# Patient Record
Sex: FEMALE
[De-identification: names, ages, dates, MRNs, and addresses within clinical notes are randomized per-mention and may not be internally consistent; named-entity substitution may affect disease eponyms.]

---
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
## 2021-05-17 ENCOUNTER — HOSPITAL ENCOUNTER (INPATIENT)
Facility: HOSPITAL | Age: 21
LOS: 13 days | Discharge: NURSING, REHAB OR LONG-TERM CARE | DRG: 881 | End: 2021-05-31
Attending: EMERGENCY MEDICINE | Admitting: PSYCHIATRY & NEUROLOGY
Payer: COMMERCIAL

## 2021-05-17 DIAGNOSIS — T50.902A INTENTIONAL DRUG OVERDOSE, INITIAL ENCOUNTER (CMS/HCC): Primary | ICD-10-CM

## 2021-05-17 DIAGNOSIS — R45.851 SUICIDAL IDEATION: ICD-10-CM

## 2021-05-17 LAB
AMPHET UR QL SCN: NOT DETECTED
ANION GAP SERPL CALC-SCNC: 11 MEQ/L (ref 3–15)
APAP SERPL-MCNC: <10 UG/ML (ref 10–30)
BARBITURATES UR QL SCN: NOT DETECTED
BASOPHILS # BLD: 0.04 K/UL (ref 0.01–0.1)
BASOPHILS NFR BLD: 0.5 %
BENZODIAZ UR QL SCN: NOT DETECTED
BUN SERPL-MCNC: 7 MG/DL (ref 8–20)
CALCIUM SERPL-MCNC: 9.2 MG/DL (ref 8.9–10.3)
CANNABINOIDS UR QL SCN: NOT DETECTED
CHLORIDE SERPL-SCNC: 106 MEQ/L (ref 98–109)
CO2 SERPL-SCNC: 21 MEQ/L (ref 22–32)
COCAINE UR QL SCN: NOT DETECTED
CREAT SERPL-MCNC: 0.9 MG/DL (ref 0.6–1.1)
DIFFERENTIAL METHOD BLD: ABNORMAL
EOSINOPHIL # BLD: 0.06 K/UL (ref 0.04–0.36)
EOSINOPHIL NFR BLD: 0.7 %
ERYTHROCYTE [DISTWIDTH] IN BLOOD BY AUTOMATED COUNT: 11.6 % (ref 11.7–14.4)
ETHANOL SERPL-MCNC: <5 MG/DL
GFR SERPL CREATININE-BSD FRML MDRD: >60 ML/MIN/1.73M*2
GLUCOSE SERPL-MCNC: 95 MG/DL (ref 70–99)
HCG UR QL: NEGATIVE
HCT VFR BLDCO AUTO: 38.5 % (ref 35–45)
HGB BLD-MCNC: 12.7 G/DL (ref 11.8–15.7)
IMM GRANULOCYTES # BLD AUTO: 0.02 K/UL (ref 0–0.08)
IMM GRANULOCYTES NFR BLD AUTO: 0.2 %
LYMPHOCYTES # BLD: 2.28 K/UL (ref 1.2–3.5)
LYMPHOCYTES NFR BLD: 26.7 %
MCH RBC QN AUTO: 29.5 PG (ref 28–33.2)
MCHC RBC AUTO-ENTMCNC: 33 G/DL (ref 32.2–35.5)
MCV RBC AUTO: 89.5 FL (ref 83–98)
MONOCYTES # BLD: 0.71 K/UL (ref 0.28–0.8)
MONOCYTES NFR BLD: 8.3 %
NEUTROPHILS # BLD: 5.44 K/UL (ref 1.7–7)
NEUTS SEG NFR BLD: 63.6 %
NRBC BLD-RTO: 0 %
OPIATES UR QL SCN: NOT DETECTED
PCP UR QL SCN: NOT DETECTED
PDW BLD AUTO: 9.9 FL (ref 9.4–12.3)
PLATELET # BLD AUTO: 265 K/UL (ref 150–369)
POTASSIUM SERPL-SCNC: 3.6 MEQ/L (ref 3.6–5.1)
RBC # BLD AUTO: 4.3 M/UL (ref 3.93–5.22)
SALICYLATES SERPL-MCNC: <4 MG/DL
SARS-COV-2 RNA RESP QL NAA+PROBE: NEGATIVE
SODIUM SERPL-SCNC: 138 MEQ/L (ref 136–144)
WBC # BLD AUTO: 8.55 K/UL (ref 3.8–10.5)

## 2021-05-17 PROCEDURE — 63700000 HC SELF-ADMINISTRABLE DRUG: Performed by: EMERGENCY MEDICINE

## 2021-05-17 PROCEDURE — 80307 DRUG TEST PRSMV CHEM ANLYZR: CPT | Performed by: PHYSICIAN ASSISTANT

## 2021-05-17 PROCEDURE — 63700000 HC SELF-ADMINISTRABLE DRUG: Performed by: PHYSICIAN ASSISTANT

## 2021-05-17 PROCEDURE — 84703 CHORIONIC GONADOTROPIN ASSAY: CPT | Performed by: PHYSICIAN ASSISTANT

## 2021-05-17 PROCEDURE — G0480 DRUG TEST DEF 1-7 CLASSES: HCPCS | Performed by: PHYSICIAN ASSISTANT

## 2021-05-17 PROCEDURE — 85025 COMPLETE CBC W/AUTO DIFF WBC: CPT | Performed by: PHYSICIAN ASSISTANT

## 2021-05-17 PROCEDURE — 36415 COLL VENOUS BLD VENIPUNCTURE: CPT | Performed by: PHYSICIAN ASSISTANT

## 2021-05-17 PROCEDURE — 93005 ELECTROCARDIOGRAM TRACING: CPT | Performed by: PHYSICIAN ASSISTANT

## 2021-05-17 PROCEDURE — 99285 EMERGENCY DEPT VISIT HI MDM: CPT | Mod: 25

## 2021-05-17 PROCEDURE — U0002 COVID-19 LAB TEST NON-CDC: HCPCS | Performed by: PHYSICIAN ASSISTANT

## 2021-05-17 PROCEDURE — 80048 BASIC METABOLIC PNL TOTAL CA: CPT | Performed by: PHYSICIAN ASSISTANT

## 2021-05-17 RX ORDER — ESCITALOPRAM OXALATE 20 MG/1
TABLET ORAL
COMMUNITY
Start: 2021-04-29 | End: 2021-05-31 | Stop reason: HOSPADM

## 2021-05-17 RX ORDER — TRAZODONE HYDROCHLORIDE 50 MG/1
50 TABLET ORAL
COMMUNITY
Start: 2020-11-30 | End: 2021-05-31 | Stop reason: HOSPADM

## 2021-05-17 RX ORDER — TALC
3 POWDER (GRAM) TOPICAL NIGHTLY
COMMUNITY
Start: 2020-11-30 | End: 2021-07-21 | Stop reason: HOSPADM

## 2021-05-17 RX ORDER — NORETHINDRONE ACETATE AND ETHINYL ESTRADIOL, AND FERROUS FUMARATE 1MG-20(24)
KIT ORAL
COMMUNITY
Start: 2021-04-21 | End: 2021-07-19

## 2021-05-17 RX ORDER — OLANZAPINE 5 MG/1
10 TABLET, ORALLY DISINTEGRATING ORAL ONCE
Status: COMPLETED | OUTPATIENT
Start: 2021-05-17 | End: 2021-05-17

## 2021-05-17 RX ORDER — ONDANSETRON 4 MG/1
4 TABLET, ORALLY DISINTEGRATING ORAL ONCE
Status: COMPLETED | OUTPATIENT
Start: 2021-05-17 | End: 2021-05-17

## 2021-05-17 RX ORDER — LAMOTRIGINE 100 MG/1
TABLET ORAL
COMMUNITY
Start: 2021-04-29 | End: 2021-05-31 | Stop reason: HOSPADM

## 2021-05-17 RX ADMIN — ONDANSETRON 4 MG: 4 TABLET, ORALLY DISINTEGRATING ORAL at 21:36

## 2021-05-17 RX ADMIN — OLANZAPINE 10 MG: 5 TABLET, ORALLY DISINTEGRATING ORAL at 23:00

## 2021-05-17 ASSESSMENT — ENCOUNTER SYMPTOMS
FEVER: 0
ABDOMINAL PAIN: 0
BACK PAIN: 0
DIZZINESS: 0
CHILLS: 0
HEADACHES: 0
FLANK PAIN: 0
COLOR CHANGE: 0
VOMITING: 0
NAUSEA: 1
DYSPHORIC MOOD: 1
SHORTNESS OF BREATH: 0

## 2021-05-17 NOTE — LETTER
Intake-                   Attached you will find clinical information for the patient, Gemma Randle.  Please feel free to contact me if there are any questions or concerns.  Thanks!            Wendy jennings, ARLETTE  Psychiatric Social Worker  [facility]   [address] [place] [address]  [phone]  [email or web address]                                          Patient Information    Patient Name Address Race   Gemma Randle [address] [id number] White   Patient Legal Name Legal Sex Date of Birth   Gemma Randle Female 2000   Room Ethnic Group Language   G015 Not , /a, or Barbadian origin    MRN Phone Numbers PCP   [id number] Hm: [phone] Arlette Tolentino FNP   Patient Demographics    Address   [address] [address] Phone   [phone] (Home)   Active Insurance as of 5/17/2021    Primary Coverage    Payor Plan Insurance Group Employer/Plan Group   IBC PERSONAL CHOICE [id number]    Payor Plan Address Payor Plan Phone Number Payor Plan Fax Number Effective Dates   [address] [phone]  4/1/2019 - None Entered   [place] [address]      Subscriber Name Subscriber Birth Date Member ID    ANASTACIO RANDLE 3/24/1968 [id number]    PCP and Center    Primary Care Provider Phone Center   TEJINDER Heaton [phone] [facility]   Emergency Contact(s)    Name Relation Home Work Mobile   Cindi Schaffer   [phone]   Documents on File     Status Date Received Description   Documents for the Patient   Identification  10/04/17    HIPAA Notice of Privacy Signed 05/17/21    Insurance Card Unable to Obtain 05/17/21    Photo ID Unable to Obtain 05/17/21    Advance Directives and Living Will Not Received     Power of  Not Received     Patient Photo Not Received     CE Auth Form (Scanned) Not Received     CE Prospective Auth Reg Signed 05/17/21    Documents for the Encounter   Hospital Consent and Financial TORITO Signed  05/17/21    CMS IM Copy of Signed Not Received     Lay Caregiver Designation Consent Not Received     Outside Order Not Received     Lab Requisition Scan Not Received     ECG Received (Deleted) 05/17/21    ECG Received (Deleted) 05/18/21    ECG Received 05/18/21    Admission Information    Current Information    Attending Provider Admitting Provider Admission Type Admission Status   Rosen, Howard B, MD Chevalier, Naomi E, MD Emergency Confirmed Admission          Admission Date/Time Discharge Date Hospital Service Auth/Cert Status   05/17/21  09:07 PM  Psychiatry Incomplete          Hospital Area Unit Room/Bed    [facility] PSYCH UNIT G015/G015            Discharge Disposition Discharge Destination   [facility]   Admission    Complaint      Hospital Account    Name Acct ID Class Status Primary Coverage   Gemma Darnell [id number] Inpatient Psych Open IBC - PERSONAL CHOICE          Guarantor Account (for Hospital Account #[id number])    Name Relation to Pt Service Area Active? Acct Type   Gemma Darnell Self [facility] Yes Personal/Family   Address Phone     [address] [place] [address] [phone](H)            Coverage Information (for Hospital Account #[id number])    F/O Payor/Plan Precert #   IBC/PERSONAL CHOICE    Subscriber Subscriber #   CarieGalo [id number]   Address Phone   [address] [address]   [place] [address] [phone]          Medical Record Numbers    [place] Id Number [id number]   [facility] Id Mrn [id number]   [facility] Mrn [id number]   Upi [id number]     Lara Hunt DO   Physician   Psychiatry   H&P       Addendum   Date of Service:  5/18/2021  1:42 PM               Addendum        Expand AllCollapse All      []Hide copied text    []Hover for details  Psychiatry H and P          Chief Complaint   Patient presents with   • Psychiatric Evaluation       pt reports that she was sent to ER after speaking with thep, pt reports that  "yesterday she took \"some extra meds\" reports that she has been having some SI thoughts, reported that to the who sent her to ER         Gemma Darnell is a 21 y.o. female with previous diagnoses of \"Bipolar disorder,\" anorexia, cocaine and ETOH abuse disorder, now presenting with worsening depression and suicide attempt by OD on Lamictal day before yesterday.     Today I encounter Gemma in her room. She is lying in bed. States that she was last hospitalized at the [facility] for substance and psych treatment Sept-Oct 2020 followed by Marlyn Cervantes for eating d/o Oct-Nov. Since then, \"I was doing ok for awhile\" though she admits she immediately began drinking again after discharge. Her parents  in January which was \"not a surprise, my dad is a heavy drinker\" and \"I think it is for the best\" but she also pinpoints a deterioration in her mental health around that time. Soon after that, she reports that her drinking worsened and recently, she has been drinking up to \"7 vodka shots per day\" but not every day. She denies s/s of withdrawal at this time. Endorses feeling mostly \"detached,\" empty, numb much of the time, with sadness at times. She has had difficulty sleeping prior to admission, and reports a weight loss of about 8 lbs in the last 2 months (though this was purposeful and she believes part of her anorexia rather than result of depression/anxiety), low energy, feeling like she is in a fog with lapses in memory, chronic thoughts of death/dying, self injury in the form of scratching herself (has not broken the skin), difficulty concentrating.      This all culminated the day before yesterday when she took \"half a bottle\" of Lamictal with the clear intent to die. She states, somewhat illogically, that she did not take the whole bottle \"because I knew it wouldn't work to kill me.\" Reports that it was impulsive and when she thinks about it now, she feels \"detached from it, I don't remember it too " "well.\" Following taking the pills, \"My mom set up a PCP appointment because I was giving her mixed signals\" (eg hinting that she had misused medication) but upon presenting to the PCP, she purposefully withheld that she had taken an OD. Following that appointment, she reached out to her therapist who recommended she present here. At this point, she states \"I don't want to be here\" but admits she is not safe to go home.     Other ROS: Reports previous diagnosis of bipolar d/o, was last \"manic\" in Sept 2020 when she stayed up for one week straight and was sexually impulsive, \"sleeping with friends,\" racing thoughts that \"I am a piece of shit\" (which she is experiencing right now as well). Denies feeling invincible/special pierre, denies spending large amounts of money (but has been recently in an effort to \"make myself feel better\"), denies increased goal-oriented activities. Unclear whether she was under the influence of substances at that time.      She reports hearing voices but denies VH. AH are \"in my head\" and say derogatory things such as \"you are a piece of shit.\" I ask if it is her voice and she states \"maybe.\" Does not hear them daily, they are distressing when she does hear them.     Has pushed away most friends recently but still has her mother and sister. Allows me to call her mother Cindi [phone] and outpatient psychiatrist Dr. Simpson though they have only met once.     Psychiatric History:  Suicide Attempts: yes - as above  Risk Factors: Alcohol and/or substance abuse, Easy access to lethal methods and Impulsive or aggressive tendencies   Protective Factors: Effective and accessible mental health care    Current Psychiatrist: yes - Dr. Simpson   Past psychiatric Hospitalization: yes - as above, no other hospitalizations prior to the Ag[facility]  Medication Trials:  yes - reports only Lamictal and Lexapro which she was put on in previous hospitalization  ECT trials: no        Substance Use History: ETOH as " above, cocaine prior to the Agnieszka but reports using only 2-3 times in recent months  Substance use:         Drug Details      Questions Responses     Cocaine frequency Past regular use     Comment: Past regular use on 5/18/2021       Cocaine method Snort     Comment: Snort on 5/18/2021            Consequences of use: Yes:  Blackouts  Past D&A Treatment: Yes: as above     Family History: father with ETOH use disorder, sister with good response to Prozac, cousin and sister have attempted suicide     Social History:   Social History   Social History            Socioeconomic History   • Marital status: Single       Spouse name: None   • Number of children: 0   • Years of education: 12   • Highest education level: High school graduate   Occupational History   • Occupation: [employer]   Tobacco Use   • Smoking status: Current Some Day Smoker       Types: Electronic Cigarette   • Smokeless tobacco: Never Used   Substance and Sexual Activity   • Alcohol use: Yes   • Drug use: Not Currently   • Sexual activity: None   Other Topics Concern   • None   Social History Narrative   • None      Social Determinants of Health          Financial Resource Strain:    • Difficulty of Paying Living Expenses:    Food Insecurity:    • Worried About Running Out of Food in the Last Year:    • Ran Out of Food in the Last Year:    Transportation Needs:    • Lack of Transportation (Medical):    • Lack of Transportation (Non-Medical):    Physical Activity:    • Days of Exercise per Week:    • Minutes of Exercise per Session:    Stress: Stress Concern Present   • Feeling of Stress : To some extent   Social Connections:    • Frequency of Communication with Friends and Family:    • Frequency of Social Gatherings with Friends and Family:    • Attends Zoroastrianism Services:    • Active Member of Clubs or Organizations:    • Attends Club or Organization Meetings:    • Marital Status:    Intimate Partner Violence:    • Fear of Current or Ex-Partner:    •  "Emotionally Abused:    • Physically Abused:    • Sexually Abused:             Medical History:   Medical History        Past Medical History:   Diagnosis Date   • Eating disorder     • History of ITP              Surgical History:   Surgical History   History reviewed. No pertinent surgical history.        Allergies: No Known Allergies     Current Medications:  SCHEDULED:  • [START ON 5/19/2021] escitalopram  10 mg oral Daily   • [START ON 5/19/2021] FLUoxetine  10 mg oral Daily   • gabapentin  200 mg oral TID   • nicotine  1 patch transdermal Daily   • norethindrone-e.estradioL-iron  1 tablet oral Daily      PRN  •  acetaminophen  •  alum-mag hydroxide-simeth  •  diphenhydrAMINE  •  diphenhydrAMINE  •  haloperidoL **OR** haloperidol lactate  •  ibuprofen  •  LORazepam **OR** LORazepam  •  melatonin  •  metoclopramide  •  nicotine polacrilex  •  senna        Review of Systems  Musculoskeletal:  Grossly intact        Objective         Vital Signs for the last 24 hours:  Temp:  [36.1 °C (97 °F)-36.6 °C (97.9 °F)] 36.1 °C (97 °F)  Heart Rate:  [] 112  Resp:  [18] 18  BP: (114-162)/(71-99) 119/76     Labs       Results from last 7 days   Lab Units 05/17/21  2134   HEMOGLOBIN g/dL 12.7   WBC K/uL 8.55   PLATELETS K/uL 265   POTASSIUM mEQ/L 3.6   SODIUM mEQ/L 138   CREATININE mg/dL 0.9                Ethanol   Date Value Ref Range Status   05/17/2021 <5 <=10 mg/dL Final      No results found for: LITHIUM, TSH, FREET4, T3OBWAO, ZNXNLIMQ38, FOLATE  No results found for: PHENYTOIN, PHENOBARB, VALPROATE, CBMZ     No results found.     No results found for: HDL, LDLCALC, TRIG, CHOL, HGBA1C           MENTAL STATUS EXAM  Appearance: well groomed  Gait and Motor: no abnormal movements  Speech: normal rate/rhythm/volume  Mood: \"detached\"  Affect: blunted  Associations: coherent  Thought Process: goal-directed and vague and contradictory at times  Thought Content: auditory hallucinations  Suicidality/Homicidality: thoughts of " "being dead/ no desire or plan to die  Judgement/Insight: minimizes severity level of illness  Cognition: she appears tired        Assessment/Plan  * Suicide attempt (CMS/[facility])  Assessment & Plan  Ms. Darnell is a 21 year old woman with previous diagnosis of \"bipolar disorder,\" anorexia, ETOH use and cocaine use disorder, now presenting with worsening feelings of detachment/depression culminating in overdose on Lamictal. Today she reports feeling detached, unsafe for discharge. Denies active SIIP at this time and contracts for safety. Initial impression is that substance is a significant factor in symptoms, but may have underlying mood disorder. Symptoms also highly suggestive of borderline personality disorder.      q15 min checks, contracts for safety, no plan or intent to harm herself at this time  Depression NOS, r/o substance induced mood d/o, r/o bipolar d/o, r/o major depression: will taper off Lexapro as she does not believe it has been helping (but admits to irregular compliance). Her sister has had a good experience on Prozac, she would like to try that. Will d/c Lamictal as she admits to taking it irregularly and in this context is at high risk for SJS. She expresses understanding.   For anxiety and off label for ETOH use disorder, will start gabapentin 200 mg po tid to start. Monitor for tolerability.   PRN Atarax 50 mg po tid anxiety  She has been educated to notify staff at any hint of rash following Lamictal OD  ETOH abuse: monitor for withdrawal. She is not exhibiting s/s at this time and VSS. CIWA protocol with symptom-triggered Ativan. She denies hx of withdrawal sz but has had \"cold sweats\" previously.  R/o borderline personality disorder: she would benefit from DBT on outpatient basis.   G/m/s therapy as tolerated, collaterals as permitted                              Revision History                            Lara Hunt DO   Physician   Psychiatry   Progress Notes       Signed   Date of " "Service:  5/19/2021  3:15 PM               Signed             []Hide copied text    []Dylon for details  Psychiatry Progress Note     Chief Complaint/Reason for follow-up: OD on Lamictal, \"I feel like I'm dying\"     Interval History:     Per staff, refused medication in the morning, high anxiety, SI without intent or plan currently, reports feeling numb.     Pt was observed laying in bed, wrapped in covers, with the lights turned off. She \"slept a lot\" yesterday. She continues to avoid food, stating \"I can't eat\" but cannot say why. She thinks her medicine regimen \"is not good for me,\" feels it is causing symptoms though she admits she has been experiencing many of these symptoms for months, even before being on medication at all. She feels \"numb\" but also admits to feeling anxious, sad, is tearful at times.      Pt endorsed some symptoms of alcohol withdrawal: anxiety, \"shaking\", nausea, headache, sweaty palms, itchiness, chills, night sweats but does not think this is due to ETOH withdrawal but rather to medication. She denied hallucinations, palpitations, insomnia. On review of systems and she endorsed nearly every one including: vision changes (couldn't clarify what her changes were), dizziness (\"feel like I'm going to pass out\"), soreness. She says her heart \"is slow\". States \"I feel like I am going to die\" but cannot give more detail/state why.     She denies any intent or plan to harm herself right now and contracts for safety. She does not feel she needs anyone to watch her. Tells me \"I can't even concentrate on what you are saying to me.\"     She socialized yesterday \"for an hour\" but felt like she couldn't concentrate and \"didn't know what to say\".     Later in day following PRN Ativan appeared more relaxed.     Vital Signs for the last 24 hours:  Temp:  [35.9 °C (96.7 °F)-36.9 °C (98.5 °F)] 35.9 °C (96.7 °F)  Heart Rate:  [] 124  Resp:  [16-18] 18  BP: (122-136)/(69-88) 128/85     Mental Status " "Exam:  Pt appeared her stated age (20 yo), was dressed appropriately in sweats, appropriate hygiene, well-groomed. Pt had no stereotypical tics or movements and ambulation was not observed. Pt's speech had normal rate, rhythm, and volume was soft. Pt's mood was \"I feel like I'm dying\". Pt's affect was dysphoric, anxious, tearful at times. Pt's associations were coherent. Thought process was linear and goal-directed but vague. Thought content included no current HI, VH, or AH. Anxiety was \"7/10\" with 10 being the worst and depression was \"9/10\" with 10 being the worst. Continues to endorse passive SI (\"would be ok with no waking up\"). Judgement and insight were appropriate. Cognition: alert, but appears distracted, not sedated.     Assessment/Plan  Gemma Darnell is a 20 yo female with a history of cocaine and alcohol use disorder, anorexia, \"bipolar disorder\", and \"depression\" who presented to the [place] ED after trying to overdose on Lamictal. Depression, ETOH use disorder, traits suggestive of borderline personality disorder.  1. Depression and anxiety: D/c Lexapro 10mg daily and increase Prozac to 20mg daily, PRN Atarax 50mg PO TID for anxiety  2. ETOH use disorder: increase gabapentin to 400mg PO TID for alcohol use disorder and anxiety, she is refusing at this time, continue to educate and offer but will d/c if continues to refuse. In the meantime, CIWA, monitor VS carefully, symptom triggered Ativan PRN  3. Medical: tachycardic this morning to 124, monitor, otherwise VSS.   4. R/o borderline personality disorder: she would benefit from DBT on outpatient basis.        I discussed the treatment plan and the patient's progress with nursing staff and Allied therapists in the treatment team meeting this morning. Patient is seen and evaluated for approximately 25 minutes in therapy with greater than 50% of time spent in direct face-to-face counseling, coordination of care and review of the medical record.          " "    Lara Hunt,    Physician   Psychiatry   Progress Notes       Signed   Date of Service:  5/20/2021  1:07 PM               Signed             []Hide copied text    []Dylon for details  PSYCHIATRIC PROGRESS NOTE     Chief Complaint/Reason for follow-up: depression, anorexia, ETOH abuse disorder, cluster B traits, suicide attempt     Interval History: Per staff, Ms. Darnell was visible on unit yesterday, coloring with peers, but generally withdrawn, disheveled, poor appetite, apathetic. CIWA of 10 yesterday, received Ativan 2 mg PRN, refusing gabapentin.      CT head today unremarkable.     Today I approach her in her room where she is lying in bed. I ask for her help in understanding what is going on with her. She tells me she does not know. I ask if she is upset about her parents' separation and divorce and she says yes. I ask if there are other stressors and she says she does not know. She feels \"numb.\" States she feels gabapentin is the reason for her symptoms though she was having same symptoms prior to taking gabapentin. She states she is not sure she needs medication. I ask her what she needs if not medication, ie therapy, etc. She states she does not know.      I spoke with Gemma's mother Cindi with her permission. States that Gemma has been staying with boyfriend recently so she does not know onset of current symptoms, but at least since Friday Gemma has been anxious, stating that she feels she is going to die, stating she cannot concentrate. Endorses that she believes eating disorder, substances, and recent psychosocial stressors to be contributing to current clinical picture. She has been encouraging Gemma to take medication and thinks Gemma would benefit from residential program targeting some combination of eating disorder, substance, and mental health following discharge. Gemma \"did really well in high school\" but her sister's mental health problems were difficult for the family for about 4 years " "and things have been chaotic in household with dissolution of marriage and father having difficulty with boundaries.      Vital Signs for the last 24 hours:  Temp:  [36.7 °C (98.1 °F)-37.5 °C (99.5 °F)] 36.9 °C (98.5 °F)  Heart Rate:  [105-120] 120  Resp:  [16-18] 18  BP: (124-141)/() 126/80     Scheduled Meds:  • FLUoxetine  20 mg oral Daily   • gabapentin  200 mg oral TID   • nicotine  1 patch transdermal Daily   • norethindrone-e.estradioL-iron  1 tablet oral Daily         Labs:       Results from last 7 days   Lab Units 05/17/21  2134   HEMOGLOBIN g/dL 12.7   WBC K/uL 8.55   PLATELETS K/uL 265   POTASSIUM mEQ/L 3.6   SODIUM mEQ/L 138   CREATININE mg/dL 0.9         MENTAL STATUS EXAM  Appearance: disheveled  Gait and Motor: no abnormal movements  Speech: soft  Mood: \"I don't know\"  Affect: blunted and dysphoric  Associations: coherent  Thought Process: vague  Thought Content: paucity of thought content  Suicidality/Homicidality: thoughts of being dead/ no desire or plan to die  Judgement/Insight: makes choices that perpetuate illness  Cognition: seems distracted, with poor concentration, but alert     Assessment/Plan  * Suicide attempt (CMS/[facility])  Assessment & Plan  Ms. Darnell is a 21 year old woman with previous diagnosis of anorexia, ETOH use and cocaine use disorder, now presenting with worsening feelings of detachment/depression culminating in overdose on Lamictal. Today she reports feeling detached, unsafe for discharge. Denies active SIIP at this time and contracts for safety. ETOH use disorder, substance-induced mood disorder, anorexia, symptoms also suggestive of borderline personality disorder.      q15 min checks, contracts for safety, no plan or intent to harm herself at this time  Depression NOS, r/o substance induced mood d/o, r/o major depression, bipolar disorder lower on ddx: C/w Prozac 20 mg po daily, c/w gabapentin for now though she is refusing it for unclear reason, will explore " "alternatives for acute anxiety. Monitor for tolerability. PRN Atarax 50 mg po tid anxiety  She has been educated to notify staff at any hint of rash following Lamictal OD  ETOH abuse: monitor for withdrawal. Has been about 5 days since last known drink. This morning she is not exhibiting s/s at this time and VSS, . CIWA protocol with symptom-triggered Ativan.  R/o borderline personality disorder: she would benefit from DBT on outpatient basis.   Medical:   -difficulty concentrating/AMS: baseline labs unremarkable, head CT unremarkable. Continue to monitor, repeat labs tomorrow  -Anorexia: she has been restricting intake. Repeat labs tomorrow. Nutrition consult. Provide Boost. Monitor VS - poor po intake likely contributing to tachycardia.  G/m/s therapy as tolerated, collaterals as permitted. She would benefit from residential program targeting some combination of substance, eating disorder, mental josey/DBT        I discussed the treatment plan and the patient's progress with nursing staff and Allied therapists in the treatment team meeting this morning. Patient is seen and evaluated for approximately 25 minutes in therapy with greater than 50% of time spent in direct face-to-face counseling, coordination of care and review of the medical record.              Lara Hunt DO   Physician   Psychiatry   Progress Notes       Addendum   Date of Service:  5/21/2021  1:19 PM               Addendum             []Hide copied text    []Dylon for details  Psychiatry Progress Note     Chief Complaint/Reason for follow-up: OD on Lamictal; \"I feel like I'm dying\"     Interval History:  Per staff, visible, minimally engaged, \"flat and sedentary.\" Complaining of memory difficulty. Not eating much but good fluid intake.     Today we speak in her room. She is tearful today, appears less blunted and more depressed. She slept \"ok - still tired\".  Tells me her memory is \"horrible.\" She can't remember our conversation from " "yesterday. She is \"having a hard time socializing\" because \"I'm not myself here.\" She cried and asked \"why am I here?\"  She denied active SI but described \"feeling like I'm dying, I feel like there is something wrong.\" I ask her what is wrong and she indicates her arms. With a lot of coaxing, she tells me her arms do not look normal to her, I ask what is abnormal and after long latency she says \"I don't know.\" I ask if it is the color, or a feeling, and she states it is both. States her arms look \"red.\" Upon exam there is nothing grossly remarkable about her arms.      Regarding medication, she is hesitant to take it but cannot articulate why. States \"it scares me.\" I point out that she was drinking significant ETOH outside the hospital which is more harmful to her body than the medication, and that it is also scary to continue feeling this way. She agrees with this.         Vital Signs for the last 24 hours:  Temp:  [36.9 °C (98.5 °F)-37.1 °C (98.8 °F)] 36.9 °C (98.5 °F)  Heart Rate:  [] 114  Resp:  [14-16] 14  BP: (111-140)/(72-90) 140/72     Mental Status Exam:  Pt appeared her stated age (20 yo), was dressed in sweats, unkempt. Pt had no stereotypical tics or movements and ambulation was appropriate. Pt's speech had normal rate, rhythm, and volume was soft. Pt's mood was \"I don't know\". Pt's affect was depressed, constricted, tearful, anxious. Pt's associations were coherent. Thought process was linear and goal-directed, vague. Thought content included no current SI, HI, VH, or AH. Judgement and insight were limited. Cognition was fair. Alert and oriented x 2.     Assessment/Plan  Gemma Darnell is a 20 yo female with a history of cocaine and alcohol use disorder, anorexia, now presenting to the [facility] ED after overdose on Lamictal. ETOH use disorder, anorexia, MDD. R/o borderline personality disorder.     1. MDD, anxiety: Continue Proxac 20mg daily for depression. Continue to offer gabapentin 200mg PO " "TID for alcohol use disorder and anxiety though that she has beenconsistently refusing, though she cannot articulate why she does not want it. Psychoeducation provided as she is in an acutely anxious state. Given her presentation with intense anxiety, disorganized state, will initiate Risperdal 0.5 mg po bid. PRN Atarax 50mg PO TID for anxiety  2. R/o borderline personality disorder: she would benefit from DBT on outpatient basis.   3. Anorexia: monitor PO intake. VS and labs today WNL. Would benefit from ongoing outpatient care.  4. Substance use disorder: motivational interviewing, would benefit from ongoing outpatient treatment  5. G/m/s therapy as tolerated, ongoing collaterals as permitted.        I discussed the treatment plan and the patient's progress with nursing staff and Allied therapists in the treatment team meeting this morning. Patient is seen and evaluated for approximately 25 minutes in therapy with greater than 50% of time spent in direct face-to-face counseling, coordination of care and review of the medical record.               Revision History                       Jermaine Pham MD   Physician   Psychiatry   Progress Notes       Signed   Date of Service:  5/22/2021 12:15 PM               Signed             []Hide copied text    []Errolver for details  Psychiatry Progress Note     Chief Complaint/Reason for follow-up:  MDD, anxiety, OD on lamictal     Interval History:  Mood is not as good today as yesterday and associates this with being \"nervous about discharge.\"  Sleep wsa broken, Appetite is low but she did eat breakfast.  She denies si/hi/plan/intent, no passive si.  She is looking forward to going back to a different school and looking forward to family.  No ah/vh/paranoia/no grandiosity.      Review of Systems:    Appetite is poor to fair, no gi complaints, sleep was poor to fair, broken     Vital Signs for the last 24 hours:  Temp:  [36.7 °C (98.1 °F)-36.9 °C (98.4 °F)] 36.9 °C (98.4 " "°F)  Heart Rate:  [100-117] 117  Resp:  [14-16] 16  BP: (106-132)/(77-83) 106/77        Mental Status Exam:   Appearance: appears stated age, appropriately dressed  Behavior: cooperative  Speech: normal r/r/t/v and no pressure/paucity  TP: linear  TC: no si/hi/plan/intent, no passive si, no grandiosity, no paranoia  Perception: no ah/vh, no ris  Mood: not as good  Affect: not labile, had a mask on  Insight: fair  Judgment: fair  A&Ox3        Assessment/Plan   Gemma Darnell is a 20 yo female with a history of cocaine and alcohol use disorder, anorexia, now presenting to the [place] ED after overdose on Lamictal. ETOH use disorder, anorexia, MDD. R/o borderline personality disorder.     1. MDD, anxiety: Continue Proxac 20mg daily for depression. Continue to offer gabapentin 200mg PO TID for alcohol use disorder and anxiety though that she has beenconsistently refusing, though she cannot articulate why she does not want it. Psychoeducation provided as she is in an acutely anxious state. Given her presentation with intense anxiety, disorganized state, will initiate Risperdal 0.5 mg po bid. PRN Atarax 50mg PO TID for anxiety  2. R/o borderline personality disorder: she would benefit from DBT on outpatient basis.   3. Anorexia: monitor PO intake. VS   WNL. Would benefit from ongoing outpatient care.  4. Substance use disorder: motivational interviewing, would benefit from ongoing outpatient treatment  5. G/m/s therapy as tolerated, ongoing collaterals as permitted.              Jermaine Pham MD   Physician   Psychiatry   Progress Notes       Signed   Date of Service:  5/23/2021 11:42 AM               Signed             []Hide copied text    []Hover for details  Psychiatry Progress Note     Chief Complaint/Reason for follow-up:    MDD, anxiety, OD on lamictal  Interval History:  She was in bed resting.   She reports that is \"tired.\"  She was not certain how her mood was,  She denies si/hi/plan/intent, no passive si. " She denies racing thoughts.  Appetite is ok.     She denies side effects from medication.     Review of Systems:   Appetite is poor to fair, no gi complaints, sleep is fair      Vital Signs for the last 24 hours:  Temp:  [36.7 °C (98 °F)-36.9 °C (98.4 °F)] 36.7 °C (98 °F)  Heart Rate:  [104-133] 119  Resp:  [16] 16  BP: (100-117)/(73-85) 109/76       Mental Status Exam:   Appearance: appears stated age, appropriately dressed  Behavior: cooperative  Speech: normal r/r/t/v and no pressure/paucity  TP: linear  TC: no si/hi/plan/intent, no passive si, no grandiosity, no paranoia  Perception: no ah/vh, no ris  Mood: tired  Affect: not labile, had a mask on  Insight: fair  Judgment: fair  A&Ox3     Assessment/Plan   Gemma Darnell is a 20 yo female with a history of cocaine and alcohol use disorder, anorexia, now presenting to the [facility] ED after overdose on Lamictal. ETOH use disorder, anorexia, MDD. R/o borderline personality disorder.     1. MDD, anxiety: Continue Proxac 20mg daily for depression. Continue to offer gabapentin 200mg PO TID for alcohol use disorder and anxiety though that she has beenconsistently refusing, though she cannot articulate why she does not want it. Psychoeducation provided as she is in an acutely anxious state. Given her presentation with intense anxiety, disorganized state, will initiate Risperdal 0.5 mg po bid. PRN Atarax 50mg PO TID for anxiety  2. R/o borderline personality disorder: she would benefit from DBT on outpatient basis.   3. Anorexia: monitor PO intake. VS   WNL. Would benefit from ongoing outpatient care.  4. Substance use disorder: motivational interviewing, would benefit from ongoing outpatient treatment  5. G/m/s therapy as tolerated, ongoing collaterals as permitted  6. Advised not to take naps as this could effect her night time sleep                    Lidia Brewer MD   Physician   Psychiatry   Progress Notes       Signed   Date of Service:  5/24/2021  8:30 AM     "           Signed             []Hide copied text    []Hover for details  Psychiatry Progress Note     Chief Complaint/Reason for follow-up:    MDD, anxiety, OD on lamictal  Hx of substance abuse and eating disorder     Interval History:    Discussed patient's treatment progress with nurses and allied therapists in morning meeting this morning. Gemma has been restricting but will drink Boost/Gatorade. CIWA has been continued. Patient stated she refuses to take gabapentin because it \"makes me feel weird.\" She stated she has been dissociating constantly. She noted feeling forgetful. She described her mood as \"pretty depressed\" and noted she feels worse since admission. Gemma's nurse pointed out that poor PO intake may be influencing her mental status and Gemma agreed.  She has passive wishes to die but no intent or plan. She feels guilt about needing medication.      Review of Systems:   Appetite is poor to fair, no gi complaints, sleep is fair      Vital Signs for the last 24 hours:  Temp:  [36.9 °C (98.4 °F)-37.1 °C (98.8 °F)] 37.1 °C (98.8 °F)  Heart Rate:  [106-125] 125  Resp:  [16] 16  BP: (121-142)/(80-91) 142/80       Mental Status Exam:   Appearance: appears stated age, appropriately dressed  Behavior: cooperative but guarded   Speech: normal r/r/t/v and no pressure/paucity  TP: linear, vague  TC: no si/hi/plan/intent, + passive si, no grandiosity, no paranoia  Perception: no ah/vh, no ris  Mood: tired  Affect: not labile, had a mask on  Insight: fair  Judgment: fair  A&Ox3     Assessment/Plan   Gemma Darnell is a 22 yo female with a history of cocaine and alcohol use disorder, anorexia, now presenting to the [place] ED after overdose on Lamictal. ETOH use disorder, anorexia, MDD. R/o borderline personality disorder.     1. MDD, anxiety: Continue Proxac 20mg daily for depression. D/C gabapentin. Psychoeducation provided as she is in an acutely anxious state. Given her presentation with intense anxiety, " "disorganized state, continue Risperdal 0.5 mg po bid. PRN Atarax 50mg PO TID for anxiety  2. R/o borderline personality disorder: she would benefit from DBT on outpatient basis.   3. Anorexia: monitor PO intake. VS   WNL. Would benefit from ongoing outpatient care.  4. Substance use disorder: motivational interviewing, would benefit from ongoing outpatient treatment  5. G/m/s therapy as tolerated, ongoing collaterals as permitted  6. Advised not to take naps as this could effect her night time sleep     I spent 28 minutes on this date of service performing the following activities: obtaining history, performing examination, documenting, preparing for visit, obtaining / reviewing records, providing counseling and education and coordinating care.                 Marciano Estrella MD   Physician   Psychiatry   Progress Notes       Signed   Date of Service:  5/25/2021 11:21 AM               Signed             []Hide copied text    []Dylon for details  This exam was done through synchronous audio-visual services.  The patient consented to the telehealth exam.  The patient provider were based in [place].  The patient was on the [facility] psychiatric unit.  The following other people were present during the interview: No one     Psychiatry Progress Note     Chief Complaint/Reason for follow-up:    MDD, anxiety, OD on lamictal  Hx of substance abuse and eating disorder     I met the patient today for the first time.  I reviewed her hospital psychiatric records.  She is on Prozac 20 mg daily.     Today the patient when queried says \"I do not feel right\".  She does not feel her meds are working.  Yet she asks to be discharged.  She admits that she is very unhappy here on the unit.  She is very negative.  She seems to me hopeless.  I explained to her that the Prozac which was started on 5/20 has not had time to work.  When I read her Dr. Brewer's note of yesterday saying she has passive SI.  The patient answers \"I do not " "remember\".  When I asked her to rate today's depression and anxiety on a scale.  She replies \"I do not know\" and won't try.     Mental status: Patient is fully oriented x3.  She recalls my name.  She is negative and hopeless.  Mood is of depression.  She is unsmiling.  Speech is coherent and goal-directed without evidence of psychosis but is nonspontaneous and terse.  She denies SI/HI.  She denies AH/VH.     Plan:  Continue psychiatric hospitalization  Continue Prozac 20 mg and Risperdal 0.5 mg twice daily                       Patient Information    Patient Name   Gemma Darnell ([id number]) Legal Sex   Female    2000   Results  ER toxicology screen, serum [[id number]] (Order [id number])  Contains abnormal data ER toxicology screen, serum  Order: [id number]  Status:  Final result   Visible to patient:  No (not released) Next appt with me:  None   0 Result Notes    Ref Range & Units 21 2134    Salicylate <=30.0 mg/dL <4.0     Acetaminophen 10.0 - 30.0 ug/mL <10.0Low      Ethanol <=10 mg/dL <5          Specimen Collected: 21 21:34 Last Resulted: 21 22:11        Lab Flowsheet     Order Details     View Encounter     Lab and Collection Details     Routing     Result History           Related Result Highlights     Basic metabolic panel  Final result 2021                  Order Providers    Authorizing Provider Encounter Provider   Colt Sawyer MD None   Order Report    ER toxicology screen, serum (Order #[id number]) on 21   Collection Information    Specimen ID: [id number]    Blood    Blood, Venous    Venipuncture     Collected: 2021  9:34 PM    YONATAN ROSALES II   Received: 2021  9:39 PM    Resulting Agency: [facility]    [address]   [place] [address]      Lab Information    Lab   [facility]   [address]   [place] [address]   : Leo Yarbrough MD              Additional Information    Specimen ID Bill Type " Client ID   [id number]            Specimen Date Taken Specimen Time Taken Specimen Received Date Specimen Received Time Result Date Result Time   May 17, 2021  9:34 PM May 17, 2021  9:39 PM May 17, 2021 10:11 PM   Result Read / Acknowledged    No acknowledgement history exists for this order.   Guide for Clinicians to build Lab Component [facility]    ER toxicology screen, serum (Order #[id number]) on 21   Contains abnormal data ER toxicology screen, serum  Order: [id number]  Status:  Final result   Visible to patient:  No (not released) Next appt:  None   0 Result Notes    Ref Range & Units 21 2134    Salicylate <=30.0 mg/dL <4.0     Acetaminophen 10.0 - 30.0 ug/mL <10.0Low      Ethanol <=10 mg/dL <5          Specimen Collected: 21 21:34 Last Resulted: 21 22:11        Lab Flowsheet     Order Details     View Encounter     Lab and Collection Details     Routing     Result History           Related Result Highlights     Basic metabolic panel  Final result 2021                    Patient Information    Patient Name   Gemma Darnell ([id number]) Legal Sex   Female    2000   Results  Urine drug screen (UDS) [ART050] (Order [id number])  Urine drug screen (UDS)  Order: [id number]  Status:  Final result   Visible to patient:  No (not released) Next appt with me:  None   0 Result Notes    Ref Range & Units 21 2307 Comments    PCP Scrn, Ur Not Detected Not Detected  Assay Detects: phencyclidine in urine. Lowest detectable concentration is 25 ng/mL of phencyclidine.    Benzodiazepine Ur Qual Not Detected Not Detected  Assay Detects: benzodiazepines and metabolites at varying concentrations. Lowest detectable concentration is 200 ng/mL of oxazepam.    Cocaine Screen, Urine Not Detected Not Detected  Assay Detects: benzoylecgonine and cocaine in urine. Lowest detectable concentration is 300 ng/mL of benzoylecgonine.    Amphetamine+Methamphetamine Screen, Ur Not Detected Not Detected   Assay Detects: d-methamphetamine, d-amphetamine, methlyenedioxyamphetamine (MDA), and methlyenendioxymethamphetamine (MDMA) in urine. Lowest detectable concentration is 1000 ng/mL of d-methamphetamine.   Assay is less sensitive to MDA and MDMA (lowest detectable concentration, 2500 ng/mL) and could produce a false negative result. If MDMA overdose is suspected and the result is negative, a more specific test should be requested.    Cannabinoid Screen, Urine Not Detected Not Detected  Assay Detects: cannabinoid metabolites in urine. Lowest detectable concentration is 50 ng/mL    Opiate Scrn, Ur Not Detected Not Detected  Assay Detects: codeine, dihydrocodeine, hydrocodone, hydromorphone, levorphanol, morphine, morphine-3-glucuronide, norcodeine, oxycodone in urine. Lowest detectable concentration is 300 ng/mL of morphine.    Barbiturate Screen, Ur Not Detected Not Detected  Assay Detects: alphenal, amobarbital, aprobarbital, barbital, butabarbital, butalbital, butethal, diallybarbital, pentobarbital, secobarbital,talbutal, and thiopental. Lowest detectable concentration is 200 ng/mL of secobarbital.         Specimen Collected: 05/17/21 23:07 Last Resulted: 05/17/21 23:38        Lab Flowsheet     Order Details     View Encounter     Lab and Collection Details     Routing     Result History              Order Providers    Authorizing Provider Encounter Provider   Colt Sawyer MD None   Order Report    Urine drug screen (UDS) (Order #[id number]) on 5/17/21   Collection Information    Specimen ID: [id number]    Urine    Urine, Clean Catch    Non-blood Collection     Collected: 5/17/2021 11:07 PM    PRABHU DAS   Received: 5/17/2021 11:13 PM    Resulting Agency: [facility]    [address]   [place] [address]      Lab Information    Lab   [facility]   [address]   [place] [address]   : Leo Yarbrough MD              Additional Information    Specimen ID Bill Type  Client ID   [id number]            Specimen Date Taken Specimen Time Taken Specimen Received Date Specimen Received Time Result Date Result Time   May 17, 2021 11:07 PM May 17, 2021 11:13 PM May 17, 2021 11:38 PM   Result Read / Acknowledged    No acknowledgement history exists for this order.   Guide for Clinicians to build Lab Component [facility]    Urine drug screen (UDS) (Order #[id number]) on 5/17/21   Urine drug screen (UDS)  Order: [id number]  Status:  Final result   Visible to patient:  No (not released) Next appt:  None   0 Result Notes    Ref Range & Units 5/17/21 2307 Comments    PCP Scrn, Ur Not Detected Not Detected  Assay Detects: phencyclidine in urine. Lowest detectable concentration is 25 ng/mL of phencyclidine.    Benzodiazepine Ur Qual Not Detected Not Detected  Assay Detects: benzodiazepines and metabolites at varying concentrations. Lowest detectable concentration is 200 ng/mL of oxazepam.    Cocaine Screen, Urine Not Detected Not Detected  Assay Detects: benzoylecgonine and cocaine in urine. Lowest detectable concentration is 300 ng/mL of benzoylecgonine.    Amphetamine+Methamphetamine Screen, Ur Not Detected Not Detected  Assay Detects: d-methamphetamine, d-amphetamine, methlyenedioxyamphetamine (MDA), and methlyenendioxymethamphetamine (MDMA) in urine. Lowest detectable concentration is 1000 ng/mL of d-methamphetamine.   Assay is less sensitive to MDA and MDMA (lowest detectable concentration, 2500 ng/mL) and could produce a false negative result. If MDMA overdose is suspected and the result is negative, a more specific test should be requested.    Cannabinoid Screen, Urine Not Detected Not Detected  Assay Detects: cannabinoid metabolites in urine. Lowest detectable concentration is 50 ng/mL    Opiate Scrn, Ur Not Detected Not Detected  Assay Detects: codeine, dihydrocodeine, hydrocodone, hydromorphone, levorphanol, morphine, morphine-3-glucuronide, norcodeine, oxycodone in urine. Lowest  detectable concentration is 300 ng/mL of morphine.    Barbiturate Screen, Ur Not Detected Not Detected  Assay Detects: alphenal, amobarbital, aprobarbital, barbital, butabarbital, butalbital, butethal, diallybarbital, pentobarbital, secobarbital,talbutal, and thiopental. Lowest detectable concentration is 200 ng/mL of secobarbital.         Specimen Collected: 21 23:07 Last Resulted: 21 23:38        Lab Flowsheet     Order Details     View Encounter     Lab and Collection Details     Routing     Result History                Patient Information    Patient Name   Gemma Darnell ([id number]) Legal Sex   Female    2000   Results  CBC and differential [[id number]] (Order [id number])  Contains abnormal data CBC and differential  Order: [id number]  Status:  Final result   Visible to patient:  No (not released) Next appt with me:  None   0 Result Notes    Ref Range & Units 21 0544 21 2134 10/5/17 0102    WBC 3.80 - 10.50 K/uL 9.80  8.55  18.70High  R     RBC 3.93 - 5.22 M/uL 4.43  4.30  4.48 R     Hemoglobin 11.8 - 15.7 g/dL 13.3  12.7  13.1 R     Hematocrit 35.0 - 45.0 % 39.7  38.5  38.2 R     MCV 83.0 - 98.0 fL 89.6  89.5  85.3 R     MCH 28.0 - 33.2 pg 30.0  29.5  29.2 R     MCHC 32.2 - 35.5 g/dL 33.5  33.0  34.3 R     RDW 11.7 - 14.4 % 11.6Low   11.6Low   12.7 R     Platelets 150 - 369 K/uL 237  265  303 R     MPV 9.4 - 12.3 fL 10.0  9.9  10.5 R     Differential Type  Auto  Auto  AUTOMATED     nRBC <=0.0 % 0.0  0.0  0 R     Immature Granulocytes % 0.3  0.2  0     Neutrophils % 61.0  63.6  80     Lymphocytes % 28.1  26.7  14     Monocytes % 6.4  8.3  5     Eosinophils % 3.6  0.7  1     Basophils % 0.6  0.5  0     Immature Granulocytes, Absolute 0.00 - 0.08 K/uL 0.03  0.02  0.08High  R     Neutrophils, Absolute 1.70 - 7.00 K/uL 5.98  5.44  14.72High  R     Lymphocytes, Absolute 1.20 - 3.50 K/uL 2.75  2.28  2.70 R     Monocytes, Absolute 0.28 - 0.80 K/uL 0.63  0.71  0.92High  R     Eosinophils,  Absolute 0.04 - 0.36 K/uL 0.35  0.06  0.21 R     Basophils, Absolute 0.01 - 0.10 K/uL 0.06  0.04  0.07High  R          Specimen Collected: 05/21/21 05:44 Last Resulted: 05/21/21 06:20        Lab Flowsheet     Order Details     View Encounter     Lab and Collection Details     Routing     Result History        R=Reference range differs from displayed range        Related Result Highlights     Hemoglobin A1c  Final result 5/21/2021                  Order Providers    Authorizing Provider Encounter Provider   Lara Hunt DO None   Order Report    CBC and differential (Order #[id number]) on 5/21/21   Collection Information    Specimen ID: [id number]    Blood    Blood, Venous    Venipuncture     Collected: 5/21/2021  5:44 AM    SHELLIE HAIDER   Received: 5/21/2021  6:03 AM    Resulting Agency: [facility]    [address]   [place] [address]      Lab Information    Lab   [facility]   [address]   [place] [address]   : Leo Yarbrough MD              Additional Information    Specimen ID Bill Type Client ID   [id number]            Specimen Date Taken Specimen Time Taken Specimen Received Date Specimen Received Time Result Date Result Time   May 21, 2021  5:44 AM May 21, 2021  6:03 AM May 21, 2021  6:20 AM   Result Read / Acknowledged    No acknowledgement history exists for this order.   Guide for Clinicians to build Lab Component [employer]    CBC and differential (Order #[id number]) on 5/21/21   Contains abnormal data CBC and differential  Order: [id number]  Status:  Final result   Visible to patient:  No (not released) Next appt:  None   0 Result Notes    Ref Range & Units 5/21/21 0544 5/17/21 2134 10/5/17 0102    WBC 3.80 - 10.50 K/uL 9.80  8.55  18.70High  R     RBC 3.93 - 5.22 M/uL 4.43  4.30  4.48 R     Hemoglobin 11.8 - 15.7 g/dL 13.3  12.7  13.1 R     Hematocrit 35.0 - 45.0 % 39.7  38.5  38.2 R     MCV 83.0 - 98.0 fL 89.6  89.5  85.3 R     MCH 28.0 -  33.2 pg 30.0  29.5  29.2 R     MCHC 32.2 - 35.5 g/dL 33.5  33.0  34.3 R     RDW 11.7 - 14.4 % 11.6Low   11.6Low   12.7 R     Platelets 150 - 369 K/uL 237  265  303 R     MPV 9.4 - 12.3 fL 10.0  9.9  10.5 R     Differential Type  Auto  Auto  AUTOMATED     nRBC <=0.0 % 0.0  0.0  0 R     Immature Granulocytes % 0.3  0.2  0     Neutrophils % 61.0  63.6  80     Lymphocytes % 28.1  26.7  14     Monocytes % 6.4  8.3  5     Eosinophils % 3.6  0.7  1     Basophils % 0.6  0.5  0     Immature Granulocytes, Absolute 0.00 - 0.08 K/uL 0.03  0.02  0.08High  R     Neutrophils, Absolute 1.70 - 7.00 K/uL 5.98  5.44  14.72High  R     Lymphocytes, Absolute 1.20 - 3.50 K/uL 2.75  2.28  2.70 R     Monocytes, Absolute 0.28 - 0.80 K/uL 0.63  0.71  0.92High  R     Eosinophils, Absolute 0.04 - 0.36 K/uL 0.35  0.06  0.21 R     Basophils, Absolute 0.01 - 0.10 K/uL 0.06  0.04  0.07High  R          Specimen Collected: 21 05:44 Last Resulted: 21 06:20        Lab Flowsheet     Order Details     View Encounter     Lab and Collection Details     Routing     Result History        R=Reference range differs from displayed range        Related Result Highlights     Hemoglobin A1c  Final result 2021                    Patient Information    Patient Name   Gemma Darnell ([id number]) Legal Sex   Female    2000   Results  Comprehensive metabolic panel [LAB17] (Order [id number])  Comprehensive metabolic panel  Order: [id number]  Status:  Final result   Visible to patient:  No (not released) Next appt with me:  None   0 Result Notes    Ref Range & Units 21 0544 21 2134 10/5/17 0102    Sodium 136 - 144 mEQ/L 138  138  142 R     Potassium 3.6 - 5.1 mEQ/L 4.1  3.6 CM  4.0 R, CM     Chloride 98 - 109 mEQ/L 104  106  110High  R     CO2 22 - 32 mEQ/L 26  21Low   22 R     BUN 8 - 20 mg/dL 8  7Low   6Low  R     Creatinine 0.6 - 1.1 mg/dL 0.7  0.9  0.8 R     Glucose 70 - 99 mg/dL 91  95  101High  R     Calcium 8.9 - 10.3 mg/dL 9.6   9.2  9.8 R     AST (SGOT) 15 - 41 IU/L 21       ALT (SGPT) 11 - 54 IU/L 13       Alkaline Phosphatase 35 - 126 IU/L 48       Total Protein 6.0 - 8.2 g/dL 6.6       Albumin 3.4 - 5.0 g/dL 4.0       Bilirubin, Total 0.3 - 1.2 mg/dL 0.4       eGFR >=60.0 mL/min/1.73m*2 >60.0  >60.0      Anion Gap 3 - 15 mEQ/L 8  11  10 R          Specimen Collected: 05/21/21 05:44 Last Resulted: 05/21/21 06:57        Lab Flowsheet     Order Details     View Encounter     Lab and Collection Details     Routing     Result History        CM=Additional comments  R=Reference range differs from displayed range        Related Result Highlights     Lipid panel  Final result 5/21/2021             TSH w reflex FT4  Final result 5/21/2021                  Order Providers    Authorizing Provider Encounter Provider   Lara Hunt DO None   Order Report    Comprehensive metabolic panel (Order #[id number]) on 5/21/21   Collection Information    Specimen ID: [id number]    Blood    Blood, Venous    Venipuncture     Collected: 5/21/2021  5:44 AM    SHELLIE HAIDER   Received: 5/21/2021  6:03 AM    Resulting Agency: [facility]    [address]   [place] [address]      Lab Information    Lab   [facility]   [address]   [place] [address]   : Leo Yarbrough MD              Additional Information    Specimen ID Bill Type Client ID   [id number]            Specimen Date Taken Specimen Time Taken Specimen Received Date Specimen Received Time Result Date Result Time   May 21, 2021  5:44 AM May 21, 2021  6:03 AM May 21, 2021  6:57 AM   Result Read / Acknowledged    No acknowledgement history exists for this order.   Guide for Clinicians to build Lab Component SmartPhrase    Comprehensive metabolic panel (Order #[id number]) on 5/21/21   Comprehensive metabolic panel  Order: [id number]  Status:  Final result   Visible to patient:  No (not released) Next appt:  None   0 Result Notes    Ref Range & Units  5/21/21 0544 5/17/21 2134 10/5/17 0102    Sodium 136 - 144 mEQ/L 138  138  142 R     Potassium 3.6 - 5.1 mEQ/L 4.1  3.6 CM  4.0 R, CM     Chloride 98 - 109 mEQ/L 104  106  110High  R     CO2 22 - 32 mEQ/L 26  21Low   22 R     BUN 8 - 20 mg/dL 8  7Low   6Low  R     Creatinine 0.6 - 1.1 mg/dL 0.7  0.9  0.8 R     Glucose 70 - 99 mg/dL 91  95  101High  R     Calcium 8.9 - 10.3 mg/dL 9.6  9.2  9.8 R     AST (SGOT) 15 - 41 IU/L 21       ALT (SGPT) 11 - 54 IU/L 13       Alkaline Phosphatase 35 - 126 IU/L 48       Total Protein 6.0 - 8.2 g/dL 6.6       Albumin 3.4 - 5.0 g/dL 4.0       Bilirubin, Total 0.3 - 1.2 mg/dL 0.4       eGFR >=60.0 mL/min/1.73m*2 >60.0  >60.0      Anion Gap 3 - 15 mEQ/L 8  11  10 R          Specimen Collected: 05/21/21 05:44 Last Resulted: 05/21/21 06:57        Lab Flowsheet     Order Details     View Encounter     Lab and Collection Details     Routing     Result History        CM=Additional comments  R=Reference range differs from displayed range        Related Result Highlights     Lipid panel  Final result 5/21/2021             TSH w reflex FT4  Final result 5/21/2021                    SARS-CoV-2 (COVID-19), PCR Nasopharynx  Order: [id number]  Collected:  5/23/2021 10:25 Status:  Final result   Visible to patient:  No (not released)  Specimen Information: Nasopharynx; Nasopharyngeal Swab         0 Result Notes        Specimen Collected: 05/23/21 10:25 Last Resulted: 05/23/21 14:33       Order Details     View Encounter     Lab and Collection Details     Routing     Result History              SARS-CoV-2 (COVID-19), PCR Nasopharynx  Order: [id number] - Part of Panel Order [id number]  Collected:  5/23/2021 10:25 Status:  Final result   Visible to patient:  No (not released)  Specimen Information: Nasopharynx; Nasopharyngeal Swab         0 Result Notes   Ref Range & Units    SARS-CoV-2 (COVID-19) Negative Negative    Resulting Agency  [facility]         Specimen Collected: 05/23/21 10:25 Last Resulted:  05/23/21 14:33       Order Details     View Encounter     Lab and Collection Details     Routing     Result History              Collection Information    Specimen ID: [id number]    Nasopharyngeal Swab    Nasopharynx     Collected: 5/23/2021 10:25 AM      Result Read / Acknowledged    SARS-CoV-2 (COVID-19), PCR Nasopharynx (Order [id number])    No acknowledgement history exists for this order.   Guide for Clinicians to build Lab Component SmartPhrase    SARS-CoV-2 (COVID-19), PCR Nasopharynx (Order #[id number]) on 5/23/21   SARS-CoV-2 (COVID-19), PCR Nasopharynx  Order: [id number]  Status:  Final result   Visible to patient:  No (not released) Next appt:  None  Specimen Information: Nasopharynx; Nasopharyngeal Swab         0 Result Notes        Specimen Collected: 05/23/21 10:25 Last Resulted: 05/23/21 14:33       Order Details     View Encounter     Lab and Collection Details     Routing     Result History              SARS-CoV-2 (COVID-19), PCR Nasopharynx  Order: [id number] - Part of Panel Order [id number]  Status:  Final result   Visible to patient:  No (not released) Next appt:  None  Specimen Information: Nasopharynx; Nasopharyngeal Swab         0 Result Notes    Ref Range & Units     SARS-CoV-2 (COVID-19) Negative Negative          Specimen Collected: 05/23/21 10:25 Last Resulted: 05/23/21 14:33       Order Details     View Encounter     Lab and Collection Details     Routing     Result History

## 2021-05-17 NOTE — LETTER
Intake-                   Attached you will find clinical information for the patient, Gemma Randle.  Please feel free to contact me if there are any questions or concerns.  Thanks!            Wendy jennings, ARLETTE  Psychiatric Social Worker  [facility]   [address] [place] [address]  [phone]  [email or web address]                                          Patient Information    Patient Name Address Race   Gemma Randle [address] [id number] White   Patient Legal Name Legal Sex Date of Birth   Gemma Randle Female 2000   Room Ethnic Group Language   G015 Not , /a, or Latvian origin    MRN Phone Numbers PCP   [id number] Hm: [phone] Arlette Tolentino FNP   Patient Demographics    Address   [address] [address] Phone   [phone] (Home)   Active Insurance as of 5/17/2021    Primary Coverage    Payor Plan Insurance Group Employer/Plan Group   IBC PERSONAL CHOICE [id number]    Payor Plan Address Payor Plan Phone Number Payor Plan Fax Number Effective Dates   [address] [phone]  4/1/2019 - None Entered   [place] [address]      Subscriber Name Subscriber Birth Date Member ID    ANASTACIO RANDLE 3/24/1968 [id number]    PCP and Center    Primary Care Provider Phone Center   TEJINDER Heaton [phone] [facility]   Emergency Contact(s)    Name Relation Home Work Mobile   Cindi Schaffer   [phone]   Documents on File     Status Date Received Description   Documents for the Patient   Identification  10/04/17    HIPAA Notice of Privacy Signed 05/17/21    Insurance Card Unable to Obtain 05/17/21    Photo ID Unable to Obtain 05/17/21    Advance Directives and Living Will Not Received     Power of  Not Received     Patient Photo Not Received     CE Auth Form (Scanned) Not Received     CE Prospective Auth Reg Signed 05/17/21    Documents for the Encounter   Hospital Consent and Financial TORITO Signed  05/17/21    CMS IM Copy of Signed Not Received     Lay Caregiver Designation Consent Not Received     Outside Order Not Received     Lab Requisition Scan Not Received     ECG Received (Deleted) 05/17/21    ECG Received (Deleted) 05/18/21    ECG Received 05/18/21    Admission Information    Current Information    Attending Provider Admitting Provider Admission Type Admission Status   Rosen, Howard B, MD Chevalier, Naomi E, MD Emergency Confirmed Admission          Admission Date/Time Discharge Date Hospital Service Auth/Cert Status   05/17/21  09:07 PM  Psychiatry Incomplete          Hospital Area Unit Room/Bed    [facility] PSY[facility] UNIT G015/G015            Discharge Disposition Discharge Destination   [facility]   Admission    Complaint      Hospital Account    Name Acct ID Class Status Primary Coverage   Gemma Darnell [id number] Inpatient Psych Open IBC - PERSONAL CHOICE          Guarantor Account (for Hospital Account #[id number])    Name Relation to Pt Service Area Active? Acct Type   Gemma Darnell Self [facility] Yes Personal/Family   Address Phone     [address] [place] [address] [phone](H)            Coverage Information (for Hospital Account #[id number])    F/O Payor/Plan Precert #   IBC/PERSONAL CHOICE    Subscriber Subscriber #   CarieGalo [id number]   Address Phone   [address] [address]   [place] [address] [phone]          Medical Record Numbers    [facility] Id Number [id number]   [facility] [place] Id Mrn [id number]   [facility] Mrn [id number]   Upi [id number]     Lara Hunt DO   Physician   Psychiatry   H&P       Addendum   Date of Service:  5/18/2021  1:42 PM               Addendum        Expand AllCollapse All      []Hide copied text    []Hover for details  Psychiatry H and P          Chief Complaint   Patient presents with   • Psychiatric Evaluation       pt reports that she was sent to ER after speaking with thep, pt reports that  "yesterday she took \"some extra meds\" reports that she has been having some SI thoughts, reported that to the who sent her to ER         Gemma Darnell is a 21 y.o. female with previous diagnoses of \"Bipolar disorder,\" anorexia, cocaine and ETOH abuse disorder, now presenting with worsening depression and suicide attempt by OD on Lamictal day before yesterday.     Today I encounter Gemma in her room. She is lying in bed. States that she was last hospitalized at the [facility] for substance and psych treatment Sept-Oct 2020 followed by Marlyn Cervantes for eating d/o Oct-Nov. Since then, \"I was doing ok for awhile\" though she admits she immediately began drinking again after discharge. Her parents  in January which was \"not a surprise, my dad is a heavy drinker\" and \"I think it is for the best\" but she also pinpoints a deterioration in her mental health around that time. Soon after that, she reports that her drinking worsened and recently, she has been drinking up to \"7 vodka shots per day\" but not every day. She denies s/s of withdrawal at this time. Endorses feeling mostly \"detached,\" empty, numb much of the time, with sadness at times. She has had difficulty sleeping prior to admission, and reports a weight loss of about 8 lbs in the last 2 months (though this was purposeful and she believes part of her anorexia rather than result of depression/anxiety), low energy, feeling like she is in a fog with lapses in memory, chronic thoughts of death/dying, self injury in the form of scratching herself (has not broken the skin), difficulty concentrating.      This all culminated the day before yesterday when she took \"half a bottle\" of Lamictal with the clear intent to die. She states, somewhat illogically, that she did not take the whole bottle \"because I knew it wouldn't work to kill me.\" Reports that it was impulsive and when she thinks about it now, she feels \"detached from it, I don't remember it too " "well.\" Following taking the pills, \"My mom set up a PCP appointment because I was giving her mixed signals\" (eg hinting that she had misused medication) but upon presenting to the PCP, she purposefully withheld that she had taken an OD. Following that appointment, she reached out to her therapist who recommended she present here. At this point, she states \"I don't want to be here\" but admits she is not safe to go home.     Other ROS: Reports previous diagnosis of bipolar d/o, was last \"manic\" in Sept 2020 when she stayed up for one week straight and was sexually impulsive, \"sleeping with friends,\" racing thoughts that \"I am a piece of shit\" (which she is experiencing right now as well). Denies feeling invincible/special pierre, denies spending large amounts of money (but has been recently in an effort to \"make myself feel better\"), denies increased goal-oriented activities. Unclear whether she was under the influence of substances at that time.      She reports hearing voices but denies VH. AH are \"in my head\" and say derogatory things such as \"you are a piece of shit.\" I ask if it is her voice and she states \"maybe.\" Does not hear them daily, they are distressing when she does hear them.     Has pushed away most friends recently but still has her mother and sister. Allows me to call her mother Cindi [phone] and outpatient psychiatrist Dr. Simpson though they have only met once.     Psychiatric History:  Suicide Attempts: yes - as above  Risk Factors: Alcohol and/or substance abuse, Easy access to lethal methods and Impulsive or aggressive tendencies   Protective Factors: Effective and accessible mental health care    Current Psychiatrist: yes - Dr. Simpson   Past psychiatric Hospitalization: yes - as above, no other hospitalizations prior to the [facility]  Medication Trials:  yes - reports only Lamictal and Lexapro which she was put on in previous hospitalization  ECT trials: no        Substance Use History: ETOH as " above, cocaine prior to the Agnieszka but reports using only 2-3 times in recent months  Substance use:         Drug Details      Questions Responses     Cocaine frequency Past regular use     Comment: Past regular use on 5/18/2021       Cocaine method Snort     Comment: Snort on 5/18/2021            Consequences of use: Yes:  Blackouts  Past D&A Treatment: Yes: as above     Family History: father with ETOH use disorder, sister with good response to Prozac, cousin and sister have attempted suicide     Social History:   Social History   Social History            Socioeconomic History   • Marital status: Single       Spouse name: None   • Number of children: 0   • Years of education: 12   • Highest education level: High school graduate   Occupational History   • Occupation: [employer]   Tobacco Use   • Smoking status: Current Some Day Smoker       Types: Electronic Cigarette   • Smokeless tobacco: Never Used   Substance and Sexual Activity   • Alcohol use: Yes   • Drug use: Not Currently   • Sexual activity: None   Other Topics Concern   • None   Social History Narrative   • None      Social Determinants of Health          Financial Resource Strain:    • Difficulty of Paying Living Expenses:    Food Insecurity:    • Worried About Running Out of Food in the Last Year:    • Ran Out of Food in the Last Year:    Transportation Needs:    • Lack of Transportation (Medical):    • Lack of Transportation (Non-Medical):    Physical Activity:    • Days of Exercise per Week:    • Minutes of Exercise per Session:    Stress: Stress Concern Present   • Feeling of Stress : To some extent   Social Connections:    • Frequency of Communication with Friends and Family:    • Frequency of Social Gatherings with Friends and Family:    • Attends [employer] Services:    • Active Member of Clubs or Organizations:    • Attends Club or Organization Meetings:    • Marital Status:    Intimate Partner Violence:    • Fear of Current or Ex-Partner:    •  "Emotionally Abused:    • Physically Abused:    • Sexually Abused:             Medical History:   Medical History        Past Medical History:   Diagnosis Date   • Eating disorder     • History of ITP              Surgical History:   Surgical History   History reviewed. No pertinent surgical history.        Allergies: No Known Allergies     Current Medications:  SCHEDULED:  • [START ON 5/19/2021] escitalopram  10 mg oral Daily   • [START ON 5/19/2021] FLUoxetine  10 mg oral Daily   • gabapentin  200 mg oral TID   • nicotine  1 patch transdermal Daily   • norethindrone-e.estradioL-iron  1 tablet oral Daily      PRN  •  acetaminophen  •  alum-mag hydroxide-simeth  •  diphenhydrAMINE  •  diphenhydrAMINE  •  haloperidoL **OR** haloperidol lactate  •  ibuprofen  •  LORazepam **OR** LORazepam  •  melatonin  •  metoclopramide  •  nicotine polacrilex  •  senna        Review of Systems  Musculoskeletal:  Grossly intact        Objective         Vital Signs for the last 24 hours:  Temp:  [36.1 °C (97 °F)-36.6 °C (97.9 °F)] 36.1 °C (97 °F)  Heart Rate:  [] 112  Resp:  [18] 18  BP: (114-162)/(71-99) 119/76     Labs       Results from last 7 days   Lab Units 05/17/21  2134   HEMOGLOBIN g/dL 12.7   WBC K/uL 8.55   PLATELETS K/uL 265   POTASSIUM mEQ/L 3.6   SODIUM mEQ/L 138   CREATININE mg/dL 0.9                Ethanol   Date Value Ref Range Status   05/17/2021 <5 <=10 mg/dL Final      No results found for: LITHIUM, TSH, FREET4, A9CYJAI, RSQFZQAV09, FOLATE  No results found for: PHENYTOIN, PHENOBARB, VALPROATE, CBMZ     No results found.     No results found for: HDL, LDLCALC, TRIG, CHOL, HGBA1C           MENTAL STATUS EXAM  Appearance: well groomed  Gait and Motor: no abnormal movements  Speech: normal rate/rhythm/volume  Mood: \"detached\"  Affect: blunted  Associations: coherent  Thought Process: goal-directed and vague and contradictory at times  Thought Content: auditory hallucinations  Suicidality/Homicidality: thoughts of " "being dead/ no desire or plan to die  Judgement/Insight: minimizes severity level of illness  Cognition: she appears tired        Assessment/Plan  * Suicide attempt (CMS/[facility])  Assessment & Plan  Ms. Darnell is a 21 year old woman with previous diagnosis of \"bipolar disorder,\" anorexia, ETOH use and cocaine use disorder, now presenting with worsening feelings of detachment/depression culminating in overdose on Lamictal. Today she reports feeling detached, unsafe for discharge. Denies active SIIP at this time and contracts for safety. Initial impression is that substance is a significant factor in symptoms, but may have underlying mood disorder. Symptoms also highly suggestive of borderline personality disorder.      q15 min checks, contracts for safety, no plan or intent to harm herself at this time  Depression NOS, r/o substance induced mood d/o, r/o bipolar d/o, r/o major depression: will taper off Lexapro as she does not believe it has been helping (but admits to irregular compliance). Her sister has had a good experience on Prozac, she would like to try that. Will d/c Lamictal as she admits to taking it irregularly and in this context is at high risk for SJS. She expresses understanding.   For anxiety and off label for ETOH use disorder, will start gabapentin 200 mg po tid to start. Monitor for tolerability.   PRN Atarax 50 mg po tid anxiety  She has been educated to notify staff at any hint of rash following Lamictal OD  ETOH abuse: monitor for withdrawal. She is not exhibiting s/s at this time and VSS. CIWA protocol with symptom-triggered Ativan. She denies hx of withdrawal sz but has had \"cold sweats\" previously.  R/o borderline personality disorder: she would benefit from DBT on outpatient basis.   G/m/s therapy as tolerated, collaterals as permitted                              Revision History                            Lara Hunt DO   Physician   Psychiatry   Progress Notes       Signed   Date of " "Service:  5/19/2021  3:15 PM               Signed             []Hide copied text    []Dylon for details  Psychiatry Progress Note     Chief Complaint/Reason for follow-up: OD on Lamictal, \"I feel like I'm dying\"     Interval History:     Per staff, refused medication in the morning, high anxiety, SI without intent or plan currently, reports feeling numb.     Pt was observed laying in bed, wrapped in covers, with the lights turned off. She \"slept a lot\" yesterday. She continues to avoid food, stating \"I can't eat\" but cannot say why. She thinks her medicine regimen \"is not good for me,\" feels it is causing symptoms though she admits she has been experiencing many of these symptoms for months, even before being on medication at all. She feels \"numb\" but also admits to feeling anxious, sad, is tearful at times.      Pt endorsed some symptoms of alcohol withdrawal: anxiety, \"shaking\", nausea, headache, sweaty palms, itchiness, chills, night sweats but does not think this is due to ETOH withdrawal but rather to medication. She denied hallucinations, palpitations, insomnia. On review of systems and she endorsed nearly every one including: vision changes (couldn't clarify what her changes were), dizziness (\"feel like I'm going to pass out\"), soreness. She says her heart \"is slow\". States \"I feel like I am going to die\" but cannot give more detail/state why.     She denies any intent or plan to harm herself right now and contracts for safety. She does not feel she needs anyone to watch her. Tells me \"I can't even concentrate on what you are saying to me.\"     She socialized yesterday \"for an hour\" but felt like she couldn't concentrate and \"didn't know what to say\".     Later in day following PRN Ativan appeared more relaxed.     Vital Signs for the last 24 hours:  Temp:  [35.9 °C (96.7 °F)-36.9 °C (98.5 °F)] 35.9 °C (96.7 °F)  Heart Rate:  [] 124  Resp:  [16-18] 18  BP: (122-136)/(69-88) 128/85     Mental Status " "Exam:  Pt appeared her stated age (20 yo), was dressed appropriately in sweats, appropriate hygiene, well-groomed. Pt had no stereotypical tics or movements and ambulation was not observed. Pt's speech had normal rate, rhythm, and volume was soft. Pt's mood was \"I feel like I'm dying\". Pt's affect was dysphoric, anxious, tearful at times. Pt's associations were coherent. Thought process was linear and goal-directed but vague. Thought content included no current HI, VH, or AH. Anxiety was \"7/10\" with 10 being the worst and depression was \"9/10\" with 10 being the worst. Continues to endorse passive SI (\"would be ok with no waking up\"). Judgement and insight were appropriate. Cognition: alert, but appears distracted, not sedated.     Assessment/Plan  Gemma Darnell is a 20 yo female with a history of cocaine and alcohol use disorder, anorexia, \"bipolar disorder\", and \"depression\" who presented to the [place] ED after trying to overdose on Lamictal. Depression, ETOH use disorder, traits suggestive of borderline personality disorder.  1. Depression and anxiety: D/c Lexapro 10mg daily and increase Prozac to 20mg daily, PRN Atarax 50mg PO TID for anxiety  2. ETOH use disorder: increase gabapentin to 400mg PO TID for alcohol use disorder and anxiety, she is refusing at this time, continue to educate and offer but will d/c if continues to refuse. In the meantime, CIWA, monitor VS carefully, symptom triggered Ativan PRN  3. Medical: tachycardic this morning to 124, monitor, otherwise VSS.   4. R/o borderline personality disorder: she would benefit from DBT on outpatient basis.        I discussed the treatment plan and the patient's progress with nursing staff and Allied therapists in the treatment team meeting this morning. Patient is seen and evaluated for approximately 25 minutes in therapy with greater than 50% of time spent in direct face-to-face counseling, coordination of care and review of the medical record.          " "    Lara Hunt,    Physician   Psychiatry   Progress Notes       Signed   Date of Service:  5/20/2021  1:07 PM               Signed             []Hide copied text    []Dylon for details  PSYCHIATRIC PROGRESS NOTE     Chief Complaint/Reason for follow-up: depression, anorexia, ETOH abuse disorder, cluster B traits, suicide attempt     Interval History: Per staff, Ms. Darnell was visible on unit yesterday, coloring with peers, but generally withdrawn, disheveled, poor appetite, apathetic. CIWA of 10 yesterday, received Ativan 2 mg PRN, refusing gabapentin.      CT head today unremarkable.     Today I approach her in her room where she is lying in bed. I ask for her help in understanding what is going on with her. She tells me she does not know. I ask if she is upset about her parents' separation and divorce and she says yes. I ask if there are other stressors and she says she does not know. She feels \"numb.\" States she feels gabapentin is the reason for her symptoms though she was having same symptoms prior to taking gabapentin. She states she is not sure she needs medication. I ask her what she needs if not medication, ie therapy, etc. She states she does not know.      I spoke with Gemma's mother Cindi with her permission. States that Gemma has been staying with boyfriend recently so she does not know onset of current symptoms, but at least since Friday Gemma has been anxious, stating that she feels she is going to die, stating she cannot concentrate. Endorses that she believes eating disorder, substances, and recent psychosocial stressors to be contributing to current clinical picture. She has been encouraging Gemma to take medication and thinks Gemma would benefit from residential program targeting some combination of eating disorder, substance, and mental health following discharge. Gemma \"did really well in high school\" but her sister's mental health problems were difficult for the family for about 4 years " "and things have been chaotic in household with dissolution of marriage and father having difficulty with boundaries.      Vital Signs for the last 24 hours:  Temp:  [36.7 °C (98.1 °F)-37.5 °C (99.5 °F)] 36.9 °C (98.5 °F)  Heart Rate:  [105-120] 120  Resp:  [16-18] 18  BP: (124-141)/() 126/80     Scheduled Meds:  • FLUoxetine  20 mg oral Daily   • gabapentin  200 mg oral TID   • nicotine  1 patch transdermal Daily   • norethindrone-e.estradioL-iron  1 tablet oral Daily         Labs:       Results from last 7 days   Lab Units 05/17/21  2134   HEMOGLOBIN g/dL 12.7   WBC K/uL 8.55   PLATELETS K/uL 265   POTASSIUM mEQ/L 3.6   SODIUM mEQ/L 138   CREATININE mg/dL 0.9         MENTAL STATUS EXAM  Appearance: disheveled  Gait and Motor: no abnormal movements  Speech: soft  Mood: \"I don't know\"  Affect: blunted and dysphoric  Associations: coherent  Thought Process: vague  Thought Content: paucity of thought content  Suicidality/Homicidality: thoughts of being dead/ no desire or plan to die  Judgement/Insight: makes choices that perpetuate illness  Cognition: seems distracted, with poor concentration, but alert     Assessment/Plan  * Suicide attempt (CMS/[facility])  Assessment & Plan  Ms. Darnell is a 21 year old woman with previous diagnosis of anorexia, ETOH use and cocaine use disorder, now presenting with worsening feelings of detachment/depression culminating in overdose on Lamictal. Today she reports feeling detached, unsafe for discharge. Denies active SIIP at this time and contracts for safety. ETOH use disorder, substance-induced mood disorder, anorexia, symptoms also suggestive of borderline personality disorder.      q15 min checks, contracts for safety, no plan or intent to harm herself at this time  Depression NOS, r/o substance induced mood d/o, r/o major depression, bipolar disorder lower on ddx: C/w Prozac 20 mg po daily, c/w gabapentin for now though she is refusing it for unclear reason, will explore " "alternatives for acute anxiety. Monitor for tolerability. PRN Atarax 50 mg po tid anxiety  She has been educated to notify staff at any hint of rash following Lamictal OD  ETOH abuse: monitor for withdrawal. Has been about 5 days since last known drink. This morning she is not exhibiting s/s at this time and VSS, . CIWA protocol with symptom-triggered Ativan.  R/o borderline personality disorder: she would benefit from DBT on outpatient basis.   Medical:   -difficulty concentrating/AMS: baseline labs unremarkable, head CT unremarkable. Continue to monitor, repeat labs tomorrow  -Anorexia: she has been restricting intake. Repeat labs tomorrow. Nutrition consult. Provide Boost. Monitor VS - poor po intake likely contributing to tachycardia.  G/m/s therapy as tolerated, collaterals as permitted. She would benefit from residential program targeting some combination of substance, eating disorder, mental josey/DBT        I discussed the treatment plan and the patient's progress with nursing staff and Allied therapists in the treatment team meeting this morning. Patient is seen and evaluated for approximately 25 minutes in therapy with greater than 50% of time spent in direct face-to-face counseling, coordination of care and review of the medical record.              Lara Hunt DO   Physician   Psychiatry   Progress Notes       Addendum   Date of Service:  5/21/2021  1:19 PM               Addendum             []Hide copied text    []Dylon for details  Psychiatry Progress Note     Chief Complaint/Reason for follow-up: OD on Lamictal; \"I feel like I'm dying\"     Interval History:  Per staff, visible, minimally engaged, \"flat and sedentary.\" Complaining of memory difficulty. Not eating much but good fluid intake.     Today we speak in her room. She is tearful today, appears less blunted and more depressed. She slept \"ok - still tired\".  Tells me her memory is \"horrible.\" She can't remember our conversation from " "yesterday. She is \"having a hard time socializing\" because \"I'm not myself here.\" She cried and asked \"why am I here?\"  She denied active SI but described \"feeling like I'm dying, I feel like there is something wrong.\" I ask her what is wrong and she indicates her arms. With a lot of coaxing, she tells me her arms do not look normal to her, I ask what is abnormal and after long latency she says \"I don't know.\" I ask if it is the color, or a feeling, and she states it is both. States her arms look \"red.\" Upon exam there is nothing grossly remarkable about her arms.      Regarding medication, she is hesitant to take it but cannot articulate why. States \"it scares me.\" I point out that she was drinking significant ETOH outside the hospital which is more harmful to her body than the medication, and that it is also scary to continue feeling this way. She agrees with this.         Vital Signs for the last 24 hours:  Temp:  [36.9 °C (98.5 °F)-37.1 °C (98.8 °F)] 36.9 °C (98.5 °F)  Heart Rate:  [] 114  Resp:  [14-16] 14  BP: (111-140)/(72-90) 140/72     Mental Status Exam:  Pt appeared her stated age (20 yo), was dressed in sweats, unkempt. Pt had no stereotypical tics or movements and ambulation was appropriate. Pt's speech had normal rate, rhythm, and volume was soft. Pt's mood was \"I don't know\". Pt's affect was depressed, constricted, tearful, anxious. Pt's associations were coherent. Thought process was linear and goal-directed, vague. Thought content included no current SI, HI, VH, or AH. Judgement and insight were limited. Cognition was fair. Alert and oriented x 2.     Assessment/Plan  Gemma Darnell is a 20 yo female with a history of cocaine and alcohol use disorder, anorexia, now presenting to the [facility] ED after overdose on Lamictal. ETOH use disorder, anorexia, MDD. R/o borderline personality disorder.     1. MDD, anxiety: Continue Proxac 20mg daily for depression. Continue to offer gabapentin 200mg PO " "TID for alcohol use disorder and anxiety though that she has beenconsistently refusing, though she cannot articulate why she does not want it. Psychoeducation provided as she is in an acutely anxious state. Given her presentation with intense anxiety, disorganized state, will initiate Risperdal 0.5 mg po bid. PRN Atarax 50mg PO TID for anxiety  2. R/o borderline personality disorder: she would benefit from DBT on outpatient basis.   3. Anorexia: monitor PO intake. VS and labs today WNL. Would benefit from ongoing outpatient care.  4. Substance use disorder: motivational interviewing, would benefit from ongoing outpatient treatment  5. G/m/s therapy as tolerated, ongoing collaterals as permitted.        I discussed the treatment plan and the patient's progress with nursing staff and Allied therapists in the treatment team meeting this morning. Patient is seen and evaluated for approximately 25 minutes in therapy with greater than 50% of time spent in direct face-to-face counseling, coordination of care and review of the medical record.               Revision History                       Jermaine Pham MD   Physician   Psychiatry   Progress Notes       Signed   Date of Service:  5/22/2021 12:15 PM               Signed             []Hide copied text    []Errolver for details  Psychiatry Progress Note     Chief Complaint/Reason for follow-up:  MDD, anxiety, OD on lamictal     Interval History:  Mood is not as good today as yesterday and associates this with being \"nervous about discharge.\"  Sleep wsa broken, Appetite is low but she did eat breakfast.  She denies si/hi/plan/intent, no passive si.  She is looking forward to going back to a different school and looking forward to family.  No ah/vh/paranoia/no grandiosity.      Review of Systems:    Appetite is poor to fair, no gi complaints, sleep was poor to fair, broken     Vital Signs for the last 24 hours:  Temp:  [36.7 °C (98.1 °F)-36.9 °C (98.4 °F)] 36.9 °C (98.4 " "°F)  Heart Rate:  [100-117] 117  Resp:  [14-16] 16  BP: (106-132)/(77-83) 106/77        Mental Status Exam:   Appearance: appears stated age, appropriately dressed  Behavior: cooperative  Speech: normal r/r/t/v and no pressure/paucity  TP: linear  TC: no si/hi/plan/intent, no passive si, no grandiosity, no paranoia  Perception: no ah/vh, no ris  Mood: not as good  Affect: not labile, had a mask on  Insight: fair  Judgment: fair  A&Ox3        Assessment/Plan   Gemma Darnell is a 22 yo female with a history of cocaine and alcohol use disorder, anorexia, now presenting to the [facility] ED after overdose on Lamictal. ETOH use disorder, anorexia, MDD. R/o borderline personality disorder.     1. MDD, anxiety: Continue Proxac 20mg daily for depression. Continue to offer gabapentin 200mg PO TID for alcohol use disorder and anxiety though that she has beenconsistently refusing, though she cannot articulate why she does not want it. Psychoeducation provided as she is in an acutely anxious state. Given her presentation with intense anxiety, disorganized state, will initiate Risperdal 0.5 mg po bid. PRN Atarax 50mg PO TID for anxiety  2. R/o borderline personality disorder: she would benefit from DBT on outpatient basis.   3. Anorexia: monitor PO intake. VS   WNL. Would benefit from ongoing outpatient care.  4. Substance use disorder: motivational interviewing, would benefit from ongoing outpatient treatment  5. G/m/s therapy as tolerated, ongoing collaterals as permitted.              Jermaine Pham MD   Physician   Psychiatry   Progress Notes       Signed   Date of Service:  5/23/2021 11:42 AM               Signed             []Hide copied text    []Hover for details  Psychiatry Progress Note     Chief Complaint/Reason for follow-up:    MDD, anxiety, OD on lamictal  Interval History:  She was in bed resting.   She reports that is \"tired.\"  She was not certain how her mood was,  She denies si/hi/plan/intent, no passive si. " She denies racing thoughts.  Appetite is ok.     She denies side effects from medication.     Review of Systems:   Appetite is poor to fair, no gi complaints, sleep is fair      Vital Signs for the last 24 hours:  Temp:  [36.7 °C (98 °F)-36.9 °C (98.4 °F)] 36.7 °C (98 °F)  Heart Rate:  [104-133] 119  Resp:  [16] 16  BP: (100-117)/(73-85) 109/76       Mental Status Exam:   Appearance: appears stated age, appropriately dressed  Behavior: cooperative  Speech: normal r/r/t/v and no pressure/paucity  TP: linear  TC: no si/hi/plan/intent, no passive si, no grandiosity, no paranoia  Perception: no ah/vh, no ris  Mood: tired  Affect: not labile, had a mask on  Insight: fair  Judgment: fair  A&Ox3     Assessment/Plan   Gemma Darnell is a 20 yo female with a history of cocaine and alcohol use disorder, anorexia, now presenting to the [place] ED after overdose on Lamictal. ETOH use disorder, anorexia, MDD. R/o borderline personality disorder.     1. MDD, anxiety: Continue Proxac 20mg daily for depression. Continue to offer gabapentin 200mg PO TID for alcohol use disorder and anxiety though that she has beenconsistently refusing, though she cannot articulate why she does not want it. Psychoeducation provided as she is in an acutely anxious state. Given her presentation with intense anxiety, disorganized state, will initiate Risperdal 0.5 mg po bid. PRN Atarax 50mg PO TID for anxiety  2. R/o borderline personality disorder: she would benefit from DBT on outpatient basis.   3. Anorexia: monitor PO intake. VS   WNL. Would benefit from ongoing outpatient care.  4. Substance use disorder: motivational interviewing, would benefit from ongoing outpatient treatment  5. G/m/s therapy as tolerated, ongoing collaterals as permitted  6. Advised not to take naps as this could effect her night time sleep                    Lidia Brewer MD   Physician   Psychiatry   Progress Notes       Signed   Date of Service:  5/24/2021  8:30 AM     "           Signed             []Hide copied text    []Hover for details  Psychiatry Progress Note     Chief Complaint/Reason for follow-up:    MDD, anxiety, OD on lamictal  Hx of substance abuse and eating disorder     Interval History:    Discussed patient's treatment progress with nurses and allied therapists in morning meeting this morning. Gemma has been restricting but will drink Boost/Gatorade. CIWA has been continued. Patient stated she refuses to take gabapentin because it \"makes me feel weird.\" She stated she has been dissociating constantly. She noted feeling forgetful. She described her mood as \"pretty depressed\" and noted she feels worse since admission. Gemma's nurse pointed out that poor PO intake may be influencing her mental status and Gemma agreed.  She has passive wishes to die but no intent or plan. She feels guilt about needing medication.      Review of Systems:   Appetite is poor to fair, no gi complaints, sleep is fair      Vital Signs for the last 24 hours:  Temp:  [36.9 °C (98.4 °F)-37.1 °C (98.8 °F)] 37.1 °C (98.8 °F)  Heart Rate:  [106-125] 125  Resp:  [16] 16  BP: (121-142)/(80-91) 142/80       Mental Status Exam:   Appearance: appears stated age, appropriately dressed  Behavior: cooperative but guarded   Speech: normal r/r/t/v and no pressure/paucity  TP: linear, vague  TC: no si/hi/plan/intent, + passive si, no grandiosity, no paranoia  Perception: no ah/vh, no ris  Mood: tired  Affect: not labile, had a mask on  Insight: fair  Judgment: fair  A&Ox3     Assessment/Plan   Gemma Darnell is a 20 yo female with a history of cocaine and alcohol use disorder, anorexia, now presenting to the [facility] ED after overdose on Lamictal. ETOH use disorder, anorexia, MDD. R/o borderline personality disorder.     1. MDD, anxiety: Continue Proxac 20mg daily for depression. D/C gabapentin. Psychoeducation provided as she is in an acutely anxious state. Given her presentation with intense anxiety, " "disorganized state, continue Risperdal 0.5 mg po bid. PRN Atarax 50mg PO TID for anxiety  2. R/o borderline personality disorder: she would benefit from DBT on outpatient basis.   3. Anorexia: monitor PO intake. VS   WNL. Would benefit from ongoing outpatient care.  4. Substance use disorder: motivational interviewing, would benefit from ongoing outpatient treatment  5. G/m/s therapy as tolerated, ongoing collaterals as permitted  6. Advised not to take naps as this could effect her night time sleep     I spent 28 minutes on this date of service performing the following activities: obtaining history, performing examination, documenting, preparing for visit, obtaining / reviewing records, providing counseling and education and coordinating care.                 Marciano Estrella MD   Physician   Psychiatry   Progress Notes       Signed   Date of Service:  5/25/2021 11:21 AM               Signed             []Hide copied text    []Dylon for details  This exam was done through synchronous audio-visual services.  The patient consented to the telehealth exam.  The patient provider were based in [place].  The patient was on the [facility] psychiatric unit.  The following other people were present during the interview: No one     Psychiatry Progress Note     Chief Complaint/Reason for follow-up:    MDD, anxiety, OD on lamictal  Hx of substance abuse and eating disorder     I met the patient today for the first time.  I reviewed her hospital psychiatric records.  She is on Prozac 20 mg daily.     Today the patient when queried says \"I do not feel right\".  She does not feel her meds are working.  Yet she asks to be discharged.  She admits that she is very unhappy here on the unit.  She is very negative.  She seems to me hopeless.  I explained to her that the Prozac which was started on 5/20 has not had time to work.  When I read her Dr. Brewer's note of yesterday saying she has passive SI.  The patient answers \"I do not " "remember\".  When I asked her to rate today's depression and anxiety on a scale.  She replies \"I do not know\" and won't try.     Mental status: Patient is fully oriented x3.  She recalls my name.  She is negative and hopeless.  Mood is of depression.  She is unsmiling.  Speech is coherent and goal-directed without evidence of psychosis but is nonspontaneous and terse.  She denies SI/HI.  She denies AH/VH.     Plan:  Continue psychiatric hospitalization  Continue Prozac 20 mg and Risperdal 0.5 mg twice daily                       Patient Information    Patient Name   Gemma Darnell ([id number]) Legal Sex   Female    2000   Results  ER toxicology screen, serum [[id number]] (Order [id number])  Contains abnormal data ER toxicology screen, serum  Order: [id number]  Status:  Final result   Visible to patient:  No (not released) Next appt with me:  None   0 Result Notes    Ref Range & Units 21 2134    Salicylate <=30.0 mg/dL <4.0     Acetaminophen 10.0 - 30.0 ug/mL <10.0Low      Ethanol <=10 mg/dL <5          Specimen Collected: 21 21:34 Last Resulted: 21 22:11        Lab Flowsheet     Order Details     View Encounter     Lab and Collection Details     Routing     Result History           Related Result Highlights     Basic metabolic panel  Final result 2021                  Order Providers    Authorizing Provider Encounter Provider   Colt Sawyer MD None   Order Report    ER toxicology screen, serum (Order #[id number]) on 21   Collection Information    Specimen ID: [id number]    Blood    Blood, Venous    Venipuncture     Collected: 2021  9:34 PM    YONATAN ROSALES II   Received: 2021  9:39 PM    Resulting Agency: [facility]    [address]   [place] [address]      Lab Information    Lab   [facility]   [address]   [place] [address]   : Leo Yarbrough MD              Additional Information    Specimen ID Bill Type " Client ID   [id number]            Specimen Date Taken Specimen Time Taken Specimen Received Date Specimen Received Time Result Date Result Time   May 17, 2021  9:34 PM May 17, 2021  9:39 PM May 17, 2021 10:11 PM   Result Read / Acknowledged    No acknowledgement history exists for this order.   Guide for Clinicians to build Lab Component [facility]    ER toxicology screen, serum (Order #[id number]) on 21   Contains abnormal data ER toxicology screen, serum  Order: [id number]  Status:  Final result   Visible to patient:  No (not released) Next appt:  None   0 Result Notes    Ref Range & Units 21 2134    Salicylate <=30.0 mg/dL <4.0     Acetaminophen 10.0 - 30.0 ug/mL <10.0Low      Ethanol <=10 mg/dL <5          Specimen Collected: 21 21:34 Last Resulted: 21 22:11        Lab Flowsheet     Order Details     View Encounter     Lab and Collection Details     Routing     Result History           Related Result Highlights     Basic metabolic panel  Final result 2021                    Patient Information    Patient Name   Gemma Darnell ([id number]) Legal Sex   Female    2000   Results  Urine drug screen (UDS) [[id number]] (Order [id number])  Urine drug screen (UDS)  Order: [id number]  Status:  Final result   Visible to patient:  No (not released) Next appt with me:  None   0 Result Notes    Ref Range & Units 21 2307 Comments    PCP Scrn, Ur Not Detected Not Detected  Assay Detects: phencyclidine in urine. Lowest detectable concentration is 25 ng/mL of phencyclidine.    Benzodiazepine Ur Qual Not Detected Not Detected  Assay Detects: benzodiazepines and metabolites at varying concentrations. Lowest detectable concentration is 200 ng/mL of oxazepam.    Cocaine Screen, Urine Not Detected Not Detected  Assay Detects: benzoylecgonine and cocaine in urine. Lowest detectable concentration is 300 ng/mL of benzoylecgonine.    Amphetamine+Methamphetamine Screen, Ur Not Detected Not Detected   Assay Detects: d-methamphetamine, d-amphetamine, methlyenedioxyamphetamine (MDA), and methlyenendioxymethamphetamine (MDMA) in urine. Lowest detectable concentration is 1000 ng/mL of d-methamphetamine.   Assay is less sensitive to MDA and MDMA (lowest detectable concentration, 2500 ng/mL) and could produce a false negative result. If MDMA overdose is suspected and the result is negative, a more specific test should be requested.    Cannabinoid Screen, Urine Not Detected Not Detected  Assay Detects: cannabinoid metabolites in urine. Lowest detectable concentration is 50 ng/mL    Opiate Scrn, Ur Not Detected Not Detected  Assay Detects: codeine, dihydrocodeine, hydrocodone, hydromorphone, levorphanol, morphine, morphine-3-glucuronide, norcodeine, oxycodone in urine. Lowest detectable concentration is 300 ng/mL of morphine.    Barbiturate Screen, Ur Not Detected Not Detected  Assay Detects: alphenal, amobarbital, aprobarbital, barbital, butabarbital, butalbital, butethal, diallybarbital, pentobarbital, secobarbital,talbutal, and thiopental. Lowest detectable concentration is 200 ng/mL of secobarbital.         Specimen Collected: 05/17/21 23:07 Last Resulted: 05/17/21 23:38        Lab Flowsheet     Order Details     View Encounter     Lab and Collection Details     Routing     Result History              Order Providers    Authorizing Provider Encounter Provider   Colt Sawyer MD None   Order Report    Urine drug screen (UDS) (Order #[id number]) on 5/17/21   Collection Information    Specimen ID: [id number]    Urine    Urine, Clean Catch    Non-blood Collection     Collected: 5/17/2021 11:07 PM    PRABHU DAS   Received: 5/17/2021 11:13 PM    Resulting Agency: [facility]    [address]   [place] [address]      Lab Information    Lab   [facility]   [address]   [place] [address]   : Leo Yarbrough MD              Additional Information    Specimen ID Bill Type  Client ID   [id number]            Specimen Date Taken Specimen Time Taken Specimen Received Date Specimen Received Time Result Date Result Time   May 17, 2021 11:07 PM May 17, 2021 11:13 PM May 17, 2021 11:38 PM   Result Read / Acknowledged    No acknowledgement history exists for this order.   Guide for Clinicians to build [facility]    Urine drug screen (UDS) (Order #[id number]) on 5/17/21   Urine drug screen (UDS)  Order: [id number]  Status:  Final result   Visible to patient:  No (not released) Next appt:  None   0 Result Notes    Ref Range & Units 5/17/21 2307 Comments    PCP Scrn, Ur Not Detected Not Detected  Assay Detects: phencyclidine in urine. Lowest detectable concentration is 25 ng/mL of phencyclidine.    Benzodiazepine Ur Qual Not Detected Not Detected  Assay Detects: benzodiazepines and metabolites at varying concentrations. Lowest detectable concentration is 200 ng/mL of oxazepam.    Cocaine Screen, Urine Not Detected Not Detected  Assay Detects: benzoylecgonine and cocaine in urine. Lowest detectable concentration is 300 ng/mL of benzoylecgonine.    Amphetamine+Methamphetamine Screen, Ur Not Detected Not Detected  Assay Detects: d-methamphetamine, d-amphetamine, methlyenedioxyamphetamine (MDA), and methlyenendioxymethamphetamine (MDMA) in urine. Lowest detectable concentration is 1000 ng/mL of d-methamphetamine.   Assay is less sensitive to MDA and MDMA (lowest detectable concentration, 2500 ng/mL) and could produce a false negative result. If MDMA overdose is suspected and the result is negative, a more specific test should be requested.    Cannabinoid Screen, Urine Not Detected Not Detected  Assay Detects: cannabinoid metabolites in urine. Lowest detectable concentration is 50 ng/mL    Opiate Scrn, Ur Not Detected Not Detected  Assay Detects: codeine, dihydrocodeine, hydrocodone, hydromorphone, levorphanol, morphine, morphine-3-glucuronide, norcodeine, oxycodone in urine. Lowest  detectable concentration is 300 ng/mL of morphine.    Barbiturate Screen, Ur Not Detected Not Detected  Assay Detects: alphenal, amobarbital, aprobarbital, barbital, butabarbital, butalbital, butethal, diallybarbital, pentobarbital, secobarbital,talbutal, and thiopental. Lowest detectable concentration is 200 ng/mL of secobarbital.         Specimen Collected: 21 23:07 Last Resulted: 21 23:38        Lab Flowsheet     Order Details     View Encounter     Lab and Collection Details     Routing     Result History                Patient Information    Patient Name   Gemma Darnell ([id number]) Legal Sex   Female    2000   Results  CBC and differential [[id number]] (Order [id number])  Contains abnormal data CBC and differential  Order: [id number]  Status:  Final result   Visible to patient:  No (not released) Next appt with me:  None   0 Result Notes    Ref Range & Units 21 0544 21 2134 10/5/17 0102    WBC 3.80 - 10.50 K/uL 9.80  8.55  18.70High  R     RBC 3.93 - 5.22 M/uL 4.43  4.30  4.48 R     Hemoglobin 11.8 - 15.7 g/dL 13.3  12.7  13.1 R     Hematocrit 35.0 - 45.0 % 39.7  38.5  38.2 R     MCV 83.0 - 98.0 fL 89.6  89.5  85.3 R     MCH 28.0 - 33.2 pg 30.0  29.5  29.2 R     MCHC 32.2 - 35.5 g/dL 33.5  33.0  34.3 R     RDW 11.7 - 14.4 % 11.6Low   11.6Low   12.7 R     Platelets 150 - 369 K/uL 237  265  303 R     MPV 9.4 - 12.3 fL 10.0  9.9  10.5 R     Differential Type  Auto  Auto  AUTOMATED     nRBC <=0.0 % 0.0  0.0  0 R     Immature Granulocytes % 0.3  0.2  0     Neutrophils % 61.0  63.6  80     Lymphocytes % 28.1  26.7  14     Monocytes % 6.4  8.3  5     Eosinophils % 3.6  0.7  1     Basophils % 0.6  0.5  0     Immature Granulocytes, Absolute 0.00 - 0.08 K/uL 0.03  0.02  0.08High  R     Neutrophils, Absolute 1.70 - 7.00 K/uL 5.98  5.44  14.72High  R     Lymphocytes, Absolute 1.20 - 3.50 K/uL 2.75  2.28  2.70 R     Monocytes, Absolute 0.28 - 0.80 K/uL 0.63  0.71  0.92High  R     Eosinophils,  Absolute 0.04 - 0.36 K/uL 0.35  0.06  0.21 R     Basophils, Absolute 0.01 - 0.10 K/uL 0.06  0.04  0.07High  R          Specimen Collected: 05/21/21 05:44 Last Resulted: 05/21/21 06:20        Lab Flowsheet     Order Details     View Encounter     Lab and Collection Details     Routing     Result History        R=Reference range differs from displayed range        Related Result Highlights     Hemoglobin A1c  Final result 5/21/2021                  Order Providers    Authorizing Provider Encounter Provider   Lara Hunt DO None   Order Report    CBC and differential (Order #[id number]) on 5/21/21   Collection Information    Specimen ID: [id number]    Blood    Blood, Venous    Venipuncture     Collected: 5/21/2021  5:44 AM    SHELLIE HAIDER   Received: 5/21/2021  6:03 AM    Resulting Agency: [facility]    [address] [place] [address]   [place] [address]      Lab Information    Lab   [facility]   [address]   [place] [address]   : Leo Yarbrough MD              Additional Information    Specimen ID Bill Type Client ID   [id number]            Specimen Date Taken Specimen Time Taken Specimen Received Date Specimen Received Time Result Date Result Time   May 21, 2021  5:44 AM May 21, 2021  6:03 AM May 21, 2021  6:20 AM   Result Read / Acknowledged    No acknowledgement history exists for this order.   Guide for Clinicians to build Lab Component [employer]    CBC and differential (Order #[id number]) on 5/21/21   Contains abnormal data CBC and differential  Order: [id number]  Status:  Final result   Visible to patient:  No (not released) Next appt:  None   0 Result Notes    Ref Range & Units 5/21/21 0544 5/17/21 2134 10/5/17 0102    WBC 3.80 - 10.50 K/uL 9.80  8.55  18.70High  R     RBC 3.93 - 5.22 M/uL 4.43  4.30  4.48 R     Hemoglobin 11.8 - 15.7 g/dL 13.3  12.7  13.1 R     Hematocrit 35.0 - 45.0 % 39.7  38.5  38.2 R     MCV 83.0 - 98.0 fL 89.6  89.5  85.3 R     MCH 28.0 -  33.2 pg 30.0  29.5  29.2 R     MCHC 32.2 - 35.5 g/dL 33.5  33.0  34.3 R     RDW 11.7 - 14.4 % 11.6Low   11.6Low   12.7 R     Platelets 150 - 369 K/uL 237  265  303 R     MPV 9.4 - 12.3 fL 10.0  9.9  10.5 R     Differential Type  Auto  Auto  AUTOMATED     nRBC <=0.0 % 0.0  0.0  0 R     Immature Granulocytes % 0.3  0.2  0     Neutrophils % 61.0  63.6  80     Lymphocytes % 28.1  26.7  14     Monocytes % 6.4  8.3  5     Eosinophils % 3.6  0.7  1     Basophils % 0.6  0.5  0     Immature Granulocytes, Absolute 0.00 - 0.08 K/uL 0.03  0.02  0.08High  R     Neutrophils, Absolute 1.70 - 7.00 K/uL 5.98  5.44  14.72High  R     Lymphocytes, Absolute 1.20 - 3.50 K/uL 2.75  2.28  2.70 R     Monocytes, Absolute 0.28 - 0.80 K/uL 0.63  0.71  0.92High  R     Eosinophils, Absolute 0.04 - 0.36 K/uL 0.35  0.06  0.21 R     Basophils, Absolute 0.01 - 0.10 K/uL 0.06  0.04  0.07High  R          Specimen Collected: 21 05:44 Last Resulted: 21 06:20        Lab Flowsheet     Order Details     View Encounter     Lab and Collection Details     Routing     Result History        R=Reference range differs from displayed range        Related Result Highlights     Hemoglobin A1c  Final result 2021                    Patient Information    Patient Name   Gemma Darnell ([id number]) Legal Sex   Female    2000   Results  Comprehensive metabolic panel [LAB17] (Order [id number])  Comprehensive metabolic panel  Order: [id number]  Status:  Final result   Visible to patient:  No (not released) Next appt with me:  None   0 Result Notes    Ref Range & Units 21 0544 21 2134 10/5/17 0102    Sodium 136 - 144 mEQ/L 138  138  142 R     Potassium 3.6 - 5.1 mEQ/L 4.1  3.6 CM  4.0 R, CM     Chloride 98 - 109 mEQ/L 104  106  110High  R     CO2 22 - 32 mEQ/L 26  21Low   22 R     BUN 8 - 20 mg/dL 8  7Low   6Low  R     Creatinine 0.6 - 1.1 mg/dL 0.7  0.9  0.8 R     Glucose 70 - 99 mg/dL 91  95  101High  R     Calcium 8.9 - 10.3 mg/dL 9.6   9.2  9.8 R     AST (SGOT) 15 - 41 IU/L 21       ALT (SGPT) 11 - 54 IU/L 13       Alkaline Phosphatase 35 - 126 IU/L 48       Total Protein 6.0 - 8.2 g/dL 6.6       Albumin 3.4 - 5.0 g/dL 4.0       Bilirubin, Total 0.3 - 1.2 mg/dL 0.4       eGFR >=60.0 mL/min/1.73m*2 >60.0  >60.0      Anion Gap 3 - 15 mEQ/L 8  11  10 R          Specimen Collected: 05/21/21 05:44 Last Resulted: 05/21/21 06:57        Lab Flowsheet     Order Details     View Encounter     Lab and Collection Details     Routing     Result History        CM=Additional comments  R=Reference range differs from displayed range        Related Result Highlights     Lipid panel  Final result 5/21/2021             TSH w reflex FT4  Final result 5/21/2021                  Order Providers    Authorizing Provider Encounter Provider   Lara Hunt DO None   Order Report    Comprehensive metabolic panel (Order #[id number]) on 5/21/21   Collection Information    Specimen ID: [id number]    Blood    Blood, Venous    Venipuncture     Collected: 5/21/2021  5:44 AM    SHELLIE HAIDER   Received: 5/21/2021  6:03 AM    Resulting Agency: [facility]    [address]   [place] [address]      Lab Information    Lab   [facility]   [address]   [place] [address]   : Leo Yarbrough MD              Additional Information    Specimen ID Bill Type Client ID   [id number]            Specimen Date Taken Specimen Time Taken Specimen Received Date Specimen Received Time Result Date Result Time   May 21, 2021  5:44 AM May 21, 2021  6:03 AM May 21, 2021  6:57 AM   Result Read / Acknowledged    No acknowledgement history exists for this order.   Guide for Clinicians to build Lab Component SmartPhrase    Comprehensive metabolic panel (Order #[id number]) on 5/21/21   Comprehensive metabolic panel  Order: [id number]  Status:  Final result   Visible to patient:  No (not released) Next appt:  None   0 Result Notes    Ref Range & Units  5/21/21 0544 5/17/21 2134 10/5/17 0102    Sodium 136 - 144 mEQ/L 138  138  142 R     Potassium 3.6 - 5.1 mEQ/L 4.1  3.6 CM  4.0 R, CM     Chloride 98 - 109 mEQ/L 104  106  110High  R     CO2 22 - 32 mEQ/L 26  21Low   22 R     BUN 8 - 20 mg/dL 8  7Low   6Low  R     Creatinine 0.6 - 1.1 mg/dL 0.7  0.9  0.8 R     Glucose 70 - 99 mg/dL 91  95  101High  R     Calcium 8.9 - 10.3 mg/dL 9.6  9.2  9.8 R     AST (SGOT) 15 - 41 IU/L 21       ALT (SGPT) 11 - 54 IU/L 13       Alkaline Phosphatase 35 - 126 IU/L 48       Total Protein 6.0 - 8.2 g/dL 6.6       Albumin 3.4 - 5.0 g/dL 4.0       Bilirubin, Total 0.3 - 1.2 mg/dL 0.4       eGFR >=60.0 mL/min/1.73m*2 >60.0  >60.0      Anion Gap 3 - 15 mEQ/L 8  11  10 R          Specimen Collected: 05/21/21 05:44 Last Resulted: 05/21/21 06:57        Lab Flowsheet     Order Details     View Encounter     Lab and Collection Details     Routing     Result History        CM=Additional comments  R=Reference range differs from displayed range        Related Result Highlights     Lipid panel  Final result 5/21/2021             TSH w reflex FT4  Final result 5/21/2021                    SARS-CoV-2 (COVID-19), PCR Nasopharynx  Order: [id number]  Collected:  5/23/2021 10:25 Status:  Final result   Visible to patient:  No (not released)  Specimen Information: Nasopharynx; Nasopharyngeal Swab         0 Result Notes        Specimen Collected: 05/23/21 10:25 Last Resulted: 05/23/21 14:33       Order Details     View Encounter     Lab and Collection Details     Routing     Result History              SARS-CoV-2 (COVID-19), PCR Nasopharynx  Order: [id number] - Part of Panel Order [id number]  Collected:  5/23/2021 10:25 Status:  Final result   Visible to patient:  No (not released)  Specimen Information: Nasopharynx; Nasopharyngeal Swab         0 Result Notes   Ref Range & Units    SARS-CoV-2 (COVID-19) Negative Negative    Resulting Agency  [facility]         Specimen Collected: 05/23/21 10:25 Last Resulted:  05/23/21 14:33       Order Details     View Encounter     Lab and Collection Details     Routing     Result History              Collection Information    Specimen ID: [id number]    Nasopharyngeal Swab    Nasopharynx     Collected: 5/23/2021 10:25 AM      Result Read / Acknowledged    SARS-CoV-2 (COVID-19), PCR Nasopharynx (Order [id number])    No acknowledgement history exists for this order.   Guide for Clinicians to build Lab Component SmartPhrase    SARS-CoV-2 (COVID-19), PCR Nasopharynx (Order #[id number]) on 5/23/21   SARS-CoV-2 (COVID-19), PCR Nasopharynx  Order: [id number]  Status:  Final result   Visible to patient:  No (not released) Next appt:  None  Specimen Information: Nasopharynx; Nasopharyngeal Swab         0 Result Notes        Specimen Collected: 05/23/21 10:25 Last Resulted: 05/23/21 14:33       Order Details     View Encounter     Lab and Collection Details     Routing     Result History              SARS-CoV-2 (COVID-19), PCR Nasopharynx  Order: [id number] - Part of Panel Order [id number]  Status:  Final result   Visible to patient:  No (not released) Next appt:  None  Specimen Information: Nasopharynx; Nasopharyngeal Swab         0 Result Notes    Ref Range & Units     SARS-CoV-2 (COVID-19) Negative Negative          Specimen Collected: 05/23/21 10:25 Last Resulted: 05/23/21 14:33       Order Details     View Encounter     Lab and Collection Details     Routing     Result History

## 2021-05-17 NOTE — LETTER
Seven-                   Attached you will find clinical information for the patient, Gemma Randle.  Please feel free to contact me if there are any questions or concerns.  Thanks!            Wendy jennings, ARLETTE  Psychiatric Social Worker  [facility]   [address] [place] [address]  [phone]  [email or web address]                                          Patient Information    Patient Name Address Race   Gemma Randle [id number] White   Patient Legal Name Legal Sex Date of Birth   Gemma Randle Female 2000   Room Ethnic Group Language   G015 Not , /a, or English origin    MRN Phone Numbers PCP   [id number] Hm: [phone] Arlette Tolentino FNP   Patient Demographics    Address   [address] [address] Phone   [phone] (Home)   Active Insurance as of 5/17/2021    Primary Coverage    Payor Plan Insurance Group Employer/Plan Group   IBC PERSONAL CHOICE [id number]    Payor Plan Address Payor Plan Phone Number Payor Plan Fax Number Effective Dates   [address] [phone]  4/1/2019 - None Entered   [place] [address]      Subscriber Name Subscriber Birth Date Member ID    ANASTACIO RANDLE 3/24/1968 [id number]    PCP and Center    Primary Care Provider Phone Center   TEJINDER Heaton [phone] [facility]   Emergency Contact(s)    Name Relation Home Work Mobile   Cindi Schaffer   [phone]   Documents on File     Status Date Received Description   Documents for the Patient   Identification  10/04/17    HIPAA Notice of Privacy Signed 05/17/21    Insurance Card Unable to Obtain 05/17/21    Photo ID Unable to Obtain 05/17/21    Advance Directives and Living Will Not Received     Power of  Not Received     Patient Photo Not Received     CE Auth Form (Scanned) Not Received     CE Prospective Auth Reg Signed 05/17/21    Documents for the Encounter   Hospital Consent and Financial TORITO Signed  05/17/21    CMS IM Copy of Signed Not Received     Lay Caregiver Designation Consent Not Received     Outside Order Not Received     Lab Requisition Scan Not Received     ECG Received (Deleted) 05/17/21    ECG Received (Deleted) 05/18/21    ECG Received 05/18/21    Admission Information    Current Information    Attending Provider Admitting Provider Admission Type Admission Status   Rosen, Howard B, MD Chevalier, Naomi E, MD Emergency Confirmed Admission          Admission Date/Time Discharge Date Hospital Service Auth/Cert Status   05/17/21  09:07 PM  Psychiatry Incomplete          Hospital Area Unit Room/Bed    [facility] PSYCH UNIT G015/G015            Discharge Disposition Discharge Destination   [facility]   Admission    Complaint      Hospital Account    Name Acct ID Class Status Primary Coverage   Gemma Darnell [id number] Inpatient Psych Open IBC - PERSONAL CHOICE          Guarantor Account (for Hospital Account #[id number])    Name Relation to Pt Service Area Active? Acct Type   Gemma Darnell Self [facility] Yes Personal/Family   Address Phone     [address] [place] [address] [phone](H)            Coverage Information (for Hospital Account #[id number])    F/O Payor/Plan Precert #   IBC/PERSONAL CHOICE    Subscriber Subscriber #   CarieGalo [id number]   Address Phone   [address] [address]   [place] [address] [phone]          Medical Record Numbers    [place] Id Number [id number]   [facility] Id Mrn [id number]   [facility] Mrn [id number]   [facility] [id number]     Lara Hunt DO   Physician   Psychiatry   H&P       Addendum   Date of Service:  5/18/2021  1:42 PM               Addendum        Expand AllCollapse All      []Hide copied text    []Hover for details  Psychiatry H and P          Chief Complaint   Patient presents with   • Psychiatric Evaluation       pt reports that she was sent to ER after speaking with thep, pt reports that  "yesterday she took \"some extra meds\" reports that she has been having some SI thoughts, reported that to the who sent her to ER         Gemma Darnell is a 21 y.o. female with previous diagnoses of \"Bipolar disorder,\" anorexia, cocaine and ETOH abuse disorder, now presenting with worsening depression and suicide attempt by OD on Lamictal day before yesterday.     Today I encounter Gemma in her room. She is lying in bed. States that she was last hospitalized at the [facility] for substance and psych treatment Sept-Oct 2020 followed by [facility] for eating d/o Oct-Nov. Since then, \"I was doing ok for awhile\" though she admits she immediately began drinking again after discharge. Her parents  in January which was \"not a surprise, my dad is a heavy drinker\" and \"I think it is for the best\" but she also pinpoints a deterioration in her mental health around that time. Soon after that, she reports that her drinking worsened and recently, she has been drinking up to \"7 vodka shots per day\" but not every day. She denies s/s of withdrawal at this time. Endorses feeling mostly \"detached,\" empty, numb much of the time, with sadness at times. She has had difficulty sleeping prior to admission, and reports a weight loss of about 8 lbs in the last 2 months (though this was purposeful and she believes part of her anorexia rather than result of depression/anxiety), low energy, feeling like she is in a fog with lapses in memory, chronic thoughts of death/dying, self injury in the form of scratching herself (has not broken the skin), difficulty concentrating.      This all culminated the day before yesterday when she took \"half a bottle\" of Lamictal with the clear intent to die. She states, somewhat illogically, that she did not take the whole bottle \"because I knew it wouldn't work to kill me.\" Reports that it was impulsive and when she thinks about it now, she feels \"detached from it, I don't remember it too " "well.\" Following taking the pills, \"My mom set up a PCP appointment because I was giving her mixed signals\" (eg hinting that she had misused medication) but upon presenting to the PCP, she purposefully withheld that she had taken an OD. Following that appointment, she reached out to her therapist who recommended she present here. At this point, she states \"I don't want to be here\" but admits she is not safe to go home.     Other ROS: Reports previous diagnosis of bipolar d/o, was last \"manic\" in Sept 2020 when she stayed up for one week straight and was sexually impulsive, \"sleeping with friends,\" racing thoughts that \"I am a piece of shit\" (which she is experiencing right now as well). Denies feeling invincible/special pierre, denies spending large amounts of money (but has been recently in an effort to \"make myself feel better\"), denies increased goal-oriented activities. Unclear whether she was under the influence of substances at that time.      She reports hearing voices but denies VH. AH are \"in my head\" and say derogatory things such as \"you are a piece of shit.\" I ask if it is her voice and she states \"maybe.\" Does not hear them daily, they are distressing when she does hear them.     Has pushed away most friends recently but still has her mother and sister. Allows me to call her mother Cindi [phone] and outpatient psychiatrist Dr. Simpson though they have only met once.     Psychiatric History:  Suicide Attempts: yes - as above  Risk Factors: Alcohol and/or substance abuse, Easy access to lethal methods and Impulsive or aggressive tendencies   Protective Factors: Effective and accessible mental health care    Current Psychiatrist: yes - Dr. Simpson   Past psychiatric Hospitalization: yes - as above, no other hospitalizations prior to the [facility]  Medication Trials:  yes - reports only Lamictal and Lexapro which she was put on in previous hospitalization  ECT trials: no        Substance Use History: ETOH as " above, cocaine prior to the [facility]nies[facility]ka but reports using only 2-3 times in recent months  Substance use:         Drug Details      Questions Responses     Cocaine frequency Past regular use     Comment: Past regular use on 5/18/2021       Cocaine method Snort     Comment: Snort on 5/18/2021            Consequences of use: Yes:  Blackouts  Past D&A Treatment: Yes: as above     Family History: father with ETOH use disorder, sister with good response to Prozac, cousin and sister have attempted suicide     Social History:   Social History   Social History            Socioeconomic History   • Marital status: Single       Spouse name: None   • Number of children: 0   • Years of education: 12   • Highest education level: High school graduate   Occupational History   • Occupation: [employer]   Tobacco Use   • Smoking status: Current Some Day Smoker       Types: Electronic Cigarette   • Smokeless tobacco: Never Used   Substance and Sexual Activity   • Alcohol use: Yes   • Drug use: Not Currently   • Sexual activity: None   Other Topics Concern   • None   Social History Narrative   • None      Social Determinants of Health          Financial Resource Strain:    • Difficulty of Paying Living Expenses:    Food Insecurity:    • Worried About Running Out of Food in the Last Year:    • Ran Out of Food in the Last Year:    Transportation Needs:    • Lack of Transportation (Medical):    • Lack of Transportation (Non-Medical):    Physical Activity:    • Days of Exercise per Week:    • Minutes of Exercise per Session:    Stress: Stress Concern Present   • Feeling of Stress : To some extent   Social Connections:    • Frequency of Communication with Friends and Family:    • Frequency of Social Gatherings with Friends and Family:    • Attends Faith Services:    • Active Member of Clubs or Organizations:    • Attends Club or Organization Meetings:    • Marital Status:    Intimate Partner Violence:    • Fear of Current or Ex-Partner:    •  "Emotionally Abused:    • Physically Abused:    • Sexually Abused:             Medical History:   Medical History        Past Medical History:   Diagnosis Date   • Eating disorder     • History of ITP              Surgical History:   Surgical History   History reviewed. No pertinent surgical history.        Allergies: No Known Allergies     Current Medications:  SCHEDULED:  • [START ON 5/19/2021] escitalopram  10 mg oral Daily   • [START ON 5/19/2021] FLUoxetine  10 mg oral Daily   • gabapentin  200 mg oral TID   • nicotine  1 patch transdermal Daily   • norethindrone-e.estradioL-iron  1 tablet oral Daily      PRN  •  acetaminophen  •  alum-mag hydroxide-simeth  •  diphenhydrAMINE  •  diphenhydrAMINE  •  haloperidoL **OR** haloperidol lactate  •  ibuprofen  •  LORazepam **OR** LORazepam  •  melatonin  •  metoclopramide  •  nicotine polacrilex  •  senna        Review of Systems  Musculoskeletal:  Grossly intact        Objective         Vital Signs for the last 24 hours:  Temp:  [36.1 °C (97 °F)-36.6 °C (97.9 °F)] 36.1 °C (97 °F)  Heart Rate:  [] 112  Resp:  [18] 18  BP: (114-162)/(71-99) 119/76     Labs       Results from last 7 days   Lab Units 05/17/21  2134   HEMOGLOBIN g/dL 12.7   WBC K/uL 8.55   PLATELETS K/uL 265   POTASSIUM mEQ/L 3.6   SODIUM mEQ/L 138   CREATININE mg/dL 0.9                Ethanol   Date Value Ref Range Status   05/17/2021 <5 <=10 mg/dL Final      No results found for: LITHIUM, TSH, FREET4, Y1DUUSJ, QBCMHRTN35, FOLATE  No results found for: PHENYTOIN, PHENOBARB, VALPROATE, CBMZ     No results found.     No results found for: HDL, LDLCALC, TRIG, CHOL, HGBA1C           MENTAL STATUS EXAM  Appearance: well groomed  Gait and Motor: no abnormal movements  Speech: normal rate/rhythm/volume  Mood: \"detached\"  Affect: blunted  Associations: coherent  Thought Process: goal-directed and vague and contradictory at times  Thought Content: auditory hallucinations  Suicidality/Homicidality: thoughts of " "being dead/ no desire or plan to die  Judgement/Insight: minimizes severity level of illness  Cognition: she appears tired        Assessment/Plan  * Suicide attempt (CMS/[facility])  Assessment & Plan  Ms. Darnell is a 21 year old woman with previous diagnosis of \"bipolar disorder,\" anorexia, ETOH use and cocaine use disorder, now presenting with worsening feelings of detachment/depression culminating in overdose on Lamictal. Today she reports feeling detached, unsafe for discharge. Denies active SIIP at this time and contracts for safety. Initial impression is that substance is a significant factor in symptoms, but may have underlying mood disorder. Symptoms also highly suggestive of borderline personality disorder.      q15 min checks, contracts for safety, no plan or intent to harm herself at this time  Depression NOS, r/o substance induced mood d/o, r/o bipolar d/o, r/o major depression: will taper off Lexapro as she does not believe it has been helping (but admits to irregular compliance). Her sister has had a good experience on Prozac, she would like to try that. Will d/c Lamictal as she admits to taking it irregularly and in this context is at high risk for SJS. She expresses understanding.   For anxiety and off label for ETOH use disorder, will start gabapentin 200 mg po tid to start. Monitor for tolerability.   PRN Atarax 50 mg po tid anxiety  She has been educated to notify staff at any hint of rash following Lamictal OD  ETOH abuse: monitor for withdrawal. She is not exhibiting s/s at this time and VSS. CIWA protocol with symptom-triggered Ativan. She denies hx of withdrawal sz but has had \"cold sweats\" previously.  R/o borderline personality disorder: she would benefit from DBT on outpatient basis.   G/m/s therapy as tolerated, collaterals as permitted                              Revision History                            Lara Hunt DO   Physician   Psychiatry   Progress Notes       Signed   Date of " "Service:  5/19/2021  3:15 PM               Signed             []Hide copied text    []Dylon for details  Psychiatry Progress Note     Chief Complaint/Reason for follow-up: OD on Lamictal, \"I feel like I'm dying\"     Interval History:     Per staff, refused medication in the morning, high anxiety, SI without intent or plan currently, reports feeling numb.     Pt was observed laying in bed, wrapped in covers, with the lights turned off. She \"slept a lot\" yesterday. She continues to avoid food, stating \"I can't eat\" but cannot say why. She thinks her medicine regimen \"is not good for me,\" feels it is causing symptoms though she admits she has been experiencing many of these symptoms for months, even before being on medication at all. She feels \"numb\" but also admits to feeling anxious, sad, is tearful at times.      Pt endorsed some symptoms of alcohol withdrawal: anxiety, \"shaking\", nausea, headache, sweaty palms, itchiness, chills, night sweats but does not think this is due to ETOH withdrawal but rather to medication. She denied hallucinations, palpitations, insomnia. On review of systems and she endorsed nearly every one including: vision changes (couldn't clarify what her changes were), dizziness (\"feel like I'm going to pass out\"), soreness. She says her heart \"is slow\". States \"I feel like I am going to die\" but cannot give more detail/state why.     She denies any intent or plan to harm herself right now and contracts for safety. She does not feel she needs anyone to watch her. Tells me \"I can't even concentrate on what you are saying to me.\"     She socialized yesterday \"for an hour\" but felt like she couldn't concentrate and \"didn't know what to say\".     Later in day following PRN Ativan appeared more relaxed.     Vital Signs for the last 24 hours:  Temp:  [35.9 °C (96.7 °F)-36.9 °C (98.5 °F)] 35.9 °C (96.7 °F)  Heart Rate:  [] 124  Resp:  [16-18] 18  BP: (122-136)/(69-88) 128/85     Mental Status " "Exam:  Pt appeared her stated age (22 yo), was dressed appropriately in sweats, appropriate hygiene, well-groomed. Pt had no stereotypical tics or movements and ambulation was not observed. Pt's speech had normal rate, rhythm, and volume was soft. Pt's mood was \"I feel like I'm dying\". Pt's affect was dysphoric, anxious, tearful at times. Pt's associations were coherent. Thought process was linear and goal-directed but vague. Thought content included no current HI, VH, or AH. Anxiety was \"7/10\" with 10 being the worst and depression was \"9/10\" with 10 being the worst. Continues to endorse passive SI (\"would be ok with no waking up\"). Judgement and insight were appropriate. Cognition: alert, but appears distracted, not sedated.     Assessment/Plan  Gemma Darnell is a 22 yo female with a history of cocaine and alcohol use disorder, anorexia, \"bipolar disorder\", and \"depression\" who presented to the [facility] ED after trying to overdose on Lamictal. Depression, ETOH use disorder, traits suggestive of borderline personality disorder.  1. Depression and anxiety: D/c Lexapro 10mg daily and increase Prozac to 20mg daily, PRN Atarax 50mg PO TID for anxiety  2. ETOH use disorder: increase gabapentin to 400mg PO TID for alcohol use disorder and anxiety, she is refusing at this time, continue to educate and offer but will d/c if continues to refuse. In the meantime, CIWA, monitor VS carefully, symptom triggered Ativan PRN  3. Medical: tachycardic this morning to 124, monitor, otherwise VSS.   4. R/o borderline personality disorder: she would benefit from DBT on outpatient basis.        I discussed the treatment plan and the patient's progress with nursing staff and Allied therapists in the treatment team meeting this morning. Patient is seen and evaluated for approximately 25 minutes in therapy with greater than 50% of time spent in direct face-to-face counseling, coordination of care and review of the medical record.          " "    Lara Hunt,    Physician   Psychiatry   Progress Notes       Signed   Date of Service:  5/20/2021  1:07 PM               Signed             []Hide copied text    []Dylon for details  PSYCHIATRIC PROGRESS NOTE     Chief Complaint/Reason for follow-up: depression, anorexia, ETOH abuse disorder, cluster B traits, suicide attempt     Interval History: Per staff, Ms. Darnell was visible on unit yesterday, coloring with peers, but generally withdrawn, disheveled, poor appetite, apathetic. CIWA of 10 yesterday, received Ativan 2 mg PRN, refusing gabapentin.      CT head today unremarkable.     Today I approach her in her room where she is lying in bed. I ask for her help in understanding what is going on with her. She tells me she does not know. I ask if she is upset about her parents' separation and divorce and she says yes. I ask if there are other stressors and she says she does not know. She feels \"numb.\" States she feels gabapentin is the reason for her symptoms though she was having same symptoms prior to taking gabapentin. She states she is not sure she needs medication. I ask her what she needs if not medication, ie therapy, etc. She states she does not know.      I spoke with Gemma's mother Cindi with her permission. States that Gemma has been staying with boyfriend recently so she does not know onset of current symptoms, but at least since Friday Gemma has been anxious, stating that she feels she is going to die, stating she cannot concentrate. Endorses that she believes eating disorder, substances, and recent psychosocial stressors to be contributing to current clinical picture. She has been encouraging Gemma to take medication and thinks Gemma would benefit from residential program targeting some combination of eating disorder, substance, and mental health following discharge. Gemma \"did really well in high school\" but her sister's mental health problems were difficult for the family for about 4 years " "and things have been chaotic in household with dissolution of marriage and father having difficulty with boundaries.      Vital Signs for the last 24 hours:  Temp:  [36.7 °C (98.1 °F)-37.5 °C (99.5 °F)] 36.9 °C (98.5 °F)  Heart Rate:  [105-120] 120  Resp:  [16-18] 18  BP: (124-141)/() 126/80     Scheduled Meds:  • FLUoxetine  20 mg oral Daily   • gabapentin  200 mg oral TID   • nicotine  1 patch transdermal Daily   • norethindrone-e.estradioL-iron  1 tablet oral Daily         Labs:       Results from last 7 days   Lab Units 05/17/21  2134   HEMOGLOBIN g/dL 12.7   WBC K/uL 8.55   PLATELETS K/uL 265   POTASSIUM mEQ/L 3.6   SODIUM mEQ/L 138   CREATININE mg/dL 0.9         MENTAL STATUS EXAM  Appearance: disheveled  Gait and Motor: no abnormal movements  Speech: soft  Mood: \"I don't know\"  Affect: blunted and dysphoric  Associations: coherent  Thought Process: vague  Thought Content: paucity of thought content  Suicidality/Homicidality: thoughts of being dead/ no desire or plan to die  Judgement/Insight: makes choices that perpetuate illness  Cognition: seems distracted, with poor concentration, but alert     Assessment/Plan  * Suicide attempt (CMS/[facility])  Assessment & Plan  Ms. Darnell is a 21 year old woman with previous diagnosis of anorexia, ETOH use and cocaine use disorder, now presenting with worsening feelings of detachment/depression culminating in overdose on Lamictal. Today she reports feeling detached, unsafe for discharge. Denies active SIIP at this time and contracts for safety. ETOH use disorder, substance-induced mood disorder, anorexia, symptoms also suggestive of borderline personality disorder.      q15 min checks, contracts for safety, no plan or intent to harm herself at this time  Depression NOS, r/o substance induced mood d/o, r/o major depression, bipolar disorder lower on ddx: C/w Prozac 20 mg po daily, c/w gabapentin for now though she is refusing it for unclear reason, will explore " "alternatives for acute anxiety. Monitor for tolerability. PRN Atarax 50 mg po tid anxiety  She has been educated to notify staff at any hint of rash following Lamictal OD  ETOH abuse: monitor for withdrawal. Has been about 5 days since last known drink. This morning she is not exhibiting s/s at this time and VSS, . CIWA protocol with symptom-triggered Ativan.  R/o borderline personality disorder: she would benefit from DBT on outpatient basis.   Medical:   -difficulty concentrating/AMS: baseline labs unremarkable, head CT unremarkable. Continue to monitor, repeat labs tomorrow  -Anorexia: she has been restricting intake. Repeat labs tomorrow. Nutrition consult. Provide Boost. Monitor VS - poor po intake likely contributing to tachycardia.  G/m/s therapy as tolerated, collaterals as permitted. She would benefit from residential program targeting some combination of substance, eating disorder, mental josey/DBT        I discussed the treatment plan and the patient's progress with nursing staff and Allied therapists in the treatment team meeting this morning. Patient is seen and evaluated for approximately 25 minutes in therapy with greater than 50% of time spent in direct face-to-face counseling, coordination of care and review of the medical record.              Lara Hunt DO   Physician   Psychiatry   Progress Notes       Addendum   Date of Service:  5/21/2021  1:19 PM               Addendum             []Hide copied text    []Dylon for details  Psychiatry Progress Note     Chief Complaint/Reason for follow-up: OD on Lamictal; \"I feel like I'm dying\"     Interval History:  Per staff, visible, minimally engaged, \"flat and sedentary.\" Complaining of memory difficulty. Not eating much but good fluid intake.     Today we speak in her room. She is tearful today, appears less blunted and more depressed. She slept \"ok - still tired\".  Tells me her memory is \"horrible.\" She can't remember our conversation from " "yesterday. She is \"having a hard time socializing\" because \"I'm not myself here.\" She cried and asked \"why am I here?\"  She denied active SI but described \"feeling like I'm dying, I feel like there is something wrong.\" I ask her what is wrong and she indicates her arms. With a lot of coaxing, she tells me her arms do not look normal to her, I ask what is abnormal and after long latency she says \"I don't know.\" I ask if it is the color, or a feeling, and she states it is both. States her arms look \"red.\" Upon exam there is nothing grossly remarkable about her arms.      Regarding medication, she is hesitant to take it but cannot articulate why. States \"it scares me.\" I point out that she was drinking significant ETOH outside the hospital which is more harmful to her body than the medication, and that it is also scary to continue feeling this way. She agrees with this.         Vital Signs for the last 24 hours:  Temp:  [36.9 °C (98.5 °F)-37.1 °C (98.8 °F)] 36.9 °C (98.5 °F)  Heart Rate:  [] 114  Resp:  [14-16] 14  BP: (111-140)/(72-90) 140/72     Mental Status Exam:  Pt appeared her stated age (22 yo), was dressed in sweats, unkempt. Pt had no stereotypical tics or movements and ambulation was appropriate. Pt's speech had normal rate, rhythm, and volume was soft. Pt's mood was \"I don't know\". Pt's affect was depressed, constricted, tearful, anxious. Pt's associations were coherent. Thought process was linear and goal-directed, vague. Thought content included no current SI, HI, VH, or AH. Judgement and insight were limited. Cognition was fair. Alert and oriented x 2.     Assessment/Plan  Gemma Darnell is a 22 yo female with a history of cocaine and alcohol use disorder, anorexia, now presenting to the [facility] ED after overdose on Lamictal. ETOH use disorder, anorexia, MDD. R/o borderline personality disorder.     1. MDD, anxiety: Continue Proxac 20mg daily for depression. Continue to offer gabapentin 200mg PO " "TID for alcohol use disorder and anxiety though that she has beenconsistently refusing, though she cannot articulate why she does not want it. Psychoeducation provided as she is in an acutely anxious state. Given her presentation with intense anxiety, disorganized state, will initiate Risperdal 0.5 mg po bid. PRN Atarax 50mg PO TID for anxiety  2. R/o borderline personality disorder: she would benefit from DBT on outpatient basis.   3. Anorexia: monitor PO intake. VS and labs today WNL. Would benefit from ongoing outpatient care.  4. Substance use disorder: motivational interviewing, would benefit from ongoing outpatient treatment  5. G/m/s therapy as tolerated, ongoing collaterals as permitted.        I discussed the treatment plan and the patient's progress with nursing staff and Allied therapists in the treatment team meeting this morning. Patient is seen and evaluated for approximately 25 minutes in therapy with greater than 50% of time spent in direct face-to-face counseling, coordination of care and review of the medical record.               Revision History                       Jermaine Pham MD   Physician   Psychiatry   Progress Notes       Signed   Date of Service:  5/22/2021 12:15 PM               Signed             []Hide copied text    []Errolver for details  Psychiatry Progress Note     Chief Complaint/Reason for follow-up:  MDD, anxiety, OD on lamictal     Interval History:  Mood is not as good today as yesterday and associates this with being \"nervous about discharge.\"  Sleep wsa broken, Appetite is low but she did eat breakfast.  She denies si/hi/plan/intent, no passive si.  She is looking forward to going back to a different school and looking forward to family.  No ah/vh/paranoia/no grandiosity.      Review of Systems:    Appetite is poor to fair, no gi complaints, sleep was poor to fair, broken     Vital Signs for the last 24 hours:  Temp:  [36.7 °C (98.1 °F)-36.9 °C (98.4 °F)] 36.9 °C (98.4 " "°F)  Heart Rate:  [100-117] 117  Resp:  [14-16] 16  BP: (106-132)/(77-83) 106/77        Mental Status Exam:   Appearance: appears stated age, appropriately dressed  Behavior: cooperative  Speech: normal r/r/t/v and no pressure/paucity  TP: linear  TC: no si/hi/plan/intent, no passive si, no grandiosity, no paranoia  Perception: no ah/vh, no ris  Mood: not as good  Affect: not labile, had a mask on  Insight: fair  Judgment: fair  A&Ox3        Assessment/Plan   Gemma Darnell is a 20 yo female with a history of cocaine and alcohol use disorder, anorexia, now presenting to the [place] ED after overdose on Lamictal. ETOH use disorder, anorexia, MDD. R/o borderline personality disorder.     1. MDD, anxiety: Continue Proxac 20mg daily for depression. Continue to offer gabapentin 200mg PO TID for alcohol use disorder and anxiety though that she has beenconsistently refusing, though she cannot articulate why she does not want it. Psychoeducation provided as she is in an acutely anxious state. Given her presentation with intense anxiety, disorganized state, will initiate Risperdal 0.5 mg po bid. PRN Atarax 50mg PO TID for anxiety  2. R/o borderline personality disorder: she would benefit from DBT on outpatient basis.   3. Anorexia: monitor PO intake. VS   WNL. Would benefit from ongoing outpatient care.  4. Substance use disorder: motivational interviewing, would benefit from ongoing outpatient treatment  5. G/m/s therapy as tolerated, ongoing collaterals as permitted.              Jermaine Pham MD   Physician   Psychiatry   Progress Notes       Signed   Date of Service:  5/23/2021 11:42 AM               Signed             []Hide copied text    []Hover for details  Psychiatry Progress Note     Chief Complaint/Reason for follow-up:    MDD, anxiety, OD on lamictal  Interval History:  She was in bed resting.   She reports that is \"tired.\"  She was not certain how her mood was,  She denies si/hi/plan/intent, no passive si. " She denies racing thoughts.  Appetite is ok.     She denies side effects from medication.     Review of Systems:   Appetite is poor to fair, no gi complaints, sleep is fair      Vital Signs for the last 24 hours:  Temp:  [36.7 °C (98 °F)-36.9 °C (98.4 °F)] 36.7 °C (98 °F)  Heart Rate:  [104-133] 119  Resp:  [16] 16  BP: (100-117)/(73-85) 109/76       Mental Status Exam:   Appearance: appears stated age, appropriately dressed  Behavior: cooperative  Speech: normal r/r/t/v and no pressure/paucity  TP: linear  TC: no si/hi/plan/intent, no passive si, no grandiosity, no paranoia  Perception: no ah/vh, no ris  Mood: tired  Affect: not labile, had a mask on  Insight: fair  Judgment: fair  A&Ox3     Assessment/Plan   Gemma Darnell is a 20 yo female with a history of cocaine and alcohol use disorder, anorexia, now presenting to the [facility] ED after overdose on Lamictal. ETOH use disorder, anorexia, MDD. R/o borderline personality disorder.     1. MDD, anxiety: Continue Proxac 20mg daily for depression. Continue to offer gabapentin 200mg PO TID for alcohol use disorder and anxiety though that she has beenconsistently refusing, though she cannot articulate why she does not want it. Psychoeducation provided as she is in an acutely anxious state. Given her presentation with intense anxiety, disorganized state, will initiate Risperdal 0.5 mg po bid. PRN Atarax 50mg PO TID for anxiety  2. R/o borderline personality disorder: she would benefit from DBT on outpatient basis.   3. Anorexia: monitor PO intake. VS   WNL. Would benefit from ongoing outpatient care.  4. Substance use disorder: motivational interviewing, would benefit from ongoing outpatient treatment  5. G/m/s therapy as tolerated, ongoing collaterals as permitted  6. Advised not to take naps as this could effect her night time sleep                    Lidia Brewer MD   Physician   Psychiatry   Progress Notes       Signed   Date of Service:  5/24/2021  8:30 AM     "           Signed             []Hide copied text    []Hover for details  Psychiatry Progress Note     Chief Complaint/Reason for follow-up:    MDD, anxiety, OD on lamictal  Hx of substance abuse and eating disorder     Interval History:    Discussed patient's treatment progress with nurses and allied therapists in morning meeting this morning. Gemma has been restricting but will drink Boost/Gatorade. CIWA has been continued. Patient stated she refuses to take gabapentin because it \"makes me feel weird.\" She stated she has been dissociating constantly. She noted feeling forgetful. She described her mood as \"pretty depressed\" and noted she feels worse since admission. Gemma's nurse pointed out that poor PO intake may be influencing her mental status and Gemma agreed.  She has passive wishes to die but no intent or plan. She feels guilt about needing medication.      Review of Systems:   Appetite is poor to fair, no gi complaints, sleep is fair      Vital Signs for the last 24 hours:  Temp:  [36.9 °C (98.4 °F)-37.1 °C (98.8 °F)] 37.1 °C (98.8 °F)  Heart Rate:  [106-125] 125  Resp:  [16] 16  BP: (121-142)/(80-91) 142/80       Mental Status Exam:   Appearance: appears stated age, appropriately dressed  Behavior: cooperative but guarded   Speech: normal r/r/t/v and no pressure/paucity  TP: linear, vague  TC: no si/hi/plan/intent, + passive si, no grandiosity, no paranoia  Perception: no ah/vh, no ris  Mood: tired  Affect: not labile, had a mask on  Insight: fair  Judgment: fair  A&Ox3     Assessment/Plan   Gemma Darnell is a 20 yo female with a history of cocaine and alcohol use disorder, anorexia, now presenting to the [place] ED after overdose on Lamictal. ETOH use disorder, anorexia, MDD. R/o borderline personality disorder.     1. MDD, anxiety: Continue Proxac 20mg daily for depression. D/C gabapentin. Psychoeducation provided as she is in an acutely anxious state. Given her presentation with intense anxiety, " "disorganized state, continue Risperdal 0.5 mg po bid. PRN Atarax 50mg PO TID for anxiety  2. R/o borderline personality disorder: she would benefit from DBT on outpatient basis.   3. Anorexia: monitor PO intake. VS   WNL. Would benefit from ongoing outpatient care.  4. Substance use disorder: motivational interviewing, would benefit from ongoing outpatient treatment  5. G/m/s therapy as tolerated, ongoing collaterals as permitted  6. Advised not to take naps as this could effect her night time sleep     I spent 28 minutes on this date of service performing the following activities: obtaining history, performing examination, documenting, preparing for visit, obtaining / reviewing records, providing counseling and education and coordinating care.                 Marciano Estrella MD   Physician   Psychiatry   Progress Notes       Signed   Date of Service:  5/25/2021 11:21 AM               Signed             []Hide copied text    []Dylon for details  This exam was done through synchronous audio-visual services.  The patient consented to the telehealth exam.  The patient provider were based in [place].  The patient was on the [facility] psychiatric unit.  The following other people were present during the interview: No one     Psychiatry Progress Note     Chief Complaint/Reason for follow-up:    MDD, anxiety, OD on lamictal  Hx of substance abuse and eating disorder     I met the patient today for the first time.  I reviewed her hospital psychiatric records.  She is on Prozac 20 mg daily.     Today the patient when queried says \"I do not feel right\".  She does not feel her meds are working.  Yet she asks to be discharged.  She admits that she is very unhappy here on the unit.  She is very negative.  She seems to me hopeless.  I explained to her that the Prozac which was started on 5/20 has not had time to work.  When I read her Dr. Brewer's note of yesterday saying she has passive SI.  The patient answers \"I do not " "remember\".  When I asked her to rate today's depression and anxiety on a scale.  She replies \"I do not know\" and won't try.     Mental status: Patient is fully oriented x3.  She recalls my name.  She is negative and hopeless.  Mood is of depression.  She is unsmiling.  Speech is coherent and goal-directed without evidence of psychosis but is nonspontaneous and terse.  She denies SI/HI.  She denies AH/VH.     Plan:  Continue psychiatric hospitalization  Continue Prozac 20 mg and Risperdal 0.5 mg twice daily                       Patient Information    Patient Name   Gemma Darnell ([id number]) Legal Sex   Female    2000   Results  ER toxicology screen, serum [[id number]] (Order [id number])  Contains abnormal data ER toxicology screen, serum  Order: [id number]  Status:  Final result   Visible to patient:  No (not released) Next appt with me:  None   0 Result Notes    Ref Range & Units 21 2134    Salicylate <=30.0 mg/dL <4.0     Acetaminophen 10.0 - 30.0 ug/mL <10.0Low      Ethanol <=10 mg/dL <5          Specimen Collected: 21 21:34 Last Resulted: 21 22:11        Lab Flowsheet     Order Details     View Encounter     Lab and Collection Details     Routing     Result History           Related Result Highlights     Basic metabolic panel  Final result 2021                  Order Providers    Authorizing Provider Encounter Provider   Colt Sawyer MD None   Order Report    ER toxicology screen, serum (Order #[id number]) on 21   Collection Information    Specimen ID: [id number]    Blood    Blood, Venous    Venipuncture     Collected: 2021  9:34 PM    YONATAN ROSALES II   Received: 2021  9:39 PM    Resulting Agency: [facility]    [address]   [place] [address]      Lab Information    Lab   [facility]   [address]   [place] [address]   : Leo Yarbrough MD              Additional Information    Specimen ID Bill Type " Client ID   [id number]            Specimen Date Taken Specimen Time Taken Specimen Received Date Specimen Received Time Result Date Result Time   May 17, 2021  9:34 PM May 17, 2021  9:39 PM May 17, 2021 10:11 PM   Result Read / Acknowledged    No acknowledgement history exists for this order.   Guide for Clinicians to build Lab [facility]    ER toxicology screen, serum (Order #[id number]) on 21   Contains abnormal data ER toxicology screen, serum  Order: [id number]  Status:  Final result   Visible to patient:  No (not released) Next appt:  None   0 Result Notes    Ref Range & Units 21 2134    Salicylate <=30.0 mg/dL <4.0     Acetaminophen 10.0 - 30.0 ug/mL <10.0Low      Ethanol <=10 mg/dL <5          Specimen Collected: 21 21:34 Last Resulted: 21 22:11        Lab Flowsheet     Order Details     View Encounter     Lab and Collection Details     Routing     Result History           Related Result Highlights     Basic metabolic panel  Final result 2021                    Patient Information    Patient Name   Gemma Darnell ([id number]) Legal Sex   Female    2000   Results  Urine drug screen (UDS) [[id number]] (Order [id number])  Urine drug screen (UDS)  Order: [id number]  Status:  Final result   Visible to patient:  No (not released) Next appt with me:  None   0 Result Notes    Ref Range & Units 21 2307 Comments    PCP Scrn, Ur Not Detected Not Detected  Assay Detects: phencyclidine in urine. Lowest detectable concentration is 25 ng/mL of phencyclidine.    Benzodiazepine Ur Qual Not Detected Not Detected  Assay Detects: benzodiazepines and metabolites at varying concentrations. Lowest detectable concentration is 200 ng/mL of oxazepam.    Cocaine Screen, Urine Not Detected Not Detected  Assay Detects: benzoylecgonine and cocaine in urine. Lowest detectable concentration is 300 ng/mL of benzoylecgonine.    Amphetamine+Methamphetamine Screen, Ur Not Detected Not Detected   Assay Detects: d-methamphetamine, d-amphetamine, methlyenedioxyamphetamine (MDA), and methlyenendioxymethamphetamine (MDMA) in urine. Lowest detectable concentration is 1000 ng/mL of d-methamphetamine.   Assay is less sensitive to MDA and MDMA (lowest detectable concentration, 2500 ng/mL) and could produce a false negative result. If MDMA overdose is suspected and the result is negative, a more specific test should be requested.    Cannabinoid Screen, Urine Not Detected Not Detected  Assay Detects: cannabinoid metabolites in urine. Lowest detectable concentration is 50 ng/mL    Opiate Scrn, Ur Not Detected Not Detected  Assay Detects: codeine, dihydrocodeine, hydrocodone, hydromorphone, levorphanol, morphine, morphine-3-glucuronide, norcodeine, oxycodone in urine. Lowest detectable concentration is 300 ng/mL of morphine.    Barbiturate Screen, Ur Not Detected Not Detected  Assay Detects: alphenal, amobarbital, aprobarbital, barbital, butabarbital, butalbital, butethal, diallybarbital, pentobarbital, secobarbital,talbutal, and thiopental. Lowest detectable concentration is 200 ng/mL of secobarbital.         Specimen Collected: 05/17/21 23:07 Last Resulted: 05/17/21 23:38        Lab Flowsheet     Order Details     View Encounter     Lab and Collection Details     Routing     Result History              Order Providers    Authorizing Provider Encounter Provider   Colt Sawyer MD None   Order Report    Urine drug screen (UDS) (Order #[id number]) on 5/17/21   Collection Information    Specimen ID: [id number]    Urine    Urine, Clean Catch    Non-blood Collection     Collected: 5/17/2021 11:07 PM    PRABHU DAS   Received: 5/17/2021 11:13 PM    Resulting Agency: [facility] [place] [facility]    [address]   [place] [address]      Lab Information    Lab   [facility] [place] [facility]   [address]   [place] [address]   : Leo Yarbrough MD              Additional Information    Specimen ID Bill Type  Client ID   [id number]            Specimen Date Taken Specimen Time Taken Specimen Received Date Specimen Received Time Result Date Result Time   May 17, 2021 11:07 PM May 17, 2021 11:13 PM May 17, 2021 11:38 PM   Result Read / Acknowledged    No acknowledgement history exists for this order.   Guide for Clinicians to build [facility]    Urine drug screen (UDS) (Order #[id number]) on 5/17/21   Urine drug screen (UDS)  Order: [id number]  Status:  Final result   Visible to patient:  No (not released) Next appt:  None   0 Result Notes    Ref Range & Units 5/17/21 2307 Comments    PCP Scrn, Ur Not Detected Not Detected  Assay Detects: phencyclidine in urine. Lowest detectable concentration is 25 ng/mL of phencyclidine.    Benzodiazepine Ur Qual Not Detected Not Detected  Assay Detects: benzodiazepines and metabolites at varying concentrations. Lowest detectable concentration is 200 ng/mL of oxazepam.    Cocaine Screen, Urine Not Detected Not Detected  Assay Detects: benzoylecgonine and cocaine in urine. Lowest detectable concentration is 300 ng/mL of benzoylecgonine.    Amphetamine+Methamphetamine Screen, Ur Not Detected Not Detected  Assay Detects: d-methamphetamine, d-amphetamine, methlyenedioxyamphetamine (MDA), and methlyenendioxymethamphetamine (MDMA) in urine. Lowest detectable concentration is 1000 ng/mL of d-methamphetamine.   Assay is less sensitive to MDA and MDMA (lowest detectable concentration, 2500 ng/mL) and could produce a false negative result. If MDMA overdose is suspected and the result is negative, a more specific test should be requested.    Cannabinoid Screen, Urine Not Detected Not Detected  Assay Detects: cannabinoid metabolites in urine. Lowest detectable concentration is 50 ng/mL    Opiate Scrn, Ur Not Detected Not Detected  Assay Detects: codeine, dihydrocodeine, hydrocodone, hydromorphone, levorphanol, morphine, morphine-3-glucuronide, norcodeine, oxycodone in urine. Lowest  detectable concentration is 300 ng/mL of morphine.    Barbiturate Screen, Ur Not Detected Not Detected  Assay Detects: alphenal, amobarbital, aprobarbital, barbital, butabarbital, butalbital, butethal, diallybarbital, pentobarbital, secobarbital,talbutal, and thiopental. Lowest detectable concentration is 200 ng/mL of secobarbital.         Specimen Collected: 21 23:07 Last Resulted: 21 23:38        Lab Flowsheet     Order Details     View Encounter     Lab and Collection Details     Routing     Result History                Patient Information    Patient Name   Gemma Darnell ([id number]) Legal Sex   Female    2000   Results  CBC and differential [[id number]] (Order [id number])  Contains abnormal data CBC and differential  Order: [id number]  Status:  Final result   Visible to patient:  No (not released) Next appt with me:  None   0 Result Notes    Ref Range & Units 21 0544 21 2134 10/5/17 0102    WBC 3.80 - 10.50 K/uL 9.80  8.55  18.70High  R     RBC 3.93 - 5.22 M/uL 4.43  4.30  4.48 R     Hemoglobin 11.8 - 15.7 g/dL 13.3  12.7  13.1 R     Hematocrit 35.0 - 45.0 % 39.7  38.5  38.2 R     MCV 83.0 - 98.0 fL 89.6  89.5  85.3 R     MCH 28.0 - 33.2 pg 30.0  29.5  29.2 R     MCHC 32.2 - 35.5 g/dL 33.5  33.0  34.3 R     RDW 11.7 - 14.4 % 11.6Low   11.6Low   12.7 R     Platelets 150 - 369 K/uL 237  265  303 R     MPV 9.4 - 12.3 fL 10.0  9.9  10.5 R     Differential Type  Auto  Auto  AUTOMATED     nRBC <=0.0 % 0.0  0.0  0 R     Immature Granulocytes % 0.3  0.2  0     Neutrophils % 61.0  63.6  80     Lymphocytes % 28.1  26.7  14     Monocytes % 6.4  8.3  5     Eosinophils % 3.6  0.7  1     Basophils % 0.6  0.5  0     Immature Granulocytes, Absolute 0.00 - 0.08 K/uL 0.03  0.02  0.08High  R     Neutrophils, Absolute 1.70 - 7.00 K/uL 5.98  5.44  14.72High  R     Lymphocytes, Absolute 1.20 - 3.50 K/uL 2.75  2.28  2.70 R     Monocytes, Absolute 0.28 - 0.80 K/uL 0.63  0.71  0.92High  R     Eosinophils,  Absolute 0.04 - 0.36 K/uL 0.35  0.06  0.21 R     Basophils, Absolute 0.01 - 0.10 K/uL 0.06  0.04  0.07High  R          Specimen Collected: 05/21/21 05:44 Last Resulted: 05/21/21 06:20        Lab Flowsheet     Order Details     View Encounter     Lab and Collection Details     Routing     Result History        R=Reference range differs from displayed range        Related Result Highlights     Hemoglobin A1c  Final result 5/21/2021                  Order Providers    Authorizing Provider Encounter Provider   Lara Hunt DO None   Order Report    CBC and differential (Order #[id number]) on 5/21/21   Collection Information    Specimen ID: [id number]    Blood    Blood, Venous    Venipuncture     Collected: 5/21/2021  5:44 AM    SHELLIE HAIDER   Received: 5/21/2021  6:03 AM    Resulting Agency: [facility]    [address]   [place] [address]      Lab Information    Lab   [facility]   [address]   [place] [address]   : Leo Yarbrough MD              Additional Information    Specimen ID Bill Type Client ID   [id number]            Specimen Date Taken Specimen Time Taken Specimen Received Date Specimen Received Time Result Date Result Time   May 21, 2021  5:44 AM May 21, 2021  6:03 AM May 21, 2021  6:20 AM   Result Read / Acknowledged    No acknowledgement history exists for this order.   Guide for Clinicians to build Lab Component [employer]    CBC and differential (Order #[id number]) on 5/21/21   Contains abnormal data CBC and differential  Order: [id number]  Status:  Final result   Visible to patient:  No (not released) Next appt:  None   0 Result Notes    Ref Range & Units 5/21/21 0544 5/17/21 2134 10/5/17 0102    WBC 3.80 - 10.50 K/uL 9.80  8.55  18.70High  R     RBC 3.93 - 5.22 M/uL 4.43  4.30  4.48 R     Hemoglobin 11.8 - 15.7 g/dL 13.3  12.7  13.1 R     Hematocrit 35.0 - 45.0 % 39.7  38.5  38.2 R     MCV 83.0 - 98.0 fL 89.6  89.5  85.3 R     MCH 28.0 -  33.2 pg 30.0  29.5  29.2 R     MCHC 32.2 - 35.5 g/dL 33.5  33.0  34.3 R     RDW 11.7 - 14.4 % 11.6Low   11.6Low   12.7 R     Platelets 150 - 369 K/uL 237  265  303 R     MPV 9.4 - 12.3 fL 10.0  9.9  10.5 R     Differential Type  Auto  Auto  AUTOMATED     nRBC <=0.0 % 0.0  0.0  0 R     Immature Granulocytes % 0.3  0.2  0     Neutrophils % 61.0  63.6  80     Lymphocytes % 28.1  26.7  14     Monocytes % 6.4  8.3  5     Eosinophils % 3.6  0.7  1     Basophils % 0.6  0.5  0     Immature Granulocytes, Absolute 0.00 - 0.08 K/uL 0.03  0.02  0.08High  R     Neutrophils, Absolute 1.70 - 7.00 K/uL 5.98  5.44  14.72High  R     Lymphocytes, Absolute 1.20 - 3.50 K/uL 2.75  2.28  2.70 R     Monocytes, Absolute 0.28 - 0.80 K/uL 0.63  0.71  0.92High  R     Eosinophils, Absolute 0.04 - 0.36 K/uL 0.35  0.06  0.21 R     Basophils, Absolute 0.01 - 0.10 K/uL 0.06  0.04  0.07High  R          Specimen Collected: 21 05:44 Last Resulted: 21 06:20        Lab Flowsheet     Order Details     View Encounter     Lab and Collection Details     Routing     Result History        R=Reference range differs from displayed range        Related Result Highlights     Hemoglobin A1c  Final result 2021                    Patient Information    Patient Name   Gemma Darnell ([id number]) Legal Sex   Female    2000   Results  Comprehensive metabolic panel [LAB17] (Order [id number])  Comprehensive metabolic panel  Order: [id number]  Status:  Final result   Visible to patient:  No (not released) Next appt with me:  None   0 Result Notes    Ref Range & Units 21 0544 21 2134 10/5/17 0102    Sodium 136 - 144 mEQ/L 138  138  142 R     Potassium 3.6 - 5.1 mEQ/L 4.1  3.6 CM  4.0 R, CM     Chloride 98 - 109 mEQ/L 104  106  110High  R     CO2 22 - 32 mEQ/L 26  21Low   22 R     BUN 8 - 20 mg/dL 8  7Low   6Low  R     Creatinine 0.6 - 1.1 mg/dL 0.7  0.9  0.8 R     Glucose 70 - 99 mg/dL 91  95  101High  R     Calcium 8.9 - 10.3 mg/dL 9.6   9.2  9.8 R     AST (SGOT) 15 - 41 IU/L 21       ALT (SGPT) 11 - 54 IU/L 13       Alkaline Phosphatase 35 - 126 IU/L 48       Total Protein 6.0 - 8.2 g/dL 6.6       Albumin 3.4 - 5.0 g/dL 4.0       Bilirubin, Total 0.3 - 1.2 mg/dL 0.4       eGFR >=60.0 mL/min/1.73m*2 >60.0  >60.0      Anion Gap 3 - 15 mEQ/L 8  11  10 R          Specimen Collected: 05/21/21 05:44 Last Resulted: 05/21/21 06:57        Lab Flowsheet     Order Details     View Encounter     Lab and Collection Details     Routing     Result History        CM=Additional comments  R=Reference range differs from displayed range        Related Result Highlights     Lipid panel  Final result 5/21/2021             TSH w reflex FT4  Final result 5/21/2021                  Order Providers    Authorizing Provider Encounter Provider   Lara Hunt DO None   Order Report    Comprehensive metabolic panel (Order #[id number]) on 5/21/21   Collection Information    Specimen ID: [id number]    Blood    Blood, Venous    Venipuncture     Collected: 5/21/2021  5:44 AM    SHELLIE HAIDER   Received: 5/21/2021  6:03 AM    Resulting Agency: [facility]    [address]   [place] [address]      Lab Information    Lab   [facility]   [address] [place] [address]   [place] [address]   : Leo Yarbrough MD              Additional Information    Specimen ID Bill Type Client ID   [id number]            Specimen Date Taken Specimen Time Taken Specimen Received Date Specimen Received Time Result Date Result Time   May 21, 2021  5:44 AM May 21, 2021  6:03 AM May 21, 2021  6:57 AM   Result Read / Acknowledged    No acknowledgement history exists for this order.   Guide for Clinicians to build Lab Component SmartPhrase    Comprehensive metabolic panel (Order #[id number]) on 5/21/21   Comprehensive metabolic panel  Order: [id number]  Status:  Final result   Visible to patient:  No (not released) Next appt:  None   0 Result Notes    Ref Range & Units  5/21/21 0544 5/17/21 2134 10/5/17 0102    Sodium 136 - 144 mEQ/L 138  138  142 R     Potassium 3.6 - 5.1 mEQ/L 4.1  3.6 CM  4.0 R, CM     Chloride 98 - 109 mEQ/L 104  106  110High  R     CO2 22 - 32 mEQ/L 26  21Low   22 R     BUN 8 - 20 mg/dL 8  7Low   6Low  R     Creatinine 0.6 - 1.1 mg/dL 0.7  0.9  0.8 R     Glucose 70 - 99 mg/dL 91  95  101High  R     Calcium 8.9 - 10.3 mg/dL 9.6  9.2  9.8 R     AST (SGOT) 15 - 41 IU/L 21       ALT (SGPT) 11 - 54 IU/L 13       Alkaline Phosphatase 35 - 126 IU/L 48       Total Protein 6.0 - 8.2 g/dL 6.6       Albumin 3.4 - 5.0 g/dL 4.0       Bilirubin, Total 0.3 - 1.2 mg/dL 0.4       eGFR >=60.0 mL/min/1.73m*2 >60.0  >60.0      Anion Gap 3 - 15 mEQ/L 8  11  10 R          Specimen Collected: 05/21/21 05:44 Last Resulted: 05/21/21 06:57        Lab Flowsheet     Order Details     View Encounter     Lab and Collection Details     Routing     Result History        CM=Additional comments  R=Reference range differs from displayed range        Related Result Highlights     Lipid panel  Final result 5/21/2021             TSH w reflex FT4  Final result 5/21/2021                    SARS-CoV-2 (COVID-19), PCR Nasopharynx  Order: [id number]  Collected:  5/23/2021 10:25 Status:  Final result   Visible to patient:  No (not released)  Specimen Information: Nasopharynx; Nasopharyngeal Swab         0 Result Notes        Specimen Collected: 05/23/21 10:25 Last Resulted: 05/23/21 14:33       Order Details     View Encounter     Lab and Collection Details     Routing     Result History              SARS-CoV-2 (COVID-19), PCR Nasopharynx  Order: [id number] - Part of Panel Order [id number]  Collected:  5/23/2021 10:25 Status:  Final result   Visible to patient:  No (not released)  Specimen Information: Nasopharynx; Nasopharyngeal Swab         0 Result Notes   Ref Range & Units    SARS-CoV-2 (COVID-19) Negative Negative    Resulting Agency  [facility]         Specimen Collected: 05/23/21 10:25 Last Resulted:  05/23/21 14:33       Order Details     View Encounter     Lab and Collection Details     Routing     Result History              Collection Information    Specimen ID: [id number]    Nasopharyngeal Swab    Nasopharynx     Collected: 5/23/2021 10:25 AM      Result Read / Acknowledged    SARS-CoV-2 (COVID-19), PCR Nasopharynx (Order [id number])    No acknowledgement history exists for this order.   Guide for Clinicians to build Lab Component SmartPhrase    SARS-CoV-2 (COVID-19), PCR Nasopharynx (Order #[id number]) on 5/23/21   SARS-CoV-2 (COVID-19), PCR Nasopharynx  Order: [id number]  Status:  Final result   Visible to patient:  No (not released) Next appt:  None  Specimen Information: Nasopharynx; Nasopharyngeal Swab         0 Result Notes        Specimen Collected: 05/23/21 10:25 Last Resulted: 05/23/21 14:33       Order Details     View Encounter     Lab and Collection Details     Routing     Result History              SARS-CoV-2 (COVID-19), PCR Nasopharynx  Order: [id number] - Part of Panel Order [id number]  Status:  Final result   Visible to patient:  No (not released) Next appt:  None  Specimen Information: Nasopharynx; Nasopharyngeal Swab         0 Result Notes    Ref Range & Units     SARS-CoV-2 (COVID-19) Negative Negative          Specimen Collected: 05/23/21 10:25 Last Resulted: 05/23/21 14:33       Order Details     View Encounter     Lab and Collection Details     Routing     Result History

## 2021-05-17 NOTE — LETTER
Intake-                   Attached you will find clinical information for the patient, Gemma Randle.  Our team feels this patient would be appropriate for residential treatment.  Please feel free to contact me if there are any questions or concerns.  Thanks!            Wendy jennings, LSW  Psychiatric Social Worker  [facility]   [address] [place] [address]  [phone]  [email or web address]                                          Patient Information    Patient Name Address Race   Gemma Randle [address] [id number] White   Patient Legal Name Legal Sex Date of Birth   Gemma Randle Female 2000   Room Ethnic Group Language   G015 Not , /a, or Sami origin    MRN Phone Numbers PCP   [id number] : [phone] Arlette Tolentino FNP   Patient Demographics    Address   [address] [address] Phone   [phone] (Home)   Active Insurance as of 5/17/2021    Primary Coverage    Payor Plan Insurance Group Employer/Plan Group   IBC PERSONAL CHOICE [id number]    Payor Plan Address Payor Plan Phone Number Payor Plan Fax Number Effective Dates   [address] [phone]  4/1/2019 - None Entered   [facility] [id number]      Subscriber Name Subscriber Birth Date Member ID    ANASTACIO RANDLE 3/24/1968 [id number]    PCP and Center    Primary Care Provider Phone Center   TEJINDER Heaton [phone] [facility]   Emergency Contact(s)    Name Relation Home Work Mobile   Cindi Schaffer Mother   [phone]   Documents on File     Status Date Received Description   Documents for the Patient   Identification  10/04/17    HIPAA Notice of Privacy Signed 05/17/21    Insurance Card Unable to Obtain 05/17/21    Photo ID Unable to Obtain 05/17/21    Advance Directives and Living Will Not Received     Power of  Not Received     Patient Photo Not Received     CE Auth Form (Scanned) Not Received     CE Prospective Auth Reg Signed 05/17/21     Documents for the Encounter   Hospital Consent and Financial TORITO Signed 05/17/21    CMS IM Copy of Signed Not Received     Lay Caregiver Designation Consent Not Received     Outside Order Not Received     Lab Requisition Scan Not Received     ECG Received (Deleted) 05/17/21    ECG Received (Deleted) 05/18/21    ECG Received 05/18/21    Admission Information    Current Information    Attending Provider Admitting Provider Admission Type Admission Status   Rosen, Howard B, MD Chevalier, Naomi E, MD Emergency Confirmed Admission          Admission Date/Time Discharge Date Hospital Service Auth/Cert Status   05/17/21  09:07 PM  Psychiatry Incomplete          Hospital Area Unit Room/Bed    [facility] PSYCH UNIT G015/G015            Discharge Disposition Discharge Destination   [facility]   Admission    Complaint      Hospital Account    Name Acct ID Class Status Primary Coverage   Gemma Darnell [id number] Inpatient Psych Open IBC - PERSONAL CHOICE          Guarantor Account (for Hospital Account #[id number])    Name Relation to Pt Service Area Active? Acct Type   Gemma Darnell Self [facility] Yes Personal/Family   Address Phone     [address] [place] [address] [phone](H)            Coverage Information (for Hospital Account #[id number])    F/O Payor/Plan Precert #   IBC/PERSONAL CHOICE    Subscriber Subscriber #   Galo Darnell [id number]   Address Phone   [address] [address]   [place] [address] [phone]          Medical Record Numbers    [place] Id Number [id number]   [facility] Id Mrn [id number]   [facility] Mrn [id number]   Upi [id number]     Lara Hunt,    Physician   Psychiatry   H&P       Addendum   Date of Service:  5/18/2021  1:42 PM               Addendum        Expand AllCollapse All      []Hide copied text    []Hover for details  Psychiatry H and P          Chief Complaint   Patient presents with   • Psychiatric Evaluation       pt reports  "that she was sent to ER after speaking with thep, pt reports that yesterday she took \"some extra meds\" reports that she has been having some SI thoughts, reported that to thep who sent her to ER         Gemma Darnell is a 21 y.o. female with previous diagnoses of \"Bipolar disorder,\" anorexia, cocaine and ETOH abuse disorder, now presenting with worsening depression and suicide attempt by OD on Lamictal day before yesterday.     Today I encounter Gemma in her room. She is lying in bed. States that she was last hospitalized at the [facility] for substance and psych treatment Sept-Oct 2020 followed by [facility] Cervantes for eating d/o Oct-Nov. Since then, \"I was doing ok for awhile\" though she admits she immediately began drinking again after discharge. Her parents  in January which was \"not a surprise, my dad is a heavy drinker\" and \"I think it is for the best\" but she also pinpoints a deterioration in her mental health around that time. Soon after that, she reports that her drinking worsened and recently, she has been drinking up to \"7 vodka shots per day\" but not every day. She denies s/s of withdrawal at this time. Endorses feeling mostly \"detached,\" empty, numb much of the time, with sadness at times. She has had difficulty sleeping prior to admission, and reports a weight loss of about 8 lbs in the last 2 months (though this was purposeful and she believes part of her anorexia rather than result of depression/anxiety), low energy, feeling like she is in a fog with lapses in memory, chronic thoughts of death/dying, self injury in the form of scratching herself (has not broken the skin), difficulty concentrating.      This all culminated the day before yesterday when she took \"half a bottle\" of Lamictal with the clear intent to die. She states, somewhat illogically, that she did not take the whole bottle \"because I knew it wouldn't work to kill me.\" Reports that it was impulsive and when she thinks about " "it now, she feels \"detached from it, I don't remember it too well.\" Following taking the pills, \"My mom set up a PCP appointment because I was giving her mixed signals\" (eg hinting that she had misused medication) but upon presenting to the PCP, she purposefully withheld that she had taken an OD. Following that appointment, she reached out to her therapist who recommended she present here. At this point, she states \"I don't want to be here\" but admits she is not safe to go home.     Other ROS: Reports previous diagnosis of bipolar d/o, was last \"manic\" in Sept 2020 when she stayed up for one week straight and was sexually impulsive, \"sleeping with friends,\" racing thoughts that \"I am a piece of shit\" (which she is experiencing right now as well). Denies feeling invincible/special pierre, denies spending large amounts of money (but has been recently in an effort to \"make myself feel better\"), denies increased goal-oriented activities. Unclear whether she was under the influence of substances at that time.      She reports hearing voices but denies VH. AH are \"in my head\" and say derogatory things such as \"you are a piece of shit.\" I ask if it is her voice and she states \"maybe.\" Does not hear them daily, they are distressing when she does hear them.     Has pushed away most friends recently but still has her mother and sister. Allows me to call her mother Cindi [phone] and outpatient psychiatrist Dr. Simpson though they have only met once.     Psychiatric History:  Suicide Attempts: yes - as above  Risk Factors: Alcohol and/or substance abuse, Easy access to lethal methods and Impulsive or aggressive tendencies   Protective Factors: Effective and accessible mental health care    Current Psychiatrist: yes - Dr. Simpson   Past psychiatric Hospitalization: yes - as above, no other hospitalizations prior to the [facility]  Medication Trials:  yes - reports only Lamictal and Lexapro which she was put on in previous " hospitalization  ECT trials: no        Substance Use History: ETOH as above, cocaine prior to the Agnies[facility]ka but reports using only 2-3 times in recent months  Substance use:         Drug Details      Questions Responses     Cocaine frequency Past regular use     Comment: Past regular use on 5/18/2021       Cocaine method Snort     Comment: Snort on 5/18/2021            Consequences of use: Yes:  Blackouts  Past D&A Treatment: Yes: as above     Family History: father with ETOH use disorder, sister with good response to Prozac, cousin and sister have attempted suicide     Social History:   Social History   Social History            Socioeconomic History   • Marital status: Single       Spouse name: None   • Number of children: 0   • Years of education: 12   • Highest education level: High school graduate   Occupational History   • Occupation: [employer]   Tobacco Use   • Smoking status: Current Some Day Smoker       Types: Electronic Cigarette   • Smokeless tobacco: Never Used   Substance and Sexual Activity   • Alcohol use: Yes   • Drug use: Not Currently   • Sexual activity: None   Other Topics Concern   • None   Social History Narrative   • None      Social Determinants of Health          Financial Resource Strain:    • Difficulty of Paying Living Expenses:    Food Insecurity:    • Worried About Running Out of Food in the Last Year:    • Ran Out of Food in the Last Year:    Transportation Needs:    • Lack of Transportation (Medical):    • Lack of Transportation (Non-Medical):    Physical Activity:    • Days of Exercise per Week:    • Minutes of Exercise per Session:    Stress: Stress Concern Present   • Feeling of Stress : To some extent   Social Connections:    • Frequency of Communication with Friends and Family:    • Frequency of Social Gatherings with Friends and Family:    • Attends Confucianist Services:    • Active Member of Clubs or Organizations:    • Attends Club or Organization Meetings:    • Marital Status:   "  Intimate Partner Violence:    • Fear of Current or Ex-Partner:    • Emotionally Abused:    • Physically Abused:    • Sexually Abused:             Medical History:   Medical History        Past Medical History:   Diagnosis Date   • Eating disorder     • History of ITP              Surgical History:   Surgical History   History reviewed. No pertinent surgical history.        Allergies: No Known Allergies     Current Medications:  SCHEDULED:  • [START ON 5/19/2021] escitalopram  10 mg oral Daily   • [START ON 5/19/2021] FLUoxetine  10 mg oral Daily   • gabapentin  200 mg oral TID   • nicotine  1 patch transdermal Daily   • norethindrone-e.estradioL-iron  1 tablet oral Daily      PRN  •  acetaminophen  •  alum-mag hydroxide-simeth  •  diphenhydrAMINE  •  diphenhydrAMINE  •  haloperidoL **OR** haloperidol lactate  •  ibuprofen  •  LORazepam **OR** LORazepam  •  melatonin  •  metoclopramide  •  nicotine polacrilex  •  senna        Review of Systems  Musculoskeletal:  Grossly intact        Objective         Vital Signs for the last 24 hours:  Temp:  [36.1 °C (97 °F)-36.6 °C (97.9 °F)] 36.1 °C (97 °F)  Heart Rate:  [] 112  Resp:  [18] 18  BP: (114-162)/(71-99) 119/76     Labs       Results from last 7 days   Lab Units 05/17/21  2134   HEMOGLOBIN g/dL 12.7   WBC K/uL 8.55   PLATELETS K/uL 265   POTASSIUM mEQ/L 3.6   SODIUM mEQ/L 138   CREATININE mg/dL 0.9                Ethanol   Date Value Ref Range Status   05/17/2021 <5 <=10 mg/dL Final      No results found for: LITHIUM, TSH, FREET4, I9PJXAR, ZGYQVCWA71, FOLATE  No results found for: PHENYTOIN, PHENOBARB, VALPROATE, CBMZ     No results found.     No results found for: HDL, LDLCALC, TRIG, CHOL, HGBA1C           MENTAL STATUS EXAM  Appearance: well groomed  Gait and Motor: no abnormal movements  Speech: normal rate/rhythm/volume  Mood: \"detached\"  Affect: blunted  Associations: coherent  Thought Process: goal-directed and vague and contradictory at times  Thought " "Content: auditory hallucinations  Suicidality/Homicidality: thoughts of being dead/ no desire or plan to die  Judgement/Insight: minimizes severity level of illness  Cognition: she appears tired        Assessment/Plan  * Suicide attempt (CMS/[facility])  Assessment & Plan  Ms. Darnell is a 21 year old woman with previous diagnosis of \"bipolar disorder,\" anorexia, ETOH use and cocaine use disorder, now presenting with worsening feelings of detachment/depression culminating in overdose on Lamictal. Today she reports feeling detached, unsafe for discharge. Denies active SIIP at this time and contracts for safety. Initial impression is that substance is a significant factor in symptoms, but may have underlying mood disorder. Symptoms also highly suggestive of borderline personality disorder.      q15 min checks, contracts for safety, no plan or intent to harm herself at this time  Depression NOS, r/o substance induced mood d/o, r/o bipolar d/o, r/o major depression: will taper off Lexapro as she does not believe it has been helping (but admits to irregular compliance). Her sister has had a good experience on Prozac, she would like to try that. Will d/c Lamictal as she admits to taking it irregularly and in this context is at high risk for SJS. She expresses understanding.   For anxiety and off label for ETOH use disorder, will start gabapentin 200 mg po tid to start. Monitor for tolerability.   PRN Atarax 50 mg po tid anxiety  She has been educated to notify staff at any hint of rash following Lamictal OD  ETOH abuse: monitor for withdrawal. She is not exhibiting s/s at this time and VSS. CIWA protocol with symptom-triggered Ativan. She denies hx of withdrawal sz but has had \"cold sweats\" previously.  R/o borderline personality disorder: she would benefit from DBT on outpatient basis.   G/m/s therapy as tolerated, collaterals as permitted                              Revision History                            Lara Hunt " "K, DO   Physician   Psychiatry   Progress Notes       Signed   Date of Service:  5/19/2021  3:15 PM               Signed             []Hide copied text    []Dylon for details  Psychiatry Progress Note     Chief Complaint/Reason for follow-up: OD on Lamictal, \"I feel like I'm dying\"     Interval History:     Per staff, refused medication in the morning, high anxiety, SI without intent or plan currently, reports feeling numb.     Pt was observed laying in bed, wrapped in covers, with the lights turned off. She \"slept a lot\" yesterday. She continues to avoid food, stating \"I can't eat\" but cannot say why. She thinks her medicine regimen \"is not good for me,\" feels it is causing symptoms though she admits she has been experiencing many of these symptoms for months, even before being on medication at all. She feels \"numb\" but also admits to feeling anxious, sad, is tearful at times.      Pt endorsed some symptoms of alcohol withdrawal: anxiety, \"shaking\", nausea, headache, sweaty palms, itchiness, chills, night sweats but does not think this is due to ETOH withdrawal but rather to medication. She denied hallucinations, palpitations, insomnia. On review of systems and she endorsed nearly every one including: vision changes (couldn't clarify what her changes were), dizziness (\"feel like I'm going to pass out\"), soreness. She says her heart \"is slow\". States \"I feel like I am going to die\" but cannot give more detail/state why.     She denies any intent or plan to harm herself right now and contracts for safety. She does not feel she needs anyone to watch her. Tells me \"I can't even concentrate on what you are saying to me.\"     She socialized yesterday \"for an hour\" but felt like she couldn't concentrate and \"didn't know what to say\".     Later in day following PRN Ativan appeared more relaxed.     Vital Signs for the last 24 hours:  Temp:  [35.9 °C (96.7 °F)-36.9 °C (98.5 °F)] 35.9 °C (96.7 °F)  Heart Rate:  [] " "124  Resp:  [16-18] 18  BP: (122-136)/(69-88) 128/85     Mental Status Exam:  Pt appeared her stated age (22 yo), was dressed appropriately in sweats, appropriate hygiene, well-groomed. Pt had no stereotypical tics or movements and ambulation was not observed. Pt's speech had normal rate, rhythm, and volume was soft. Pt's mood was \"I feel like I'm dying\". Pt's affect was dysphoric, anxious, tearful at times. Pt's associations were coherent. Thought process was linear and goal-directed but vague. Thought content included no current HI, VH, or AH. Anxiety was \"7/10\" with 10 being the worst and depression was \"9/10\" with 10 being the worst. Continues to endorse passive SI (\"would be ok with no waking up\"). Judgement and insight were appropriate. Cognition: alert, but appears distracted, not sedated.     Assessment/Plan  Gemma Darnell is a 22 yo female with a history of cocaine and alcohol use disorder, anorexia, \"bipolar disorder\", and \"depression\" who presented to the [facility] ED after trying to overdose on Lamictal. Depression, ETOH use disorder, traits suggestive of borderline personality disorder.  1. Depression and anxiety: D/c Lexapro 10mg daily and increase Prozac to 20mg daily, PRN Atarax 50mg PO TID for anxiety  2. ETOH use disorder: increase gabapentin to 400mg PO TID for alcohol use disorder and anxiety, she is refusing at this time, continue to educate and offer but will d/c if continues to refuse. In the meantime, CIWA, monitor VS carefully, symptom triggered Ativan PRN  3. Medical: tachycardic this morning to 124, monitor, otherwise VSS.   4. R/o borderline personality disorder: she would benefit from DBT on outpatient basis.        I discussed the treatment plan and the patient's progress with nursing staff and Allied therapists in the treatment team meeting this morning. Patient is seen and evaluated for approximately 25 minutes in therapy with greater than 50% of time spent in direct face-to-face " "counseling, coordination of care and review of the medical record.              Lara Hunt,    Physician   Psychiatry   Progress Notes       Signed   Date of Service:  5/20/2021  1:07 PM               Signed             []Hide copied text    []Dylon for details  PSYCHIATRIC PROGRESS NOTE     Chief Complaint/Reason for follow-up: depression, anorexia, ETOH abuse disorder, cluster B traits, suicide attempt     Interval History: Per staff, Ms. Darnell was visible on unit yesterday, coloring with peers, but generally withdrawn, disheveled, poor appetite, apathetic. CIWA of 10 yesterday, received Ativan 2 mg PRN, refusing gabapentin.      CT head today unremarkable.     Today I approach her in her room where she is lying in bed. I ask for her help in understanding what is going on with her. She tells me she does not know. I ask if she is upset about her parents' separation and divorce and she says yes. I ask if there are other stressors and she says she does not know. She feels \"numb.\" States she feels gabapentin is the reason for her symptoms though she was having same symptoms prior to taking gabapentin. She states she is not sure she needs medication. I ask her what she needs if not medication, ie therapy, etc. She states she does not know.      I spoke with Gemma's mother Cidni with her permission. States that Gemma has been staying with boyfriend recently so she does not know onset of current symptoms, but at least since Friday Gemma has been anxious, stating that she feels she is going to die, stating she cannot concentrate. Endorses that she believes eating disorder, substances, and recent psychosocial stressors to be contributing to current clinical picture. She has been encouraging Gemma to take medication and thinks Gemma would benefit from residential program targeting some combination of eating disorder, substance, and mental health following discharge. Gemma \"did really well in high school\" but her " "sister's mental health problems were difficult for the family for about 4 years and things have been chaotic in household with dissolution of marriage and father having difficulty with boundaries.      Vital Signs for the last 24 hours:  Temp:  [36.7 °C (98.1 °F)-37.5 °C (99.5 °F)] 36.9 °C (98.5 °F)  Heart Rate:  [105-120] 120  Resp:  [16-18] 18  BP: (124-141)/() 126/80     Scheduled Meds:  • FLUoxetine  20 mg oral Daily   • gabapentin  200 mg oral TID   • nicotine  1 patch transdermal Daily   • norethindrone-e.estradioL-iron  1 tablet oral Daily         Labs:       Results from last 7 days   Lab Units 05/17/21  2134   HEMOGLOBIN g/dL 12.7   WBC K/uL 8.55   PLATELETS K/uL 265   POTASSIUM mEQ/L 3.6   SODIUM mEQ/L 138   CREATININE mg/dL 0.9         MENTAL STATUS EXAM  Appearance: disheveled  Gait and Motor: no abnormal movements  Speech: soft  Mood: \"I don't know\"  Affect: blunted and dysphoric  Associations: coherent  Thought Process: vague  Thought Content: paucity of thought content  Suicidality/Homicidality: thoughts of being dead/ no desire or plan to die  Judgement/Insight: makes choices that perpetuate illness  Cognition: seems distracted, with poor concentration, but alert     Assessment/Plan  * Suicide attempt (CMS/[facility])  Assessment & Plan  Ms. Darnell is a 21 year old woman with previous diagnosis of anorexia, ETOH use and cocaine use disorder, now presenting with worsening feelings of detachment/depression culminating in overdose on Lamictal. Today she reports feeling detached, unsafe for discharge. Denies active SIIP at this time and contracts for safety. ETOH use disorder, substance-induced mood disorder, anorexia, symptoms also suggestive of borderline personality disorder.      q15 min checks, contracts for safety, no plan or intent to harm herself at this time  Depression NOS, r/o substance induced mood d/o, r/o major depression, bipolar disorder lower on ddx: C/w Prozac 20 mg po daily, c/w " "gabapentin for now though she is refusing it for unclear reason, will explore alternatives for acute anxiety. Monitor for tolerability. PRN Atarax 50 mg po tid anxiety  She has been educated to notify staff at any hint of rash following Lamictal OD  ETOH abuse: monitor for withdrawal. Has been about 5 days since last known drink. This morning she is not exhibiting s/s at this time and VSS, . CIWA protocol with symptom-triggered Ativan.  R/o borderline personality disorder: she would benefit from DBT on outpatient basis.   Medical:   -difficulty concentrating/AMS: baseline labs unremarkable, head CT unremarkable. Continue to monitor, repeat labs tomorrow  -Anorexia: she has been restricting intake. Repeat labs tomorrow. Nutrition consult. Provide Boost. Monitor VS - poor po intake likely contributing to tachycardia.  G/m/s therapy as tolerated, collaterals as permitted. She would benefit from residential program targeting some combination of substance, eating disorder, mental josey/DBT        I discussed the treatment plan and the patient's progress with nursing staff and Allied therapists in the treatment team meeting this morning. Patient is seen and evaluated for approximately 25 minutes in therapy with greater than 50% of time spent in direct face-to-face counseling, coordination of care and review of the medical record.              Lara Hunt DO   Physician   Psychiatry   Progress Notes       Addendum   Date of Service:  5/21/2021  1:19 PM               Addendum             []Hide copied text    []Errolver for details  Psychiatry Progress Note     Chief Complaint/Reason for follow-up: OD on Lamictal; \"I feel like I'm dying\"     Interval History:  Per staff, visible, minimally engaged, \"flat and sedentary.\" Complaining of memory difficulty. Not eating much but good fluid intake.     Today we speak in her room. She is tearful today, appears less blunted and more depressed. She slept \"ok - still " "tired\".  Tells me her memory is \"horrible.\" She can't remember our conversation from yesterday. She is \"having a hard time socializing\" because \"I'm not myself here.\" She cried and asked \"why am I here?\"  She denied active SI but described \"feeling like I'm dying, I feel like there is something wrong.\" I ask her what is wrong and she indicates her arms. With a lot of coaxing, she tells me her arms do not look normal to her, I ask what is abnormal and after long latency she says \"I don't know.\" I ask if it is the color, or a feeling, and she states it is both. States her arms look \"red.\" Upon exam there is nothing grossly remarkable about her arms.      Regarding medication, she is hesitant to take it but cannot articulate why. States \"it scares me.\" I point out that she was drinking significant ETOH outside the hospital which is more harmful to her body than the medication, and that it is also scary to continue feeling this way. She agrees with this.         Vital Signs for the last 24 hours:  Temp:  [36.9 °C (98.5 °F)-37.1 °C (98.8 °F)] 36.9 °C (98.5 °F)  Heart Rate:  [] 114  Resp:  [14-16] 14  BP: (111-140)/(72-90) 140/72     Mental Status Exam:  Pt appeared her stated age (20 yo), was dressed in sweats, unkempt. Pt had no stereotypical tics or movements and ambulation was appropriate. Pt's speech had normal rate, rhythm, and volume was soft. Pt's mood was \"I don't know\". Pt's affect was depressed, constricted, tearful, anxious. Pt's associations were coherent. Thought process was linear and goal-directed, vague. Thought content included no current SI, HI, VH, or AH. Judgement and insight were limited. Cognition was fair. Alert and oriented x 2.     Assessment/Plan  Gemma Darnell is a 20 yo female with a history of cocaine and alcohol use disorder, anorexia, now presenting to the [place] ED after overdose on Lamictal. ETOH use disorder, anorexia, MDD. R/o borderline personality disorder.     1. MDD, " "anxiety: Continue Proxac 20mg daily for depression. Continue to offer gabapentin 200mg PO TID for alcohol use disorder and anxiety though that she has beenconsistently refusing, though she cannot articulate why she does not want it. Psychoeducation provided as she is in an acutely anxious state. Given her presentation with intense anxiety, disorganized state, will initiate Risperdal 0.5 mg po bid. PRN Atarax 50mg PO TID for anxiety  2. R/o borderline personality disorder: she would benefit from DBT on outpatient basis.   3. Anorexia: monitor PO intake. VS and labs today WNL. Would benefit from ongoing outpatient care.  4. Substance use disorder: motivational interviewing, would benefit from ongoing outpatient treatment  5. G/m/s therapy as tolerated, ongoing collaterals as permitted.        I discussed the treatment plan and the patient's progress with nursing staff and Allied therapists in the treatment team meeting this morning. Patient is seen and evaluated for approximately 25 minutes in therapy with greater than 50% of time spent in direct face-to-face counseling, coordination of care and review of the medical record.               Revision History                       Jermaine Pham MD   Physician   Psychiatry   Progress Notes       Signed   Date of Service:  5/22/2021 12:15 PM               Signed             []Hide copied text    []Errolver for details  Psychiatry Progress Note     Chief Complaint/Reason for follow-up:  MDD, anxiety, OD on lamictal     Interval History:  Mood is not as good today as yesterday and associates this with being \"nervous about discharge.\"  Sleep wsa broken, Appetite is low but she did eat breakfast.  She denies si/hi/plan/intent, no passive si.  She is looking forward to going back to a different school and looking forward to family.  No ah/vh/paranoia/no grandiosity.      Review of Systems:    Appetite is poor to fair, no gi complaints, sleep was poor to fair, broken     Vital " Signs for the last 24 hours:  Temp:  [36.7 °C (98.1 °F)-36.9 °C (98.4 °F)] 36.9 °C (98.4 °F)  Heart Rate:  [100-117] 117  Resp:  [14-16] 16  BP: (106-132)/(77-83) 106/77        Mental Status Exam:   Appearance: appears stated age, appropriately dressed  Behavior: cooperative  Speech: normal r/r/t/v and no pressure/paucity  TP: linear  TC: no si/hi/plan/intent, no passive si, no grandiosity, no paranoia  Perception: no ah/vh, no ris  Mood: not as good  Affect: not labile, had a mask on  Insight: fair  Judgment: fair  A&Ox3        Assessment/Plan   Gemma Darnell is a 22 yo female with a history of cocaine and alcohol use disorder, anorexia, now presenting to the [place] ED after overdose on Lamictal. ETOH use disorder, anorexia, MDD. R/o borderline personality disorder.     1. MDD, anxiety: Continue Proxac 20mg daily for depression. Continue to offer gabapentin 200mg PO TID for alcohol use disorder and anxiety though that she has beenconsistently refusing, though she cannot articulate why she does not want it. Psychoeducation provided as she is in an acutely anxious state. Given her presentation with intense anxiety, disorganized state, will initiate Risperdal 0.5 mg po bid. PRN Atarax 50mg PO TID for anxiety  2. R/o borderline personality disorder: she would benefit from DBT on outpatient basis.   3. Anorexia: monitor PO intake. VS   WNL. Would benefit from ongoing outpatient care.  4. Substance use disorder: motivational interviewing, would benefit from ongoing outpatient treatment  5. G/m/s therapy as tolerated, ongoing collaterals as permitted.              Jermaine Pham MD   Physician   Psychiatry   Progress Notes       Signed   Date of Service:  5/23/2021 11:42 AM               Signed             []Hide copied text    []Dylon for details  Psychiatry Progress Note     Chief Complaint/Reason for follow-up:    MDD, anxiety, OD on lamictal  Interval History:  She was in bed resting.   She reports that is  "\"tired.\"  She was not certain how her mood was,  She denies si/hi/plan/intent, no passive si. She denies racing thoughts.  Appetite is ok.     She denies side effects from medication.     Review of Systems:   Appetite is poor to fair, no gi complaints, sleep is fair      Vital Signs for the last 24 hours:  Temp:  [36.7 °C (98 °F)-36.9 °C (98.4 °F)] 36.7 °C (98 °F)  Heart Rate:  [104-133] 119  Resp:  [16] 16  BP: (100-117)/(73-85) 109/76       Mental Status Exam:   Appearance: appears stated age, appropriately dressed  Behavior: cooperative  Speech: normal r/r/t/v and no pressure/paucity  TP: linear  TC: no si/hi/plan/intent, no passive si, no grandiosity, no paranoia  Perception: no ah/vh, no ris  Mood: tired  Affect: not labile, had a mask on  Insight: fair  Judgment: fair  A&Ox3     Assessment/Plan   Gemma Darnell is a 20 yo female with a history of cocaine and alcohol use disorder, anorexia, now presenting to the [place] ED after overdose on Lamictal. ETOH use disorder, anorexia, MDD. R/o borderline personality disorder.     1. MDD, anxiety: Continue Proxac 20mg daily for depression. Continue to offer gabapentin 200mg PO TID for alcohol use disorder and anxiety though that she has beenconsistently refusing, though she cannot articulate why she does not want it. Psychoeducation provided as she is in an acutely anxious state. Given her presentation with intense anxiety, disorganized state, will initiate Risperdal 0.5 mg po bid. PRN Atarax 50mg PO TID for anxiety  2. R/o borderline personality disorder: she would benefit from DBT on outpatient basis.   3. Anorexia: monitor PO intake. VS   WNL. Would benefit from ongoing outpatient care.  4. Substance use disorder: motivational interviewing, would benefit from ongoing outpatient treatment  5. G/m/s therapy as tolerated, ongoing collaterals as permitted  6. Advised not to take naps as this could effect her night time sleep                    Lidia Brewer MD " "  Physician   Psychiatry   Progress Notes       Signed   Date of Service:  5/24/2021  8:30 AM               Signed             []Hide copied text    []Dylon for details  Psychiatry Progress Note     Chief Complaint/Reason for follow-up:    MDD, anxiety, OD on lamictal  Hx of substance abuse and eating disorder     Interval History:    Discussed patient's treatment progress with nurses and allied therapists in morning meeting this morning. Gemma has been restricting but will drink Boost/Gatorade. CIWA has been continued. Patient stated she refuses to take gabapentin because it \"makes me feel weird.\" She stated she has been dissociating constantly. She noted feeling forgetful. She described her mood as \"pretty depressed\" and noted she feels worse since admission. Gemma's nurse pointed out that poor PO intake may be influencing her mental status and Gemma agreed.  She has passive wishes to die but no intent or plan. She feels guilt about needing medication.      Review of Systems:   Appetite is poor to fair, no gi complaints, sleep is fair      Vital Signs for the last 24 hours:  Temp:  [36.9 °C (98.4 °F)-37.1 °C (98.8 °F)] 37.1 °C (98.8 °F)  Heart Rate:  [106-125] 125  Resp:  [16] 16  BP: (121-142)/(80-91) 142/80       Mental Status Exam:   Appearance: appears stated age, appropriately dressed  Behavior: cooperative but guarded   Speech: normal r/r/t/v and no pressure/paucity  TP: linear, vague  TC: no si/hi/plan/intent, + passive si, no grandiosity, no paranoia  Perception: no ah/vh, no ris  Mood: tired  Affect: not labile, had a mask on  Insight: fair  Judgment: fair  A&Ox3     Assessment/Plan   Gemma Darnell is a 20 yo female with a history of cocaine and alcohol use disorder, anorexia, now presenting to the [place] ED after overdose on Lamictal. ETOH use disorder, anorexia, MDD. R/o borderline personality disorder.     1. MDD, anxiety: Continue Proxac 20mg daily for depression. D/C gabapentin. Psychoeducation " "provided as she is in an acutely anxious state. Given her presentation with intense anxiety, disorganized state, continue Risperdal 0.5 mg po bid. PRN Atarax 50mg PO TID for anxiety  2. R/o borderline personality disorder: she would benefit from DBT on outpatient basis.   3. Anorexia: monitor PO intake. VS   WNL. Would benefit from ongoing outpatient care.  4. Substance use disorder: motivational interviewing, would benefit from ongoing outpatient treatment  5. G/m/s therapy as tolerated, ongoing collaterals as permitted  6. Advised not to take naps as this could effect her night time sleep     I spent 28 minutes on this date of service performing the following activities: obtaining history, performing examination, documenting, preparing for visit, obtaining / reviewing records, providing counseling and education and coordinating care.                 Marciano Estrella MD   Physician   Psychiatry   Progress Notes       Signed   Date of Service:  5/25/2021 11:21 AM               Signed             []Hide copied text    []Dylon for details  This exam was done through synchronous audio-visual services.  The patient consented to the telehealth exam.  The patient provider were based in [place].  The patient was on the [facility] psychiatric unit.  The following other people were present during the interview: No one     Psychiatry Progress Note     Chief Complaint/Reason for follow-up:    MDD, anxiety, OD on lamictal  Hx of substance abuse and eating disorder     I met the patient today for the first time.  I reviewed her hospital psychiatric records.  She is on Prozac 20 mg daily.     Today the patient when queried says \"I do not feel right\".  She does not feel her meds are working.  Yet she asks to be discharged.  She admits that she is very unhappy here on the unit.  She is very negative.  She seems to me hopeless.  I explained to her that the Prozac which was started on 5/20 has not had time to work.  When I read " "her Dr. Brewer's note of yesterday saying she has passive SI.  The patient answers \"I do not remember\".  When I asked her to rate today's depression and anxiety on a scale.  She replies \"I do not know\" and won't try.     Mental status: Patient is fully oriented x3.  She recalls my name.  She is negative and hopeless.  Mood is of depression.  She is unsmiling.  Speech is coherent and goal-directed without evidence of psychosis but is nonspontaneous and terse.  She denies SI/HI.  She denies AH/VH.     Plan:  Continue psychiatric hospitalization  Continue Prozac 20 mg and Risperdal 0.5 mg twice daily                       Patient Information    Patient Name   Gemma Darnell ([id number]) Legal Sex   Female    2000   Results  ER toxicology screen, serum [[id number]] (Order [id number])  Contains abnormal data ER toxicology screen, serum  Order: [id number]  Status:  Final result   Visible to patient:  No (not released) Next appt with me:  None   0 Result Notes    Ref Range & Units 21 2134    Salicylate <=30.0 mg/dL <4.0     Acetaminophen 10.0 - 30.0 ug/mL <10.0Low      Ethanol <=10 mg/dL <5          Specimen Collected: 21 21:34 Last Resulted: 21 22:11        Lab Flowsheet     Order Details     View Encounter     Lab and Collection Details     Routing     Result History           Related Result Highlights     Basic metabolic panel  Final result 2021                  Order Providers    Authorizing Provider Encounter Provider   Colt Sawyer MD None   Order Report    ER toxicology screen, serum (Order #[id number]) on 21   Collection Information    Specimen ID: [id number]    Blood    Blood, Venous    Venipuncture     Collected: 2021  9:34 PM    YONATAN ROSALES II   Received: 2021  9:39 PM    Resulting Agency: [facility]    [address]   [place] [address]      Lab Information    Lab   [facility]   [address]   [place] [address] "   : Leo Yarbrough MD              Additional Information    Specimen ID Bill Type Client ID   [id number]            Specimen Date Taken Specimen Time Taken Specimen Received Date Specimen Received Time Result Date Result Time   May 17, 2021  9:34 PM May 17, 2021  9:39 PM May 17, 2021 10:11 PM   Result Read / Acknowledged    No acknowledgement history exists for this order.   Guide for Clinicians to build Lab Component SmartPhrase    ER toxicology screen, serum (Order #[id number]) on 21   Contains abnormal data ER toxicology screen, serum  Order: [id number]  Status:  Final result   Visible to patient:  No (not released) Next appt:  None   0 Result Notes    Ref Range & Units 21 2134    Salicylate <=30.0 mg/dL <4.0     Acetaminophen 10.0 - 30.0 ug/mL <10.0Low      Ethanol <=10 mg/dL <5          Specimen Collected: 21 21:34 Last Resulted: 21 22:11        Lab Flowsheet     Order Details     View Encounter     Lab and Collection Details     Routing     Result History           Related Result Highlights     Basic metabolic panel  Final result 2021                    Patient Information    Patient Name   Gemma Darnell ([id number]) Legal Sex   Female    2000   Results  Urine drug screen (UDS) [[id number]] (Order [id number])  Urine drug screen (UDS)  Order: [id number]  Status:  Final result   Visible to patient:  No (not released) Next appt with me:  None   0 Result Notes    Ref Range & Units 21 2307 Comments    PCP Scrn, Ur Not Detected Not Detected  Assay Detects: phencyclidine in urine. Lowest detectable concentration is 25 ng/mL of phencyclidine.    Benzodiazepine Ur Qual Not Detected Not Detected  Assay Detects: benzodiazepines and metabolites at varying concentrations. Lowest detectable concentration is 200 ng/mL of oxazepam.    Cocaine Screen, Urine Not Detected Not Detected  Assay Detects: benzoylecgonine and cocaine in urine. Lowest detectable concentration is 300  ng/mL of benzoylecgonine.    Amphetamine+Methamphetamine Screen, Ur Not Detected Not Detected  Assay Detects: d-methamphetamine, d-amphetamine, methlyenedioxyamphetamine (MDA), and methlyenendioxymethamphetamine (MDMA) in urine. Lowest detectable concentration is 1000 ng/mL of d-methamphetamine.   Assay is less sensitive to MDA and MDMA (lowest detectable concentration, 2500 ng/mL) and could produce a false negative result. If MDMA overdose is suspected and the result is negative, a more specific test should be requested.    Cannabinoid Screen, Urine Not Detected Not Detected  Assay Detects: cannabinoid metabolites in urine. Lowest detectable concentration is 50 ng/mL    Opiate Scrn, Ur Not Detected Not Detected  Assay Detects: codeine, dihydrocodeine, hydrocodone, hydromorphone, levorphanol, morphine, morphine-3-glucuronide, norcodeine, oxycodone in urine. Lowest detectable concentration is 300 ng/mL of morphine.    Barbiturate Screen, Ur Not Detected Not Detected  Assay Detects: alphenal, amobarbital, aprobarbital, barbital, butabarbital, butalbital, butethal, diallybarbital, pentobarbital, secobarbital,talbutal, and thiopental. Lowest detectable concentration is 200 ng/mL of secobarbital.         Specimen Collected: 05/17/21 23:07 Last Resulted: 05/17/21 23:38        Lab Flowsheet     Order Details     View Encounter     Lab and Collection Details     Routing     Result History              Order Providers    Authorizing Provider Encounter Provider   Colt Sawyer MD None   Order Report    Urine drug screen (UDS) (Order #[id number]) on 5/17/21   Collection Information    Specimen ID: [id number]    Urine    Urine, Clean Catch    Non-blood Collection     Collected: 5/17/2021 11:07 PM    PRABHU DAS   Received: 5/17/2021 11:13 PM    Resulting Agency: [facility] [place] [facility]    [address]   [place] [address]      Lab Information    Lab   [facility] [place] [facility]   [address]   [place] [address]    : Leo Yarbrough MD              Additional Information    Specimen ID Bill Type Client ID   [id number]            Specimen Date Taken Specimen Time Taken Specimen Received Date Specimen Received Time Result Date Result Time   May 17, 2021 11:07 PM May 17, 2021 11:13 PM May 17, 2021 11:38 PM   Result Read / Acknowledged    No acknowledgement history exists for this order.   Guide for Clinicians to build Lab Component [facility]    Urine drug screen (UDS) (Order #[id number]) on 5/17/21   Urine drug screen (UDS)  Order: [id number]  Status:  Final result   Visible to patient:  No (not released) Next appt:  None   0 Result Notes    Ref Range & Units 5/17/21 2307 Comments    PCP Scrn, Ur Not Detected Not Detected  Assay Detects: phencyclidine in urine. Lowest detectable concentration is 25 ng/mL of phencyclidine.    Benzodiazepine Ur Qual Not Detected Not Detected  Assay Detects: benzodiazepines and metabolites at varying concentrations. Lowest detectable concentration is 200 ng/mL of oxazepam.    Cocaine Screen, Urine Not Detected Not Detected  Assay Detects: benzoylecgonine and cocaine in urine. Lowest detectable concentration is 300 ng/mL of benzoylecgonine.    Amphetamine+Methamphetamine Screen, Ur Not Detected Not Detected  Assay Detects: d-methamphetamine, d-amphetamine, methlyenedioxyamphetamine (MDA), and methlyenendioxymethamphetamine (MDMA) in urine. Lowest detectable concentration is 1000 ng/mL of d-methamphetamine.   Assay is less sensitive to MDA and MDMA (lowest detectable concentration, 2500 ng/mL) and could produce a false negative result. If MDMA overdose is suspected and the result is negative, a more specific test should be requested.    Cannabinoid Screen, Urine Not Detected Not Detected  Assay Detects: cannabinoid metabolites in urine. Lowest detectable concentration is 50 ng/mL    Opiate Scrn, Ur Not Detected Not Detected  Assay Detects: codeine, dihydrocodeine, hydrocodone,  hydromorphone, levorphanol, morphine, morphine-3-glucuronide, norcodeine, oxycodone in urine. Lowest detectable concentration is 300 ng/mL of morphine.    Barbiturate Screen, Ur Not Detected Not Detected  Assay Detects: alphenal, amobarbital, aprobarbital, barbital, butabarbital, butalbital, butethal, diallybarbital, pentobarbital, secobarbital,talbutal, and thiopental. Lowest detectable concentration is 200 ng/mL of secobarbital.         Specimen Collected: 21 23:07 Last Resulted: 21 23:38        Lab Flowsheet     Order Details     View Encounter     Lab and Collection Details     Routing     Result History                Patient Information    Patient Name   Gemma Darnell ([id number]) Legal Sex   Female    2000   Results  CBC and differential [[id number]] (Order [id number])  Contains abnormal data CBC and differential  Order: [id number]  Status:  Final result   Visible to patient:  No (not released) Next appt with me:  None   0 Result Notes    Ref Range & Units 21 0544 21 2134 10/5/17 0102    WBC 3.80 - 10.50 K/uL 9.80  8.55  18.70High  R     RBC 3.93 - 5.22 M/uL 4.43  4.30  4.48 R     Hemoglobin 11.8 - 15.7 g/dL 13.3  12.7  13.1 R     Hematocrit 35.0 - 45.0 % 39.7  38.5  38.2 R     MCV 83.0 - 98.0 fL 89.6  89.5  85.3 R     MCH 28.0 - 33.2 pg 30.0  29.5  29.2 R     MCHC 32.2 - 35.5 g/dL 33.5  33.0  34.3 R     RDW 11.7 - 14.4 % 11.6Low   11.6Low   12.7 R     Platelets 150 - 369 K/uL 237  265  303 R     MPV 9.4 - 12.3 fL 10.0  9.9  10.5 R     Differential Type  Auto  Auto  AUTOMATED     nRBC <=0.0 % 0.0  0.0  0 R     Immature Granulocytes % 0.3  0.2  0     Neutrophils % 61.0  63.6  80     Lymphocytes % 28.1  26.7  14     Monocytes % 6.4  8.3  5     Eosinophils % 3.6  0.7  1     Basophils % 0.6  0.5  0     Immature Granulocytes, Absolute 0.00 - 0.08 K/uL 0.03  0.02  0.08High  R     Neutrophils, Absolute 1.70 - 7.00 K/uL 5.98  5.44  14.72High  R     Lymphocytes, Absolute 1.20 - 3.50 K/uL 2.75   2.28  2.70 R     Monocytes, Absolute 0.28 - 0.80 K/uL 0.63  0.71  0.92High  R     Eosinophils, Absolute 0.04 - 0.36 K/uL 0.35  0.06  0.21 R     Basophils, Absolute 0.01 - 0.10 K/uL 0.06  0.04  0.07High  R          Specimen Collected: 05/21/21 05:44 Last Resulted: 05/21/21 06:20        Lab Flowsheet     Order Details     View Encounter     Lab and Collection Details     Routing     Result History        R=Reference range differs from displayed range        Related Result Highlights     Hemoglobin A1c  Final result 5/21/2021                  Order Providers    Authorizing Provider Encounter Provider   Lara Hunt DO None   Order Report    CBC and differential (Order #[id number]) on 5/21/21   Collection Information    Specimen ID: [id number]    Blood    Blood, Venous    Venipuncture     Collected: 5/21/2021  5:44 AM    SHELLIE HAIDER   Received: 5/21/2021  6:03 AM    Resulting Agency: [facility]    [address]   [place] [address]      Lab Information    Lab   [facility]   [address]   [place] [address]   : Leo Yarbrough MD              Additional Information    Specimen ID Bill Type Client ID   [id number]            Specimen Date Taken Specimen Time Taken Specimen Received Date Specimen Received Time Result Date Result Time   May 21, 2021  5:44 AM May 21, 2021  6:03 AM May 21, 2021  6:20 AM   Result Read / Acknowledged    No acknowledgement history exists for this order.   Guide for Clinicians to build Lab Component SmartPhrase    CBC and differential (Order #[id number]) on 5/21/21   Contains abnormal data CBC and differential  Order: [id number]  Status:  Final result   Visible to patient:  No (not released) Next appt:  None   0 Result Notes    Ref Range & Units 5/21/21 0544 5/17/21 2134 10/5/17 0102    WBC 3.80 - 10.50 K/uL 9.80  8.55  18.70High  R     RBC 3.93 - 5.22 M/uL 4.43  4.30  4.48 R     Hemoglobin 11.8 - 15.7 g/dL 13.3  12.7  13.1 R      Hematocrit 35.0 - 45.0 % 39.7  38.5  38.2 R     MCV 83.0 - 98.0 fL 89.6  89.5  85.3 R     MCH 28.0 - 33.2 pg 30.0  29.5  29.2 R     MCHC 32.2 - 35.5 g/dL 33.5  33.0  34.3 R     RDW 11.7 - 14.4 % 11.6Low   11.6Low   12.7 R     Platelets 150 - 369 K/uL 237  265  303 R     MPV 9.4 - 12.3 fL 10.0  9.9  10.5 R     Differential Type  Auto  Auto  AUTOMATED     nRBC <=0.0 % 0.0  0.0  0 R     Immature Granulocytes % 0.3  0.2  0     Neutrophils % 61.0  63.6  80     Lymphocytes % 28.1  26.7  14     Monocytes % 6.4  8.3  5     Eosinophils % 3.6  0.7  1     Basophils % 0.6  0.5  0     Immature Granulocytes, Absolute 0.00 - 0.08 K/uL 0.03  0.02  0.08High  R     Neutrophils, Absolute 1.70 - 7.00 K/uL 5.98  5.44  14.72High  R     Lymphocytes, Absolute 1.20 - 3.50 K/uL 2.75  2.28  2.70 R     Monocytes, Absolute 0.28 - 0.80 K/uL 0.63  0.71  0.92High  R     Eosinophils, Absolute 0.04 - 0.36 K/uL 0.35  0.06  0.21 R     Basophils, Absolute 0.01 - 0.10 K/uL 0.06  0.04  0.07High  R          Specimen Collected: 21 05:44 Last Resulted: 21 06:20        Lab Flowsheet     Order Details     View Encounter     Lab and Collection Details     Routing     Result History        R=Reference range differs from displayed range        Related Result Highlights     Hemoglobin A1c  Final result 2021                    Patient Information    Patient Name   Gemma Darnell ([id number]) Legal Sex   Female    2000   Results  Comprehensive metabolic panel [LAB17] (Order [id number])  Comprehensive metabolic panel  Order: [id number]  Status:  Final result   Visible to patient:  No (not released) Next appt with me:  None   0 Result Notes    Ref Range & Units 21 0544 21 2134 10/5/17 0102    Sodium 136 - 144 mEQ/L 138  138  142 R     Potassium 3.6 - 5.1 mEQ/L 4.1  3.6 CM  4.0 R, CM     Chloride 98 - 109 mEQ/L 104  106  110High  R     CO2 22 - 32 mEQ/L 26  21Low   22 R     BUN 8 - 20 mg/dL 8  7Low   6Low  R     Creatinine 0.6 - 1.1  mg/dL 0.7  0.9  0.8 R     Glucose 70 - 99 mg/dL 91  95  101High  R     Calcium 8.9 - 10.3 mg/dL 9.6  9.2  9.8 R     AST (SGOT) 15 - 41 IU/L 21       ALT (SGPT) 11 - 54 IU/L 13       Alkaline Phosphatase 35 - 126 IU/L 48       Total Protein 6.0 - 8.2 g/dL 6.6       Albumin 3.4 - 5.0 g/dL 4.0       Bilirubin, Total 0.3 - 1.2 mg/dL 0.4       eGFR >=60.0 mL/min/1.73m*2 >60.0  >60.0      Anion Gap 3 - 15 mEQ/L 8  11  10 R          Specimen Collected: 05/21/21 05:44 Last Resulted: 05/21/21 06:57        Lab Flowsheet     Order Details     View Encounter     Lab and Collection Details     Routing     Result History        CM=Additional comments  R=Reference range differs from displayed range        Related Result Highlights     Lipid panel  Final result 5/21/2021             TSH w reflex FT4  Final result 5/21/2021                  Order Providers    Authorizing Provider Encounter Provider   Lara Hunt DO None   Order Report    Comprehensive metabolic panel (Order #[id number]) on 5/21/21   Collection Information    Specimen ID: [id number]    Blood    Blood, Venous    Venipuncture     Collected: 5/21/2021  5:44 AM    SHELLIE HAIDER   Received: 5/21/2021  6:03 AM    Resulting Agency: [facility]    [address]   [place] [address]      Lab Information    Lab   [facility]   [address]   [place] [address]   : Leo Yarbrough MD              Additional Information    Specimen ID Bill Type Client ID   [id number]            Specimen Date Taken Specimen Time Taken Specimen Received Date Specimen Received Time Result Date Result Time   May 21, 2021  5:44 AM May 21, 2021  6:03 AM May 21, 2021  6:57 AM   Result Read / Acknowledged    No acknowledgement history exists for this order.   Guide for Clinicians to build Lab Component SmartPhrase    Comprehensive metabolic panel (Order #[id number]) on 5/21/21   Comprehensive metabolic panel  Order: [id number]  Status:  Final  result   Visible to patient:  No (not released) Next appt:  None   0 Result Notes    Ref Range & Units 5/21/21 0544 5/17/21 2134 10/5/17 0102    Sodium 136 - 144 mEQ/L 138  138  142 R     Potassium 3.6 - 5.1 mEQ/L 4.1  3.6 CM  4.0 R, CM     Chloride 98 - 109 mEQ/L 104  106  110High  R     CO2 22 - 32 mEQ/L 26  21Low   22 R     BUN 8 - 20 mg/dL 8  7Low   6Low  R     Creatinine 0.6 - 1.1 mg/dL 0.7  0.9  0.8 R     Glucose 70 - 99 mg/dL 91  95  101High  R     Calcium 8.9 - 10.3 mg/dL 9.6  9.2  9.8 R     AST (SGOT) 15 - 41 IU/L 21       ALT (SGPT) 11 - 54 IU/L 13       Alkaline Phosphatase 35 - 126 IU/L 48       Total Protein 6.0 - 8.2 g/dL 6.6       Albumin 3.4 - 5.0 g/dL 4.0       Bilirubin, Total 0.3 - 1.2 mg/dL 0.4       eGFR >=60.0 mL/min/1.73m*2 >60.0  >60.0      Anion Gap 3 - 15 mEQ/L 8  11  10 R          Specimen Collected: 05/21/21 05:44 Last Resulted: 05/21/21 06:57        Lab Flowsheet     Order Details     View Encounter     Lab and Collection Details     Routing     Result History        CM=Additional comments  R=Reference range differs from displayed range        Related Result Highlights     Lipid panel  Final result 5/21/2021             TSH w reflex FT4  Final result 5/21/2021                    SARS-CoV-2 (COVID-19), PCR Nasopharynx  Order: [id number]  Collected:  5/23/2021 10:25 Status:  Final result   Visible to patient:  No (not released)  Specimen Information: Nasopharynx; Nasopharyngeal Swab         0 Result Notes        Specimen Collected: 05/23/21 10:25 Last Resulted: 05/23/21 14:33       Order Details     View Encounter     Lab and Collection Details     Routing     Result History              SARS-CoV-2 (COVID-19), PCR Nasopharynx  Order: [id number] - Part of Panel Order [id number]  Collected:  5/23/2021 10:25 Status:  Final result   Visible to patient:  No (not released)  Specimen Information: Nasopharynx; Nasopharyngeal Swab         0 Result Notes   Ref Range & Units    SARS-CoV-2 (COVID-19)  Negative Negative    Resulting Agency  [facility]         Specimen Collected: 05/23/21 10:25 Last Resulted: 05/23/21 14:33       Order Details     View Encounter     Lab and Collection Details     Routing     Result History              Collection Information    Specimen ID: [id number]    Nasopharyngeal Swab    Nasopharynx     Collected: 5/23/2021 10:25 AM      Result Read / Acknowledged    SARS-CoV-2 (COVID-19), PCR Nasopharynx (Order [id number])    No acknowledgement history exists for this order.   Guide for Clinicians to build Lab Component SmartPhrase    SARS-CoV-2 (COVID-19), PCR Nasopharynx (Order #[id number]) on 5/23/21   SARS-CoV-2 (COVID-19), PCR Nasopharynx  Order: [id number]  Status:  Final result   Visible to patient:  No (not released) Next appt:  None  Specimen Information: Nasopharynx; Nasopharyngeal Swab         0 Result Notes        Specimen Collected: 05/23/21 10:25 Last Resulted: 05/23/21 14:33       Order Details     View Encounter     Lab and Collection Details     Routing     Result History              SARS-CoV-2 (COVID-19), PCR Nasopharynx  Order: [id number] - Part of Panel Order [id number]  Status:  Final result   Visible to patient:  No (not released) Next appt:  None  Specimen Information: Nasopharynx; Nasopharyngeal Swab         0 Result Notes    Ref Range & Units     SARS-CoV-2 (COVID-19) Negative Negative          Specimen Collected: 05/23/21 10:25 Last Resulted: 05/23/21 14:33       Order Details     View Encounter     Lab and Collection Details     Routing     Result History

## 2021-05-17 NOTE — LETTER
Intake-                   Attached you will find clinical information for the patient, Gemma Randle.  Our team feels this patient would be appropriate for residential treatment.  Please feel free to contact me if there are any questions or concerns.  Thanks!            Wendy jennings, LSW  Psychiatric Social Worker  [facility]   [address] [place] [address]  [phone]  [email or web address]                                          Patient Information    Patient Name Address Race   Gemma Randle [id number] White   Patient Legal Name Legal Sex Date of Birth   Gemma Randle Female 2000   Room Ethnic Group Language   G015 Not , /a, or German origin    MRN Phone Numbers PCP   [id number] : [phone] Arlette Tolentino FNP   Patient Demographics    Address   [address] [address] Phone   [phone] (Home)   Active Insurance as of 5/17/2021    Primary Coverage    Payor Plan Insurance Group Employer/Plan Group   IBC PERSONAL CHOICE [id number]    Payor Plan Address Payor Plan Phone Number Payor Plan Fax Number Effective Dates   [address] [phone]  4/1/2019 - None Entered   [facility] [id number]      Subscriber Name Subscriber Birth Date Member ID    ANASTACIO RANDLE 3/24/1968 [id number]    PCP and Center    Primary Care Provider Phone Center   TEJINDER Heaton [phone] [facility]   Emergency Contact(s)    Name Relation Home Work Mobile   Cindi Schaffer Mother   [phone]   Documents on File     Status Date Received Description   Documents for the Patient   Identification  10/04/17    HIPAA Notice of Privacy Signed 05/17/21    Insurance Card Unable to Obtain 05/17/21    Photo ID Unable to Obtain 05/17/21    Advance Directives and Living Will Not Received     Power of  Not Received     Patient Photo Not Received     CE Auth Form (Scanned) Not Received     CE Prospective Auth Reg Signed 05/17/21     Documents for the Encounter   Hospital Consent and Financial TORITO Signed 05/17/21    CMS IM Copy of Signed Not Received     Lay Caregiver Designation Consent Not Received     Outside Order Not Received     Lab Requisition Scan Not Received     ECG Received (Deleted) 05/17/21    ECG Received (Deleted) 05/18/21    ECG Received 05/18/21    Admission Information    Current Information    Attending Provider Admitting Provider Admission Type Admission Status   Rosen, Howard B, MD Chevalier, Naomi E, MD Emergency Confirmed Admission          Admission Date/Time Discharge Date Hospital Service Auth/Cert Status   05/17/21  09:07 PM  Psychiatry Incomplete          Hospital Area Unit Room/Bed    [facility] PSYCH UNIT G015/G015            Discharge Disposition Discharge Destination   [facility]   Admission    Complaint      Hospital Account    Name Acct ID Class Status Primary Coverage   Gemma Darnell [id number] Inpatient Psych Open IBC - PERSONAL CHOICE          Guarantor Account (for Hospital Account #[id number])    Name Relation to Pt Service Area Active? Acct Type   Gemma Darnell Self [facility] Yes Personal/Family   Address Phone     [address] [place] [address] [phone](H)            Coverage Information (for Hospital Account #[id number])    F/O Payor/Plan Precert #   IBC/PERSONAL CHOICE    Subscriber Subscriber #   Galo Darnell [id number]   Address Phone   [address] [address]   [place] [address] [phone]          Medical Record Numbers    [place] Id Number [id number]   [facility] Is[place] Id Mrn [id number]   [facility] Mrn [id number]   Upi [id number]     Lara Hunt,    Physician   Psychiatry   H&P       Addendum   Date of Service:  5/18/2021  1:42 PM               Addendum        Expand AllCollapse All      []Hide copied text    []Hover for details  Psychiatry H and P          Chief Complaint   Patient presents with   • Psychiatric Evaluation       pt reports  "that she was sent to ER after speaking with thep, pt reports that yesterday she took \"some extra meds\" reports that she has been having some SI thoughts, reported that to thep who sent her to ER         Gemma Darnell is a 21 y.o. female with previous diagnoses of \"Bipolar disorder,\" anorexia, cocaine and ETOH abuse disorder, now presenting with worsening depression and suicide attempt by OD on Lamictal day before yesterday.     Today I encounter Gemma in her room. She is lying in bed. States that she was last hospitalized at the [facility] for substance and psych treatment Sept-Oct 2020 followed by Marlyn Cervantes for eating d/o Oct-Nov. Since then, \"I was doing ok for awhile\" though she admits she immediately began drinking again after discharge. Her parents  in January which was \"not a surprise, my dad is a heavy drinker\" and \"I think it is for the best\" but she also pinpoints a deterioration in her mental health around that time. Soon after that, she reports that her drinking worsened and recently, she has been drinking up to \"7 vodka shots per day\" but not every day. She denies s/s of withdrawal at this time. Endorses feeling mostly \"detached,\" empty, numb much of the time, with sadness at times. She has had difficulty sleeping prior to admission, and reports a weight loss of about 8 lbs in the last 2 months (though this was purposeful and she believes part of her anorexia rather than result of depression/anxiety), low energy, feeling like she is in a fog with lapses in memory, chronic thoughts of death/dying, self injury in the form of scratching herself (has not broken the skin), difficulty concentrating.      This all culminated the day before yesterday when she took \"half a bottle\" of Lamictal with the clear intent to die. She states, somewhat illogically, that she did not take the whole bottle \"because I knew it wouldn't work to kill me.\" Reports that it was impulsive and when she thinks about " "it now, she feels \"detached from it, I don't remember it too well.\" Following taking the pills, \"My mom set up a PCP appointment because I was giving her mixed signals\" (eg hinting that she had misused medication) but upon presenting to the PCP, she purposefully withheld that she had taken an OD. Following that appointment, she reached out to her therapist who recommended she present here. At this point, she states \"I don't want to be here\" but admits she is not safe to go home.     Other ROS: Reports previous diagnosis of bipolar d/o, was last \"manic\" in Sept 2020 when she stayed up for one week straight and was sexually impulsive, \"sleeping with friends,\" racing thoughts that \"I am a piece of shit\" (which she is experiencing right now as well). Denies feeling invincible/special pierre, denies spending large amounts of money (but has been recently in an effort to \"make myself feel better\"), denies increased goal-oriented activities. Unclear whether she was under the influence of substances at that time.      She reports hearing voices but denies VH. AH are \"in my head\" and say derogatory things such as \"you are a piece of shit.\" I ask if it is her voice and she states \"maybe.\" Does not hear them daily, they are distressing when she does hear them.     Has pushed away most friends recently but still has her mother and sister. Allows me to call her mother Cindi [phone] and outpatient psychiatrist Dr. Simpson though they have only met once.     Psychiatric History:  Suicide Attempts: yes - as above  Risk Factors: Alcohol and/or substance abuse, Easy access to lethal methods and Impulsive or aggressive tendencies   Protective Factors: Effective and accessible mental health care    Current Psychiatrist: yes - Dr. Simpson   Past psychiatric Hospitalization: yes - as above, no other hospitalizations prior to the [facility]  Medication Trials:  yes - reports only Lamictal and Lexapro which she was put on in previous " hospitalization  ECT trials: no        Substance Use History: ETOH as above, cocaine prior to the Agnies[facility]ka but reports using only 2-3 times in recent months  Substance use:         Drug Details      Questions Responses     Cocaine frequency Past regular use     Comment: Past regular use on 5/18/2021       Cocaine method Snort     Comment: Snort on 5/18/2021            Consequences of use: Yes:  Blackouts  Past D&A Treatment: Yes: as above     Family History: father with ETOH use disorder, sister with good response to Prozac, cousin and sister have attempted suicide     Social History:   Social History   Social History            Socioeconomic History   • Marital status: Single       Spouse name: None   • Number of children: 0   • Years of education: 12   • Highest education level: High school graduate   Occupational History   • Occupation: [employer]   Tobacco Use   • Smoking status: Current Some Day Smoker       Types: Electronic Cigarette   • Smokeless tobacco: Never Used   Substance and Sexual Activity   • Alcohol use: Yes   • Drug use: Not Currently   • Sexual activity: None   Other Topics Concern   • None   Social History Narrative   • None      Social Determinants of Health          Financial Resource Strain:    • Difficulty of Paying Living Expenses:    Food Insecurity:    • Worried About Running Out of Food in the Last Year:    • Ran Out of Food in the Last Year:    Transportation Needs:    • Lack of Transportation (Medical):    • Lack of Transportation (Non-Medical):    Physical Activity:    • Days of Exercise per Week:    • Minutes of Exercise per Session:    Stress: Stress Concern Present   • Feeling of Stress : To some extent   Social Connections:    • Frequency of Communication with Friends and Family:    • Frequency of Social Gatherings with Friends and Family:    • Attends Scientologist Services:    • Active Member of Clubs or Organizations:    • Attends Club or Organization Meetings:    • Marital Status:   "  Intimate Partner Violence:    • Fear of Current or Ex-Partner:    • Emotionally Abused:    • Physically Abused:    • Sexually Abused:             Medical History:   Medical History        Past Medical History:   Diagnosis Date   • Eating disorder     • History of ITP              Surgical History:   Surgical History   History reviewed. No pertinent surgical history.        Allergies: No Known Allergies     Current Medications:  SCHEDULED:  • [START ON 5/19/2021] escitalopram  10 mg oral Daily   • [START ON 5/19/2021] FLUoxetine  10 mg oral Daily   • gabapentin  200 mg oral TID   • nicotine  1 patch transdermal Daily   • norethindrone-e.estradioL-iron  1 tablet oral Daily      PRN  •  acetaminophen  •  alum-mag hydroxide-simeth  •  diphenhydrAMINE  •  diphenhydrAMINE  •  haloperidoL **OR** haloperidol lactate  •  ibuprofen  •  LORazepam **OR** LORazepam  •  melatonin  •  metoclopramide  •  nicotine polacrilex  •  senna        Review of Systems  Musculoskeletal:  Grossly intact        Objective         Vital Signs for the last 24 hours:  Temp:  [36.1 °C (97 °F)-36.6 °C (97.9 °F)] 36.1 °C (97 °F)  Heart Rate:  [] 112  Resp:  [18] 18  BP: (114-162)/(71-99) 119/76     Labs       Results from last 7 days   Lab Units 05/17/21  2134   HEMOGLOBIN g/dL 12.7   WBC K/uL 8.55   PLATELETS K/uL 265   POTASSIUM mEQ/L 3.6   SODIUM mEQ/L 138   CREATININE mg/dL 0.9                Ethanol   Date Value Ref Range Status   05/17/2021 <5 <=10 mg/dL Final      No results found for: LITHIUM, TSH, FREET4, E2RVUCU, DPBYUIIL34, FOLATE  No results found for: PHENYTOIN, PHENOBARB, VALPROATE, CBMZ     No results found.     No results found for: HDL, LDLCALC, TRIG, CHOL, HGBA1C           MENTAL STATUS EXAM  Appearance: well groomed  Gait and Motor: no abnormal movements  Speech: normal rate/rhythm/volume  Mood: \"detached\"  Affect: blunted  Associations: coherent  Thought Process: goal-directed and vague and contradictory at times  Thought " "Content: auditory hallucinations  Suicidality/Homicidality: thoughts of being dead/ no desire or plan to die  Judgement/Insight: minimizes severity level of illness  Cognition: she appears tired        Assessment/Plan  * Suicide attempt (CMS/[facility])  Assessment & Plan  Ms. Darnell is a 21 year old woman with previous diagnosis of \"bipolar disorder,\" anorexia, ETOH use and cocaine use disorder, now presenting with worsening feelings of detachment/depression culminating in overdose on Lamictal. Today she reports feeling detached, unsafe for discharge. Denies active SIIP at this time and contracts for safety. Initial impression is that substance is a significant factor in symptoms, but may have underlying mood disorder. Symptoms also highly suggestive of borderline personality disorder.      q15 min checks, contracts for safety, no plan or intent to harm herself at this time  Depression NOS, r/o substance induced mood d/o, r/o bipolar d/o, r/o major depression: will taper off Lexapro as she does not believe it has been helping (but admits to irregular compliance). Her sister has had a good experience on Prozac, she would like to try that. Will d/c Lamictal as she admits to taking it irregularly and in this context is at high risk for SJS. She expresses understanding.   For anxiety and off label for ETOH use disorder, will start gabapentin 200 mg po tid to start. Monitor for tolerability.   PRN Atarax 50 mg po tid anxiety  She has been educated to notify staff at any hint of rash following Lamictal OD  ETOH abuse: monitor for withdrawal. She is not exhibiting s/s at this time and VSS. CIWA protocol with symptom-triggered Ativan. She denies hx of withdrawal sz but has had \"cold sweats\" previously.  R/o borderline personality disorder: she would benefit from DBT on outpatient basis.   G/m/s therapy as tolerated, collaterals as permitted                              Revision History                            Lara Hunt " "K, DO   Physician   Psychiatry   Progress Notes       Signed   Date of Service:  5/19/2021  3:15 PM               Signed             []Hide copied text    []Dylon for details  Psychiatry Progress Note     Chief Complaint/Reason for follow-up: OD on Lamictal, \"I feel like I'm dying\"     Interval History:     Per staff, refused medication in the morning, high anxiety, SI without intent or plan currently, reports feeling numb.     Pt was observed laying in bed, wrapped in covers, with the lights turned off. She \"slept a lot\" yesterday. She continues to avoid food, stating \"I can't eat\" but cannot say why. She thinks her medicine regimen \"is not good for me,\" feels it is causing symptoms though she admits she has been experiencing many of these symptoms for months, even before being on medication at all. She feels \"numb\" but also admits to feeling anxious, sad, is tearful at times.      Pt endorsed some symptoms of alcohol withdrawal: anxiety, \"shaking\", nausea, headache, sweaty palms, itchiness, chills, night sweats but does not think this is due to ETOH withdrawal but rather to medication. She denied hallucinations, palpitations, insomnia. On review of systems and she endorsed nearly every one including: vision changes (couldn't clarify what her changes were), dizziness (\"feel like I'm going to pass out\"), soreness. She says her heart \"is slow\". States \"I feel like I am going to die\" but cannot give more detail/state why.     She denies any intent or plan to harm herself right now and contracts for safety. She does not feel she needs anyone to watch her. Tells me \"I can't even concentrate on what you are saying to me.\"     She socialized yesterday \"for an hour\" but felt like she couldn't concentrate and \"didn't know what to say\".     Later in day following PRN Ativan appeared more relaxed.     Vital Signs for the last 24 hours:  Temp:  [35.9 °C (96.7 °F)-36.9 °C (98.5 °F)] 35.9 °C (96.7 °F)  Heart Rate:  [] " "124  Resp:  [16-18] 18  BP: (122-136)/(69-88) 128/85     Mental Status Exam:  Pt appeared her stated age (22 yo), was dressed appropriately in sweats, appropriate hygiene, well-groomed. Pt had no stereotypical tics or movements and ambulation was not observed. Pt's speech had normal rate, rhythm, and volume was soft. Pt's mood was \"I feel like I'm dying\". Pt's affect was dysphoric, anxious, tearful at times. Pt's associations were coherent. Thought process was linear and goal-directed but vague. Thought content included no current HI, VH, or AH. Anxiety was \"7/10\" with 10 being the worst and depression was \"9/10\" with 10 being the worst. Continues to endorse passive SI (\"would be ok with no waking up\"). Judgement and insight were appropriate. Cognition: alert, but appears distracted, not sedated.     Assessment/Plan  Gemma Darnell is a 22 yo female with a history of cocaine and alcohol use disorder, anorexia, \"bipolar disorder\", and \"depression\" who presented to the [facility] ED after trying to overdose on Lamictal. Depression, ETOH use disorder, traits suggestive of borderline personality disorder.  1. Depression and anxiety: D/c Lexapro 10mg daily and increase Prozac to 20mg daily, PRN Atarax 50mg PO TID for anxiety  2. ETOH use disorder: increase gabapentin to 400mg PO TID for alcohol use disorder and anxiety, she is refusing at this time, continue to educate and offer but will d/c if continues to refuse. In the meantime, CIWA, monitor VS carefully, symptom triggered Ativan PRN  3. Medical: tachycardic this morning to 124, monitor, otherwise VSS.   4. R/o borderline personality disorder: she would benefit from DBT on outpatient basis.        I discussed the treatment plan and the patient's progress with nursing staff and Allied therapists in the treatment team meeting this morning. Patient is seen and evaluated for approximately 25 minutes in therapy with greater than 50% of time spent in direct face-to-face " "counseling, coordination of care and review of the medical record.              Lara Hunt,    Physician   Psychiatry   Progress Notes       Signed   Date of Service:  5/20/2021  1:07 PM               Signed             []Hide copied text    []Dylon for details  PSYCHIATRIC PROGRESS NOTE     Chief Complaint/Reason for follow-up: depression, anorexia, ETOH abuse disorder, cluster B traits, suicide attempt     Interval History: Per staff, Ms. Darnell was visible on unit yesterday, coloring with peers, but generally withdrawn, disheveled, poor appetite, apathetic. CIWA of 10 yesterday, received Ativan 2 mg PRN, refusing gabapentin.      CT head today unremarkable.     Today I approach her in her room where she is lying in bed. I ask for her help in understanding what is going on with her. She tells me she does not know. I ask if she is upset about her parents' separation and divorce and she says yes. I ask if there are other stressors and she says she does not know. She feels \"numb.\" States she feels gabapentin is the reason for her symptoms though she was having same symptoms prior to taking gabapentin. She states she is not sure she needs medication. I ask her what she needs if not medication, ie therapy, etc. She states she does not know.      I spoke with Gemma's mother Cindi with her permission. States that Gemma has been staying with boyfriend recently so she does not know onset of current symptoms, but at least since Friday Gemma has been anxious, stating that she feels she is going to die, stating she cannot concentrate. Endorses that she believes eating disorder, substances, and recent psychosocial stressors to be contributing to current clinical picture. She has been encouraging Gemma to take medication and thinks Gemma would benefit from residential program targeting some combination of eating disorder, substance, and mental health following discharge. Gemma \"did really well in high school\" but her " "sister's mental health problems were difficult for the family for about 4 years and things have been chaotic in household with dissolution of marriage and father having difficulty with boundaries.      Vital Signs for the last 24 hours:  Temp:  [36.7 °C (98.1 °F)-37.5 °C (99.5 °F)] 36.9 °C (98.5 °F)  Heart Rate:  [105-120] 120  Resp:  [16-18] 18  BP: (124-141)/() 126/80     Scheduled Meds:  • FLUoxetine  20 mg oral Daily   • gabapentin  200 mg oral TID   • nicotine  1 patch transdermal Daily   • norethindrone-e.estradioL-iron  1 tablet oral Daily         Labs:       Results from last 7 days   Lab Units 05/17/21  2134   HEMOGLOBIN g/dL 12.7   WBC K/uL 8.55   PLATELETS K/uL 265   POTASSIUM mEQ/L 3.6   SODIUM mEQ/L 138   CREATININE mg/dL 0.9         MENTAL STATUS EXAM  Appearance: disheveled  Gait and Motor: no abnormal movements  Speech: soft  Mood: \"I don't know\"  Affect: blunted and dysphoric  Associations: coherent  Thought Process: vague  Thought Content: paucity of thought content  Suicidality/Homicidality: thoughts of being dead/ no desire or plan to die  Judgement/Insight: makes choices that perpetuate illness  Cognition: seems distracted, with poor concentration, but alert     Assessment/Plan  * Suicide attempt (CMS/[facility])  Assessment & Plan  Ms. Darnell is a 21 year old woman with previous diagnosis of anorexia, ETOH use and cocaine use disorder, now presenting with worsening feelings of detachment/depression culminating in overdose on Lamictal. Today she reports feeling detached, unsafe for discharge. Denies active SIIP at this time and contracts for safety. ETOH use disorder, substance-induced mood disorder, anorexia, symptoms also suggestive of borderline personality disorder.      q15 min checks, contracts for safety, no plan or intent to harm herself at this time  Depression NOS, r/o substance induced mood d/o, r/o major depression, bipolar disorder lower on ddx: C/w Prozac 20 mg po daily, c/w " "gabapentin for now though she is refusing it for unclear reason, will explore alternatives for acute anxiety. Monitor for tolerability. PRN Atarax 50 mg po tid anxiety  She has been educated to notify staff at any hint of rash following Lamictal OD  ETOH abuse: monitor for withdrawal. Has been about 5 days since last known drink. This morning she is not exhibiting s/s at this time and VSS, . CIWA protocol with symptom-triggered Ativan.  R/o borderline personality disorder: she would benefit from DBT on outpatient basis.   Medical:   -difficulty concentrating/AMS: baseline labs unremarkable, head CT unremarkable. Continue to monitor, repeat labs tomorrow  -Anorexia: she has been restricting intake. Repeat labs tomorrow. Nutrition consult. Provide Boost. Monitor VS - poor po intake likely contributing to tachycardia.  G/m/s therapy as tolerated, collaterals as permitted. She would benefit from residential program targeting some combination of substance, eating disorder, mental josey/DBT        I discussed the treatment plan and the patient's progress with nursing staff and Allied therapists in the treatment team meeting this morning. Patient is seen and evaluated for approximately 25 minutes in therapy with greater than 50% of time spent in direct face-to-face counseling, coordination of care and review of the medical record.              Lara Hunt DO   Physician   Psychiatry   Progress Notes       Addendum   Date of Service:  5/21/2021  1:19 PM               Addendum             []Hide copied text    []Errolver for details  Psychiatry Progress Note     Chief Complaint/Reason for follow-up: OD on Lamictal; \"I feel like I'm dying\"     Interval History:  Per staff, visible, minimally engaged, \"flat and sedentary.\" Complaining of memory difficulty. Not eating much but good fluid intake.     Today we speak in her room. She is tearful today, appears less blunted and more depressed. She slept \"ok - still " "tired\".  Tells me her memory is \"horrible.\" She can't remember our conversation from yesterday. She is \"having a hard time socializing\" because \"I'm not myself here.\" She cried and asked \"why am I here?\"  She denied active SI but described \"feeling like I'm dying, I feel like there is something wrong.\" I ask her what is wrong and she indicates her arms. With a lot of coaxing, she tells me her arms do not look normal to her, I ask what is abnormal and after long latency she says \"I don't know.\" I ask if it is the color, or a feeling, and she states it is both. States her arms look \"red.\" Upon exam there is nothing grossly remarkable about her arms.      Regarding medication, she is hesitant to take it but cannot articulate why. States \"it scares me.\" I point out that she was drinking significant ETOH outside the hospital which is more harmful to her body than the medication, and that it is also scary to continue feeling this way. She agrees with this.         Vital Signs for the last 24 hours:  Temp:  [36.9 °C (98.5 °F)-37.1 °C (98.8 °F)] 36.9 °C (98.5 °F)  Heart Rate:  [] 114  Resp:  [14-16] 14  BP: (111-140)/(72-90) 140/72     Mental Status Exam:  Pt appeared her stated age (22 yo), was dressed in sweats, unkempt. Pt had no stereotypical tics or movements and ambulation was appropriate. Pt's speech had normal rate, rhythm, and volume was soft. Pt's mood was \"I don't know\". Pt's affect was depressed, constricted, tearful, anxious. Pt's associations were coherent. Thought process was linear and goal-directed, vague. Thought content included no current SI, HI, VH, or AH. Judgement and insight were limited. Cognition was fair. Alert and oriented x 2.     Assessment/Plan  Gemma Darnell is a 22 yo female with a history of cocaine and alcohol use disorder, anorexia, now presenting to the [place] ED after overdose on Lamictal. ETOH use disorder, anorexia, MDD. R/o borderline personality disorder.     1. MDD, " "anxiety: Continue Proxac 20mg daily for depression. Continue to offer gabapentin 200mg PO TID for alcohol use disorder and anxiety though that she has beenconsistently refusing, though she cannot articulate why she does not want it. Psychoeducation provided as she is in an acutely anxious state. Given her presentation with intense anxiety, disorganized state, will initiate Risperdal 0.5 mg po bid. PRN Atarax 50mg PO TID for anxiety  2. R/o borderline personality disorder: she would benefit from DBT on outpatient basis.   3. Anorexia: monitor PO intake. VS and labs today WNL. Would benefit from ongoing outpatient care.  4. Substance use disorder: motivational interviewing, would benefit from ongoing outpatient treatment  5. G/m/s therapy as tolerated, ongoing collaterals as permitted.        I discussed the treatment plan and the patient's progress with nursing staff and Allied therapists in the treatment team meeting this morning. Patient is seen and evaluated for approximately 25 minutes in therapy with greater than 50% of time spent in direct face-to-face counseling, coordination of care and review of the medical record.               Revision History                       Jermaine Pham MD   Physician   Psychiatry   Progress Notes       Signed   Date of Service:  5/22/2021 12:15 PM               Signed             []Hide copied text    []Errolver for details  Psychiatry Progress Note     Chief Complaint/Reason for follow-up:  MDD, anxiety, OD on lamictal     Interval History:  Mood is not as good today as yesterday and associates this with being \"nervous about discharge.\"  Sleep wsa broken, Appetite is low but she did eat breakfast.  She denies si/hi/plan/intent, no passive si.  She is looking forward to going back to a different school and looking forward to family.  No ah/vh/paranoia/no grandiosity.      Review of Systems:    Appetite is poor to fair, no gi complaints, sleep was poor to fair, broken     Vital " Signs for the last 24 hours:  Temp:  [36.7 °C (98.1 °F)-36.9 °C (98.4 °F)] 36.9 °C (98.4 °F)  Heart Rate:  [100-117] 117  Resp:  [14-16] 16  BP: (106-132)/(77-83) 106/77        Mental Status Exam:   Appearance: appears stated age, appropriately dressed  Behavior: cooperative  Speech: normal r/r/t/v and no pressure/paucity  TP: linear  TC: no si/hi/plan/intent, no passive si, no grandiosity, no paranoia  Perception: no ah/vh, no ris  Mood: not as good  Affect: not labile, had a mask on  Insight: fair  Judgment: fair  A&Ox3        Assessment/Plan   Gemma Darnell is a 20 yo female with a history of cocaine and alcohol use disorder, anorexia, now presenting to the [place] ED after overdose on Lamictal. ETOH use disorder, anorexia, MDD. R/o borderline personality disorder.     1. MDD, anxiety: Continue Proxac 20mg daily for depression. Continue to offer gabapentin 200mg PO TID for alcohol use disorder and anxiety though that she has beenconsistently refusing, though she cannot articulate why she does not want it. Psychoeducation provided as she is in an acutely anxious state. Given her presentation with intense anxiety, disorganized state, will initiate Risperdal 0.5 mg po bid. PRN Atarax 50mg PO TID for anxiety  2. R/o borderline personality disorder: she would benefit from DBT on outpatient basis.   3. Anorexia: monitor PO intake. VS   WNL. Would benefit from ongoing outpatient care.  4. Substance use disorder: motivational interviewing, would benefit from ongoing outpatient treatment  5. G/m/s therapy as tolerated, ongoing collaterals as permitted.              Jermaine Pham MD   Physician   Psychiatry   Progress Notes       Signed   Date of Service:  5/23/2021 11:42 AM               Signed             []Hide copied text    []Dylon for details  Psychiatry Progress Note     Chief Complaint/Reason for follow-up:    MDD, anxiety, OD on lamictal  Interval History:  She was in bed resting.   She reports that is  "\"tired.\"  She was not certain how her mood was,  She denies si/hi/plan/intent, no passive si. She denies racing thoughts.  Appetite is ok.     She denies side effects from medication.     Review of Systems:   Appetite is poor to fair, no gi complaints, sleep is fair      Vital Signs for the last 24 hours:  Temp:  [36.7 °C (98 °F)-36.9 °C (98.4 °F)] 36.7 °C (98 °F)  Heart Rate:  [104-133] 119  Resp:  [16] 16  BP: (100-117)/(73-85) 109/76       Mental Status Exam:   Appearance: appears stated age, appropriately dressed  Behavior: cooperative  Speech: normal r/r/t/v and no pressure/paucity  TP: linear  TC: no si/hi/plan/intent, no passive si, no grandiosity, no paranoia  Perception: no ah/vh, no ris  Mood: tired  Affect: not labile, had a mask on  Insight: fair  Judgment: fair  A&Ox3     Assessment/Plan   Gemma Darnell is a 22 yo female with a history of cocaine and alcohol use disorder, anorexia, now presenting to the [place] ED after overdose on Lamictal. ETOH use disorder, anorexia, MDD. R/o borderline personality disorder.     1. MDD, anxiety: Continue Proxac 20mg daily for depression. Continue to offer gabapentin 200mg PO TID for alcohol use disorder and anxiety though that she has beenconsistently refusing, though she cannot articulate why she does not want it. Psychoeducation provided as she is in an acutely anxious state. Given her presentation with intense anxiety, disorganized state, will initiate Risperdal 0.5 mg po bid. PRN Atarax 50mg PO TID for anxiety  2. R/o borderline personality disorder: she would benefit from DBT on outpatient basis.   3. Anorexia: monitor PO intake. VS   WNL. Would benefit from ongoing outpatient care.  4. Substance use disorder: motivational interviewing, would benefit from ongoing outpatient treatment  5. G/m/s therapy as tolerated, ongoing collaterals as permitted  6. Advised not to take naps as this could effect her night time sleep                    Lidia Brewer MD " "  Physician   Psychiatry   Progress Notes       Signed   Date of Service:  5/24/2021  8:30 AM               Signed             []Hide copied text    []Dylon for details  Psychiatry Progress Note     Chief Complaint/Reason for follow-up:    MDD, anxiety, OD on lamictal  Hx of substance abuse and eating disorder     Interval History:    Discussed patient's treatment progress with nurses and allied therapists in morning meeting this morning. Gemma has been restricting but will drink Boost/Gatorade. CIWA has been continued. Patient stated she refuses to take gabapentin because it \"makes me feel weird.\" She stated she has been dissociating constantly. She noted feeling forgetful. She described her mood as \"pretty depressed\" and noted she feels worse since admission. Gemma's nurse pointed out that poor PO intake may be influencing her mental status and Gemma agreed.  She has passive wishes to die but no intent or plan. She feels guilt about needing medication.      Review of Systems:   Appetite is poor to fair, no gi complaints, sleep is fair      Vital Signs for the last 24 hours:  Temp:  [36.9 °C (98.4 °F)-37.1 °C (98.8 °F)] 37.1 °C (98.8 °F)  Heart Rate:  [106-125] 125  Resp:  [16] 16  BP: (121-142)/(80-91) 142/80       Mental Status Exam:   Appearance: appears stated age, appropriately dressed  Behavior: cooperative but guarded   Speech: normal r/r/t/v and no pressure/paucity  TP: linear, vague  TC: no si/hi/plan/intent, + passive si, no grandiosity, no paranoia  Perception: no ah/vh, no ris  Mood: tired  Affect: not labile, had a mask on  Insight: fair  Judgment: fair  A&Ox3     Assessment/Plan   Gemma Darnell is a 22 yo female with a history of cocaine and alcohol use disorder, anorexia, now presenting to the [place] ED after overdose on Lamictal. ETOH use disorder, anorexia, MDD. R/o borderline personality disorder.     1. MDD, anxiety: Continue Proxac 20mg daily for depression. D/C gabapentin. Psychoeducation " "provided as she is in an acutely anxious state. Given her presentation with intense anxiety, disorganized state, continue Risperdal 0.5 mg po bid. PRN Atarax 50mg PO TID for anxiety  2. R/o borderline personality disorder: she would benefit from DBT on outpatient basis.   3. Anorexia: monitor PO intake. VS   WNL. Would benefit from ongoing outpatient care.  4. Substance use disorder: motivational interviewing, would benefit from ongoing outpatient treatment  5. G/m/s therapy as tolerated, ongoing collaterals as permitted  6. Advised not to take naps as this could effect her night time sleep     I spent 28 minutes on this date of service performing the following activities: obtaining history, performing examination, documenting, preparing for visit, obtaining / reviewing records, providing counseling and education and coordinating care.                 Marciano Estrella MD   Physician   Psychiatry   Progress Notes       Signed   Date of Service:  5/25/2021 11:21 AM               Signed             []Hide copied text    []Dylon for details  This exam was done through synchronous audio-visual services.  The patient consented to the telehealth exam.  The patient provider were based in [place].  The patient was on the [facility] psychiatric unit.  The following other people were present during the interview: No one     Psychiatry Progress Note     Chief Complaint/Reason for follow-up:    MDD, anxiety, OD on lamictal  Hx of substance abuse and eating disorder     I met the patient today for the first time.  I reviewed her hospital psychiatric records.  She is on Prozac 20 mg daily.     Today the patient when queried says \"I do not feel right\".  She does not feel her meds are working.  Yet she asks to be discharged.  She admits that she is very unhappy here on the unit.  She is very negative.  She seems to me hopeless.  I explained to her that the Prozac which was started on 5/20 has not had time to work.  When I read " "her Dr. Brewer's note of yesterday saying she has passive SI.  The patient answers \"I do not remember\".  When I asked her to rate today's depression and anxiety on a scale.  She replies \"I do not know\" and won't try.     Mental status: Patient is fully oriented x3.  She recalls my name.  She is negative and hopeless.  Mood is of depression.  She is unsmiling.  Speech is coherent and goal-directed without evidence of psychosis but is nonspontaneous and terse.  She denies SI/HI.  She denies AH/VH.     Plan:  Continue psychiatric hospitalization  Continue Prozac 20 mg and Risperdal 0.5 mg twice daily                       Patient Information    Patient Name   Gemma Darnell ([id number]) Legal Sex   Female    2000   Results  ER toxicology screen, serum [[id number]] (Order [id number])  Contains abnormal data ER toxicology screen, serum  Order: [id number]  Status:  Final result   Visible to patient:  No (not released) Next appt with me:  None   0 Result Notes    Ref Range & Units 21 2134    Salicylate <=30.0 mg/dL <4.0     Acetaminophen 10.0 - 30.0 ug/mL <10.0Low      Ethanol <=10 mg/dL <5          Specimen Collected: 21 21:34 Last Resulted: 21 22:11        Lab Flowsheet     Order Details     View Encounter     Lab and Collection Details     Routing     Result History           Related Result Highlights     Basic metabolic panel  Final result 2021                  Order Providers    Authorizing Provider Encounter Provider   Colt Sawyer MD None   Order Report    ER toxicology screen, serum (Order #[id number]) on 21   Collection Information    Specimen ID: [id number]    Blood    Blood, Venous    Venipuncture     Collected: 2021  9:34 PM    YONATAN ROSALES II   Received: 2021  9:39 PM    Resulting Agency: [facility]    [address]   [place] [address]      Lab Information    Lab   [facility]   [address]   [place] [address] "   : Leo Yarbrough MD              Additional Information    Specimen ID Bill Type Client ID   [id number]            Specimen Date Taken Specimen Time Taken Specimen Received Date Specimen Received Time Result Date Result Time   May 17, 2021  9:34 PM May 17, 2021  9:39 PM May 17, 2021 10:11 PM   Result Read / Acknowledged    No acknowledgement history exists for this order.   Guide for Clinicians to build Lab Component SmartPhrase    ER toxicology screen, serum (Order #[id number]) on 21   Contains abnormal data ER toxicology screen, serum  Order: [id number]  Status:  Final result   Visible to patient:  No (not released) Next appt:  None   0 Result Notes    Ref Range & Units 21 2134    Salicylate <=30.0 mg/dL <4.0     Acetaminophen 10.0 - 30.0 ug/mL <10.0Low      Ethanol <=10 mg/dL <5          Specimen Collected: 21 21:34 Last Resulted: 21 22:11        Lab Flowsheet     Order Details     View Encounter     Lab and Collection Details     Routing     Result History           Related Result Highlights     Basic metabolic panel  Final result 2021                    Patient Information    Patient Name   Gemma Darnell ([id number]) Legal Sex   Female    2000   Results  Urine drug screen (UDS) [[id number]] (Order [id number])  Urine drug screen (UDS)  Order: [id number]  Status:  Final result   Visible to patient:  No (not released) Next appt with me:  None   0 Result Notes    Ref Range & Units 21 2307 Comments    PCP Scrn, Ur Not Detected Not Detected  Assay Detects: phencyclidine in urine. Lowest detectable concentration is 25 ng/mL of phencyclidine.    Benzodiazepine Ur Qual Not Detected Not Detected  Assay Detects: benzodiazepines and metabolites at varying concentrations. Lowest detectable concentration is 200 ng/mL of oxazepam.    Cocaine Screen, Urine Not Detected Not Detected  Assay Detects: benzoylecgonine and cocaine in urine. Lowest detectable concentration is 300  ng/mL of benzoylecgonine.    Amphetamine+Methamphetamine Screen, Ur Not Detected Not Detected  Assay Detects: d-methamphetamine, d-amphetamine, methlyenedioxyamphetamine (MDA), and methlyenendioxymethamphetamine (MDMA) in urine. Lowest detectable concentration is 1000 ng/mL of d-methamphetamine.   Assay is less sensitive to MDA and MDMA (lowest detectable concentration, 2500 ng/mL) and could produce a false negative result. If MDMA overdose is suspected and the result is negative, a more specific test should be requested.    Cannabinoid Screen, Urine Not Detected Not Detected  Assay Detects: cannabinoid metabolites in urine. Lowest detectable concentration is 50 ng/mL    Opiate Scrn, Ur Not Detected Not Detected  Assay Detects: codeine, dihydrocodeine, hydrocodone, hydromorphone, levorphanol, morphine, morphine-3-glucuronide, norcodeine, oxycodone in urine. Lowest detectable concentration is 300 ng/mL of morphine.    Barbiturate Screen, Ur Not Detected Not Detected  Assay Detects: alphenal, amobarbital, aprobarbital, barbital, butabarbital, butalbital, butethal, diallybarbital, pentobarbital, secobarbital,talbutal, and thiopental. Lowest detectable concentration is 200 ng/mL of secobarbital.         Specimen Collected: 05/17/21 23:07 Last Resulted: 05/17/21 23:38        Lab Flowsheet     Order Details     View Encounter     Lab and Collection Details     Routing     Result History              Order Providers    Authorizing Provider Encounter Provider   Colt Sawyer MD None   Order Report    Urine drug screen (UDS) (Order #[id number]) on 5/17/21   Collection Information    Specimen ID: [id number]    Urine    Urine, Clean Catch    Non-blood Collection     Collected: 5/17/2021 11:07 PM    PRABHU DAS   Received: 5/17/2021 11:13 PM    Resulting Agency: [facility]    [address]   [place] [address]      Lab Information    Lab   [facility]   [address]   [place] [address]    : Leo Yarbrough MD              Additional Information    Specimen ID Bill Type Client ID   [id number]            Specimen Date Taken Specimen Time Taken Specimen Received Date Specimen Received Time Result Date Result Time   May 17, 2021 11:07 PM May 17, 2021 11:13 PM May 17, 2021 11:38 PM   Result Read / Acknowledged    No acknowledgement history exists for this order.   Guide for Clinicians to build [facility]    Urine drug screen (UDS) (Order #[id number]) on 5/17/21   Urine drug screen (UDS)  Order: [id number]  Status:  Final result   Visible to patient:  No (not released) Next appt:  None   0 Result Notes    Ref Range & Units 5/17/21 2307 Comments    PCP Scrn, Ur Not Detected Not Detected  Assay Detects: phencyclidine in urine. Lowest detectable concentration is 25 ng/mL of phencyclidine.    Benzodiazepine Ur Qual Not Detected Not Detected  Assay Detects: benzodiazepines and metabolites at varying concentrations. Lowest detectable concentration is 200 ng/mL of oxazepam.    Cocaine Screen, Urine Not Detected Not Detected  Assay Detects: benzoylecgonine and cocaine in urine. Lowest detectable concentration is 300 ng/mL of benzoylecgonine.    Amphetamine+Methamphetamine Screen, Ur Not Detected Not Detected  Assay Detects: d-methamphetamine, d-amphetamine, methlyenedioxyamphetamine (MDA), and methlyenendioxymethamphetamine (MDMA) in urine. Lowest detectable concentration is 1000 ng/mL of d-methamphetamine.   Assay is less sensitive to MDA and MDMA (lowest detectable concentration, 2500 ng/mL) and could produce a false negative result. If MDMA overdose is suspected and the result is negative, a more specific test should be requested.    Cannabinoid Screen, Urine Not Detected Not Detected  Assay Detects: cannabinoid metabolites in urine. Lowest detectable concentration is 50 ng/mL    Opiate Scrn, Ur Not Detected Not Detected  Assay Detects: codeine, dihydrocodeine, hydrocodone,  hydromorphone, levorphanol, morphine, morphine-3-glucuronide, norcodeine, oxycodone in urine. Lowest detectable concentration is 300 ng/mL of morphine.    Barbiturate Screen, Ur Not Detected Not Detected  Assay Detects: alphenal, amobarbital, aprobarbital, barbital, butabarbital, butalbital, butethal, diallybarbital, pentobarbital, secobarbital,talbutal, and thiopental. Lowest detectable concentration is 200 ng/mL of secobarbital.         Specimen Collected: 21 23:07 Last Resulted: 21 23:38        Lab Flowsheet     Order Details     View Encounter     Lab and Collection Details     Routing     Result History                Patient Information    Patient Name   Gemma Darnell ([id number]) Legal Sex   Female    2000   Results  CBC and differential [[id number]] (Order [id number])  Contains abnormal data CBC and differential  Order: [id number]  Status:  Final result   Visible to patient:  No (not released) Next appt with me:  None   0 Result Notes    Ref Range & Units 21 0544 21 2134 10/5/17 0102    WBC 3.80 - 10.50 K/uL 9.80  8.55  18.70High  R     RBC 3.93 - 5.22 M/uL 4.43  4.30  4.48 R     Hemoglobin 11.8 - 15.7 g/dL 13.3  12.7  13.1 R     Hematocrit 35.0 - 45.0 % 39.7  38.5  38.2 R     MCV 83.0 - 98.0 fL 89.6  89.5  85.3 R     MCH 28.0 - 33.2 pg 30.0  29.5  29.2 R     MCHC 32.2 - 35.5 g/dL 33.5  33.0  34.3 R     RDW 11.7 - 14.4 % 11.6Low   11.6Low   12.7 R     Platelets 150 - 369 K/uL 237  265  303 R     MPV 9.4 - 12.3 fL 10.0  9.9  10.5 R     Differential Type  Auto  Auto  AUTOMATED     nRBC <=0.0 % 0.0  0.0  0 R     Immature Granulocytes % 0.3  0.2  0     Neutrophils % 61.0  63.6  80     Lymphocytes % 28.1  26.7  14     Monocytes % 6.4  8.3  5     Eosinophils % 3.6  0.7  1     Basophils % 0.6  0.5  0     Immature Granulocytes, Absolute 0.00 - 0.08 K/uL 0.03  0.02  0.08High  R     Neutrophils, Absolute 1.70 - 7.00 K/uL 5.98  5.44  14.72High  R     Lymphocytes, Absolute 1.20 - 3.50 K/uL 2.75   2.28  2.70 R     Monocytes, Absolute 0.28 - 0.80 K/uL 0.63  0.71  0.92High  R     Eosinophils, Absolute 0.04 - 0.36 K/uL 0.35  0.06  0.21 R     Basophils, Absolute 0.01 - 0.10 K/uL 0.06  0.04  0.07High  R          Specimen Collected: 05/21/21 05:44 Last Resulted: 05/21/21 06:20        Lab Flowsheet     Order Details     View Encounter     Lab and Collection Details     Routing     Result History        R=Reference range differs from displayed range        Related Result Highlights     Hemoglobin A1c  Final result 5/21/2021                  Order Providers    Authorizing Provider Encounter Provider   Lara Hunt DO None   Order Report    CBC and differential (Order #[id number]) on 5/21/21   Collection Information    Specimen ID: [id number]    Blood    Blood, Venous    Venipuncture     Collected: 5/21/2021  5:44 AM    SHELLIE HAIDER   Received: 5/21/2021  6:03 AM    Resulting Agency: [facility]    [address]   [place] [address]      Lab Information    Lab   [facility]   [address]   [place] [address]   : Leo Yarbrough MD              Additional Information    Specimen ID Bill Type Client ID   [id number]            Specimen Date Taken Specimen Time Taken Specimen Received Date Specimen Received Time Result Date Result Time   May 21, 2021  5:44 AM May 21, 2021  6:03 AM May 21, 2021  6:20 AM   Result Read / Acknowledged    No acknowledgement history exists for this order.   Guide for Clinicians to build Lab Component SmartPhrase    CBC and differential (Order #[id number]) on 5/21/21   Contains abnormal data CBC and differential  Order: [id number]  Status:  Final result   Visible to patient:  No (not released) Next appt:  None   0 Result Notes    Ref Range & Units 5/21/21 0544 5/17/21 2134 10/5/17 0102    WBC 3.80 - 10.50 K/uL 9.80  8.55  18.70High  R     RBC 3.93 - 5.22 M/uL 4.43  4.30  4.48 R     Hemoglobin 11.8 - 15.7 g/dL 13.3  12.7  13.1 R      Hematocrit 35.0 - 45.0 % 39.7  38.5  38.2 R     MCV 83.0 - 98.0 fL 89.6  89.5  85.3 R     MCH 28.0 - 33.2 pg 30.0  29.5  29.2 R     MCHC 32.2 - 35.5 g/dL 33.5  33.0  34.3 R     RDW 11.7 - 14.4 % 11.6Low   11.6Low   12.7 R     Platelets 150 - 369 K/uL 237  265  303 R     MPV 9.4 - 12.3 fL 10.0  9.9  10.5 R     Differential Type  Auto  Auto  AUTOMATED     nRBC <=0.0 % 0.0  0.0  0 R     Immature Granulocytes % 0.3  0.2  0     Neutrophils % 61.0  63.6  80     Lymphocytes % 28.1  26.7  14     Monocytes % 6.4  8.3  5     Eosinophils % 3.6  0.7  1     Basophils % 0.6  0.5  0     Immature Granulocytes, Absolute 0.00 - 0.08 K/uL 0.03  0.02  0.08High  R     Neutrophils, Absolute 1.70 - 7.00 K/uL 5.98  5.44  14.72High  R     Lymphocytes, Absolute 1.20 - 3.50 K/uL 2.75  2.28  2.70 R     Monocytes, Absolute 0.28 - 0.80 K/uL 0.63  0.71  0.92High  R     Eosinophils, Absolute 0.04 - 0.36 K/uL 0.35  0.06  0.21 R     Basophils, Absolute 0.01 - 0.10 K/uL 0.06  0.04  0.07High  R          Specimen Collected: 21 05:44 Last Resulted: 21 06:20        Lab Flowsheet     Order Details     View Encounter     Lab and Collection Details     Routing     Result History        R=Reference range differs from displayed range        Related Result Highlights     Hemoglobin A1c  Final result 2021                    Patient Information    Patient Name   Gemma Darnell ([id number]) Legal Sex   Female    2000   Results  Comprehensive metabolic panel [LAB17] (Order [id number])  Comprehensive metabolic panel  Order: [id number]  Status:  Final result   Visible to patient:  No (not released) Next appt with me:  None   0 Result Notes    Ref Range & Units 21 0544 21 2134 10/5/17 0102    Sodium 136 - 144 mEQ/L 138  138  142 R     Potassium 3.6 - 5.1 mEQ/L 4.1  3.6 CM  4.0 R, CM     Chloride 98 - 109 mEQ/L 104  106  110High  R     CO2 22 - 32 mEQ/L 26  21Low   22 R     BUN 8 - 20 mg/dL 8  7Low   6Low  R     Creatinine 0.6 - 1.1  mg/dL 0.7  0.9  0.8 R     Glucose 70 - 99 mg/dL 91  95  101High  R     Calcium 8.9 - 10.3 mg/dL 9.6  9.2  9.8 R     AST (SGOT) 15 - 41 IU/L 21       ALT (SGPT) 11 - 54 IU/L 13       Alkaline Phosphatase 35 - 126 IU/L 48       Total Protein 6.0 - 8.2 g/dL 6.6       Albumin 3.4 - 5.0 g/dL 4.0       Bilirubin, Total 0.3 - 1.2 mg/dL 0.4       eGFR >=60.0 mL/min/1.73m*2 >60.0  >60.0      Anion Gap 3 - 15 mEQ/L 8  11  10 R          Specimen Collected: 05/21/21 05:44 Last Resulted: 05/21/21 06:57        Lab Flowsheet     Order Details     View Encounter     Lab and Collection Details     Routing     Result History        CM=Additional comments  R=Reference range differs from displayed range        Related Result Highlights     Lipid panel  Final result 5/21/2021             TSH w reflex FT4  Final result 5/21/2021                  Order Providers    Authorizing Provider Encounter Provider   Lara Hunt DO None   Order Report    Comprehensive metabolic panel (Order #[id number]) on 5/21/21   Collection Information    Specimen ID: [id number]    Blood    Blood, Venous    Venipuncture     Collected: 5/21/2021  5:44 AM    SHELLIE HAIDER   Received: 5/21/2021  6:03 AM    Resulting Agency: [facility]    [address]   [place] [address]      Lab Information    Lab   [facility]   [address]   [place] [address]   : Leo Yarbrough MD              Additional Information    Specimen ID Bill Type Client ID   [id number]            Specimen Date Taken Specimen Time Taken Specimen Received Date Specimen Received Time Result Date Result Time   May 21, 2021  5:44 AM May 21, 2021  6:03 AM May 21, 2021  6:57 AM   Result Read / Acknowledged    No acknowledgement history exists for this order.   Guide for Clinicians to build Lab Component SmartPhrase    Comprehensive metabolic panel (Order #[id number]) on 5/21/21   Comprehensive metabolic panel  Order: [id number]  Status:  Final  result   Visible to patient:  No (not released) Next appt:  None   0 Result Notes    Ref Range & Units 5/21/21 0544 5/17/21 2134 10/5/17 0102    Sodium 136 - 144 mEQ/L 138  138  142 R     Potassium 3.6 - 5.1 mEQ/L 4.1  3.6 CM  4.0 R, CM     Chloride 98 - 109 mEQ/L 104  106  110High  R     CO2 22 - 32 mEQ/L 26  21Low   22 R     BUN 8 - 20 mg/dL 8  7Low   6Low  R     Creatinine 0.6 - 1.1 mg/dL 0.7  0.9  0.8 R     Glucose 70 - 99 mg/dL 91  95  101High  R     Calcium 8.9 - 10.3 mg/dL 9.6  9.2  9.8 R     AST (SGOT) 15 - 41 IU/L 21       ALT (SGPT) 11 - 54 IU/L 13       Alkaline Phosphatase 35 - 126 IU/L 48       Total Protein 6.0 - 8.2 g/dL 6.6       Albumin 3.4 - 5.0 g/dL 4.0       Bilirubin, Total 0.3 - 1.2 mg/dL 0.4       eGFR >=60.0 mL/min/1.73m*2 >60.0  >60.0      Anion Gap 3 - 15 mEQ/L 8  11  10 R          Specimen Collected: 05/21/21 05:44 Last Resulted: 05/21/21 06:57        Lab Flowsheet     Order Details     View Encounter     Lab and Collection Details     Routing     Result History        CM=Additional comments  R=Reference range differs from displayed range        Related Result Highlights     Lipid panel  Final result 5/21/2021             TSH w reflex FT4  Final result 5/21/2021                    SARS-CoV-2 (COVID-19), PCR Nasopharynx  Order: [id number]  Collected:  5/23/2021 10:25 Status:  Final result   Visible to patient:  No (not released)  Specimen Information: Nasopharynx; Nasopharyngeal Swab         0 Result Notes        Specimen Collected: 05/23/21 10:25 Last Resulted: 05/23/21 14:33       Order Details     View Encounter     Lab and Collection Details     Routing     Result History              SARS-CoV-2 (COVID-19), PCR Nasopharynx  Order: [id number] - Part of Panel Order [id number]  Collected:  5/23/2021 10:25 Status:  Final result   Visible to patient:  No (not released)  Specimen Information: Nasopharynx; Nasopharyngeal Swab         0 Result Notes   Ref Range & Units    SARS-CoV-2 (COVID-19)  Negative Negative    Resulting Agency  [facility]         Specimen Collected: 05/23/21 10:25 Last Resulted: 05/23/21 14:33       Order Details     View Encounter     Lab and Collection Details     Routing     Result History              Collection Information    Specimen ID: [id number]    Nasopharyngeal Swab    Nasopharynx     Collected: 5/23/2021 10:25 AM      Result Read / Acknowledged    SARS-CoV-2 (COVID-19), PCR Nasopharynx (Order [id number])    No acknowledgement history exists for this order.   Guide for Clinicians to build Lab Component SmartPhrase    SARS-CoV-2 (COVID-19), PCR Nasopharynx (Order #[id number]) on 5/23/21   SARS-CoV-2 (COVID-19), PCR Nasopharynx  Order: [id number]  Status:  Final result   Visible to patient:  No (not released) Next appt:  None  Specimen Information: Nasopharynx; Nasopharyngeal Swab         0 Result Notes        Specimen Collected: 05/23/21 10:25 Last Resulted: 05/23/21 14:33       Order Details     View Encounter     Lab and Collection Details     Routing     Result History              SARS-CoV-2 (COVID-19), PCR Nasopharynx  Order: [id number] - Part of Panel Order [id number]  Status:  Final result   Visible to patient:  No (not released) Next appt:  None  Specimen Information: Nasopharynx; Nasopharyngeal Swab         0 Result Notes    Ref Range & Units     SARS-CoV-2 (COVID-19) Negative Negative          Specimen Collected: 05/23/21 10:25 Last Resulted: 05/23/21 14:33       Order Details     View Encounter     Lab and Collection Details     Routing     Result History

## 2021-05-18 PROBLEM — T14.91XA SUICIDE ATTEMPT (CMS/HCC): Status: ACTIVE | Noted: 2021-05-18

## 2021-05-18 PROBLEM — Z00.8 ENCOUNTER FOR MEDICAL ASSESSMENT: Status: ACTIVE | Noted: 2021-05-18

## 2021-05-18 LAB
ATRIAL RATE: 78
P AXIS: 76
PR INTERVAL: 124
QRS DURATION: 74
QT INTERVAL: 362
QTC CALCULATION(BAZETT): 412
R AXIS: 65
T WAVE AXIS: 41
VENTRICULAR RATE: 78

## 2021-05-18 PROCEDURE — 99222 1ST HOSP IP/OBS MODERATE 55: CPT | Performed by: STUDENT IN AN ORGANIZED HEALTH CARE EDUCATION/TRAINING PROGRAM

## 2021-05-18 PROCEDURE — 200200 PR NO CHARGE: Performed by: INTERNAL MEDICINE

## 2021-05-18 PROCEDURE — 63700000 HC SELF-ADMINISTRABLE DRUG: Performed by: PSYCHIATRY & NEUROLOGY

## 2021-05-18 PROCEDURE — 63700000 HC SELF-ADMINISTRABLE DRUG: Performed by: STUDENT IN AN ORGANIZED HEALTH CARE EDUCATION/TRAINING PROGRAM

## 2021-05-18 PROCEDURE — 12400000 HC ROOM AND CARE SEMIPRIVATE PSYCH

## 2021-05-18 RX ORDER — GABAPENTIN 100 MG/1
200 CAPSULE ORAL 3 TIMES DAILY
Status: DISCONTINUED | OUTPATIENT
Start: 2021-05-18 | End: 2021-05-19

## 2021-05-18 RX ORDER — FLUOXETINE 10 MG/1
10 CAPSULE ORAL DAILY
Status: DISCONTINUED | OUTPATIENT
Start: 2021-05-19 | End: 2021-05-19

## 2021-05-18 RX ORDER — HALOPERIDOL 5 MG/ML
5 INJECTION INTRAMUSCULAR EVERY 4 HOURS PRN
Status: DISCONTINUED | OUTPATIENT
Start: 2021-05-18 | End: 2021-05-31 | Stop reason: HOSPADM

## 2021-05-18 RX ORDER — ALUMINUM HYDROXIDE, MAGNESIUM HYDROXIDE, AND SIMETHICONE 1200; 120; 1200 MG/30ML; MG/30ML; MG/30ML
30 SUSPENSION ORAL EVERY 4 HOURS PRN
Status: DISCONTINUED | OUTPATIENT
Start: 2021-05-18 | End: 2021-05-31 | Stop reason: HOSPADM

## 2021-05-18 RX ORDER — LORAZEPAM 2 MG/ML
2 INJECTION INTRAMUSCULAR EVERY 4 HOURS PRN
Status: DISCONTINUED | OUTPATIENT
Start: 2021-05-18 | End: 2021-05-19

## 2021-05-18 RX ORDER — LORAZEPAM 1 MG/1
1 TABLET ORAL EVERY 6 HOURS PRN
Status: DISCONTINUED | OUTPATIENT
Start: 2021-05-18 | End: 2021-05-18

## 2021-05-18 RX ORDER — ESCITALOPRAM OXALATE 10 MG/1
10 TABLET ORAL DAILY
Status: DISCONTINUED | OUTPATIENT
Start: 2021-05-19 | End: 2021-05-19

## 2021-05-18 RX ORDER — DIPHENHYDRAMINE HCL 25 MG
25 CAPSULE ORAL EVERY 6 HOURS PRN
Status: DISCONTINUED | OUTPATIENT
Start: 2021-05-18 | End: 2021-05-31 | Stop reason: HOSPADM

## 2021-05-18 RX ORDER — ESCITALOPRAM OXALATE 20 MG/1
20 TABLET ORAL DAILY
Status: DISCONTINUED | OUTPATIENT
Start: 2021-05-18 | End: 2021-05-18

## 2021-05-18 RX ORDER — METOCLOPRAMIDE 10 MG/1
10 TABLET ORAL EVERY 6 HOURS PRN
Status: DISCONTINUED | OUTPATIENT
Start: 2021-05-18 | End: 2021-05-31 | Stop reason: HOSPADM

## 2021-05-18 RX ORDER — SENNOSIDES 8.6 MG/1
1 TABLET ORAL 2 TIMES DAILY PRN
Status: DISCONTINUED | OUTPATIENT
Start: 2021-05-18 | End: 2021-05-31 | Stop reason: HOSPADM

## 2021-05-18 RX ORDER — IBUPROFEN 400 MG/1
400 TABLET ORAL EVERY 6 HOURS PRN
Status: DISCONTINUED | OUTPATIENT
Start: 2021-05-18 | End: 2021-05-31 | Stop reason: HOSPADM

## 2021-05-18 RX ORDER — IBUPROFEN 200 MG
1 TABLET ORAL DAILY
Status: DISCONTINUED | OUTPATIENT
Start: 2021-05-18 | End: 2021-05-31 | Stop reason: HOSPADM

## 2021-05-18 RX ORDER — DIPHENHYDRAMINE HCL 50 MG/ML
50 VIAL (ML) INJECTION EVERY 4 HOURS PRN
Status: DISCONTINUED | OUTPATIENT
Start: 2021-05-18 | End: 2021-05-31 | Stop reason: HOSPADM

## 2021-05-18 RX ORDER — LORAZEPAM 1 MG/1
1 TABLET ORAL EVERY 6 HOURS PRN
Status: DISCONTINUED | OUTPATIENT
Start: 2021-05-18 | End: 2021-05-19

## 2021-05-18 RX ORDER — HALOPERIDOL 5 MG/1
5 TABLET ORAL EVERY 4 HOURS PRN
Status: DISCONTINUED | OUTPATIENT
Start: 2021-05-18 | End: 2021-05-31 | Stop reason: HOSPADM

## 2021-05-18 RX ORDER — LORAZEPAM 2 MG/ML
2 INJECTION INTRAMUSCULAR EVERY 4 HOURS PRN
Status: DISCONTINUED | OUTPATIENT
Start: 2021-05-18 | End: 2021-05-18

## 2021-05-18 RX ORDER — TRAZODONE HYDROCHLORIDE 50 MG/1
50 TABLET ORAL NIGHTLY PRN
Status: DISCONTINUED | OUTPATIENT
Start: 2021-05-18 | End: 2021-05-18

## 2021-05-18 RX ORDER — IBUPROFEN/PSEUDOEPHEDRINE HCL 200MG-30MG
3 TABLET ORAL NIGHTLY PRN
Status: DISCONTINUED | OUTPATIENT
Start: 2021-05-18 | End: 2021-05-31 | Stop reason: HOSPADM

## 2021-05-18 RX ORDER — MICONAZOLE NITRATE 2 %
2 CREAM (GRAM) TOPICAL EVERY 2 HOUR PRN
Status: DISCONTINUED | OUTPATIENT
Start: 2021-05-18 | End: 2021-05-31 | Stop reason: HOSPADM

## 2021-05-18 RX ORDER — ACETAMINOPHEN 325 MG/1
650 TABLET ORAL EVERY 4 HOURS PRN
Status: DISCONTINUED | OUTPATIENT
Start: 2021-05-18 | End: 2021-05-31 | Stop reason: HOSPADM

## 2021-05-18 RX ADMIN — GABAPENTIN 200 MG: 100 CAPSULE ORAL at 18:06

## 2021-05-18 RX ADMIN — NICOTINE 1 PATCH: 14 PATCH, EXTENDED RELEASE TRANSDERMAL at 10:57

## 2021-05-18 RX ADMIN — LORAZEPAM 1 MG: 1 TABLET ORAL at 18:10

## 2021-05-18 RX ADMIN — ESCITALOPRAM OXALATE 20 MG: 20 TABLET ORAL at 10:57

## 2021-05-18 ASSESSMENT — COGNITIVE AND FUNCTIONAL STATUS - GENERAL
PSYCHOMOTOR FUNCTIONING: WNL
SPEECH: INHIBITED/SPARSE
ORIENTATION: FULLY ORIENTED
PERCEPTUAL FUNCTION: AUDITORY HALLUCINATIONS;VISUAL HALLUCINATIONS
JUDGEMENT: IMPAIRED, SEVERELY
INSIGHT: IMPAIRED SEVERELY
APPEARANCE: WELL GROOMED
THOUGHT_CONTENT: SUSPICIOUS;GUARDED
AFFECT: RESTRICTED
IMPULSE CONTROL: NORMAL
MOOD: ANXIOUS

## 2021-05-18 ASSESSMENT — PATIENT HEALTH QUESTIONNAIRE - PHQ9: SUM OF ALL RESPONSES TO PHQ9 QUESTIONS 1 & 2: 4

## 2021-05-18 NOTE — STUDENT
"Psychiatry Progress Note    Chief Complaint/Reason for follow-up: OD on lamictal    Interval History:  Pt was observed laying in her bed with the lights off. A full meal tray was on her desk - only thing eaten was a bit of fruit. Pt was \"not hungry - I don't really like to eat.\" Encouraged her to eat and she said \"I'll try.\" Queried pt on suicide attempt: \"I've been depressed for a while\". She didn't want to talk about it much (was evasive in her answers) so I didn't probe. Pt endorses recent alcohol use. Last drank on Thursday 5/13 \"I drank a lot that night.\" Endorsed drinking \"almost every day\" in the week leading up to Thursday. Asked pt what she wanted from this inpatient stay: \"stabilize my meds and attend groups.\"    Collateral  Called pt's psychiatrist Cathy Simpson who did not . She is on vacation until June 1 and is not being covered by another doctor.    Called pt's mother Cindi ([phone]):  Pt was \"a really sweet kid growing up\". No conduct issues, no issues with lying. When pt was in high school, she \"supported her sister who was completely crazy.\" Sister is 1.5 years older. \"I think Gemma feels that she didn't get much attention - that she was in her sister's shadow.\" Mom believes pt \"went off the deep end when she went to college in [place].\" Pt developed eating disorder in college - \"this all started with the eating disorder.\" Pt was \"with a lot of boys in college\". When pt had to go to rehab in late 2020, \"it really messed her up - [facility] was 10 steps forward but [facility] was 20 steps back\".     Pt started drinking alcohol again after [facility] and \"probably started using cocaine again.\" She was not eating or drinking much during this time, but was \"drinking coffee like a manic.\" Estimated that she drinks 6 cups a day. She dropped out of [employer] this academic year. She has a boyfriend who mom says an \"idiot\". Pt's father \"has no rules\" so when pt is staying with him, she goes out " "with her boyfriend at night and comes home late. Mom thinks she is doing drugs with the boyfriend, but boyfriend says \"we only drink\" \"but he is so full of shit.    Mom traces the origin of this admission to last week. On Thursday night 5/13, \"something happened but I'm not sure\". Mom thinks pt \"partied really hard on Thursday because she totally crashed on Friday\". Pt was \"catatonic on Friday - just stayed in bed, wouldn't talk, wouldn't eat or drink anything except coffee.\" On Saturday pt \"followed me around everywhere - she didn't want to leave my sight.\" Mom did chores on her own Saturday and pt called saying \"you need to come home I am going to die\". Mom brought pt food and gave her benadryl so she would sleep. Sunday was a repeat of Saturday with pt \"feeling horrible and following me around\". On Monday AM pt got in bed with mom \"because she was crying but she wouldn't answer any of my questions.\" Pt told me \"she took a bunch of pills\" but mom had to go to work so mom had pt's dad come and check on pt. The mom set up a PCP appointment that pt's dad took pt to on Monday. Pt lied to PCP about overdose, but pt told her therapist and therapist encouraged pt to go to hospital.    To mom's knowledge, pt has never had a manic episode. Mom laughed when I asked about manic symptoms, \"I don't know why they diagnosed her as bipolar.\" There have been \"times when she stayed up really late but that never lasted more than a day\". The pt has had moments of \"being impulsive, but that's always been her personality.\" Queried mom about signs of borderline PD. Pt does not have a sense of who she is \"she is aimless\"; pt \"generally has stable relationships in family but not with her friends\"; \"her relationships do not end abruptly except for with boys\"; \"she can be impulsive and has mood swings\".    Vital Signs for the last 24 hours:  Temp:  [36.1 °C (97 °F)-36.6 °C (97.9 °F)] 36.1 °C (97 °F)  Heart Rate:  [] 112  Resp:  [18] " "18  BP: (114-162)/(71-99) 119/76    Mental Status Exam:  Pt appeared her stated age (20 yo), was dressed appropriately in sweatsuit, appropriate hygiene, well-groomed. Pt had no stereotypical tics or movements and ambulation was normal. Pt's speech had normal rate, rhythm, and volume. Pt's mood was \"I just want to stay in bed all day\". Pt's affect was quiet, reserved, guarded, congruent to situation. Pt's associations were coherent. Thought process was linear and goal-directed. Thought content included no current HI, VH, or AH. Endorsed passive SI (\"would be ok with no waking up\"). Judgement and insight were appropriate. Cognition was fair. Alert and oriented x 3.    Assessment/Plan  Gemma Darnell is a 20 yo white female with a history of cocaine and alcohol use disorder, anorexia, \"bipolar disorder\", and \"depression\" who presented to the [place] ED after trying to overdose on Lamictal. Her presentation is suggestive of potential alcohol withdrawal as well as a mood disorder. She has traits suggestive of borderline personality disorder.  1. Downtitrate Lexapro 10mg daily and uptitrate Proxac 10mg daily for depression  2. Gabapentin 200mg PO TID for alcohol use disorder and anxiety  3. PRN Atarax 50mg PO TID for anxiety  4. Encouraged to notify staff about rash due to Lamictal OD  5. For alcohol abuse: monitor for withdrawal. She is not exhibiting s/s at this time. CIWA protocol with symptom-triggered Ativan. She denies hx of withdrawal sx but has had \"cold sweats\" previously.  6. R/o borderline personality disorder: she would benefit from DBT on outpatient basis.     Kevin Garcia  Medical Student    " Detail Level: Simple Include Location In Plan?: Yes Hide Additional Notes?: No Detail Level: Detailed

## 2021-05-18 NOTE — NURSING NOTE
Gemma Darnell is a 21 year old female who signed a 201 from [facility] ED presenting s/p suicide attempt by OD on Lamictal. She received Zyprexa while in the ED and consequently was sedated upon arrival to the unit and admission was slightly limited. This is her first inpatient psychiatric hospitalization, however, she was previously at [facility] for drug abuse (cocaine and adderall) and [facility] for eating disorder. She reports that she has multiple stressors leading up to her suicide attempt. She is a student at [employer], studying criminal justice, currently taking a semester off. She reports family stress, financial stress, stating she worked as a  but was fired for inconsistent hours and reported overspending lately. She sees a psychiatrist and a therapist outpatient, and reports having previously been diagnosed Bipolar. She currently reports having an active eating disorder, stating she has been restricting recently but denies any weight loss. She denies any current drug use, but does report smoking and vaping nicotine, and drinking 4-5 times a week, stating on average about 5 drinks each time. UDS was negative and COVID negative. She does endorse having auditory hallucinations command in nature stating she hears a female voice that tells her to hurt herself. She denies any visual hallucinations. She currently denies active SI/HI, but does endorse having passive SI with no plan or intent and can CFS. Telepsych was called for orders and [facility] was paged. Staff will continue to monitor.

## 2021-05-18 NOTE — CONSULTS
"Patient Information     Patient Name  Gemma Darnell MRN  [id number] Legal Sex  Female  Age  2000 (21 y.o.) [facility]         Admit Date Department Dept Phone    2021 [facility] Emergency Department [phone]      Presenting Problems - Mon May 17, 2021     Row Name 2100       Presenting Problems    Presenting Problems  Gemma is a 20yo white female brought to ER s/p suicide attempt by Lamictal ED. Spoke with therapist Cindy Werner today who sent her in. Denies prior attempts or psych hospitalizations. Hearing voices that \"won't stop gnawing at my brain\", they tell her she is bad and should kill herself, last week they told her she should hurt other people. Feels she is being followed and monitored. Appears scared. Scratches legs with fingernails. Has barely been sleeping. Hx of cocaine use, completed Claire in Oct 2020, completed RTF at [facility] for anorexia. Denies current drug use. Reports she has been drinking more than usual, unsure of amount. Denies wd sx. Dr Calvin lopez, last appt 1 month ago, has not disclosed voices to her. 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 Lives with mom and 13yo brother, dog Olive and 2 cats Pongo & Necco. Denies HI, access to firearms. Calm, cooperative, latency of response, RIS, good eye contact.    Patient Experiencing  sleep disturbances;energy;appetite;hopelessness;worthlessness;anxiety;guilt;difficulty concentrating    Energy  decreased    Appetite  decreased    Sleep Disturbances Comments  initial, frequent wakening, non-restorative      Mental Status Exam - Tue May 18, 2021     Row Name 0012       Mental Status Exam    Appearance  Well Groomed    Mood  Anxious    Affect  Restricted    Speech  Inhibited/Sparse    Orientation  Fully oriented    Psychomotor Functioning  WNL    Insight  Impaired severely    Impulse control  normal    Judgement  impaired, severely    Thought Content  Suspicious;Guarded    Perceptual Function  Auditory " hallucinations;Visual hallucinations    Current violent thoughts and/or behavior  none    Mental Status Exam Comments  +SI      [place] Suicide Severity Rating Scale (C-SSRS Short Version) - Tue May 18, 2021     Row Name 0014       [place] Suicide Severity Rating Scale    1. Within the past month, have you wished you were dead or wished you could go to sleep and not wake up?  Yes    2. Within the past month, have you actually had any thoughts of killing yourself?  Yes    3. Within the past month, have you been thinking about how you might kill yourself?  Yes    4. Within the past month, have you had these thoughts and had some intention of acting on them?  Yes    5. Within the past month, have you started to work out or worked out the details of how to kill yourself? Do you intend to carry out this plan?  Yes    6. Have you ever done anything, started to do anything, or prepared to do anything to end your life?  No      [place] Suicide Severity Rating Scale (C-SSRS Short Version) - Mon May 17, 2021     Row Name 2104       [place] Suicide Severity Rating Scale    1. Within the past month, have you wished you were dead or wished you could go to sleep and not wake up?  Yes    2. Within the past month, have you actually had any thoughts of killing yourself?  Yes    6. Have you ever done anything, started to do anything, or prepared to do anything to end your life?  No      Patient Risk Factors - Tue May 18, 2021     Row Name 0015       Ideation/Plan    Self Harm/Suicidal Ideation Plan  rx pill od    Current Plans to Harm Another  denies    Violent Episode Description  n/a       Psychological/Behavioral Risk Factors    Psychological/Behavioral Risk Factors  self-harm behaviors (cutting, burning, etc.);psychosis;eating disorder    Self-harm Behaviors (cutting, burning, hitting, etc.)  high    Psychosis  high    Eating disorder  high       Treatment Issues    Resisting Treatment  no    Witholding information,  deception, contradictions  no       Resource/Environmental Concerns    Current Living Arrangements  home/apartment/condo    Resource/Environmental Concerns  none       Collateral Information    Collateral Information  emr, family    Protective Factors  seeking help, positive engagement with mh tx       Access to Weapons    Access to Weapons  no       Likelihood of Outside Intervention    Likelihood of outside intervention  high       Traumatic Event Screening    Have You Ever Experienced a Traumatic Event?  yes      Suicide Risk Estimation - Tue May 18, 2021     Row Name 0016       Suicide Risk Estimation    Patient suicide risk is estimated to be  high      Homicide/Violence Risk - Tue May 18, 2021     Row Name 0016       Homicide/Violence Risk    Feels Like Hurting Others  no      Alcohol Use     Yes.      Tobacco Use     Current Some Day Smoker; Smoked: Electronic Cigarette.    Smokeless Tobacco: Never used smokeless tobacco.      Drug Details     Questions Responses    Cocaine frequency Past regular use    Comment: Past regular use on 5/18/2021     Cocaine method Snort    Comment: Snort on 5/18/2021       Problem List  Current as of 05/18/21 0020           No problems recorded      Allergies    No Known Allergies     Results (last 24 hours)     Procedure Component Value Units Date/Time    Urine drug screen (UDS) [[id number]]  (Normal) Collected: 05/17/21 2307    Order Status: Completed Specimen: Urine, Clean Catch Updated: 05/17/21 2338     PCP Scrn, Ur Not Detected     Benzodiazepine Ur Qual Not Detected     Cocaine Screen, Urine Not Detected     Amphetamine+Methamphetamine Screen, Ur Not Detected     Cannabinoid Screen, Urine Not Detected     Opiate Scrn, Ur Not Detected     Barbiturate Screen, Ur Not Detected    ER toxicology screen, serum [[id number]]  (Abnormal) Collected: 05/17/21 2134    Order Status: Completed Specimen: Blood, Venous Updated: 05/17/21 2211     Salicylate <4.0 mg/dL      Acetaminophen <10.0  ug/mL      Ethanol <5 mg/dL     Basic metabolic panel [[id number]]  (Abnormal) Collected: 05/17/21 2134    Order Status: Completed Specimen: Blood, Venous Updated: 05/17/21 2208     Sodium 138 mEQ/L      Potassium 3.6 mEQ/L      Chloride 106 mEQ/L      CO2 21 mEQ/L      BUN 7 mg/dL      Creatinine 0.9 mg/dL      Glucose 95 mg/dL      Calcium 9.2 mg/dL      eGFR >60.0 mL/min/1.73m*2      Anion Gap 11 mEQ/L     BhCG, Serum, Qual [[id number]]  (Normal) Collected: 05/17/21 2134    Order Status: Completed Specimen: Blood, Venous Updated: 05/17/21 2201     Preg Test, Serum Negative    CBC and differential [[id number]]  (Abnormal) Collected: 05/17/21 2134    Order Status: Completed Specimen: Blood, Venous Updated: 05/17/21 2145     WBC 8.55 K/uL      RBC 4.30 M/uL      Hemoglobin 12.7 g/dL      Hematocrit 38.5 %      MCV 89.5 fL      MCH 29.5 pg      MCHC 33.0 g/dL      RDW 11.6 %      Platelets 265 K/uL      MPV 9.9 fL      Differential Type Auto     nRBC 0.0 %      Immature Granulocytes 0.2 %      Neutrophils 63.6 %      Lymphocytes 26.7 %      Monocytes 8.3 %      Eosinophils 0.7 %      Basophils 0.5 %      Immature Granulocytes, Absolute 0.02 K/uL      Neutrophils, Absolute 5.44 K/uL      Lymphocytes, Absolute 2.28 K/uL      Monocytes, Absolute 0.71 K/uL      Eosinophils, Absolute 0.06 K/uL      Basophils, Absolute 0.04 K/uL       Medical History     Diagnosis Date Comment Source    Eating disorder       History of ITP         Surgical History          No past surgical history on file.      Mental Health/Substance Use Treatment - Tue May 18, 2021     Row Name 0017       Current Mental Health Treatment    Current Mental Health Treatment  outpatient treatment       Previous Substance Use Treatment    Previous Substance Use Treatment  detox unit;inpatient rehab      Living Environment - Tue May 18, 2021     Row Name 0015       Living Environment    People in Home  parent(s)    Quality of Family Relationships   helpful;involved;supportive    Able to Return to Prior Arrangements  yes       County Agency Invovled    County Agencies Involved?  No      Employment History     Occupation Employer Comments    nanny        Family and Education     Marital Status Number of Children Years of Education Highest Education Level    Single 0 12 High school graduate      Social Identity     Preferred Language Ethnicity Race    English Not , /a, or Swedish origin White         Diagnosis Codes - Tue May 18, 2021     Row Name 0018       Diagnosis    Primary Code 1  F32.9    Primary Code Description 1  Unspecified depressive d/o      Recommendations/Plan - Tue May 18, 2021     Row Name 0018       Recommendations/Plan    Clinical assessment summary  Inpatient psych tx, pt agreeable. Reviewed 201 and rights, signed and on chart. Spoke with Dr Truong for admission.    Recommended level of care  Psychiatric, Voluntary (201)    Patient refused treatment recommendation  No    Suicide Resource Information Provided  yes      Radiology Results (last 24 hours)    No matching results found        ECG/EMG Results (last 24 hours)      ECG 12 lead [[id number]]  Resulted: 05/18/21 0005, Result status: Preliminary result   Ordering provider: Catherine Walter PA C  05/17/21 2141 Resulting lab: MUSE   Narrative:   Poor data quality, interpretation may be adversely affected  Normal sinus rhythm  Normal ECG    Components    Component Value Flag   Ventricular rate 78  --   Atrial rate 78  --   MS Interval 124  --   QRS duration 74  --   QT Interval 362  --   QTC Calculation(Bazett) 412  --   P Axis 76  --   R Axis 65  --   T Wave Axis 41  --            ECG 12 lead [[id number]]  Resulted: 05/17/21 2244, Result status: Preliminary result   Ordering provider: Catherine Walter PA C  05/17/21 2141 Resulting lab: MUSE   Narrative:  Poor data quality, interpretation may be adversely affected  Normal sinus rhythm  Normal ECG    Components     Component Value Flag   Ventricular rate 78  --   Atrial rate 78  --   AR Interval 124  --   QRS duration 74  --   QT Interval 362  --   QTC Calculation(Bazett) 412  --   P Axis 76  --   R Axis 65  --   T Wave Axis 41  --                  Microbiology Results     Procedure Component Value Units Date/Time    SARS-CoV-2 (COVID-19), PCR Nasopharynx [[id number]]  (Normal) Collected: 05/17/21 2137    Specimen: Nasopharyngeal Swab from Nasopharynx Updated: 05/17/21 2251    Narrative:      The following orders were created for panel order SARS-CoV-2 (COVID-19), PCR Nasopharynx.  Procedure                               Abnormality         Status                     ---------                               -----------         ------                     SARS-CoV-2 (COVID-19), P...[[id number]]  Normal              Final result                 Please view results for these tests on the individual orders.    SARS-CoV-2 (COVID-19), PCR Nasopharynx [[id number]]  (Normal) Collected: 05/17/21 2137    Specimen: Nasopharyngeal Swab from Nasopharynx Updated: 05/17/21 2251     SARS-CoV-2 (COVID-19) Negative     Comment: EUA/IVD       Narrative:      Nursing instructions: Obtain nasopharyngeal swab ONLY.  Send swab in viral transport media.      Home Medications         Taking? Start Date End Date Provider     escitalopram (LEXAPRO) 20 mg tablet   04/29/21  --  Fer Renteria MD HAILEY 24 FE 1 mg-20 mcg (24)/75 mg (4) per tablet   04/21/21  --  Fer Renteria MD          lamoTRIgine (LaMICtal) 100 mg tablet   04/29/21  --  Fer Renteria MD          melatonin 3 mg tablet   11/30/20  --  Fer Renteria MD     Take 3 mg by mouth.     traZODone (DESYREL) 50 mg tablet   11/30/20  --  Fer Renteria MD     Take 50 mg by mouth.

## 2021-05-18 NOTE — ED PROVIDER NOTES
Emergency Medicine Note  HPI   HISTORY OF PRESENT ILLNESS     21-year-old female with history of depression presents to the emergency room for evaluation of depression and suicidal ideation.  Patient was referred to the emergency room by her therapist after she revealed suicidal ideation with an intent to kill herself and reported suicide attempt last night after patient took 10 to 20 tablets of 100mg lamictal last night.  Today she admits to feeling woozy and nauseated without vomiting.  Also admits to very poor sleep over the past few nights.  History of psychiatric hospitalization in September in [place].  Admits to heavy short-term alcohol use, she is unable to quantify but drinks a combination of wine, beer and liquor.  She denies any alcohol intake today.            Patient History   PAST HISTORY     Reviewed from Nursing Triage:      Past Medical History:   Diagnosis Date   • Eating disorder    • History of ITP        History reviewed. No pertinent surgical history.    History reviewed. No pertinent family history.    Social History     Tobacco Use   • Smoking status: Current Some Day Smoker     Types: Electronic Cigarette   • Smokeless tobacco: Never Used   Substance Use Topics   • Alcohol use: Yes   • Drug use: Yes     Types: Marijuana         Review of Systems   REVIEW OF SYSTEMS     Review of Systems   Constitutional: Negative for chills and fever.   Respiratory: Negative for shortness of breath.    Cardiovascular: Negative for chest pain.   Gastrointestinal: Positive for nausea. Negative for abdominal pain and vomiting.   Genitourinary: Negative for flank pain.   Musculoskeletal: Negative for back pain.   Skin: Negative for color change.   Allergic/Immunologic: Negative for immunocompromised state.   Neurological: Negative for dizziness, syncope and headaches.   Psychiatric/Behavioral: Positive for dysphoric mood, self-injury and suicidal ideas.   All other systems reviewed and are negative.         VITALS     ED Vitals    Date/Time Temp Pulse Resp BP SpO2 Who   05/17/21 2100 36.6 °C (97.9 °F) 93 18 162/99 99 % DLP        Pulse Ox %: 99 % (05/17/21 2204)  Pulse Ox Interpretation: Normal (05/17/21 2204)  Heart Rate: 75 (05/17/21 2204)  Rhythm Strip Interpretation: Normal Sinus Rhythm (05/17/21 2204)     Physical Exam   PHYSICAL EXAM     Physical Exam  Vitals and nursing note reviewed.   Constitutional:       Appearance: Normal appearance.   HENT:      Head: Normocephalic and atraumatic.   Cardiovascular:      Rate and Rhythm: Normal rate and regular rhythm.      Pulses: Normal pulses.      Heart sounds: Normal heart sounds.   Pulmonary:      Effort: Pulmonary effort is normal.      Breath sounds: Normal breath sounds.   Abdominal:      General: Abdomen is flat. Bowel sounds are normal.      Palpations: Abdomen is soft.      Tenderness: There is no abdominal tenderness.   Musculoskeletal:         General: Normal range of motion.   Skin:     General: Skin is warm and dry.   Neurological:      General: No focal deficit present.      Mental Status: She is alert and oriented to person, place, and time.   Psychiatric:         Attention and Perception: Attention normal.         Mood and Affect: Mood is depressed. Affect is flat.         Speech: Speech normal.         Behavior: Behavior normal. Behavior is cooperative.         Thought Content: Thought content includes suicidal ideation. Thought content does not include homicidal ideation. Thought content includes suicidal plan.           PROCEDURES     ECG 12 lead    Date/Time: 5/17/2021 10:05 PM  Performed by: Catherine Walter PA C  Authorized by: Colt Sawyer MD     ECG reviewed by ED Physician in the absence of a cardiologist: yes    Rate:     ECG rate:  78    ECG rate assessment: normal    Rhythm:     Rhythm: sinus rhythm    Ectopy:     Ectopy: none    QRS:     QRS axis:  Normal    QRS intervals:  Normal  Conduction:     Conduction: normal    ST segments:     ST  segments:  Normal  T waves:     T waves: normal    Comments:      Qt/qtc 362/412         DATA     Results     Procedure Component Value Units Date/Time    Urine drug screen (UDS) [[id number]]  (Normal) Collected: 05/17/21 2307    Specimen: Urine, Clean Catch Updated: 05/17/21 2338     PCP Scrn, Ur Not Detected     Comment: Assay Detects: phencyclidine in urine. Lowest detectable concentration is 25 ng/mL of phencyclidine.        Benzodiazepine Ur Qual Not Detected     Comment: Assay Detects: benzodiazepines and metabolites at varying concentrations. Lowest detectable concentration is 200 ng/mL of oxazepam.        Cocaine Screen, Urine Not Detected     Comment: Assay Detects: benzoylecgonine and cocaine in urine. Lowest detectable concentration is 300 ng/mL of benzoylecgonine.        Amphetamine+Methamphetamine Screen, Ur Not Detected     Comment: Assay Detects: d-methamphetamine, d-amphetamine, methlyenedioxyamphetamine (MDA), and methlyenendioxymethamphetamine (MDMA) in urine. Lowest detectable concentration is 1000 ng/mL of d-methamphetamine.  Assay is less sensitive to MDA and MDMA (lowest detectable concentration, 2500 ng/mL) and could produce a false negative result. If MDMA overdose is suspected and the result is negative, a more specific test should be requested.        Cannabinoid Screen, Urine Not Detected     Comment: Assay Detects: cannabinoid metabolites in urine. Lowest detectable concentration is 50 ng/mL        Opiate Scrn, Ur Not Detected     Comment: Assay Detects: codeine, dihydrocodeine, hydrocodone, hydromorphone, levorphanol, morphine, morphine-3-glucuronide, norcodeine, oxycodone in urine. Lowest detectable concentration is 300 ng/mL of morphine.        Barbiturate Screen, Ur Not Detected     Comment: Assay Detects: alphenal, amobarbital, aprobarbital, barbital, butabarbital, butalbital, butethal, diallybarbital, pentobarbital, secobarbital,talbutal, and thiopental. Lowest detectable  concentration is 200 ng/mL of secobarbital.       SARS-CoV-2 (COVID-19), PCR Nasopharynx [[id number]]  (Normal) Collected: 05/17/21 2137    Specimen: Nasopharyngeal Swab from Nasopharynx Updated: 05/17/21 2251    Narrative:      The following orders were created for panel order SARS-CoV-2 (COVID-19), PCR Nasopharynx.  Procedure                               Abnormality         Status                     ---------                               -----------         ------                     SARS-CoV-2 (COVID-19), P...[[id number]]  Normal              Final result                 Please view results for these tests on the individual orders.    SARS-CoV-2 (COVID-19), PCR Nasopharynx [[id number]]  (Normal) Collected: 05/17/21 2137    Specimen: Nasopharyngeal Swab from Nasopharynx Updated: 05/17/21 2251     SARS-CoV-2 (COVID-19) Negative     Comment: EUA/IVD       Narrative:      Nursing instructions: Obtain nasopharyngeal swab ONLY.  Send swab in viral transport media.    ER toxicology screen, serum [[id number]]  (Abnormal) Collected: 05/17/21 2134    Specimen: Blood, Venous Updated: 05/17/21 2211     Salicylate <4.0 mg/dL      Acetaminophen <10.0 ug/mL      Ethanol <5 mg/dL     Basic metabolic panel [[id number]]  (Abnormal) Collected: 05/17/21 2134    Specimen: Blood, Venous Updated: 05/17/21 2208     Sodium 138 mEQ/L      Potassium 3.6 mEQ/L      Comment: Results obtained on plasma. Plasma Potassium values may be up to 0.4 mEQ/L less than serum values. The differences may be greater for patients with high platelet or white cell counts.        Chloride 106 mEQ/L      CO2 21 mEQ/L      BUN 7 mg/dL      Creatinine 0.9 mg/dL      Glucose 95 mg/dL      Calcium 9.2 mg/dL      eGFR >60.0 mL/min/1.73m*2      Anion Gap 11 mEQ/L     BhCG, Serum, Qual [[id number]]  (Normal) Collected: 05/17/21 2134    Specimen: Blood, Venous Updated: 05/17/21 2201     Preg Test, Serum Negative    CBC and differential [[id number]]  (Abnormal) Collected:  05/17/21 2134    Specimen: Blood, Venous Updated: 05/17/21 2145     WBC 8.55 K/uL      RBC 4.30 M/uL      Hemoglobin 12.7 g/dL      Hematocrit 38.5 %      MCV 89.5 fL      MCH 29.5 pg      MCHC 33.0 g/dL      RDW 11.6 %      Platelets 265 K/uL      MPV 9.9 fL      Differential Type Auto     nRBC 0.0 %      Immature Granulocytes 0.2 %      Neutrophils 63.6 %      Lymphocytes 26.7 %      Monocytes 8.3 %      Eosinophils 0.7 %      Basophils 0.5 %      Immature Granulocytes, Absolute 0.02 K/uL      Neutrophils, Absolute 5.44 K/uL      Lymphocytes, Absolute 2.28 K/uL      Monocytes, Absolute 0.71 K/uL      Eosinophils, Absolute 0.06 K/uL      Basophils, Absolute 0.04 K/uL           Imaging Results    None         ECG 12 lead             Scoring tools                                 ED Course & MDM   MDM / ED COURSE and CLINICAL IMPRESSIONS     [facility]    ED Course as of May 18 0005   Mon May 17, 2021   2123 Patient presents the emergency room after a suicide attempt yesterday and continued suicidal ideation with intent.  Patient's overdose was 24 hours ago, however I will place a call to poison control to determine if any specific monitoring is required.  She was placed on 1:1 for safety and will require inpatient hospitalization, pt is agreeable at this time.  Discussed case with [place] MSW    [EK]   2141 Poison control  GI symptoms normal  Obtain ECG to r/o Qtc prolongation  No need for further monitoring    [EK]      ED Course User Index  [EK] Catherine Walter PA C         Clinical Impressions as of May 18 0005   Intentional drug overdose, initial encounter (CMS/[facility])   Suicidal ideation            Catherine Walter PA C  05/18/21 0005

## 2021-05-18 NOTE — ED ATTESTATION NOTE
I have personally seen, evaluated,and participated in the management of Gemma Darnell.  I reviewed and agree with the PA/NP's assessment and plan of care with the following exceptions: None    My examination, assessment, and plan of care for Gemma Darnell:  21-year-old female presented for evaluation after admitted to an attempt for self-harm by overdose on Lamictal yesterday.  Has had worsening SI.  Exam:   Vitals:    05/17/21 2100   BP: (!) 162/99   Pulse: 93   Resp: 18   Temp: 36.6 °C (97.9 °F)   SpO2: 99%   Awake and alert, low volume clear speech, flat affect, good eye contact, cooperative, no respiratory distress, normal complexion, moving all 4 extremities.    Plan:  One-to-one observation labs crisis     Colt Sawyer MD  05/17/21 7904

## 2021-05-18 NOTE — PLAN OF CARE
Problem: Suicidal Behavior  Goal: Suicidal Behavior is Absent or Managed  Outcome: Progressing  Flowsheets (Taken 5/18/2021 0222)  [place] Goal:   identifies protective factors   shares suicidal thoughts  Individual Goal: Pt will attend and participate in two groups to learn coping skills to manage SI  Goal Outcome: progressing     Problem: Adult Behavioral Health Plan of Care  Goal: Plan of Care Review  Outcome: Progressing  Flowsheets (Taken 5/18/2021 0222)  Progress: no change  Plan of Care Reviewed With: patient  Patient Agreement with Plan of Care: agrees  Intervention(s): [place] patient to unit and encourage patient to attend and participate in groups

## 2021-05-18 NOTE — ASSESSMENT & PLAN NOTE
Ms. Darnell is a 21 year old woman with previous diagnosis of anorexia, ETOH use and cocaine use disorder, now presenting with worsening feelings of detachment/depression culminating in overdose on Lamictal. Today she reports feeling detached, unsafe for discharge. Denies active SIIP at this time and contracts for safety. ETOH use disorder, substance-induced mood disorder, anorexia, symptoms also suggestive of borderline personality disorder.     q15 min checks, contracts for safety, no plan or intent to harm herself at this time  Depression NOS, r/o substance induced mood d/o, r/o major depression, bipolar disorder lower on ddx: C/w Prozac 20 mg po daily, c/w gabapentin for now though she is refusing it for unclear reason, will explore alternatives for acute anxiety. Monitor for tolerability. PRN Atarax 50 mg po tid anxiety  She has been educated to notify staff at any hint of rash following Lamictal OD  ETOH abuse: monitor for withdrawal. Has been about 5 days since last known drink. This morning she is not exhibiting s/s at this time and VSS, . CIWA protocol with symptom-triggered Ativan.  R/o borderline personality disorder: she would benefit from DBT on outpatient basis.   Medical:   -difficulty concentrating/AMS: baseline labs unremarkable, head CT unremarkable. Continue to monitor, repeat labs tomorrow  -Anorexia: she has been restricting intake. Repeat labs tomorrow. Nutrition consult. Provide Boost. Monitor VS - poor po intake likely contributing to tachycardia.  G/m/s therapy as tolerated, collaterals as permitted. She would benefit from residential program targeting some combination of substance, eating disorder, mental josey/DBT

## 2021-05-18 NOTE — CONSULTS
Hospital Medicine Service -  Inpatient Consultation         Requesting Physician: Dr Galo Smith    Reason for Consultation: Medical management     HISTORY OF PRESENT ILLNESS        This is a 21 y.o. female with history of eating disorder and ITP presents with concerns for depression and suicidal ideation.  She denies any somatic symptoms and medical conditions at this time.    PAST MEDICAL AND SURGICAL HISTORY        Past Medical History:   Diagnosis Date   • Eating disorder    • History of ITP        History reviewed. No pertinent surgical history.    PCP: Arlette Tolentino FNP    MEDICATIONS        Home Medications:  Medications Prior to Admission   Medication Sig Dispense Refill Last Dose   • escitalopram (LEXAPRO) 20 mg tablet    5/17/2021 at Unknown time   • CK 24 FE 1 mg-20 mcg (24)/75 mg (4) per tablet    5/17/2021 at Unknown time   • lamoTRIgine (LaMICtal) 100 mg tablet    5/17/2021 at Unknown time   • melatonin 3 mg tablet Take 3 mg by mouth.   More than a month at Unknown time   • traZODone (DESYREL) 50 mg tablet Take 50 mg by mouth.   More than a month at Unknown time       Current inpatient medications were personally reviewed.    ALLERGIES        Patient has no known allergies.    FAMILY HISTORY        History reviewed. No pertinent family history.    SOCIAL HISTORY        Social History     Socioeconomic History   • Marital status: Single     Spouse name: None   • Number of children: 0   • Years of education: 12   • Highest education level: High school graduate   Occupational History   • Occupation: [employer]   Tobacco Use   • Smoking status: Current Some Day Smoker     Types: Electronic Cigarette   • Smokeless tobacco: Never Used   Substance and Sexual Activity   • Alcohol use: Yes   • Drug use: Not Currently   • Sexual activity: None   Other Topics Concern   • None   Social History Narrative   • None     Social Determinants of Health     Financial Resource Strain:    • Difficulty of Paying  "Living Expenses:    Food Insecurity:    • Worried About Running Out of Food in the Last Year:    • Ran Out of Food in the Last Year:    Transportation Needs:    • Lack of Transportation (Medical):    • Lack of Transportation (Non-Medical):    Physical Activity:    • Days of Exercise per Week:    • Minutes of Exercise per Session:    Stress: Stress Concern Present   • Feeling of Stress : To some extent   Social Connections:    • Frequency of Communication with Friends and Family:    • Frequency of Social Gatherings with Friends and Family:    • Attends Anabaptist Services:    • Active Member of Clubs or Organizations:    • Attends Club or Organization Meetings:    • Marital Status:    Intimate Partner Violence:    • Fear of Current or Ex-Partner:    • Emotionally Abused:    • Physically Abused:    • Sexually Abused:        REVIEW OF SYSTEMS        All other systems reviewed and negative except as noted in HPI    PHYSICAL EXAMINATION        Visit Vitals  /71   Pulse 82   Temp 36.6 °C (97.9 °F) (Tympanic)   Resp 18   Ht 1.626 m (5' 4\")   Wt 52.3 kg (115 lb 3.2 oz)   SpO2 99%   BMI 19.77 kg/m²     Body mass index is 19.77 kg/m².  No intake or output data in the 24 hours ending 05/18/21 0639    Physical Exam  Constitutional:       Appearance: Normal appearance.   HENT:      Head: Normocephalic and atraumatic.      Nose: No congestion.      Mouth/Throat:      Mouth: Mucous membranes are moist.   Eyes:      Extraocular Movements: Extraocular movements intact.      Pupils: Pupils are equal, round, and reactive to light.   Cardiovascular:      Rate and Rhythm: Normal rate and regular rhythm.      Heart sounds: Normal heart sounds.   Pulmonary:      Effort: Pulmonary effort is normal.      Breath sounds: Normal breath sounds.   Abdominal:      General: Abdomen is flat.      Palpations: Abdomen is soft.   Musculoskeletal:      Cervical back: Neck supple.      Right lower leg: No edema.      Left lower leg: No edema. "   Skin:     General: Skin is warm and dry.   Neurological:      General: No focal deficit present.      Mental Status: She is alert and oriented to person, place, and time.         LABS / EKG        Labs  Results from last 7 days   Lab Units 05/17/21  2134   WBC K/uL 8.55   HEMOGLOBIN g/dL 12.7   HEMATOCRIT % 38.5   PLATELETS K/uL 265     Results from last 7 days   Lab Units 05/17/21  2134   SODIUM mEQ/L 138   POTASSIUM mEQ/L 3.6   CHLORIDE mEQ/L 106   CO2 mEQ/L 21*   BUN mg/dL 7*   CREATININE mg/dL 0.9   GLUCOSE mg/dL 95   CALCIUM mg/dL 9.2       SARS-CoV-2 (COVID-19) (no units)   Date/Time Value   05/17/2021 2137 Negative        ASSESSMENT AND RECOMMENDATIONS           Encounter for medical assessment  Assessment & Plan  Currently without any symptoms  Call if any additional questions            Estela Ross MD  5/18/2021

## 2021-05-18 NOTE — PLAN OF CARE
Plan of Care Review  Plan of Care Reviewed With: patient  Patient Agreement with Plan of Care: agrees  Progress: no change  Intervention(s): Encouraged pt to attend and participate in group  Outcome Summary: Gemma has bee mostly isolative to room. Reports positive AH, denies command in nature. Reports feeling disconnected. Affect flat. Eye contact hesitant. Slow to respond. Appears preoccupied. Reports anxiety 6/10, depression 8/10 denies active SI. Endorses passive wish to be dead. CFS. Pt reports eating one meal a day, acknowledges that she does restrict food but denies purging. Med compliant. Will continue to monitor and offer support

## 2021-05-18 NOTE — H&P
"Psychiatry H and P    Chief Complaint   Patient presents with   • Psychiatric Evaluation     pt reports that she was sent to ER after speaking with thep, pt reports that yesterday she took \"some extra meds\" reports that she has been having some SI thoughts, reported that to thep who sent her to ER       Gemma Darnell is a 21 y.o. female with previous diagnoses of \"Bipolar disorder,\" anorexia, cocaine and ETOH abuse disorder, now presenting with worsening depression and suicide attempt by OD on Lamictal day before yesterday.    Today I encounter Gemma in her room. She is lying in bed. States that she was last hospitalized at the [facility] for substance and psych treatment Sept-Oct 2020 followed by [facility]arly[facility] Cerv[facility] for eating d/o Oct-Nov. Since then, \"I was doing ok for awhile\" though she admits she immediately began drinking again after discharge. Her parents  in January which was \"not a surprise, my dad is a heavy drinker\" and \"I think it is for the best\" but she also pinpoints a deterioration in her mental health around that time. Soon after that, she reports that her drinking worsened and recently, she has been drinking up to \"7 vodka shots per day\" but not every day. She denies s/s of withdrawal at this time. Endorses feeling mostly \"detached,\" empty, numb much of the time, with sadness at times. She has had difficulty sleeping prior to admission, and reports a weight loss of about 8 lbs in the last 2 months (though this was purposeful and she believes part of her anorexia rather than result of depression/anxiety), low energy, feeling like she is in a fog with lapses in memory, chronic thoughts of death/dying, self injury in the form of scratching herself (has not broken the skin), difficulty concentrating.     This all culminated the day before yesterday when she took \"half a bottle\" of Lamictal with the clear intent to die. She states, somewhat illogically, that she did not take the whole bottle " "\"because I knew it wouldn't work to kill me.\" Reports that it was impulsive and when she thinks about it now, she feels \"detached from it, I don't remember it too well.\" Following taking the pills, \"My mom set up a PCP appointment because I was giving her mixed signals\" (eg hinting that she had misused medication) but upon presenting to the PCP, she purposefully withheld that she had taken an OD. Following that appointment, she reached out to her therapist who recommended she present here. At this point, she states \"I don't want to be here\" but admits she is not safe to go home.    Other ROS: Reports previous diagnosis of bipolar d/o, was last \"manic\" in Sept 2020 when she stayed up for one week straight and was sexually impulsive, \"sleeping with friends,\" racing thoughts that \"I am a piece of shit\" (which she is experiencing right now as well). Denies feeling invincible/special pierre, denies spending large amounts of money (but has been recently in an effort to \"make myself feel better\"), denies increased goal-oriented activities. Unclear whether she was under the influence of substances at that time.     She reports hearing voices but denies VH. AH are \"in my head\" and say derogatory things such as \"you are a piece of shit.\" I ask if it is her voice and she states \"maybe.\" Does not hear them daily, they are distressing when she does hear them.    Has pushed away most friends recently but still has her mother and sister. Allows me to call her mother Cindi [phone] and outpatient psychiatrist Dr. Simpson though they have only met once.    Psychiatric History:  Suicide Attempts: yes - as above  Risk Factors: Alcohol and/or substance abuse, Easy access to lethal methods and Impulsive or aggressive tendencies   Protective Factors: Effective and accessible mental health care    Current Psychiatrist: yes - Dr. Simpson   Past psychiatric Hospitalization: yes - as above, no other hospitalizations prior to the " [facility]  Medication Trials:  yes - reports only Lamictal and Lexapro which she was put on in previous hospitalization  ECT trials: no      Substance Use History: ETOH as above, cocaine prior to the Agnies[facility]ka but reports using only 2-3 times in recent months  Substance use:   Drug Details     Questions Responses    Cocaine frequency Past regular use    Comment: Past regular use on 5/18/2021     Cocaine method Snort    Comment: Snort on 5/18/2021         Consequences of use: Yes:  Blackouts  Past D&A Treatment: Yes: as above    Family History: father with ETOH use disorder, sister with good response to Prozac, cousin and sister have attempted suicide    Social History:   Social History     Socioeconomic History   • Marital status: Single     Spouse name: None   • Number of children: 0   • Years of education: 12   • Highest education level: High school graduate   Occupational History   • Occupation: [employer]   Tobacco Use   • Smoking status: Current Some Day Smoker     Types: Electronic Cigarette   • Smokeless tobacco: Never Used   Substance and Sexual Activity   • Alcohol use: Yes   • Drug use: Not Currently   • Sexual activity: None   Other Topics Concern   • None   Social History Narrative   • None     Social Determinants of Health     Financial Resource Strain:    • Difficulty of Paying Living Expenses:    Food Insecurity:    • Worried About Running Out of Food in the Last Year:    • Ran Out of Food in the Last Year:    Transportation Needs:    • Lack of Transportation (Medical):    • Lack of Transportation (Non-Medical):    Physical Activity:    • Days of Exercise per Week:    • Minutes of Exercise per Session:    Stress: Stress Concern Present   • Feeling of Stress : To some extent   Social Connections:    • Frequency of Communication with Friends and Family:    • Frequency of Social Gatherings with Friends and Family:    • Attends Orthodox Services:    • Active Member of Clubs or Organizations:    • Attends Club or  "Organization Meetings:    • Marital Status:    Intimate Partner Violence:    • Fear of Current or Ex-Partner:    • Emotionally Abused:    • Physically Abused:    • Sexually Abused:        Medical History:   Past Medical History:   Diagnosis Date   • Eating disorder    • History of ITP        Surgical History: History reviewed. No pertinent surgical history.    Allergies: No Known Allergies    Current Medications:  SCHEDULED:  • [START ON 5/19/2021] escitalopram  10 mg oral Daily   • [START ON 5/19/2021] FLUoxetine  10 mg oral Daily   • gabapentin  200 mg oral TID   • nicotine  1 patch transdermal Daily   • norethindrone-e.estradioL-iron  1 tablet oral Daily     PRN  •  acetaminophen  •  alum-mag hydroxide-simeth  •  diphenhydrAMINE  •  diphenhydrAMINE  •  haloperidoL **OR** haloperidol lactate  •  ibuprofen  •  LORazepam **OR** LORazepam  •  melatonin  •  metoclopramide  •  nicotine polacrilex  •  senna      Review of Systems  Musculoskeletal:  Grossly intact    Objective     Vital Signs for the last 24 hours:  Temp:  [36.1 °C (97 °F)-36.6 °C (97.9 °F)] 36.1 °C (97 °F)  Heart Rate:  [] 112  Resp:  [18] 18  BP: (114-162)/(71-99) 119/76    Labs  Results from last 7 days   Lab Units 05/17/21  2134   HEMOGLOBIN g/dL 12.7   WBC K/uL 8.55   PLATELETS K/uL 265   POTASSIUM mEQ/L 3.6   SODIUM mEQ/L 138   CREATININE mg/dL 0.9         Ethanol   Date Value Ref Range Status   05/17/2021 <5 <=10 mg/dL Final     No results found for: LITHIUM, TSH, FREET4, L1PXPTA, FGSOGDUT47, FOLATE  No results found for: PHENYTOIN, PHENOBARB, VALPROATE, CBMZ    No results found.    No results found for: HDL, LDLCALC, TRIG, CHOL, HGBA1C        MENTAL STATUS EXAM  Appearance: well groomed  Gait and Motor: no abnormal movements  Speech: normal rate/rhythm/volume  Mood: \"detached\"  Affect: blunted  Associations: coherent  Thought Process: goal-directed and vague and contradictory at times  Thought Content: auditory " "hallucinations  Suicidality/Homicidality: thoughts of being dead/ no desire or plan to die  Judgement/Insight: minimizes severity level of illness  Cognition: she appears tired      Assessment/Plan  * Suicide attempt ([facility])  Assessment & Plan  Ms. Darnell is a 21 year old woman with previous diagnosis of \"bipolar disorder,\" anorexia, ETOH use and cocaine use disorder, now presenting with worsening feelings of detachment/depression culminating in overdose on Lamictal. Today she reports feeling detached, unsafe for discharge. Denies active SIIP at this time and contracts for safety. Initial impression is that substance is a significant factor in symptoms, but may have underlying mood disorder. Symptoms also highly suggestive of borderline personality disorder.     q15 min checks, contracts for safety, no plan or intent to harm herself at this time  Depression NOS, r/o substance induced mood d/o, r/o bipolar d/o, r/o major depression: will taper off Lexapro as she does not believe it has been helping (but admits to irregular compliance). Her sister has had a good experience on Prozac, she would like to try that. Will d/c Lamictal as she admits to taking it irregularly and in this context is at high risk for SJS. She expresses understanding.   For anxiety and off label for ETOH use disorder, will start gabapentin 200 mg po tid to start. Monitor for tolerability.   PRN Atarax 50 mg po tid anxiety  She has been educated to notify staff at any hint of rash following Lamictal OD  ETOH abuse: monitor for withdrawal. She is not exhibiting s/s at this time and VSS. CIWA protocol with symptom-triggered Ativan. She denies hx of withdrawal sz but has had \"cold sweats\" previously.  R/o borderline personality disorder: she would benefit from DBT on outpatient basis.   G/m/s therapy as tolerated, collaterals as permitted          "

## 2021-05-19 PROCEDURE — 63700000 HC SELF-ADMINISTRABLE DRUG: Performed by: STUDENT IN AN ORGANIZED HEALTH CARE EDUCATION/TRAINING PROGRAM

## 2021-05-19 PROCEDURE — 99232 SBSQ HOSP IP/OBS MODERATE 35: CPT | Performed by: STUDENT IN AN ORGANIZED HEALTH CARE EDUCATION/TRAINING PROGRAM

## 2021-05-19 PROCEDURE — 63700000 HC SELF-ADMINISTRABLE DRUG: Performed by: PSYCHIATRY & NEUROLOGY

## 2021-05-19 PROCEDURE — 12400000 HC ROOM AND CARE SEMIPRIVATE PSYCH

## 2021-05-19 RX ORDER — LORAZEPAM 2 MG/ML
2 INJECTION INTRAMUSCULAR EVERY 4 HOURS PRN
Status: DISCONTINUED | OUTPATIENT
Start: 2021-05-19 | End: 2021-05-31 | Stop reason: HOSPADM

## 2021-05-19 RX ORDER — GABAPENTIN 400 MG/1
400 CAPSULE ORAL 3 TIMES DAILY
Status: DISCONTINUED | OUTPATIENT
Start: 2021-05-19 | End: 2021-05-20

## 2021-05-19 RX ORDER — LORAZEPAM 2 MG/1
2 TABLET ORAL EVERY 6 HOURS PRN
Status: DISCONTINUED | OUTPATIENT
Start: 2021-05-19 | End: 2021-05-31 | Stop reason: HOSPADM

## 2021-05-19 RX ORDER — GABAPENTIN 400 MG/1
400 CAPSULE ORAL ONCE
Status: DISPENSED | OUTPATIENT
Start: 2021-05-19 | End: 2021-05-19

## 2021-05-19 RX ORDER — FLUOXETINE HYDROCHLORIDE 20 MG/1
20 CAPSULE ORAL DAILY
Status: DISCONTINUED | OUTPATIENT
Start: 2021-05-20 | End: 2021-05-31 | Stop reason: HOSPADM

## 2021-05-19 RX ADMIN — NICOTINE 1 PATCH: 14 PATCH, EXTENDED RELEASE TRANSDERMAL at 12:54

## 2021-05-19 RX ADMIN — LORAZEPAM 2 MG: 2 TABLET ORAL at 17:20

## 2021-05-19 RX ADMIN — ESCITALOPRAM OXALATE 10 MG: 10 TABLET ORAL at 12:53

## 2021-05-19 RX ADMIN — FLUOXETINE 10 MG: 10 CAPSULE ORAL at 12:54

## 2021-05-19 RX ADMIN — LORAZEPAM 2 MG: 2 TABLET ORAL at 10:54

## 2021-05-19 NOTE — PROGRESS NOTES
"Psychiatry Progress Note    Chief Complaint/Reason for follow-up: OD on Lamictal, \"I feel like I'm dying\"    Interval History:    Per staff, refused medication in the morning, high anxiety, SI without intent or plan currently, reports feeling numb.    Pt was observed laying in bed, wrapped in covers, with the lights turned off. She \"slept a lot\" yesterday. She continues to avoid food, stating \"I can't eat\" but cannot say why. She thinks her medicine regimen \"is not good for me,\" feels it is causing symptoms though she admits she has been experiencing many of these symptoms for months, even before being on medication at all. She feels \"numb\" but also admits to feeling anxious, sad, is tearful at times.     Pt endorsed some symptoms of alcohol withdrawal: anxiety, \"shaking\", nausea, headache, sweaty palms, itchiness, chills, night sweats but does not think this is due to ETOH withdrawal but rather to medication. She denied hallucinations, palpitations, insomnia. On review of systems and she endorsed nearly every one including: vision changes (couldn't clarify what her changes were), dizziness (\"feel like I'm going to pass out\"), soreness. She says her heart \"is slow\". States \"I feel like I am going to die\" but cannot give more detail/state why.    She denies any intent or plan to harm herself right now and contracts for safety. She does not feel she needs anyone to watch her. Tells me \"I can't even concentrate on what you are saying to me.\"    She socialized yesterday \"for an hour\" but felt like she couldn't concentrate and \"didn't know what to say\".    Later in day following PRN Ativan appeared more relaxed.    Vital Signs for the last 24 hours:  Temp:  [35.9 °C (96.7 °F)-36.9 °C (98.5 °F)] 35.9 °C (96.7 °F)  Heart Rate:  [] 124  Resp:  [16-18] 18  BP: (122-136)/(69-88) 128/85    Mental Status Exam:  Pt appeared her stated age (20 yo), was dressed appropriately in sweats, appropriate hygiene, well-groomed. Pt " "had no stereotypical tics or movements and ambulation was not observed. Pt's speech had normal rate, rhythm, and volume was soft. Pt's mood was \"I feel like I'm dying\". Pt's affect was dysphoric, anxious, tearful at times. Pt's associations were coherent. Thought process was linear and goal-directed but vague. Thought content included no current HI, VH, or AH. Anxiety was \"7/10\" with 10 being the worst and depression was \"9/10\" with 10 being the worst. Continues to endorse passive SI (\"would be ok with no waking up\"). Judgement and insight were appropriate. Cognition: alert, but appears distracted, not sedated.     Assessment/Plan  Gemma Darnell is a 20 yo female with a history of cocaine and alcohol use disorder, anorexia, \"bipolar disorder\", and \"depression\" who presented to the [place] ED after trying to overdose on Lamictal. Depression, ETOH use disorder, traits suggestive of borderline personality disorder.  1. Depression and anxiety: D/c Lexapro 10mg daily and increase Prozac to 20mg daily, PRN Atarax 50mg PO TID for anxiety  2. ETOH use disorder: increase gabapentin to 400mg PO TID for alcohol use disorder and anxiety, she is refusing at this time, continue to educate and offer but will d/c if continues to refuse. In the meantime, CIWA, monitor VS carefully, symptom triggered Ativan PRN  3. Medical: tachycardic this morning to 124, monitor, otherwise VSS.   4. R/o borderline personality disorder: she would benefit from DBT on outpatient basis.        I discussed the treatment plan and the patient's progress with nursing staff and Allied therapists in the treatment team meeting this morning. Patient is seen and evaluated for approximately 25 minutes in therapy with greater than 50% of time spent in direct face-to-face counseling, coordination of care and review of the medical record.  "

## 2021-05-19 NOTE — PLAN OF CARE
"Plan of Care Review  Plan of Care Reviewed With: patient  Patient Agreement with Plan of Care: agrees  Progress: improving  Intervention(s): Encouraged pt to spend time out of her room  Outcome Summary: Gemma has spent most of the shift in her. Pt refused her medication this morning stating, \"I don't want it I don't think it's working\". Pt denies active SI endorse wish to be dead no plan/intent, CFS. Reports anxiety 7/10 depression 8 to 9/10. CIWA=9 pt pt reports nausea, tingling in extremities, clammy hands, tremor and tachycardiac. Per protocol offered pt Ativan 2 mg, pt refused when first offered.  Pt refused all of her morning meds as well. When asked abut mood pt responded, \"I don't feel anything, I'm numb\". Again attempted to give pt her morniing meds at 1055, pt was agreeable only to the Ativan 2mg. Pt came up to nurses station at 1250 requesting her nicotine patch at this time offered her morning meds again. Pt agreeable to Lexapro and Prozac only along with the nicotine patch. Pt stated, \"I don't think I need the Neurontin\". Pt did appear a bit brighter and slightly more relaxed at this time. Pt denied auditory hallucinations this shift. Will continue to monitor and offer support  "

## 2021-05-19 NOTE — PLAN OF CARE
Plan of Care Review  Plan of Care Reviewed With: patient  Patient Agreement with Plan of Care: agrees  Progress: improving  Intervention(s): Assessed for SI  Outcome Summary: Gemma has been visible in the milieu, engaged with peers. Affect restricted. She reports poor intake today. Tachycardic earlier, with sweaty palms and tremulous hands. CIWA 9, received ativan. She denies SI, and contracts for safety. Coloring and talking with group of female peers. Compliant with medications.

## 2021-05-19 NOTE — NURSING NOTE
"Blood pressure and HR elevated at dinner time 141/100 & 109. Scored 10 on CIWA. Received PRN Ativan 2mg. Patient continues to refuse Gabapentin. Gemma reports \"feeling sick\" with poor memory. Complained of poor appetite and feeling full after few bites. Ate 25% of meal. Denies purging. Patient given a BOOST, which she drank. Gemma denies drinking daily, however is vague when asked to quantify. Rationale for CIWA and PRN lorazepam explained to patient, who verbalized understanding. Will continue to monitor for signs of withdrawal.   "

## 2021-05-19 NOTE — STUDENT
"Psychiatry Progress Note    Chief Complaint/Reason for follow-up: OD on Lamictal, \"I feel like I'm dying\"    Interval History:  Pt was observed laying in bed, wrapped in covers, with the lights turned off. She \"slept a lot\" yesterday. She endorsed some sx of alcohol withdrawal but denied others. She continues to avoid food. She thinks her medicine regimen \"is not good for me.\" She feels \"numb\" but also endorsed many physical symptoms. Unclear if sx are due to withdrawal, not eating, new medications, a factitious disorder, or another unspecified cause. Pt rejected all AM medications.    Pt endorsed some symptoms of alcohol withdrawal: anxiety, \"shaking\", nausea, headache, sweaty palms, itchiness, chills, night sweats. She denied hallucinations, palpitations, insomnia. Queried pt on a variety of review of systems and she endorsed nearly every one including: vision changes (couldn't clarify what her changes were), dizziness (\"feel like I'm going to pass out\"), soreness. She says her heart \"is slow\". She has peed \"a little bit\" but \"can't remember the last time I had a bowel movement.\" Her vitals taken this morning at 0742 included a low temp (96.7) and tachycardia (124). Encouraged pt to tell nurse if she is experiencing new sxs.     Pt feels that her meds are \"not good for me.\" She feels that they are making her \"numb\" and she doesn't like feeling sedated. They make her very tired and she is sleeping \"a lot\". Specifically, she didn't like the Ativan and how it makes her feel sedated. She does not want to take Prozac, she wants to stay on Lexapro. She couldn't describe her reasons. She believes the medicines are \"making my appetite worse\" but she says her appetite was bad before coming in here. She says she is drinking fluids. Encouraged her to eat and drink more.     She socialized yesterday \"for an hour\" but felt like she couldn't concentrate and \"didn't know what to say\". One of her goals that she described " "yesterday was \"attending groups\" but she did not attend the 1000 group today.     Vital Signs for the last 24 hours:  Temp:  [35.9 °C (96.7 °F)-36.9 °C (98.5 °F)] 35.9 °C (96.7 °F)  Heart Rate:  [] 124  Resp:  [16-18] 18  BP: (122-136)/(69-88) 128/85    Mental Status Exam:  Pt appeared her stated age (22 yo), was dressed appropriately in sweats, appropriate hygiene, well-groomed. Pt had no stereotypical tics or movements and ambulation was not observed. Pt's speech had normal rate, rhythm, and volume was soft. Pt's mood was \"I feel like I'm dying\". Pt's affect was quiet, reserved, guarded, congruent to situation. Pt's associations were coherent. Thought process was linear and goal-directed. Thought content included no current HI, VH, or AH. Anxiety was \"7/10\" with 10 being the worst and depression was \"9/10\" with 10 being the worst. Continues to endorse passive SI (\"would be ok with no waking up\"). Judgement and insight were appropriate. Cognition was fair. Alert and but not oriented to person or date or time. Oriented only to place.     Assessment/Plan  Gemma Darnell is a 22 yo white female with a history of cocaine and alcohol use disorder, anorexia, \"bipolar disorder\", and \"depression\" who presented to the [facility] after trying to overdose on Lamictal. Her presentation is suggestive of potential alcohol withdrawal as well as a mood disorder. She has traits suggestive of borderline personality disorder.  1. Downtitrate Lexapro 10mg daily and uptitrate Proxac 10mg daily for depression  2. Gabapentin 200mg PO TID for alcohol use disorder and anxiety  3. PRN Atarax 50mg PO TID for anxiety  4. Encouraged to notify staff about rash due to Lamictal OD  5. For alcohol abuse: monitor for withdrawal. She is not exhibiting s/s at this time. CIWA protocol with symptom-triggered Ativan 2mg PO or IM. She denies hx of withdrawal sx but has had \"cold sweats\" previously.  6. R/o borderline personality disorder: she would " benefit from DBT on outpatient basis.      Kevin Garcia  Medical Student

## 2021-05-20 ENCOUNTER — APPOINTMENT (INPATIENT)
Dept: RADIOLOGY | Facility: HOSPITAL | Age: 21
DRG: 881 | End: 2021-05-20
Attending: STUDENT IN AN ORGANIZED HEALTH CARE EDUCATION/TRAINING PROGRAM
Payer: COMMERCIAL

## 2021-05-20 PROCEDURE — 99232 SBSQ HOSP IP/OBS MODERATE 35: CPT | Performed by: STUDENT IN AN ORGANIZED HEALTH CARE EDUCATION/TRAINING PROGRAM

## 2021-05-20 PROCEDURE — 12400000 HC ROOM AND CARE SEMIPRIVATE PSYCH

## 2021-05-20 PROCEDURE — 63700000 HC SELF-ADMINISTRABLE DRUG: Performed by: PSYCHIATRY & NEUROLOGY

## 2021-05-20 PROCEDURE — 63700000 HC SELF-ADMINISTRABLE DRUG: Performed by: STUDENT IN AN ORGANIZED HEALTH CARE EDUCATION/TRAINING PROGRAM

## 2021-05-20 PROCEDURE — G1004 CDSM NDSC: HCPCS

## 2021-05-20 RX ORDER — GABAPENTIN 100 MG/1
200 CAPSULE ORAL 3 TIMES DAILY
Status: DISCONTINUED | OUTPATIENT
Start: 2021-05-20 | End: 2021-05-24

## 2021-05-20 RX ADMIN — FLUOXETINE 20 MG: 20 CAPSULE ORAL at 10:37

## 2021-05-20 RX ADMIN — NICOTINE 1 PATCH: 14 PATCH, EXTENDED RELEASE TRANSDERMAL at 10:37

## 2021-05-20 NOTE — PLAN OF CARE
Plan of Care Review  Plan of Care Reviewed With: patient  Patient Agreement with Plan of Care: agrees  Progress: improving  Intervention(s): Assessed for SI; Provided medication list.  Outcome Summary: Gemma was visible in LR with female peers, quietly coloring. Minimally engaged. Remains flat, disheveled. Poor appetite. She requested and received list of medications.Denies active SI; is apathetic. Continues to endorse depression. Will continue to monitor and offer support.

## 2021-05-20 NOTE — PROGRESS NOTES
"PSYCHIATRIC PROGRESS NOTE    Chief Complaint/Reason for follow-up: depression, anorexia, ETOH abuse disorder, cluster B traits, suicide attempt    Interval History: Per staff, Ms. Darnell was visible on unit yesterday, coloring with peers, but generally withdrawn, disheveled, poor appetite, apathetic. CIWA of 10 yesterday, received Ativan 2 mg PRN, refusing gabapentin.     CT head today unremarkable.    Today I approach her in her room where she is lying in bed. I ask for her help in understanding what is going on with her. She tells me she does not know. I ask if she is upset about her parents' separation and divorce and she says yes. I ask if there are other stressors and she says she does not know. She feels \"numb.\" States she feels gabapentin is the reason for her symptoms though she was having same symptoms prior to taking gabapentin. She states she is not sure she needs medication. I ask her what she needs if not medication, ie therapy, etc. She states she does not know.     I spoke with Gemma's mother Cindi with her permission. States that Gemma has been staying with boyfriend recently so she does not know onset of current symptoms, but at least since Friday Gemma has been anxious, stating that she feels she is going to die, stating she cannot concentrate. Endorses that she believes eating disorder, substances, and recent psychosocial stressors to be contributing to current clinical picture. She has been encouraging Gemma to take medication and thinks Gemma would benefit from residential program targeting some combination of eating disorder, substance, and mental health following discharge. Gemma \"did really well in high school\" but her sister's mental health problems were difficult for the family for about 4 years and things have been chaotic in household with dissolution of marriage and father having difficulty with boundaries.     Vital Signs for the last 24 hours:  Temp:  [36.7 °C (98.1 °F)-37.5 °C (99.5 °F)] " "36.9 °C (98.5 °F)  Heart Rate:  [105-120] 120  Resp:  [16-18] 18  BP: (124-141)/() 126/80    Scheduled Meds:  • FLUoxetine  20 mg oral Daily   • gabapentin  200 mg oral TID   • nicotine  1 patch transdermal Daily   • norethindrone-e.estradioL-iron  1 tablet oral Daily       Labs:  Results from last 7 days   Lab Units 05/17/21  2134   HEMOGLOBIN g/dL 12.7   WBC K/uL 8.55   PLATELETS K/uL 265   POTASSIUM mEQ/L 3.6   SODIUM mEQ/L 138   CREATININE mg/dL 0.9       MENTAL STATUS EXAM  Appearance: disheveled  Gait and Motor: no abnormal movements  Speech: soft  Mood: \"I don't know\"  Affect: blunted and dysphoric  Associations: coherent  Thought Process: vague  Thought Content: paucity of thought content  Suicidality/Homicidality: thoughts of being dead/ no desire or plan to die  Judgement/Insight: makes choices that perpetuate illness  Cognition: seems distracted, with poor concentration, but alert    Assessment/Plan  * Suicide attempt ([facility])  Assessment & Plan  Ms. Darnell is a 21 year old woman with previous diagnosis of anorexia, ETOH use and cocaine use disorder, now presenting with worsening feelings of detachment/depression culminating in overdose on Lamictal. Today she reports feeling detached, unsafe for discharge. Denies active SIIP at this time and contracts for safety. ETOH use disorder, substance-induced mood disorder, anorexia, symptoms also suggestive of borderline personality disorder.     q15 min checks, contracts for safety, no plan or intent to harm herself at this time  Depression NOS, r/o substance induced mood d/o, r/o major depression, bipolar disorder lower on ddx: C/w Prozac 20 mg po daily, c/w gabapentin for now though she is refusing it for unclear reason, will explore alternatives for acute anxiety. Monitor for tolerability. PRN Atarax 50 mg po tid anxiety  She has been educated to notify staff at any hint of rash following Lamictal OD  ETOH abuse: monitor for withdrawal. Has been about " 5 days since last known drink. This morning she is not exhibiting s/s at this time and VSS, . CIWA protocol with symptom-triggered Ativan.  R/o borderline personality disorder: she would benefit from DBT on outpatient basis.   Medical:   -difficulty concentrating/AMS: baseline labs unremarkable, head CT unremarkable. Continue to monitor, repeat labs tomorrow  -Anorexia: she has been restricting intake. Repeat labs tomorrow. Nutrition consult. Provide Boost. Monitor VS - poor po intake likely contributing to tachycardia.  G/m/s therapy as tolerated, collaterals as permitted. She would benefit from residential program targeting some combination of substance, eating disorder, mental josey/DBT       I discussed the treatment plan and the patient's progress with nursing staff and Allied therapists in the treatment team meeting this morning. Patient is seen and evaluated for approximately 25 minutes in therapy with greater than 50% of time spent in direct face-to-face counseling, coordination of care and review of the medical record.

## 2021-05-20 NOTE — STUDENT
"Psychiatry Progress Note    Chief Complaint/Reason for follow-up: OD on Lamictal, \"feeling physically and emotionally numb\"    Interval History:  Pt continues to feel \"physically and emotionally numb\" but is more talkative and expressive today. She has a number of physical symptoms, including vaginal bleeding. Had head CT for memory loss that revealed no abnormalities. She continues to eat minimal food, rejects Gabapentin, feels overwhelmed, no active or passive.    Pt described vaginal bleeding \"that doesn't feel like my period\" but couldn't recall when it started. She has gone through \"6 or 7 tampons since yesterday\". She described the blood as \"dark red, globby\". Her last period was in September 2020. She has been on birth control \"for years\" and takes it consistently every day \"only miss 1 day once in a while\". She stopped taking her birth control on Monday. She denied any vaginal trauma. Recent pregnancy test on 5/17 was normal. No history of miscarriage. This bleeding could be secondary to birth control withdrawal, metabolic abnormalities due to anorexia, introduction of an SSRI leading to low platelets (last level was 265 on 5/17), or an unknown cause.    Pt was observed sitting at her desk, with her arms wrapped around her legs similar to fetal position, with the lights off. She described feeling \"overwhelmed by all the stuff building up\". Queried her on specifics which included: her parents divorce (\"I'm happy for them - it was time\"), pushing friends away (\"usually I do something bad and they leave me\"), her boyfriend (\"we're really good\" \"he has never hurt me\"), her sister (\"I think we're good but she is more distant right now\"), not being in college anymore (\"feels shitty - like I'm a failure\"), attending rehab (\"everything is wrong with me\"), and her eating disorder (\"I don't know why I get so nauseas thinking about food\", \"I wish I wasn't this way\"). We discussed all these topics one by one. She " "described feeling \"foggy, like it's hard to remember things\" but she was able to have a full conversation with me. By the end of the conversation she was sitting more relaxed with open body language.    She had a \"nightmare\" last night that she was in group therapy here but was not talking. She worries that she has \"loset the ability to talk to people\".     She continues to endorse having multiple physical symptoms including cold sweat, nausea, dizziness, and abdominal pain. She denied tremor, headache, itchiness, hallucinations, palpitations, insomnia, vision changes, soreness.    She did not eat today's food except for the Boost drink. She continues to endorse nausea and \"feeling like I'm going to throw up but I haven't thrown up\". She pooped and peed this morning with no pain and no blood. Pt was encouraged to try to eat more as her physical symptoms may be exacerbated by poor nutrition intake.     Vital Signs for the last 24 hours:  Temp:  [36.7 °C (98.1 °F)-37.5 °C (99.5 °F)] 36.9 °C (98.5 °F)  Heart Rate:  [105-120] 120  Resp:  [16-18] 18  BP: (124-141)/() 126/80    Mental Status Exam:  Pt appeared her stated age (20 yo), was dressed appropriately in sweats, appropriate hygiene, well-groomed. Pt had no stereotypical tics or movements and ambulation was not observed. Pt's speech had normal rate, rhythm, and volume was soft. Pt's mood was \"depressed\". Pt's affect was quiet, reserved, guarded, congruent to situation. Pt's associations were coherent. Thought process was linear and goal-directed. Thought content included no current SI, HI, VH, or AH. Anxiety was \"7/10\" with 10 being the worst and depression was \"8/10\" with 10 being the worst. Judgement and insight were appropriate. Cognition was fair. Alert and oriented x 3.     Assessment/Plan  Gemma Darnell is a 20 yo white female with a history of cocaine and alcohol use disorder, anorexia, \"bipolar disorder\", and \"depression\" who presented to the [facility] " "ED after trying to overdose on Lamictal. Her presentation is suggestive of potential alcohol withdrawal as well as a mood disorder. She has traits suggestive of borderline personality disorder.  1. Downtitrate Lexapro 10mg daily and uptitrate Proxac 10mg daily for depression  2. Gabapentin 200mg PO TID for alcohol use disorder and anxiety  3. PRN Atarax 50mg PO TID for anxiety  4. Encouraged to notify staff about rash due to Lamictal OD  5. For alcohol abuse: monitor for withdrawal. She is not exhibiting s/s at this time. CIWA protocol with symptom-triggered Ativan 2mg PO or IM. She denies hx of withdrawal sx but has had \"cold sweats\" previously.  6. R/o borderline personality disorder: she would benefit from DBT on outpatient basis.   7. Encourage pt's mom to bring her birth control to the unit     Kevin Garcia  Medical Student  "

## 2021-05-20 NOTE — PLAN OF CARE
"Plan of Care Review  Plan of Care Reviewed With: patient  Patient Agreement with Plan of Care: agrees with comment (describe) (refusing Gabapentin)  Progress: no change  Intervention(s): Encouraged pt to spend time out of her room  Outcome Summary: Gemma has been a bit more visible this shift. Selectively social, walking laps with a peer. Reports high anxiety 8/10, depression 8/10 denies active SI continues with a WTBD. Affect flat, blunted. Less guarded. Denies AVH. Continues to refuse Gabapentin. CIWA=6. More verbal as the shift went on. Continues to state she feels \"detached\". Heart rate rechecked at 1105 = 102. Sight hand tremors. Dressed but disheveled. CFS. Encouraged pt to spend time out of room which she has done this shift. Will continue to encourage medication compliance. Offer support   "

## 2021-05-21 LAB
ALBUMIN SERPL-MCNC: 4 G/DL (ref 3.4–5)
ALP SERPL-CCNC: 48 IU/L (ref 35–126)
ALT SERPL-CCNC: 13 IU/L (ref 11–54)
ANION GAP SERPL CALC-SCNC: 8 MEQ/L (ref 3–15)
AST SERPL-CCNC: 21 IU/L (ref 15–41)
BASOPHILS # BLD: 0.06 K/UL (ref 0.01–0.1)
BASOPHILS NFR BLD: 0.6 %
BILIRUB SERPL-MCNC: 0.4 MG/DL (ref 0.3–1.2)
BUN SERPL-MCNC: 8 MG/DL (ref 8–20)
CALCIUM SERPL-MCNC: 9.6 MG/DL (ref 8.9–10.3)
CHLORIDE SERPL-SCNC: 104 MEQ/L (ref 98–109)
CHOLEST SERPL-MCNC: 147 MG/DL
CO2 SERPL-SCNC: 26 MEQ/L (ref 22–32)
CREAT SERPL-MCNC: 0.7 MG/DL (ref 0.6–1.1)
DIFFERENTIAL METHOD BLD: ABNORMAL
EOSINOPHIL # BLD: 0.35 K/UL (ref 0.04–0.36)
EOSINOPHIL NFR BLD: 3.6 %
ERYTHROCYTE [DISTWIDTH] IN BLOOD BY AUTOMATED COUNT: 11.6 % (ref 11.7–14.4)
EST. AVERAGE GLUCOSE BLD GHB EST-MCNC: 80 MG/DL
GFR SERPL CREATININE-BSD FRML MDRD: >60 ML/MIN/1.73M*2
GLUCOSE SERPL-MCNC: 91 MG/DL (ref 70–99)
HBA1C MFR BLD HPLC: 4.4 %
HCT VFR BLDCO AUTO: 39.7 % (ref 35–45)
HDLC SERPL-MCNC: 59 MG/DL
HDLC SERPL: 2.5 {RATIO}
HGB BLD-MCNC: 13.3 G/DL (ref 11.8–15.7)
IMM GRANULOCYTES # BLD AUTO: 0.03 K/UL (ref 0–0.08)
IMM GRANULOCYTES NFR BLD AUTO: 0.3 %
LDLC SERPL CALC-MCNC: 77 MG/DL
LYMPHOCYTES # BLD: 2.75 K/UL (ref 1.2–3.5)
LYMPHOCYTES NFR BLD: 28.1 %
MCH RBC QN AUTO: 30 PG (ref 28–33.2)
MCHC RBC AUTO-ENTMCNC: 33.5 G/DL (ref 32.2–35.5)
MCV RBC AUTO: 89.6 FL (ref 83–98)
MONOCYTES # BLD: 0.63 K/UL (ref 0.28–0.8)
MONOCYTES NFR BLD: 6.4 %
NEUTROPHILS # BLD: 5.98 K/UL (ref 1.7–7)
NEUTS SEG NFR BLD: 61 %
NONHDLC SERPL-MCNC: 88 MG/DL
NRBC BLD-RTO: 0 %
PDW BLD AUTO: 10 FL (ref 9.4–12.3)
PLATELET # BLD AUTO: 237 K/UL (ref 150–369)
POTASSIUM SERPL-SCNC: 4.1 MEQ/L (ref 3.6–5.1)
PROT SERPL-MCNC: 6.6 G/DL (ref 6–8.2)
RBC # BLD AUTO: 4.43 M/UL (ref 3.93–5.22)
SODIUM SERPL-SCNC: 138 MEQ/L (ref 136–144)
TRIGL SERPL-MCNC: 57 MG/DL (ref 30–149)
TSH SERPL DL<=0.05 MIU/L-ACNC: 2.07 MIU/L (ref 0.34–5.6)
WBC # BLD AUTO: 9.8 K/UL (ref 3.8–10.5)

## 2021-05-21 PROCEDURE — 84443 ASSAY THYROID STIM HORMONE: CPT | Performed by: STUDENT IN AN ORGANIZED HEALTH CARE EDUCATION/TRAINING PROGRAM

## 2021-05-21 PROCEDURE — 85025 COMPLETE CBC W/AUTO DIFF WBC: CPT | Performed by: STUDENT IN AN ORGANIZED HEALTH CARE EDUCATION/TRAINING PROGRAM

## 2021-05-21 PROCEDURE — 36415 COLL VENOUS BLD VENIPUNCTURE: CPT | Performed by: STUDENT IN AN ORGANIZED HEALTH CARE EDUCATION/TRAINING PROGRAM

## 2021-05-21 PROCEDURE — 80053 COMPREHEN METABOLIC PANEL: CPT | Performed by: STUDENT IN AN ORGANIZED HEALTH CARE EDUCATION/TRAINING PROGRAM

## 2021-05-21 PROCEDURE — 12400000 HC ROOM AND CARE SEMIPRIVATE PSYCH

## 2021-05-21 PROCEDURE — 99232 SBSQ HOSP IP/OBS MODERATE 35: CPT | Performed by: STUDENT IN AN ORGANIZED HEALTH CARE EDUCATION/TRAINING PROGRAM

## 2021-05-21 PROCEDURE — 83036 HEMOGLOBIN GLYCOSYLATED A1C: CPT | Performed by: STUDENT IN AN ORGANIZED HEALTH CARE EDUCATION/TRAINING PROGRAM

## 2021-05-21 PROCEDURE — 80061 LIPID PANEL: CPT | Performed by: STUDENT IN AN ORGANIZED HEALTH CARE EDUCATION/TRAINING PROGRAM

## 2021-05-21 PROCEDURE — 63700000 HC SELF-ADMINISTRABLE DRUG: Performed by: STUDENT IN AN ORGANIZED HEALTH CARE EDUCATION/TRAINING PROGRAM

## 2021-05-21 RX ORDER — RISPERIDONE 0.5 MG/1
0.5 TABLET, ORALLY DISINTEGRATING ORAL 2 TIMES DAILY
Status: DISCONTINUED | OUTPATIENT
Start: 2021-05-21 | End: 2021-05-27

## 2021-05-21 RX ADMIN — RISPERIDONE 0.5 MG: 0.5 TABLET, ORALLY DISINTEGRATING ORAL at 21:01

## 2021-05-21 RX ADMIN — FLUOXETINE 20 MG: 20 CAPSULE ORAL at 10:05

## 2021-05-21 NOTE — PLAN OF CARE
"  Problem: Adult Behavioral Health Plan of Care  Goal: Plan of Care Review  Flowsheets (Taken 5/21/2021 1434)  Plan of Care Reviewed With: patient  Patient Agreement with Plan of Care: agrees   Plan of Care Review  Plan of Care Reviewed With: patient  Patient Agreement with Plan of Care: agrees        SW met with pt to introduce self and role in pt's tx.  SW attempted to engage pt in conversation and review tx plan but pt remains disorganized.  Pt states the tx plan is \" a lie\" and she feels \"confused\".  SW will continue to follow.   "

## 2021-05-21 NOTE — PROGRESS NOTES
"Psychiatry Progress Note    Chief Complaint/Reason for follow-up: OD on Lamictal; \"I feel like I'm dying\"    Interval History:  Per staff, visible, minimally engaged, \"flat and sedentary.\" Complaining of memory difficulty. Not eating much but good fluid intake.    Today we speak in her room. She is tearful today, appears less blunted and more depressed. She slept \"ok - still tired\".  Tells me her memory is \"horrible.\" She can't remember our conversation from yesterday. She is \"having a hard time socializing\" because \"I'm not myself here.\" She cried and asked \"why am I here?\"  She denied active SI but described \"feeling like I'm dying, I feel like there is something wrong.\" I ask her what is wrong and she indicates her arms. With a lot of coaxing, she tells me her arms do not look normal to her, I ask what is abnormal and after long latency she says \"I don't know.\" I ask if it is the color, or a feeling, and she states it is both. States her arms look \"red.\" Upon exam there is nothing grossly remarkable about her arms.     Regarding medication, she is hesitant to take it but cannot articulate why. States \"it scares me.\" I point out that she was drinking significant ETOH outside the hospital which is more harmful to her body than the medication, and that it is also scary to continue feeling this way. She agrees with this.       Vital Signs for the last 24 hours:  Temp:  [36.9 °C (98.5 °F)-37.1 °C (98.8 °F)] 36.9 °C (98.5 °F)  Heart Rate:  [] 114  Resp:  [14-16] 14  BP: (111-140)/(72-90) 140/72    Mental Status Exam:  Pt appeared her stated age (20 yo), was dressed in sweats, unkempt. Pt had no stereotypical tics or movements and ambulation was appropriate. Pt's speech had normal rate, rhythm, and volume was soft. Pt's mood was \"I don't know\". Pt's affect was depressed, constricted, tearful, anxious. Pt's associations were coherent. Thought process was linear and goal-directed, vague. Thought content included no " current SI, HI, VH, or AH. Judgement and insight were limited. Cognition was fair. Alert and oriented x 2.     Assessment/Plan  Gemma Darnell is a 20 yo female with a history of cocaine and alcohol use disorder, anorexia, now presenting to the [facility] after overdose on Lamictal. ETOH use disorder, anorexia, MDD. R/o borderline personality disorder.    1. MDD, anxiety: Continue Proxac 20mg daily for depression. Continue to offer gabapentin 200mg PO TID for alcohol use disorder and anxiety though that she has beenconsistently refusing, though she cannot articulate why she does not want it. Psychoeducation provided as she is in an acutely anxious state. Given her presentation with intense anxiety, disorganized state, will initiate Risperdal 0.5 mg po bid. PRN Atarax 50mg PO TID for anxiety  2. R/o borderline personality disorder: she would benefit from DBT on outpatient basis.   3. Anorexia: monitor PO intake. VS and labs today WNL. Would benefit from ongoing outpatient care.  4. Substance use disorder: motivational interviewing, would benefit from ongoing outpatient treatment  5. G/m/s therapy as tolerated, ongoing collaterals as permitted.       I discussed the treatment plan and the patient's progress with nursing staff and Allied therapists in the treatment team meeting this morning. Patient is seen and evaluated for approximately 25 minutes in therapy with greater than 50% of time spent in direct face-to-face counseling, coordination of care and review of the medical record.

## 2021-05-21 NOTE — STUDENT
"Collateral    Spoke with Addiction Therapist Cindy Wilda ([phone]).    Pt has been seeing Cindy for 1.5 years. Initially pt saw Cindy for cocaine and alcohol abuse but Cindy believes the primary  is pt's eating disorder.    Throughout pt's life, her dad has had a \"serious drinking problem that he always minimized.\" Her dad also minimizes the pt's substance use problem as well as her eating disorder. \"He torments her and she wants his approval. She has certainly developed a complex around his tormenting of her\". Pt always minimizes her eating disorder and substance use.    Cindy says pt's friend group at [employer] were \"heavy cocaine users\". At start of this academic year at [employer], pt's cocaine and alcohol abuse worsened and therapist + mom convinced pt to attend rehab at [facility]. Pt was there for two weeks but \"passed out because she wasn't eating anything at all.\" They then transferred her to [facility] for dual substance abuse + eating disorder. They intended on keeping her at [facility] for 2 months but her insurance ran out and she was spontaneously removed after a little over a month in November 2020. This immediate removal \"shattered the good progress Gemma was making\". Since November, her substance abuse has stayed \"steadily bad\". When pt's parents started divorce proceedings in early 2021, this \"further destabilized Gemma\". Pt presented to therapist this Monday admitting to \"taking a bunch of her pills and needing help\". Pt never admits to needing help so \"this was a big deal\".    Discussed pt's current memory and concentration problems. Pt has never experienced these symptoms before. Cindy believes that the lack of nutrition is likely the contributing factor as the pt has a history of severely restricting her food intake. We discussed pt's resistance to taking medications. Cindy says this is not typical for Gemma.    Gemma Darnell is a 21 year old white woman with worsening feelings of detachment/depression that " culminated in a suicide attempt, diagnoses of cocaine and alcohol abuse disorder & eating disorder, and resistance to eating food and taking medications. Based on this current status, pt's therapist agreed that the best next step would be a residential program targeting eating disorder, substance abuse, and mental health.     Therapist recommended [facility] (substance abuse + eating disorder), [facility] (substance abuse + eating disorder), and [facility] (substance abuse). Therapist will connect with her supervisor and give us more suggestions next week.    Kevin Garcia  Medical Student

## 2021-05-21 NOTE — STUDENT
"Psychiatry Progress Note    Chief Complaint/Reason for follow-up: OD on Lamictal; \"I feel like I'm dying\"    Interval History:  Pt is guarded and tearful today. She slept \"ok - still tired\". Recent labwork reveals no abnormalities; metabolically stable. Her mother brought her birth control medication; \"a little bleeding today\". She continues to eat minimal food and no BOOST, rejects Gabapentin, feels \"numb\", no active or passive SI.    Pt was observed pacing quickly in her room with the lights on and door open. She did not want to talk to me \"maybe a little later\". I returned an hour later and pt was laying in bed, fully covered in sheets, fetal position, with the lights off. Her legs were shaking. She denied tremor and she showed no tremor with her fingers outstretched.     Pt's memory is \"horrible\". She can't remember our conversation from yesterday, can't remember if she spoke with her mom. She is \"having a hard time socializing\" because \"I'm not myself here.\" She cried and asked \"why am I here?\" She does not want to be on anymore medications.     She denied active SI but described \"feeling like I'm dying\". She does not wish to be dead but she does not know what she is living for.    She did not eat today's food and did not have the BOOST drink. She continues to endorse nausea. She peed this morning with no pain but had \"a little blood\". Pt was encouraged to try to eat more as her physical symptoms may be exacerbated by poor nutrition intake.    Vital Signs for the last 24 hours:  Temp:  [36.9 °C (98.5 °F)-37.1 °C (98.8 °F)] 36.9 °C (98.5 °F)  Heart Rate:  [] 114  Resp:  [14-16] 14  BP: (111-140)/(72-90) 140/72    Mental Status Exam:  Pt appeared her stated age (20 yo), was dressed appropriately in sweats, appropriate hygiene, well-groomed. Pt had no stereotypical tics or movements and ambulation was appropriate. Pt's speech had normal rate, rhythm, and volume was soft. Pt's mood was \"I don't know\". Pt's " "affect was quiet, reserved, guarded, congruent to situation. Pt's associations were coherent. Thought process was linear and goal-directed. Thought content included no current SI, HI, VH, or AH. Anxiety was \"I don't know\" on a scale of 1-10 with 10 being the worst and depression was \"I don't know\"  on a scale of 1-10 with 10 being the worst. Judgement and insight were limited. Cognition was fair. Alert and oriented x 2.     Assessment/Plan  Gemma Darnell is a 22 yo white female with a history of cocaine and alcohol use disorder, anorexia, \"bipolar disorder\", and \"depression\" who presented to the [facility] after trying to overdose on Lamictal. Her presentation is suggestive of potential alcohol withdrawal as well as a mood disorder. She has traits suggestive of borderline personality disorder.  1. Continue Proxac 20mg daily for depression  2. D/c Gabapentin 200mg PO TID for alcohol use disorder and anxiety  3. PRN Atarax 50mg PO TID for anxiety  4. R/o borderline personality disorder: she would benefit from DBT on outpatient basis.   5. Loestin 1 tablet PO for birth control     Kevin Garcia  Medical Student  "

## 2021-05-21 NOTE — PLAN OF CARE
"Plan of Care Review  Plan of Care Reviewed With: patient  Patient Agreement with Plan of Care: agrees  Progress: no change  Intervention(s): Assessed for SI; Monitored for withdrawal  Outcome Summary: Gemma is visible, minimally engaged, very flat and sedentary. Disheveled appearance. CIWA scores lower this evening, 5 & 3. Less tremulous. Gemma continues to have a poor appetite, although drinks plenty of fluids.Blood pressures stable; HR . Gemma continues to report poor memory, claiming she recalls little of the past week. She also continues to state \" I feel sick\" but is unable to elaborate, answering \"I don't know\" when queried about different body systems. Mood depressed, flat, with delay in response. Patient reports heavy bleeding due to not having birth control pills for number of days. Mother made aware, and brought pills to unit. Will continue to monitor.   "

## 2021-05-21 NOTE — PLAN OF CARE
"Plan of Care Review  Plan of Care Reviewed With: patient  Patient Agreement with Plan of Care: agrees  Progress: improving  Intervention(s): Shamika will paticipate in one or more groups this shift  Outcome Summary: \"I slept, my depression and anxiety are medium, I showered last night, \" Pt answered most questions with \"I don't know or can't remember\" she admitted to feeling confused and overwhelmed. With much coaxing she took her am Prozac but declined her Neurontin at 0900 and 1300. She seemed to have trouble processing and there were long lags before an answer or  no answer given.  After taking with RN at 0900 in her room. She walked out to the front of the RN desk and said,\"I don't know what I'm supposed to be doing now\" RN showed her the activity board. Encouraged her to drink her Boost and Gatoraid and eat breakfast and go to group. She indicated none on those things were things she wanted to do. Later RN saw she had drank her Boost and some of her Gatorade Positive reinforcement given. Pt did attend 1300 groups and able to participate answering many substance abuse questions correctly.  stated pt does not agree that she has a substance abuse problem so she disagrees with her treatment plan.Pt seemed less confused and more alert in afternoon.  "

## 2021-05-22 PROCEDURE — 63700000 HC SELF-ADMINISTRABLE DRUG: Performed by: STUDENT IN AN ORGANIZED HEALTH CARE EDUCATION/TRAINING PROGRAM

## 2021-05-22 PROCEDURE — 63700000 HC SELF-ADMINISTRABLE DRUG: Performed by: PSYCHIATRY & NEUROLOGY

## 2021-05-22 PROCEDURE — 12400000 HC ROOM AND CARE SEMIPRIVATE PSYCH

## 2021-05-22 PROCEDURE — 99231 SBSQ HOSP IP/OBS SF/LOW 25: CPT | Performed by: PSYCHIATRY & NEUROLOGY

## 2021-05-22 RX ADMIN — RISPERIDONE 0.5 MG: 0.5 TABLET, ORALLY DISINTEGRATING ORAL at 09:42

## 2021-05-22 RX ADMIN — Medication 3 MG: at 21:53

## 2021-05-22 RX ADMIN — NICOTINE POLACRILEX 2 MG: 2 GUM, CHEWING BUCCAL at 18:36

## 2021-05-22 RX ADMIN — RISPERIDONE 0.5 MG: 0.5 TABLET, ORALLY DISINTEGRATING ORAL at 21:53

## 2021-05-22 RX ADMIN — FLUOXETINE 20 MG: 20 CAPSULE ORAL at 09:42

## 2021-05-22 RX ADMIN — NICOTINE 1 PATCH: 14 PATCH, EXTENDED RELEASE TRANSDERMAL at 09:45

## 2021-05-22 NOTE — PLAN OF CARE
Plan of Care Review  Plan of Care Reviewed With: patient  Patient Agreement with Plan of Care: agrees  Progress: improving  Intervention(s): provided education on Risperdal  Outcome Summary: Gemma is visible, engaged with younger peers, doing artwork. Requested watercolors from this RN. Affect remains restricted, but is somewhat more spontaneous. She ate 50% of dinner, and an ice cream sandwich. Blood pressure stable. No signs of withdrawal. She denies SI. Reports confusion regarding medications. Hard time focusing and processing certain things; reports feeling confused at times. Was compliant with HS risperdal, but continues to decline neurontin. No PRNs required this shift.

## 2021-05-22 NOTE — PLAN OF CARE
Plan of Care Review  Plan of Care Reviewed With: patient  Patient Agreement with Plan of Care: agrees  Progress: improving  Intervention(s): Gemma was encouraged to attend 2-3 groups and report any suicidal thoughts to staff.  Outcome Summary: Gemma reports feeling better overall, denied SI and hallucinations, social with select patients, appears anxious at times, future focused, inquiring about a tentative discharge plan-she was encouraged to speak to MD and SW.  Continues to refuse to take the Neurontin-Dr. Pham aware.  She is compliant with taking other prescribed medications.  No w/d  sx's noted.  Appetite fine.  She is currently sitting out in the LR area watching tv.  Will continue to monitor and offer support.

## 2021-05-22 NOTE — PROGRESS NOTES
"Psychiatry Progress Note    Chief Complaint/Reason for follow-up:  MDD, anxiety, OD on lamictal    Interval History:  Mood is not as good today as yesterday and associates this with being \"nervous about discharge.\"  Sleep wsa broken, Appetite is low but she did eat breakfast.  She denies si/hi/plan/intent, no passive si.  She is looking forward to going back to a different school and looking forward to family.  No ah/vh/paranoia/no grandiosity.     Review of Systems:    Appetite is poor to fair, no gi complaints, sleep was poor to fair, broken    Vital Signs for the last 24 hours:  Temp:  [36.7 °C (98.1 °F)-36.9 °C (98.4 °F)] 36.9 °C (98.4 °F)  Heart Rate:  [100-117] 117  Resp:  [14-16] 16  BP: (106-132)/(77-83) 106/77       Mental Status Exam:   Appearance: appears stated age, appropriately dressed  Behavior: cooperative  Speech: normal r/r/t/v and no pressure/paucity  TP: linear  TC: no si/hi/plan/intent, no passive si, no grandiosity, no paranoia  Perception: no ah/vh, no ris  Mood: not as good  Affect: not labile, had a mask on  Insight: fair  Judgment: fair  A&Ox3       Assessment/Plan   Gemma Darnell is a 20 yo female with a history of cocaine and alcohol use disorder, anorexia, now presenting to the [facility] ED after overdose on Lamictal. ETOH use disorder, anorexia, MDD. R/o borderline personality disorder.     1. MDD, anxiety: Continue Proxac 20mg daily for depression. Continue to offer gabapentin 200mg PO TID for alcohol use disorder and anxiety though that she has beenconsistently refusing, though she cannot articulate why she does not want it. Psychoeducation provided as she is in an acutely anxious state. Given her presentation with intense anxiety, disorganized state, will initiate Risperdal 0.5 mg po bid. PRN Atarax 50mg PO TID for anxiety  2. R/o borderline personality disorder: she would benefit from DBT on outpatient basis.   3. Anorexia: monitor PO intake. VS   WNL. Would benefit from ongoing " outpatient care.  4. Substance use disorder: motivational interviewing, would benefit from ongoing outpatient treatment  5. G/m/s therapy as tolerated, ongoing collaterals as permitted.

## 2021-05-23 LAB — SARS-COV-2 RNA RESP QL NAA+PROBE: NEGATIVE

## 2021-05-23 PROCEDURE — 12400000 HC ROOM AND CARE SEMIPRIVATE PSYCH

## 2021-05-23 PROCEDURE — U0003 INFECTIOUS AGENT DETECTION BY NUCLEIC ACID (DNA OR RNA); SEVERE ACUTE RESPIRATORY SYNDROME CORONAVIRUS 2 (SARS-COV-2) (CORONAVIRUS DISEASE [COVID-19]), AMPLIFIED PROBE TECHNIQUE, MAKING USE OF HIGH THROUGHPUT TECHNOLOGIES AS DESCRIBED BY CMS-2020-01-R: HCPCS | Performed by: STUDENT IN AN ORGANIZED HEALTH CARE EDUCATION/TRAINING PROGRAM

## 2021-05-23 PROCEDURE — 63700000 HC SELF-ADMINISTRABLE DRUG: Performed by: STUDENT IN AN ORGANIZED HEALTH CARE EDUCATION/TRAINING PROGRAM

## 2021-05-23 PROCEDURE — 99231 SBSQ HOSP IP/OBS SF/LOW 25: CPT | Performed by: PSYCHIATRY & NEUROLOGY

## 2021-05-23 PROCEDURE — 63700000 HC SELF-ADMINISTRABLE DRUG: Performed by: PSYCHIATRY & NEUROLOGY

## 2021-05-23 RX ADMIN — RISPERIDONE 0.5 MG: 0.5 TABLET, ORALLY DISINTEGRATING ORAL at 20:48

## 2021-05-23 RX ADMIN — RISPERIDONE 0.5 MG: 0.5 TABLET, ORALLY DISINTEGRATING ORAL at 09:06

## 2021-05-23 RX ADMIN — FLUOXETINE 20 MG: 20 CAPSULE ORAL at 09:06

## 2021-05-23 RX ADMIN — NICOTINE 1 PATCH: 14 PATCH, EXTENDED RELEASE TRANSDERMAL at 09:07

## 2021-05-23 NOTE — PLAN OF CARE
"Plan of Care Review  Plan of Care Reviewed With: patient  Patient Agreement with Plan of Care: agrees  Progress: no change  Intervention(s): Gemma was encouraged to attend 2-3 groups and verbalize her thoughts to staff.  Outcome Summary: Gemma reported feeling \"drained and out of it today.\"  Verbalized frustration about having difficulty remember things and articulating her thoughts, stated she is \"unsure why,\" appears somewhat preoccupied, mild thought blocking with delay responses, soft spoken, restricted, restless/anxious, observed by staff pacing back and forth in her room today, periodically walks laps around the unit with other patients, denied SI and hallucinations, disheveled-wearing the same outfit she wore yesterday.  Rated her depression and anxiety both a 6/10 today.  When RN asked her what she ate for breakfast she responded-\"I can't remember.\" When asked a second time Gemma stated she didn't eat anything.  RN gave her a box of cereal, and she ate it.  Appetite fair.  Continues to refuse the Neurontin.  Will continue to monitor and offer support.   "

## 2021-05-23 NOTE — PROGRESS NOTES
"Psychiatry Progress Note    Chief Complaint/Reason for follow-up:    MDD, anxiety, OD on lamictal  Interval History:  She was in bed resting.   She reports that is \"tired.\"  She was not certain how her mood was,  She denies si/hi/plan/intent, no passive si. She denies racing thoughts.  Appetite is ok.    She denies side effects from medication.    Review of Systems:   Appetite is poor to fair, no gi complaints, sleep is fair     Vital Signs for the last 24 hours:  Temp:  [36.7 °C (98 °F)-36.9 °C (98.4 °F)] 36.7 °C (98 °F)  Heart Rate:  [104-133] 119  Resp:  [16] 16  BP: (100-117)/(73-85) 109/76      Mental Status Exam:   Appearance: appears stated age, appropriately dressed  Behavior: cooperative  Speech: normal r/r/t/v and no pressure/paucity  TP: linear  TC: no si/hi/plan/intent, no passive si, no grandiosity, no paranoia  Perception: no ah/vh, no ris  Mood: tired  Affect: not labile, had a mask on  Insight: fair  Judgment: fair  A&Ox3    Assessment/Plan   Gemma Darnell is a 20 yo female with a history of cocaine and alcohol use disorder, anorexia, now presenting to the [place] ED after overdose on Lamictal. ETOH use disorder, anorexia, MDD. R/o borderline personality disorder.     1. MDD, anxiety: Continue Proxac 20mg daily for depression. Continue to offer gabapentin 200mg PO TID for alcohol use disorder and anxiety though that she has beenconsistently refusing, though she cannot articulate why she does not want it. Psychoeducation provided as she is in an acutely anxious state. Given her presentation with intense anxiety, disorganized state, will initiate Risperdal 0.5 mg po bid. PRN Atarax 50mg PO TID for anxiety  2. R/o borderline personality disorder: she would benefit from DBT on outpatient basis.   3. Anorexia: monitor PO intake. VS   WNL. Would benefit from ongoing outpatient care.  4. Substance use disorder: motivational interviewing, would benefit from ongoing outpatient treatment  5. G/m/s therapy as " tolerated, ongoing collaterals as permitted  6. Advised not to take naps as this could effect her night time sleep

## 2021-05-23 NOTE — NURSING NOTE
"Gemma has been visible in the milieu watching tv with peers with limited interactions. States \"it's been a long day\" and \"boring\". States she has been struggling with some anxiety today but watching movies with peers has been helpful.  "

## 2021-05-24 PROCEDURE — 12400000 HC ROOM AND CARE SEMIPRIVATE PSYCH

## 2021-05-24 PROCEDURE — 63700000 HC SELF-ADMINISTRABLE DRUG: Performed by: STUDENT IN AN ORGANIZED HEALTH CARE EDUCATION/TRAINING PROGRAM

## 2021-05-24 PROCEDURE — 99232 SBSQ HOSP IP/OBS MODERATE 35: CPT | Performed by: PSYCHIATRY & NEUROLOGY

## 2021-05-24 PROCEDURE — 63700000 HC SELF-ADMINISTRABLE DRUG: Performed by: PSYCHIATRY & NEUROLOGY

## 2021-05-24 RX ADMIN — Medication 3 MG: at 21:18

## 2021-05-24 RX ADMIN — RISPERIDONE 0.5 MG: 0.5 TABLET, ORALLY DISINTEGRATING ORAL at 21:18

## 2021-05-24 RX ADMIN — FLUOXETINE 20 MG: 20 CAPSULE ORAL at 10:05

## 2021-05-24 RX ADMIN — RISPERIDONE 0.5 MG: 0.5 TABLET, ORALLY DISINTEGRATING ORAL at 10:05

## 2021-05-24 RX ADMIN — NICOTINE 1 PATCH: 14 PATCH, EXTENDED RELEASE TRANSDERMAL at 10:04

## 2021-05-24 NOTE — DISCHARGE INSTR - APPOINTMENTS
[facility]-Residential treatment center-[address] [place], [place] [address]Gayatri Henao [id number].  [phone].  Transfer on 5/31/21 with patients mother

## 2021-05-24 NOTE — STUDENT
"Psychiatry Progress Note    Chief Complaint/Reason for follow-up: OD on lamictal, \"I feel like I'm dying\"    Interval History:  Pt had an \"okay weekend\" and slept \"okay\". She continues to avoid eating. She endorses dissociation/depersonalization. She believes she is \"getting worse\" but couldn't describe how.    Pt was observed laying in bed, with the lights off, and the sheets fully covering her. Per weekend staff, pt was more visible on the unit. She says she attended \"some groups\". She was encouraged to attend at least 1 group today.    She \"did not eat much this weekend\" and had no eaten breakfast today. She was encouraged to try to eat 1/2 of her lunch today. She presented wearing the same clothes she wore last week. Unclear if clothes have been washed. Pt was encouraged to shower today.    Pt endorses feelings of dissociation/depersonalization: \"I don't feel real\". She was encouraged to utilize grounding techniques.    Pt was queried on what she would like to be doing right now: \"back in school\". She feels \"shitty\" that she is no longer in school and hopes to complete her degree in criminal justice.      Vital Signs for the last 24 hours:  Temp:  [36.9 °C (98.4 °F)-37.1 °C (98.8 °F)] 37.1 °C (98.8 °F)  Heart Rate:  [106-125] 125  Resp:  [16] 16  BP: (121-142)/(80-91) 142/80    Scheduled Medications:  • FLUoxetine  20 mg oral Daily   • gabapentin  200 mg oral TID   • nicotine  1 patch transdermal Daily   • norethindrone-e.estradioL-iron  1 tablet oral Daily   • risperiDONE  0.5 mg oral BID     Mental Status Exam:  Pt appeared her stated age (22 yo), was dressed appropriately in sweats, appropriate hygiene, well-groomed. Pt had no stereotypical tics or movements and ambulation was appropriate. Pt's speech had normal rate, rhythm, and volume was soft. Pt's mood was \"pretty depressed\". Pt's affect was quiet, reserved, guarded, congruent to situation. Pt's associations were coherent. Thought process was " "disorganized. Thought content included no current SI, HI, VH, or AH.  Judgement and insight were limited. Cognition was limited. Alert.     Assessment/Plan  Gemma Darnell is a 22 yo white female with a history of cocaine and alcohol use disorder, anorexia, \"bipolar disorder\", and MDD who presented to the [facility] ED after trying to overdose on Lamictal. Her presentation is suggestive of potential alcohol withdrawal as well as a mood disorder. She has traits suggestive of borderline personality disorder.  1. Continue Proxac 20mg daily for depression  2. D/c Gabapentin 200mg PO TID for alcohol use disorder and anxiety  3. PRN Atarax 50mg PO TID for anxiety  4. R/o borderline personality disorder: she would benefit from DBT on outpatient basis.   5. Loestin 1 tablet PO for birth control     Kevin Garcia  Medical Student  "

## 2021-05-24 NOTE — PROGRESS NOTES
"Psychiatry Progress Note    Chief Complaint/Reason for follow-up:    MDD, anxiety, OD on lamictal  Hx of substance abuse and eating disorder    Interval History:    Discussed patient's treatment progress with nurses and allied therapists in morning meeting this morning. Gemma has been restricting but will drink Boost/Gatorade. CIWA has been continued. Patient stated she refuses to take gabapentin because it \"makes me feel weird.\" She stated she has been dissociating constantly. She noted feeling forgetful. She described her mood as \"pretty depressed\" and noted she feels worse since admission. Gemma's nurse pointed out that poor PO intake may be influencing her mental status and Gemma agreed.  She has passive wishes to die but no intent or plan. She feels guilt about needing medication.     Review of Systems:   Appetite is poor to fair, no gi complaints, sleep is fair     Vital Signs for the last 24 hours:  Temp:  [36.9 °C (98.4 °F)-37.1 °C (98.8 °F)] 37.1 °C (98.8 °F)  Heart Rate:  [106-125] 125  Resp:  [16] 16  BP: (121-142)/(80-91) 142/80      Mental Status Exam:   Appearance: appears stated age, appropriately dressed  Behavior: cooperative but guarded   Speech: normal r/r/t/v and no pressure/paucity  TP: linear, vague  TC: no si/hi/plan/intent, + passive si, no grandiosity, no paranoia  Perception: no ah/vh, no ris  Mood: tired  Affect: not labile, had a mask on  Insight: fair  Judgment: fair  A&Ox3    Assessment/Plan   Gemma Darnell is a 20 yo female with a history of cocaine and alcohol use disorder, anorexia, now presenting to the [place] ED after overdose on Lamictal. ETOH use disorder, anorexia, MDD. R/o borderline personality disorder.     1. MDD, anxiety: Continue Proxac 20mg daily for depression. D/C gabapentin. Psychoeducation provided as she is in an acutely anxious state. Given her presentation with intense anxiety, disorganized state, continue Risperdal 0.5 mg po bid. PRN Atarax 50mg PO TID for " anxiety  2. R/o borderline personality disorder: she would benefit from DBT on outpatient basis.   3. Anorexia: monitor PO intake. VS   WNL. Would benefit from ongoing outpatient care.  4. Substance use disorder: motivational interviewing, would benefit from ongoing outpatient treatment  5. G/m/s therapy as tolerated, ongoing collaterals as permitted  6. Advised not to take naps as this could effect her night time sleep    I spent 28 minutes on this date of service performing the following activities: obtaining history, performing examination, documenting, preparing for visit, obtaining / reviewing records, providing counseling and education and coordinating care.

## 2021-05-24 NOTE — PLAN OF CARE
"Plan of Care Review  Plan of Care Reviewed With: patient  Patient Agreement with Plan of Care: agrees  Progress: improving  Intervention(s): Encouraged pt to attend and participate in all groups  Outcome Summary: Gemma has been visible on unit. Reports anxiety, depression denies SI. CFS Denies AVH but appears preoccupied. Affect flat restricted. Reports poor appetite. Pt c/o feeling weak, headache but refused Tylenol. Reports anxiety, denies SI. CFS. States, \"I can't remember things, I feel weak I think its because I'm not eating\". Encouraged pt to try and increase her food and fluid intake. Became tearful when asked to take her medications. \"I don't want to take these\" eyes welling up. Explained to pt the medications are helping and that she reports no voices. Encouraged pt to remain visible, attend and participate in groups. Minimally social. Pt did remain in community in between groups, sitting in living room watching TV. Encouraged pt to shower. Will continue to monitor and offer support   "

## 2021-05-24 NOTE — PLAN OF CARE
Plan of Care Review  Plan of Care Reviewed With: patient  Patient Agreement with Plan of Care: agrees  Progress: improving  Outcome Summary: Gemma was visible tonight watching TV and attended group. She reported anxiety and depression 6/10, denied HI/SI/AVH and pain. She is eating and sleeping better. Will continue to monitor for safety.

## 2021-05-25 PROCEDURE — 63700000 HC SELF-ADMINISTRABLE DRUG: Performed by: STUDENT IN AN ORGANIZED HEALTH CARE EDUCATION/TRAINING PROGRAM

## 2021-05-25 PROCEDURE — 63700000 HC SELF-ADMINISTRABLE DRUG: Performed by: PSYCHIATRY & NEUROLOGY

## 2021-05-25 PROCEDURE — 12400000 HC ROOM AND CARE SEMIPRIVATE PSYCH

## 2021-05-25 PROCEDURE — 99231 SBSQ HOSP IP/OBS SF/LOW 25: CPT | Mod: 95 | Performed by: PSYCHIATRY & NEUROLOGY

## 2021-05-25 RX ADMIN — FLUOXETINE 20 MG: 20 CAPSULE ORAL at 09:43

## 2021-05-25 RX ADMIN — NICOTINE 1 PATCH: 14 PATCH, EXTENDED RELEASE TRANSDERMAL at 09:45

## 2021-05-25 RX ADMIN — RISPERIDONE 0.5 MG: 0.5 TABLET, ORALLY DISINTEGRATING ORAL at 09:44

## 2021-05-25 NOTE — PROGRESS NOTES
"This exam was done through synchronous audio-visual services.  The patient consented to the telehealth exam.  The patient provider were based in [place].  The patient was on the [facility] psychiatric unit.  The following other people were present during the interview: No one    Psychiatry Progress Note     Chief Complaint/Reason for follow-up:    MDD, anxiety, OD on lamictal  Hx of substance abuse and eating disorder    I met the patient today for the first time.  I reviewed her hospital psychiatric records.  She is on Prozac 20 mg daily.    Today the patient when queried says \"I do not feel right\".  She does not feel her meds are working.  Yet she asks to be discharged.  She admits that she is very unhappy here on the unit.  She is very negative.  She seems to me hopeless.  I explained to her that the Prozac which was started on 5/20 has not had time to work.  When I read her Dr. Brewer's note of yesterday saying she has passive SI.  The patient answers \"I do not remember\".  When I asked her to rate today's depression and anxiety on a scale.  She replies \"I do not know\" and won't try.    Mental status: Patient is fully oriented x3.  She recalls my name.  She is negative and hopeless.  Mood is of depression.  She is unsmiling.  Speech is coherent and goal-directed without evidence of psychosis but is nonspontaneous and terse.  She denies SI/HI.  She denies AH/VH.    Plan:  Continue psychiatric hospitalization  Continue Prozac 20 mg and Risperdal 0.5 mg twice daily         "

## 2021-05-25 NOTE — PLAN OF CARE
"  Problem: Adult Behavioral Health Plan of Care  Goal: Plan of Care Review  Flowsheets (Taken 5/24/2021 7820)  Progress: improving  Plan of Care Reviewed With: patient  Patient Agreement with Plan of Care: agrees  Outcome Summary: Gemma has been visible, more engaged with peers. Offered to staff  \"I feel much better today\" Denies SI. Reports improvement in mood, and good sleep. Ate 50% of dinner. No signs of withdrawal. Compliant with medications.  Intervention(s): Assessed for SI     "

## 2021-05-25 NOTE — PLAN OF CARE
Problem: Adult Behavioral Health Plan of Care  Goal: Plan of Care Review  Flowsheets (Taken 5/25/2021 4181)  Plan of Care Reviewed With:   patient   parent  Patient Agreement with Plan of Care: agrees   Plan of Care Review  Plan of Care Reviewed With: patient, parent  Patient Agreement with Plan of Care: agrees      SW facilitated family conference with pt, pt's mother, Cindi, and pt's therapist, Catherine. Team discussed recommendation for pt to attend residential and pt's hesitancy to do so.  Cindi states she is unable to care for pt, in her current state, and that she feels the pt needs more tx.  Pt was unable to state why she does not want to participate in residential or PHP.  Pt was asked to consider options and we will revisit tomorrow.  SW will continue to follow.

## 2021-05-25 NOTE — STUDENT
"Psychiatry Progress Note    Chief Complaint/Reason for follow-up: OD on lamictal, \"I feel like I'm dying\"    Interval History:  Pt had an \"okay day\". She ate 50% of dinner last night but \"didn't eat much today\". She believes she is \"getting worse\" but couldn't describe how: \"I just can't find the words\". Attended family call with SW, mother, and therapist. Focus of discussion was patient's next step. Pt was encouraged to attend a residential treatment program but was hesitant to do so. She could not vocalize why she was hesitant during the call. She later described to me that she \"does not want to address things\". I reminded her that until she addresses things, her symptoms likely will not improve. She tearfully agreed. Another family meeting is planned for tomorrow to follow up on this.    She continues to have \"light bleeding\" from her vagina. She did not take her birth control yesterday and was encouraged to do so every day.    Vital Signs for the last 24 hours:  Temp:  [36.4 °C (97.5 °F)-37.1 °C (98.8 °F)] 36.9 °C (98.5 °F)  Heart Rate:  [] 129  Resp:  [16] 16  BP: (123-129)/(76-84) 127/81    Scheduled Medications:  • FLUoxetine  20 mg oral Daily   • nicotine  1 patch transdermal Daily   • norethindrone-e.estradioL-iron  1 tablet oral Daily   • risperiDONE  0.5 mg oral BID     Mental Status Exam:  Pt appeared her stated age (22 yo), was dressed appropriately in sweats, appropriate hygiene, well-groomed. Pt had no stereotypical tics or movements and ambulation was appropriate. Pt's speech had normal rate, rhythm, and volume was soft. Pt's mood was \"I don't know\". Pt's affect was quiet, reserved, guarded, congruent to situation. Pt's associations were coherent. Thought process was disorganized. Thought content included no current SI, HI, VH, or AH.  Judgement and insight were limited. Cognition was limited. Alert.     Assessment/Plan  Gemma Darnell is a 22 yo white female with a history of cocaine and alcohol " "use disorder, anorexia, \"bipolar disorder\", and MDD who presented to the [place] ED after trying to overdose on Lamictal. Her presentation is suggestive of potential alcohol withdrawal as well as a mood disorder. She has traits suggestive of borderline personality disorder.  1. Continue Proxac 20mg daily for depression  2. D/c Gabapentin 200mg PO TID for alcohol use disorder and anxiety  3. PRN Atarax 50mg PO TID for anxiety  4. R/o borderline personality disorder: she would benefit from DBT on outpatient basis.   5. Loestin 1 tablet PO for birth control     Kevin Garcia  Medical Student  "

## 2021-05-25 NOTE — STUDENT
Psychiatry Progress Note    Chief Complaint/Reason for follow-up: OD on lamictal    Interval History:  Per overnight staff, pt was more visible/engaged, reported improvements in mood, and ate 50% of dinner. She is compliant with medications.    Review of Systems:   {[facility] ROS:95443}    Vital Signs for the last 24 hours:  Temp:  [36.4 °C (97.5 °F)-37.1 °C (98.8 °F)] 36.9 °C (98.5 °F)  Heart Rate:  [] 129  Resp:  [16] 16  BP: (123-129)/(76-84) 127/81    Labs:  {LABS REVIEWED SCT:44353}    Mental Status Exam:  {Mental Status Exam:66552}    VTE Assessment: {VTE Reassessment:69281}    Assessment/Plan  Gemma Darnell is a 20 yo female with a history of cocaine and alcohol use disorder, anorexia, now presenting to the [place] ED after overdose on Lamictal. Her presentation is suggestive of substance use disorder, anorexia, MDD. Additional diagnosis on the differential includes borderline personality disorder.  1. MDD, anxiety: Continue Proxac 20mg daily for depression. D/C gabapentin. Psychoeducation provided as she is in an acutely anxious state. Given her presentation with intense anxiety, disorganized state, continue Risperdal 0.5 mg po bid. PRN Atarax 50mg PO TID for anxiety  2. R/o borderline personality disorder: she would benefit from DBT on outpatient basis.   3. Anorexia: monitor PO intake. VS   WNL. Would benefit from ongoing outpatient care.  4. Substance use disorder: motivational interviewing, would benefit from ongoing outpatient treatment  5. G/m/s therapy as tolerated, ongoing collaterals as permitted  6. Advised not to take naps as this could effect her night time sleep

## 2021-05-25 NOTE — PLAN OF CARE
"Plan of Care Review  Plan of Care Reviewed With: patient  Patient Agreement with Plan of Care: agrees  Progress: improving  Intervention(s): Encouraged pt to attend and participate in all groups  Outcome Summary: Gemma has been visible on unit. Reports feeling a little better. States her sleep was restless waking up a few times during the night. Reports anxiety 6/10, depression 6/10 denies SI. Affect remains restricted. CIWA=1 \"I don't feel like I'm withdrawing from anything\". Remains disheveled looking. Comes to nurses station on own for medications. Remained out in living area in between groups. Pt declined going outside to patio. Appetite improving. Pleasant cooperative. Selectively social. Will continue to monitor  "

## 2021-05-26 PROCEDURE — 63700000 HC SELF-ADMINISTRABLE DRUG: Performed by: STUDENT IN AN ORGANIZED HEALTH CARE EDUCATION/TRAINING PROGRAM

## 2021-05-26 PROCEDURE — 99231 SBSQ HOSP IP/OBS SF/LOW 25: CPT | Mod: 95 | Performed by: PSYCHIATRY & NEUROLOGY

## 2021-05-26 PROCEDURE — 63700000 HC SELF-ADMINISTRABLE DRUG: Performed by: PSYCHIATRY & NEUROLOGY

## 2021-05-26 PROCEDURE — 12400000 HC ROOM AND CARE SEMIPRIVATE PSYCH

## 2021-05-26 RX ADMIN — NICOTINE 1 PATCH: 14 PATCH, EXTENDED RELEASE TRANSDERMAL at 09:45

## 2021-05-26 RX ADMIN — RISPERIDONE 0.5 MG: 0.5 TABLET, ORALLY DISINTEGRATING ORAL at 09:45

## 2021-05-26 RX ADMIN — Medication 3 MG: at 20:35

## 2021-05-26 RX ADMIN — RISPERIDONE 0.5 MG: 0.5 TABLET, ORALLY DISINTEGRATING ORAL at 20:35

## 2021-05-26 RX ADMIN — FLUOXETINE 20 MG: 20 CAPSULE ORAL at 09:45

## 2021-05-26 RX ADMIN — SENNOSIDES 1 TABLET: 8.6 TABLET, FILM COATED ORAL at 14:33

## 2021-05-26 NOTE — PLAN OF CARE
"  Problem: Adult Behavioral Health Plan of Care  Goal: Plan of Care Review  Flowsheets (Taken 5/26/2021 1237)  Plan of Care Reviewed With:   patient   mother  Patient Agreement with Plan of Care: agrees   Plan of Care Review  Plan of Care Reviewed With: patient, mother  Patient Agreement with Plan of Care: agrees      SW and pt met to research [facility]'s residential program.  SW spoke with both pt and her mother, [facility] residential program.  Both were familiar with it.  SW faxed referral to [facility] and is awaiting response.  Pt remains ambivalent but states she is \"ok\" with SW making referral.  SW will continue to follow.  "

## 2021-05-26 NOTE — PLAN OF CARE
Plan of Care Review  Plan of Care Reviewed With: patient  Patient Agreement with Plan of Care: agrees  Progress: no change  Intervention(s): Assessed for SI  Outcome Summary: Gemma has been visible, quiet, flat. Minimally engaged with peers. Ate 50%. Drinking fluids. She remains depressed, with low energy. Denies active SI. Refused HS Risperdal. Will continue to monitor.

## 2021-05-26 NOTE — STUDENT
"Psychiatry Progress Note    Chief Complaint/Reason for follow-up: OD on lamictal,     Interval History:  Per overnight staff, pt was more visible on the unit. She refused her nighttime Risperdal yesterday but accepted her morning Risperdal today. She accepted all other medications.    Today pt is observed in her room. She showered today. She is more vocal with me and shows slightly more emotion than in the past. She had a family meeting today with LA Nguyen to discuss [facility] residential program. Pt tells me: \"I do not want to go but I don't have any other options\". She is worried that she will \"never get better\". I encouraged her to try to engage the program at [facility] and get the help she needs. She nodded and seemed amenable to the recommendation.    Vital Signs for the last 24 hours:  Temp:  [36.8 °C (98.3 °F)-37.1 °C (98.7 °F)] 37.1 °C (98.7 °F)  Heart Rate:  [105-137] 137  Resp:  [16-18] 18  BP: (106-124)/(64-80) 119/64    Current Facility-Administered Medications   Medication Dose Route Frequency   • acetaminophen  650 mg oral q4h PRN   • alum-mag hydroxide-simeth  30 mL oral q4h PRN   • diphenhydrAMINE  25 mg oral q6h PRN   • diphenhydrAMINE  50 mg intramuscular q4h PRN   • FLUoxetine  20 mg oral Daily   • haloperidoL  5 mg oral q4h PRN    Or   • haloperidol lactate  5 mg intramuscular q4h PRN   • ibuprofen  400 mg oral q6h PRN   • LORazepam  2 mg oral q6h PRN    Or   • LORazepam  2 mg intramuscular q4h PRN   • melatonin  3 mg oral Nightly PRN   • metoclopramide  10 mg oral q6h PRN   • nicotine  1 patch transdermal Daily   • nicotine polacrilex  2 mg buccal q2h PRN   • norethindrone-e.estradioL-iron  1 tablet oral Daily   • risperiDONE  0.5 mg oral BID   • senna  1 tablet oral 2x daily PRN     Mental Status Exam:  Pt appeared her stated age (22 yo), was dressed appropriately in sweats, appropriate hygiene, well-groomed. Pt had no stereotypical tics or movements and ambulation was appropriate. Pt's " "speech had normal rate, rhythm, and volume was soft. Pt's mood was \"okay\". Pt's affect was quiet, reserved, guarded, congruent to situation. Pt's associations were coherent. Thought process was disorganized. Thought content included no current SI, HI, VH, or AH.  Judgement and insight were limited. Cognition was limited. Alert.     Assessment/Plan  Gemma Darnell is a 22 yo white female with a history of cocaine and alcohol use disorder, anorexia, \"bipolar disorder\", and MDD who presented to the [place] ED after trying to overdose on Lamictal. Her presentation is suggestive of potential alcohol withdrawal as well as a mood disorder. She has traits suggestive of borderline personality disorder.  1. Continue Proxac 20mg daily for depression  2. D/c Gabapentin 200mg PO TID for alcohol use disorder and anxiety  3. PRN Atarax 50mg PO TID for anxiety  4. R/o borderline personality disorder: she would benefit from DBT on outpatient basis.   5. Loestin 1 tablet PO for birth control     Kevin Garcia  Medical Student  "

## 2021-05-26 NOTE — PROGRESS NOTES
"This exam was done through synchronous audio-visual services.  The patient consented to the telehealth exam.  The patient provider were based in [place].  The patient was on the [facility] psychiatric unit.  The following other people were present during the interview: No one    Psychiatry Progress Note     Chief Complaint/Reason for follow-up:    MDD, anxiety, OD on lamictal  Hx of substance abuse and eating disorder     Nursing reported that the patient did not take her at bedtime Risperdal.  Also the medical student, Kevin told me that a family meeting was held yesterday and a follow-up meeting will be held today.  The patient has agreed to go to a residential program following discharge..    Today I saw the patient for the second time.  Initially she was nonverbal just replying by nodding her head.  As the interview progressed she became more verbal and cooperative.  She told me that she did not take the Risperdal because she believes that the meds are not working.  I again reiterated that she has been on the Prozac for less than 1 week and it is too early to say that the meds are not working.  When I asked her about of the follow-up meeting with her family she said she had already had it.  She is now agreed to a residential and is exploring going to the [facility].  The patient said that she is not eating.  She has a eating disorder she said she had an Ensure for lunch.    Mental status: The patient is fully oriented x3.  She easily recalls my name.  Speech is coherent and goal-directed without evidence of psychosis.  Speech however is nonspontaneous and terse.  Mood is of deep depression.  On a scale 1-10 with 10 being the most severe she rates today's depression as a \"7\".  On a similar scale she rates today's anxiety as a \"8\".  She admits to passive SI but denies active SI.  She remains helpless and hopeless she denies AH/VH.    Plan:  Continue psychiatric hospitalization  Continue Prozac 20 mg and Risperdal " 0.5 mg twice daily

## 2021-05-26 NOTE — PLAN OF CARE
"Plan of Care Review  Plan of Care Reviewed With: patient  Patient Agreement with Plan of Care: agrees  Progress: no change  Intervention(s): Encouraged pt to remain visible and engaged on unit  Outcome Summary: Gemma reports depression 8/10, anxiety 7/10 denies SI. Denies voices. Endorses poor concentration, inability to focus and mind wandering. Denies any withdrawal symptoms CIWA=1. Affect flat, somber. Reports poor sleep, restless, tossing and turning difficulty getting back to sleep. Reports poor appetite and restricting . Unable to state what % of meals she is eating. Pt visible on unit. Encouraged pt to write her thoughts and questions down in her journal, which she is agreeable. Pt is quiet, keeps to self. Needs much encouragement. Pt did speak of the possibility of following up with a Residential after discharge. Pt had mentioned the [facility] in [place]. Med compliant. Will continue to monitor, offer support and positive reinforcement.  Pt came to nurses station and reported, \"I don't think I've had a BM in like 4 days\". Asked pt if she felt constipated, bloated response, \"a little\". Pt appetite has been slight, not eating very much. Offered and given pt Senokot. Instructed pt to increase fluids, explained to pt she may have another senokot this evening before bed.   "

## 2021-05-27 LAB
ALBUMIN SERPL-MCNC: 4.4 G/DL (ref 3.4–5)
ALP SERPL-CCNC: 44 IU/L (ref 35–126)
ALT SERPL-CCNC: 14 IU/L (ref 11–54)
ANION GAP SERPL CALC-SCNC: 8 MEQ/L (ref 3–15)
AST SERPL-CCNC: 16 IU/L (ref 15–41)
BASOPHILS # BLD: 0.05 K/UL (ref 0.01–0.1)
BASOPHILS NFR BLD: 0.6 %
BILIRUB SERPL-MCNC: 0.7 MG/DL (ref 0.3–1.2)
BUN SERPL-MCNC: 9 MG/DL (ref 8–20)
CALCIUM SERPL-MCNC: 9.5 MG/DL (ref 8.9–10.3)
CHLORIDE SERPL-SCNC: 105 MEQ/L (ref 98–109)
CO2 SERPL-SCNC: 23 MEQ/L (ref 22–32)
CREAT SERPL-MCNC: 0.8 MG/DL (ref 0.6–1.1)
DIFFERENTIAL METHOD BLD: ABNORMAL
EOSINOPHIL # BLD: 0.07 K/UL (ref 0.04–0.36)
EOSINOPHIL NFR BLD: 0.9 %
ERYTHROCYTE [DISTWIDTH] IN BLOOD BY AUTOMATED COUNT: 11.6 % (ref 11.7–14.4)
GFR SERPL CREATININE-BSD FRML MDRD: >60 ML/MIN/1.73M*2
GLUCOSE SERPL-MCNC: 95 MG/DL (ref 70–99)
HCG UR QL: NEGATIVE
HCT VFR BLDCO AUTO: 41.3 % (ref 35–45)
HGB BLD-MCNC: 13.6 G/DL (ref 11.8–15.7)
IMM GRANULOCYTES # BLD AUTO: 0.02 K/UL (ref 0–0.08)
IMM GRANULOCYTES NFR BLD AUTO: 0.3 %
LYMPHOCYTES # BLD: 1.82 K/UL (ref 1.2–3.5)
LYMPHOCYTES NFR BLD: 22.9 %
MAGNESIUM SERPL-MCNC: 2 MG/DL (ref 1.8–2.5)
MCH RBC QN AUTO: 29.7 PG (ref 28–33.2)
MCHC RBC AUTO-ENTMCNC: 32.9 G/DL (ref 32.2–35.5)
MCV RBC AUTO: 90.2 FL (ref 83–98)
MONOCYTES # BLD: 0.44 K/UL (ref 0.28–0.8)
MONOCYTES NFR BLD: 5.5 %
NEUTROPHILS # BLD: 5.55 K/UL (ref 1.7–7)
NEUTS SEG NFR BLD: 69.8 %
NRBC BLD-RTO: 0 %
PDW BLD AUTO: 10.2 FL (ref 9.4–12.3)
PHOSPHATE SERPL-MCNC: 4.1 MG/DL (ref 2.4–4.7)
PLATELET # BLD AUTO: 243 K/UL (ref 150–369)
POTASSIUM SERPL-SCNC: 4.4 MEQ/L (ref 3.6–5.1)
PROT SERPL-MCNC: 6.9 G/DL (ref 6–8.2)
RBC # BLD AUTO: 4.58 M/UL (ref 3.93–5.22)
SODIUM SERPL-SCNC: 136 MEQ/L (ref 136–144)
WBC # BLD AUTO: 7.95 K/UL (ref 3.8–10.5)

## 2021-05-27 PROCEDURE — 80053 COMPREHEN METABOLIC PANEL: CPT | Performed by: PSYCHIATRY & NEUROLOGY

## 2021-05-27 PROCEDURE — 63700000 HC SELF-ADMINISTRABLE DRUG: Performed by: PSYCHIATRY & NEUROLOGY

## 2021-05-27 PROCEDURE — 84100 ASSAY OF PHOSPHORUS: CPT | Performed by: PSYCHIATRY & NEUROLOGY

## 2021-05-27 PROCEDURE — 83735 ASSAY OF MAGNESIUM: CPT | Performed by: PSYCHIATRY & NEUROLOGY

## 2021-05-27 PROCEDURE — 85025 COMPLETE CBC W/AUTO DIFF WBC: CPT | Performed by: PSYCHIATRY & NEUROLOGY

## 2021-05-27 PROCEDURE — 99232 SBSQ HOSP IP/OBS MODERATE 35: CPT | Performed by: PSYCHIATRY & NEUROLOGY

## 2021-05-27 PROCEDURE — 84703 CHORIONIC GONADOTROPIN ASSAY: CPT | Performed by: PSYCHIATRY & NEUROLOGY

## 2021-05-27 PROCEDURE — 12400000 HC ROOM AND CARE SEMIPRIVATE PSYCH

## 2021-05-27 PROCEDURE — 63700000 HC SELF-ADMINISTRABLE DRUG: Performed by: STUDENT IN AN ORGANIZED HEALTH CARE EDUCATION/TRAINING PROGRAM

## 2021-05-27 PROCEDURE — 36415 COLL VENOUS BLD VENIPUNCTURE: CPT | Performed by: PSYCHIATRY & NEUROLOGY

## 2021-05-27 RX ORDER — OLANZAPINE 5 MG/1
2.5 TABLET, ORALLY DISINTEGRATING ORAL NIGHTLY
Status: DISCONTINUED | OUTPATIENT
Start: 2021-05-27 | End: 2021-05-29

## 2021-05-27 RX ADMIN — NICOTINE 1 PATCH: 14 PATCH, EXTENDED RELEASE TRANSDERMAL at 09:03

## 2021-05-27 RX ADMIN — IBUPROFEN 400 MG: 400 TABLET, FILM COATED ORAL at 09:03

## 2021-05-27 RX ADMIN — RISPERIDONE 0.5 MG: 0.5 TABLET, ORALLY DISINTEGRATING ORAL at 09:03

## 2021-05-27 RX ADMIN — OLANZAPINE 2.5 MG: 5 TABLET, ORALLY DISINTEGRATING ORAL at 21:31

## 2021-05-27 RX ADMIN — Medication 3 MG: at 21:31

## 2021-05-27 RX ADMIN — FLUOXETINE 20 MG: 20 CAPSULE ORAL at 09:03

## 2021-05-27 NOTE — PLAN OF CARE
"Plan of Care Review  Plan of Care Reviewed With: patient  Patient Agreement with Plan of Care: agrees  Progress: improving  Intervention(s): Encouraged pt to notify staff of any thoyghts of Si, attend and participate in groups  Outcome Summary: Gemma reports anxiety 8/10, depression 7/10 denies SI/SH/AVH. Affect flat. Mood low. Pt c/o abd pain Motrin 400mg given with morning meds at 903. Pt reports +BM this morning. Reports sleeping better less tossing and turning. C/o feeling groggy in morning. Refused BCP. Reports no appetite eating approx 20%, states its part no appetite and part restricting.  CIWA=2. Medications changed risperdal discontinued, Zyprexa 2.5mg to start tonight. Pt first agreeable to change of med than stated \"I don't want to take medicine\". Explained to pt that the medications are helping with her thoughts. Pt doesn't feel that she is improving, \"I can't stay here any longer\". Pt still ambivalent about Residential during conversation with this writer. Encouraged pt to consider Residential for continuation of treatment. At 1410 pt again approached RN stating, \"I don't want to take the medicine can you tell Dr Brewer \". Moments later pt then said she would try the Zyprexa. Pt needs much encouragement. Will continue to monitor, offer support and positive reinforcement    "

## 2021-05-27 NOTE — PLAN OF CARE
Problem: Adult Behavioral Health Plan of Care  Goal: Plan of Care Review  Flowsheets (Taken 5/27/2021 3902)  Progress: no change  Plan of Care Reviewed With: patient  Outcome Summary: Pt with no c/o N/V/D, but with decrease appetite and is restricting herself at meal.  Pt with a 7# wt loss from her stated UBW, pt is trying to drink supplements with meals. Encourage pt to fill out her menus and increase her po intake. Pt now changed to Zyprexa which can help with increase appetite/weight gain.    Goal:  Pt will meet >75% of needs via po intake  Wt stable with no further wt loss    Recommendation:  1. Agree with Regular diet  2. Van. Boot Plus TID with meals  3. Encourage good po intake at meals  4. Will follow up with po intake, weights

## 2021-05-27 NOTE — PLAN OF CARE
"Plan of Care Review  Plan of Care Reviewed With: patient  Patient Agreement with Plan of Care: agrees  Progress: improving  Intervention(s): Encouraged patient to consider residential; Discussed benefits of extended treatment  Outcome Summary: Gemma was visible in the milieu this evening, notably brighter. More engaged with peers. She reports eating most of her dinner. She was more spontaneous, with a stronger voice. She denies SI. Compliant with all medications. Still some ambivalence regarding residential, but states, \" I'll probably go\" Will continue to monitor and offer support.  "

## 2021-05-27 NOTE — PROGRESS NOTES
"Psychiatry Progress Note    Chief Complaint/Reason for follow-up:    MDD, anxiety, OD on lamictal  Hx of substance abuse and eating disorder    Interval History:    Discussed patient's treatment progress with nurses and allied therapists in morning meeting this morning. Gemma has been brighter and more spontaneous. She slept 6 3/4 hours last night. CIWA scores have been low.     This morning Gemma is guarded and will not answer questions directly, indirectly stating concerns about abdominal pain. She stated her mood is \"weird\". She denied suicidal thoughts. ADLs are fair.  She complained of feeling dizzy and disoriented. She stated she has a hard time concentrating. She noted that she missed a few days of her OCP and is now worried that she is pregnant. Gemma is open to residential treatment. Her PO intake remains limited but vital signs are stable.     Review of Systems:   Appetite is poor to fair, no gi complaints, sleep is fair     Vital Signs for the last 24 hours:  Temp:  [36.6 °C (97.9 °F)-37.1 °C (98.7 °F)] 37.1 °C (98.7 °F)  Heart Rate:  [105-123] 123  Resp:  [14-16] 16  BP: (102-140)/(69-82) 140/82      Mental Status Exam:   Appearance: appears stated age, appropriately dressed  Behavior: cooperative but guarded   Speech: normal r/r/t/v and no pressure/paucity  TP: linear, vague  TC: no si/hi/plan/intent, + passive si, no grandiosity, no paranoia  Perception: no ah/vh, no ris  Mood: tired  Affect: not labile, had a mask on  Insight: fair  Judgment: fair  A&Ox3    Assessment/Plan   Gemma Darnell is a 20 yo female with a history of cocaine and alcohol use disorder, anorexia, now presenting to the [place] ED after overdose on Lamictal. ETOH use disorder, anorexia, MDD. R/o borderline personality disorder.     1. MDD, anxiety: Continue Proxac 20mg daily for depression.   Psychoeducation provided as she is in an acutely anxious state. Given her presentation with intense anxiety, disorganized state, continue " Risperdal 0.5 mg po bid. PRN Atarax 50mg PO TID for anxiety  2. R/o borderline personality disorder: she would benefit from DBT on outpatient basis.   3. Anorexia: monitor PO intake. VS   WNL. Would benefit from ongoing outpatient care. Check electrolytes  4. Substance use disorder: motivational interviewing, would benefit from ongoing outpatient treatment  5. G/m/s therapy as tolerated, ongoing collaterals as permitted  6. Advised not to take naps as this could effect her night time sleep  7. Will recheck pregnancy test given patient's concerns     I spent 25 minutes on this date of service performing the following activities: obtaining history, performing examination, documenting, preparing for visit, obtaining / reviewing records, providing counseling and education and coordinating care.

## 2021-05-27 NOTE — PLAN OF CARE
Problem: Adult Behavioral Health Plan of Care  Goal: Plan of Care Review  Flowsheets (Taken 5/27/2021 8152)  Plan of Care Reviewed With:  • patient  • mother  Patient Agreement with Plan of Care: agrees   Plan of Care Review  Plan of Care Reviewed With: patient, mother  Patient Agreement with Plan of Care: agrees        SW faxed referrals to [facility], [facility] and [facility]     [facility]) states they can not accept pt due to eating disorder.    [facility] ([facility]) conducted phone eval with pt and determined they will be able to accept pt.  [facility] states the earliest they will be able to accept pt is Thursday 06/03.  SW updated pt, pt's mother and provider.  SW will continue to follow.

## 2021-05-28 PROCEDURE — 63700000 HC SELF-ADMINISTRABLE DRUG: Performed by: PSYCHIATRY & NEUROLOGY

## 2021-05-28 PROCEDURE — 12400000 HC ROOM AND CARE SEMIPRIVATE PSYCH

## 2021-05-28 PROCEDURE — 99231 SBSQ HOSP IP/OBS SF/LOW 25: CPT | Performed by: PSYCHIATRY & NEUROLOGY

## 2021-05-28 PROCEDURE — 63700000 HC SELF-ADMINISTRABLE DRUG: Performed by: STUDENT IN AN ORGANIZED HEALTH CARE EDUCATION/TRAINING PROGRAM

## 2021-05-28 RX ADMIN — FLUOXETINE 20 MG: 20 CAPSULE ORAL at 09:16

## 2021-05-28 RX ADMIN — Medication 3 MG: at 21:14

## 2021-05-28 RX ADMIN — NICOTINE 1 PATCH: 14 PATCH, EXTENDED RELEASE TRANSDERMAL at 09:16

## 2021-05-28 RX ADMIN — OLANZAPINE 2.5 MG: 5 TABLET, ORALLY DISINTEGRATING ORAL at 21:15

## 2021-05-28 NOTE — PLAN OF CARE
Problem: Adult Behavioral Health Plan of Care  Goal: Optimized Coping Skills in Response to Life Stressors  Outcome: Not progressing    Gemma will reflect on motivators for recovery and name at least 3 reasons to live.

## 2021-05-28 NOTE — PLAN OF CARE
Problem: Adult Behavioral Health Plan of Care  Goal: Plan of Care Review  Outcome: Progressing  Flowsheets (Taken 5/28/2021 0372)  Progress: improving  Plan of Care Reviewed With: patient  Patient Agreement with Plan of Care: agrees  Outcome Summary: Gemma welcomed conversation, and denies current -SI. Pt reports that her eating disorder started at age 17 and to date she has been in two residential programs. Gemma ate dinner in the dining area and ate 40%. Pt rates her depression as 8/10 and her anxiety is 8/10. Oftentimes watching TV in the Lounge. Pt names her mother as her prime support. 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 CIWA is 1 this evening. Pt agrees with plan to attend residential treatment for her eating disorder and feels hopeful. Pt reports frustration with medication changes but does agree to the zyprexa in the service of gaining mental health stability.    Intervention(s): Engaged  pt to assess safety and mood. Provided active listening and emotional support.

## 2021-05-28 NOTE — PLAN OF CARE
Problem: Adult Behavioral Health Plan of Care  Goal: Plan of Care Review  5/28/2021 1500 by Wendy Horn LSW  Flowsheets (Taken 5/28/2021 1500)  Plan of Care Reviewed With:   patient   mother  Patient Agreement with Plan of Care: agrees   Plan of Care Review  Plan of Care Reviewed With: patient, mother  Patient Agreement with Plan of Care: agrees        SW spoke nory Chung ([facility]) who states they can admit pt on Tuesday 06/01.  LA updated pt, provider and mother, Cindi.  Pt must present between 12-1 PM. SW will continue to follow for additional needs at discharge.

## 2021-05-28 NOTE — PLAN OF CARE
"Plan of Care Review  Plan of Care Reviewed With: patient  Patient Agreement with Plan of Care: agrees  Progress: improving  Intervention(s): Monitored every 15mins. Provided with emotional support.  Outcome Summary: Gemma presents clean and appropriately dressed. She remains depressed with restricted affect. She denies SI/HI. Denies AVH.  Shared that she feels \"confused and numb\". Also is unhappy with her current medication and is fearful it will cause weight gain. Stated \"I'm scared I'm dying\". She is guarded and isolative. Visible walking the halls at times alone or laying in her bed as her energy is low. She attended group. This AM she did not eat breakfast. She was willing to drink half of her supplement boost shake. Psychiatrist notified and recommended that she eat meals in dining room so nursing can monitor. Gemma was compliant with this request and ate 50% of lunch and her boost shake. Will CTM/Support.   "

## 2021-05-28 NOTE — PLAN OF CARE
Problem: Adult Behavioral Health Plan of Care  Goal: Plan of Care Review  Flowsheets (Taken 5/28/2021 3876)  Plan of Care Reviewed With: patient  Patient Agreement with Plan of Care: agrees   Plan of Care Review  Plan of Care Reviewed With: patient  Patient Agreement with Plan of Care: agrees      SW met with pt to review tx plan.  Pt states she does not want to sign.  Later in the day, pt request to review tx plan again with SW.  Pt states she understands and agrees with document, however, does not want to sign.  SW will continue to follow.

## 2021-05-28 NOTE — PLAN OF CARE
Plan of Care Review  Plan of Care Reviewed With: patient  Patient Agreement with Plan of Care: agrees  Progress: improving  Intervention(s): Assessed for SI; Encouraged compliance.  Outcome Summary: Gemma has been visible. Showered and ate 70% dinner. Affect restricted. Mood low. Denies SI. Reports feeling reluctant to take HS Zyprexa, but was compliant.Agreeable to [facility] after discharge.

## 2021-05-28 NOTE — PROGRESS NOTES
"Psychiatry Progress Note    Chief Complaint/Reason for follow-up:    MDD, anxiety, OD on lamictal  Hx of substance abuse and eating disorder    Interval History:    Discussed patient's treatment progress with nurses and allied therapists in morning meeting this morning. CIWA scores have been low. Gemma showered last night and ate most of dinner per her report. Gemma noted her mood is low but she feels safe without any suicidal thoughts. She refused Zyprexa but then took it (gave no explanation) but today stated she is worried about gaining weight and that is why she initially refused. She stated, \"I don't know if I want to get better.\" She stated that she feels her memory is poor. She is open to going to residential treatment but is also frustrated.     Discussed treatment plan with mother.       Review of Systems:   Appetite is poor to fair, no gi complaints, sleep is fair     Vital Signs for the last 24 hours:  Temp:  [36.8 °C (98.3 °F)-37.1 °C (98.7 °F)] 36.9 °C (98.5 °F)  Heart Rate:  [] 133  Resp:  [16] 16  BP: (103-139)/(76-81) 126/79     CMP: WNL  Ca: 9.5  Preg test, neg     Mental Status Exam:   Appearance: appears stated age, appropriately dressed  Behavior: guarded   Speech: response latency, normal r/r/t/v and no pressure/paucity  TP: linear, vague  TC: no si/hi/plan/intent,  Perception: no ah/vh, no ris  Mood: tired  Affect: not labile, had a mask on  Insight: fair  Judgment: fair  A&Ox3    Assessment/Plan   Gemma Darnell is a 22 yo female with a history of cocaine and alcohol use disorder, anorexia, now presenting to the [place] ED after overdose on Lamictal. ETOH use disorder, anorexia, MDD. R/o borderline personality disorder.     1. MDD, anxiety: Continue Proxac 20mg daily for depression.  Given her presentation with intense anxiety, disorganized state, switched Risperdal to Zydis 2.5mg. PRN Atarax 50mg PO TID for anxiety  2. R/o borderline personality disorder: she would benefit from DBT on " outpatient basis.   3. Anorexia: monitor PO intake. Change meals to witnessed in the day room.   4. Substance use disorder: motivational interviewing, would benefit from ongoing outpatient treatment  5. Dispo: RTF eating disorder facility     I spent 25 minutes on this date of service performing the following activities: obtaining history, performing examination, documenting, preparing for visit, obtaining / reviewing records, providing counseling and education and coordinating care.

## 2021-05-29 LAB — SARS-COV-2 RNA RESP QL NAA+PROBE: NEGATIVE

## 2021-05-29 PROCEDURE — 99232 SBSQ HOSP IP/OBS MODERATE 35: CPT | Performed by: PSYCHIATRY & NEUROLOGY

## 2021-05-29 PROCEDURE — 63700000 HC SELF-ADMINISTRABLE DRUG: Performed by: PSYCHIATRY & NEUROLOGY

## 2021-05-29 PROCEDURE — 63700000 HC SELF-ADMINISTRABLE DRUG: Performed by: STUDENT IN AN ORGANIZED HEALTH CARE EDUCATION/TRAINING PROGRAM

## 2021-05-29 PROCEDURE — 12400000 HC ROOM AND CARE SEMIPRIVATE PSYCH

## 2021-05-29 PROCEDURE — U0002 COVID-19 LAB TEST NON-CDC: HCPCS | Performed by: PSYCHIATRY & NEUROLOGY

## 2021-05-29 RX ORDER — QUETIAPINE FUMARATE 25 MG/1
25 TABLET, FILM COATED ORAL NIGHTLY
Status: DISCONTINUED | OUTPATIENT
Start: 2021-05-29 | End: 2021-05-30

## 2021-05-29 RX ADMIN — NICOTINE 1 PATCH: 14 PATCH, EXTENDED RELEASE TRANSDERMAL at 10:43

## 2021-05-29 RX ADMIN — FLUOXETINE 20 MG: 20 CAPSULE ORAL at 10:43

## 2021-05-29 RX ADMIN — SENNOSIDES 1 TABLET: 8.6 TABLET, FILM COATED ORAL at 17:34

## 2021-05-29 RX ADMIN — Medication 3 MG: at 20:44

## 2021-05-29 RX ADMIN — QUETIAPINE FUMARATE 25 MG: 25 TABLET ORAL at 20:43

## 2021-05-29 NOTE — PROGRESS NOTES
"Psychiatry Progress Note    Chief Complaint/Reason for follow-up:    MDD, anxiety, OD on lamictal  Hx of substance abuse and eating disorder    Interval History:    Gemma ate dinner in the dayroom and was witnessed eating about 40%. She spoke openly with her nurse last night about her eating disorder history. She noted feeling anxious that medication will cause her to gain weight. She reports feeling \"confused and numb\" with a poor memory (there is no evidence that the patient's memory is impaired, she remembered details of yesterday and talked at length about previously reading the [employer] book she has restarted). The patient is able to talk about some topics clearly without any delays but when her treatment plan is approached she has significant latency. She again reported she will now refuse Zyprexa. We discussed other atypicals and she was open to trying Seroquel. Gemma denied SI/HI/AVH.    Review of Systems:   Appetite is poor to fair, no gi complaints, sleep is fair     Vital Signs for the last 24 hours:  Temp:  [36.8 °C (98.3 °F)-37 °C (98.6 °F)] 37 °C (98.6 °F)  Heart Rate:  [] 142  Resp:  [16] 16  BP: (114-131)/(73-83) 121/75     CMP: WNL  Ca: 9.5  Preg test, neg     Mental Status Exam:   Appearance: appears stated age, appropriately dressed  Behavior: guarded   Speech: response latency, normal r/r/t/v and no pressure/paucity  TP: linear, vague  TC: no si/hi/plan/intent,  Perception: no ah/vh, no ris  Mood: tired  Affect: not labile, had a mask on  Insight: fair  Judgment: fair  A&Ox3    Assessment/Plan   Gemma Darnell is a 20 yo female with a history of cocaine and alcohol use disorder, anorexia, now presenting to the [place] ED after overdose on Lamictal. ETOH use disorder, anorexia, MDD. R/o borderline personality disorder.     1. MDD, anxiety: Continue Proxac 20mg daily for depression.  Given her presentation with intense anxiety, disorganized state, recommend atypical. The patient has refused " Risperdal and Zyprexa. She is open to a trial of Seroquel. PRN Atarax 50mg PO TID for anxiety  2. R/o borderline personality disorder: she would benefit from DBT on outpatient basis.   3. Anorexia: monitor PO intake. Change meals to witnessed in the day room.   4. Substance use disorder: motivational interviewing, would benefit from ongoing outpatient treatment  5. Dispo: RTF eating disorder facility     I spent 25 minutes on this date of service performing the following activities: obtaining history, performing examination, documenting, preparing for visit, obtaining / reviewing records, providing counseling and education and coordinating care.

## 2021-05-29 NOTE — PLAN OF CARE
Plan of Care Review  Plan of Care Reviewed With: patient  Patient Agreement with Plan of Care: agrees  Progress: improving  Intervention(s): Encouraged pt to attend and participate in group  Outcome Summary: Gemma has been visible t/o shift. Eating her meals in dining area. Eating 40% of lunch 1/2 soup, 1/2 carrots, 1/4 boost. Continues to state she has no appetite. Endorses Depression 8/10, anxiety 9/10 denies SI. CFS. Affect flat, restricted. Mood low. Indifferent about Residential. Remains quiet and withdrawn. Reports sleeping okay. Still ambivalent about medications, didn't want to continue the Zyprexa now ordered Seroquel . Pt does not want to take any medications, however was med compliant this shift. Keeps to self, minimal peer interactions. Attended afternoon group. Will continue to monitor, encourage pt to engage in unit activities.  Gemma ate 100% broccoli, 1/2 mac cheese, 100% of boost for dinner. Requested a senokot after dinner. Seen playing a game with peer in living room.

## 2021-05-30 PROCEDURE — 63700000 HC SELF-ADMINISTRABLE DRUG: Performed by: PSYCHIATRY & NEUROLOGY

## 2021-05-30 PROCEDURE — 99232 SBSQ HOSP IP/OBS MODERATE 35: CPT | Performed by: PSYCHIATRY & NEUROLOGY

## 2021-05-30 PROCEDURE — 12400000 HC ROOM AND CARE SEMIPRIVATE PSYCH

## 2021-05-30 PROCEDURE — 63700000 HC SELF-ADMINISTRABLE DRUG: Performed by: STUDENT IN AN ORGANIZED HEALTH CARE EDUCATION/TRAINING PROGRAM

## 2021-05-30 RX ORDER — IBUPROFEN 200 MG
1 TABLET ORAL DAILY
Qty: 30 PATCH | Refills: 0 | Status: SHIPPED | OUTPATIENT
Start: 2021-05-31 | End: 2021-07-19

## 2021-05-30 RX ORDER — FLUOXETINE HYDROCHLORIDE 20 MG/1
20 CAPSULE ORAL DAILY
Qty: 30 CAPSULE | Refills: 0 | Status: SHIPPED | OUTPATIENT
Start: 2021-05-31 | End: 2021-07-19

## 2021-05-30 RX ADMIN — Medication 3 MG: at 21:02

## 2021-05-30 RX ADMIN — FLUOXETINE 20 MG: 20 CAPSULE ORAL at 09:42

## 2021-05-30 RX ADMIN — NICOTINE 1 PATCH: 14 PATCH, EXTENDED RELEASE TRANSDERMAL at 09:42

## 2021-05-30 NOTE — PROGRESS NOTES
"Psychiatry Progress Note    Chief Complaint/Reason for follow-up:    MDD, anxiety, OD on lamictal  Hx of substance abuse and eating disorder    Interval History:    Gemma noted she feels depressed. She denied suicidal thoughts. She remains open to residential treatment but is also annoyed and frustrated that further care is recommended after she leaves the inpatient setting. She continues to state that she does not want to take medication. She is refusing Seroquel and does not want to try any other atypical antipsychotic. She is attending some groups. She stated she feels unable to eat but has been eating a little less than half of her tray. She denied SI/HI.     Discussed treatment plan with Gemma's mother.     Review of Systems:   Appetite is poor to fair, no gi complaints, sleep is fair     Vital Signs for the last 24 hours:  Temp:  [36.6 °C (97.8 °F)-37.1 °C (98.8 °F)] 37.1 °C (98.8 °F)  Heart Rate:  [] 100  Resp:  [16] 16  BP: (109-136)/(65-88) 136/88     CMP: WNL  Ca: 9.5  Preg test, neg     Mental Status Exam:   Appearance: appears stated age, appropriately dressed  Behavior: guarded   Speech: response latency, normal r/r/t/v and no pressure/paucity  TP: linear, vague  TC: no si/hi/plan/intent,  Perception: no ah/vh, no ris  Mood: \"not great\"  Affect:annoyed  Insight: fair  Judgment: fair  A&Ox3    Assessment/Plan   Gemma Darnell is a 20 yo female with a history of cocaine and alcohol use disorder, anorexia, now presenting to the [facility] ED after overdose on Lamictal. ETOH use disorder, anorexia, MDD, borderline personality disorder.     1. MDD, anxiety: Continue Proxac 20mg daily for depression.  Given her presentation with intense anxiety, disorganized state, recommend atypical. Patient is declining.    2. Borderline personality disorder: she would benefit from DBT on outpatient basis.   3. Anorexia: monitor PO intake. Change meals to witnessed in the day room.   4. Substance use disorder: motivational " interviewing, would benefit from ongoing outpatient treatment  5. Dispo: RTF eating disorder facility     I spent 25 minutes on this date of service performing the following activities: obtaining history, performing examination, documenting, preparing for visit, obtaining / reviewing records, providing counseling and education and coordinating care.

## 2021-05-30 NOTE — PLAN OF CARE
"Plan of Care Review  Plan of Care Reviewed With: patient  Patient Agreement with Plan of Care: agrees  Progress: improving  Intervention(s): Encouraged pt to remain visible and engaged in unti activities  Outcome Summary: Gemma has been more visible today. Engaged with select peers. Keeping busy with artwork/coloring. Affect was flat, blunted this morning. Reported depression 8/10, anxiety 8/10 denies SI. CFS. Pt reported \"I feel shitty\" but could not elaborate. Pt stated she did not eat breakfast, had no appetite. Encouraged pt to drink the Boost. Pt did eat all of her lunch which consisted of a grilled cheese sandwich and iced tea, refused Boost. Pt ate all of dinner which was hummus and veggies with aplpe sauce, again refused Boost. Pt mood began brightening as day went on. Appear more relaxed a bit more animated. Actively helping with the choice of movies to watch. Conversing more with peers. Pleasant, cooperative, med compliant. Showing improvement today. Will continue to monitor, offer support and give positive reinforcement. At 1830 pt  asked for an Ice Cream [place], ate it while watching [place].   "

## 2021-05-30 NOTE — PLAN OF CARE
Plan of Care Review  Plan of Care Reviewed With: patient  Patient Agreement with Plan of Care: agrees  Progress: improving  Intervention(s): Encouraged pt to spend time in the community  Outcome Summary: Gemma was in the community briefly, seen sitting on the periphery with minimal peer interaction. She has a flat affect and continues to voice some frustration with medication changes, but stated she has been here too long and is looking forward to the next steps despite having some anxiety about it. She rated her depression a 8 and denies SI. Staff will conitnue to monitor.

## 2021-05-31 VITALS
TEMPERATURE: 98.5 F | HEIGHT: 64 IN | DIASTOLIC BLOOD PRESSURE: 79 MMHG | BODY MASS INDEX: 19.36 KG/M2 | SYSTOLIC BLOOD PRESSURE: 129 MMHG | RESPIRATION RATE: 16 BRPM | HEART RATE: 114 BPM | WEIGHT: 113.4 LBS | OXYGEN SATURATION: 99 %

## 2021-05-31 PROCEDURE — 63700000 HC SELF-ADMINISTRABLE DRUG: Performed by: STUDENT IN AN ORGANIZED HEALTH CARE EDUCATION/TRAINING PROGRAM

## 2021-05-31 PROCEDURE — 63700000 HC SELF-ADMINISTRABLE DRUG: Performed by: PSYCHIATRY & NEUROLOGY

## 2021-05-31 PROCEDURE — 99238 HOSP IP/OBS DSCHRG MGMT 30/<: CPT | Performed by: PSYCHIATRY & NEUROLOGY

## 2021-05-31 RX ADMIN — FLUOXETINE 20 MG: 20 CAPSULE ORAL at 08:58

## 2021-05-31 RX ADMIN — NICOTINE 1 PATCH: 14 PATCH, EXTENDED RELEASE TRANSDERMAL at 08:58

## 2021-05-31 NOTE — DISCHARGE SUMMARY
"  Inpatient Psychiatry Discharge Summary    BRIEF OVERVIEW  Admitting Provider: Naomi E Chevalier, MD  Discharge Provider: Marciano Estrella MD  Primary Care Physician at Discharge: Arlette Tolentino, [facility] [phone]     Admission Date: 5/17/2021     Discharge Date: 5/31/2021    Discharge Diagnosis  MDD, anxiety, OD on lamictal  Borderline personality disorder  Anorexia  Hx of substance abuse      Discharge Disposition  Home     Discharge Medications     Medication List      START taking these medications    FLUoxetine 20 mg capsule  Commonly known as: PROzac  Take 1 capsule (20 mg total) by mouth daily.  Dose: 20 mg     nicotine 14 mg/24 hr  Commonly known as: NICODERM CQ  Place 1 patch on the skin daily.  Dose: 1 patch        CONTINUE taking these medications    CK 24 FE 1 mg-20 mcg (24)/75 mg (4) per tablet  Generic drug: norethindrone-e.estradioL-iron     melatonin 3 mg tablet  Take 3 mg by mouth.  Dose: 3 mg        STOP taking these medications    escitalopram 20 mg tablet  Commonly known as: LEXAPRO     lamoTRIgine 100 mg tablet  Commonly known as: LaMICtal     traZODone 50 mg tablet  Commonly known as: DESYREL               Outpatient Follow-Up  [facility]      Test Results Pending at Discharge  None      DETAILS OF HOSPITAL STAY    Presenting Problem/History of Present Illness  Gemma Darnell is a 21 y.o. female with previous diagnoses of \"Bipolar disorder,\" anorexia, cocaine and ETOH abuse disorder, now presenting with worsening depression and suicide attempt by OD on Lamictal day before yesterday.     Today I encounter Gemma in her room. She is lying in bed. States that she was last hospitalized at the [facility] for substance and psych treatment Sept-Oct 2020 followed by [facility] for eating d/o Oct-Nov. Since then, \"I was doing ok for awhile\" though she admits she immediately began drinking again after discharge. Her parents  in January which was \"not a " "surprise, my dad is a heavy drinker\" and \"I think it is for the best\" but she also pinpoints a deterioration in her mental health around that time. Soon after that, she reports that her drinking worsened and recently, she has been drinking up to \"7 vodka shots per day\" but not every day. She denies s/s of withdrawal at this time. Endorses feeling mostly \"detached,\" empty, numb much of the time, with sadness at times. She has had difficulty sleeping prior to admission, and reports a weight loss of about 8 lbs in the last 2 months (though this was purposeful and she believes part of her anorexia rather than result of depression/anxiety), low energy, feeling like she is in a fog with lapses in memory, chronic thoughts of death/dying, self injury in the form of scratching herself (has not broken the skin), difficulty concentrating.      This all culminated the day before yesterday when she took \"half a bottle\" of Lamictal with the clear intent to die. She states, somewhat illogically, that she did not take the whole bottle \"because I knew it wouldn't work to kill me.\" Reports that it was impulsive and when she thinks about it now, she feels \"detached from it, I don't remember it too well.\" Following taking the pills, \"My mom set up a PCP appointment because I was giving her mixed signals\" (eg hinting that she had misused medication) but upon presenting to the PCP, she purposefully withheld that she had taken an OD. Following that appointment, she reached out to her therapist who recommended she present here. At this point, she states \"I don't want to be here\" but admits she is not safe to go home.     Other ROS: Reports previous diagnosis of bipolar d/o, was last \"manic\" in Sept 2020 when she stayed up for one week straight and was sexually impulsive, \"sleeping with friends,\" racing thoughts that \"I am a piece of shit\" (which she is experiencing right now as well). Denies feeling invincible/special pierer, denies " "spending large amounts of money (but has been recently in an effort to \"make myself feel better\"), denies increased goal-oriented activities. Unclear whether she was under the influence of substances at that time.      She reports hearing voices but denies VH. AH are \"in my head\" and say derogatory things such as \"you are a piece of shit.\" I ask if it is her voice and she states \"maybe.\" Does not hear them daily, they are distressing when she does hear them.     Has pushed away most friends recently but still has her mother and sister. Allows me to call her mother Cindi [phone] and outpatient psychiatrist Dr. Simpson though they have only met once.    Hospital Course  The patient was admitted to the [facility] acute inpatient psychiatric unit under the care of Dr. Hunt. The patient's presentation was consistent with MDD, anorexia, and boderline personality disorder, with recent substance abuse. Lexapro was discontinued and prozac was increased to 20mg. Gabapentin was offered for anxiety and withdrawal but the patient declined it.  Given her presentation with intense anxiety and disorganization Risperdal was added, which the patient ultimately refused. Similar trials of Zyprexa and Seroquel were also rejected by the patient. The patient's meals were moved to the day room due to inaccurate reporting of intake by the patient as well as attempts to discard food without staff knowledge. The patient noted memory and cognitive issues (these symptoms were not consistent however, and the patient seemed motivated to shift the focus away from eating problems, substance use, and borderline traits) Medical work up was unremarkable.  The patient was accepted at [facility], an eating disorder program through [facility].       Consults:   Orders Placed This Encounter   Procedures   • Inpatient consult to Behavioral Health Crisis   • Inpatient consult to Hospitalist       Procedures: None    Mental Status Exam: " "  Appearance: appears stated age, appropriately dressed  Behavior: cooperative  Speech:  normal r/r/t/v and no pressure/paucity  TP: linear, vague  TC: no si/hi/plan/intent,  Perception: no ah/vh, no ris  Mood: \"better\"  Affect: anxious  Insight: fair  Judgment: fair  A&Ox3    Heart Rate: (!) 114  Resp: 16  BP: 129/79  Temp: 36.9 °C (98.5 °F)  Weight: 51.4 kg (113 lb 6.4 oz) (5/23/21)      "

## 2021-05-31 NOTE — NURSING NOTE
Pt discharged to [facility] in [place], mother to escort pt to facility. Pt behavior calm, cooperative denies SI. Discharge instructions reviewed with pt. Pt verbalized understanding of instruction, importance of taking medications as prescribed, keeping all follow up appts. Pt discharged with all personal belongings

## 2021-05-31 NOTE — PLAN OF CARE
Problem: Adult Behavioral Health Plan of Care  Goal: Plan of Care Review  Outcome: Progressing  Flowsheets (Taken 5/31/2021 0919)  Plan of Care Reviewed With: patient  Patient Agreement with Plan of Care: agrees  Outcome Summary:  adn attending MD had famil y conf call wiht patients mother on 5/29/21 to review d/c planning.  Patients mother is planning on transporting patient to the [facility] treatment center on 5/31/21.  Patient and her mother are staying in hotel on 5/31/21 and then patient will admit to the [facility] residential treatment center on 6/1/21.  Patient and her mother are aggreeable with plan.

## 2021-05-31 NOTE — PLAN OF CARE
Plan of Care Review  Plan of Care Reviewed With: patient  Patient Agreement with Plan of Care: agrees  Progress: improving  Intervention(s): Encouraged pt to continue to attend and participate in groups  Outcome Summary: Gemma remained in the community but was minimally engaged with her peers. Her affect remains blunted, and she continues to endorse depression. She is difficult to engage. She denies any active SI and was compliant with meds.

## 2021-07-19 ENCOUNTER — HOSPITAL ENCOUNTER (OUTPATIENT)
Facility: HOSPITAL | Age: 21
Setting detail: OBSERVATION
End: 2021-07-21
Attending: STUDENT IN AN ORGANIZED HEALTH CARE EDUCATION/TRAINING PROGRAM | Admitting: HOSPITALIST
Payer: COMMERCIAL

## 2021-07-19 ENCOUNTER — APPOINTMENT (EMERGENCY)
Dept: RADIOLOGY | Facility: HOSPITAL | Age: 21
End: 2021-07-19
Attending: STUDENT IN AN ORGANIZED HEALTH CARE EDUCATION/TRAINING PROGRAM
Payer: COMMERCIAL

## 2021-07-19 DIAGNOSIS — R00.0 TACHYCARDIA: ICD-10-CM

## 2021-07-19 DIAGNOSIS — F32.A DEPRESSION, UNSPECIFIED DEPRESSION TYPE: ICD-10-CM

## 2021-07-19 DIAGNOSIS — T14.91XA SUICIDE ATTEMPT (CMS/HCC): ICD-10-CM

## 2021-07-19 DIAGNOSIS — R79.89 ELEVATED LFTS: ICD-10-CM

## 2021-07-19 DIAGNOSIS — T50.902A POLYSUBSTANCE OVERDOSE, INTENTIONAL SELF-HARM, INITIAL ENCOUNTER (CMS/HCC): Primary | ICD-10-CM

## 2021-07-19 PROBLEM — F32.9 MAJOR DEPRESSIVE DISORDER WITH CURRENT ACTIVE EPISODE: Status: ACTIVE | Noted: 2021-07-19

## 2021-07-19 PROBLEM — F41.9 ANXIETY: Status: ACTIVE | Noted: 2021-07-19

## 2021-07-19 PROBLEM — T50.904A OD (OVERDOSE OF DRUG), UNDETERMINED INTENT, INITIAL ENCOUNTER: Status: ACTIVE | Noted: 2021-07-19

## 2021-07-19 LAB
ALBUMIN SERPL-MCNC: 4.4 G/DL (ref 3.4–5)
ALP SERPL-CCNC: 63 IU/L (ref 35–126)
ALT SERPL-CCNC: 72 IU/L (ref 11–54)
AMPHET UR QL SCN: NOT DETECTED
ANION GAP SERPL CALC-SCNC: 13 MEQ/L (ref 3–15)
APAP SERPL-MCNC: <10 UG/ML (ref 10–30)
AST SERPL-CCNC: 45 IU/L (ref 15–41)
BARBITURATES UR QL SCN: NOT DETECTED
BASOPHILS # BLD: 0.07 K/UL (ref 0.01–0.1)
BASOPHILS NFR BLD: 0.7 %
BENZODIAZ UR QL SCN: NOT DETECTED
BILIRUB DIRECT SERPL-MCNC: 0.1 MG/DL
BILIRUB SERPL-MCNC: 0.5 MG/DL (ref 0.3–1.2)
BUN SERPL-MCNC: 11 MG/DL (ref 8–20)
CALCIUM SERPL-MCNC: 9.3 MG/DL (ref 8.9–10.3)
CANNABINOIDS UR QL SCN: NOT DETECTED
CHLORIDE SERPL-SCNC: 106 MEQ/L (ref 98–109)
CO2 SERPL-SCNC: 20 MEQ/L (ref 22–32)
COCAINE UR QL SCN: NOT DETECTED
CREAT SERPL-MCNC: 0.8 MG/DL (ref 0.6–1.1)
DIFFERENTIAL METHOD BLD: ABNORMAL
EOSINOPHIL # BLD: 0.21 K/UL (ref 0.04–0.36)
EOSINOPHIL NFR BLD: 2.2 %
ERYTHROCYTE [DISTWIDTH] IN BLOOD BY AUTOMATED COUNT: 12 % (ref 11.7–14.4)
ETHANOL SERPL-MCNC: <5 MG/DL
GFR SERPL CREATININE-BSD FRML MDRD: >60 ML/MIN/1.73M*2
GLUCOSE SERPL-MCNC: 92 MG/DL (ref 70–99)
HCG UR QL: NEGATIVE
HCT VFR BLDCO AUTO: 41.4 % (ref 35–45)
HGB BLD-MCNC: 13.5 G/DL (ref 11.8–15.7)
IMM GRANULOCYTES # BLD AUTO: 0.03 K/UL (ref 0–0.08)
IMM GRANULOCYTES NFR BLD AUTO: 0.3 %
LYMPHOCYTES # BLD: 3.26 K/UL (ref 1.2–3.5)
LYMPHOCYTES NFR BLD: 33.7 %
MCH RBC QN AUTO: 29.2 PG (ref 28–33.2)
MCHC RBC AUTO-ENTMCNC: 32.6 G/DL (ref 32.2–35.5)
MCV RBC AUTO: 89.4 FL (ref 83–98)
MONOCYTES # BLD: 0.86 K/UL (ref 0.28–0.8)
MONOCYTES NFR BLD: 8.9 %
NEUTROPHILS # BLD: 5.24 K/UL (ref 1.7–7)
NEUTS SEG NFR BLD: 54.2 %
NRBC BLD-RTO: 0 %
OPIATES UR QL SCN: NOT DETECTED
PCP UR QL SCN: NOT DETECTED
PDW BLD AUTO: 9.7 FL (ref 9.4–12.3)
PLATELET # BLD AUTO: 278 K/UL (ref 150–369)
POTASSIUM SERPL-SCNC: 3.6 MEQ/L (ref 3.6–5.1)
PROT SERPL-MCNC: 7.2 G/DL (ref 6–8.2)
RBC # BLD AUTO: 4.63 M/UL (ref 3.93–5.22)
SALICYLATES SERPL-MCNC: <4 MG/DL
SARS-COV-2 RNA RESP QL NAA+PROBE: NEGATIVE
SODIUM SERPL-SCNC: 139 MEQ/L (ref 136–144)
WBC # BLD AUTO: 9.67 K/UL (ref 3.8–10.5)

## 2021-07-19 PROCEDURE — 71045 X-RAY EXAM CHEST 1 VIEW: CPT

## 2021-07-19 PROCEDURE — 25800000 HC PHARMACY IV SOLUTIONS: Performed by: HOSPITALIST

## 2021-07-19 PROCEDURE — 96376 TX/PRO/DX INJ SAME DRUG ADON: CPT

## 2021-07-19 PROCEDURE — G0480 DRUG TEST DEF 1-7 CLASSES: HCPCS | Performed by: PHYSICIAN ASSISTANT

## 2021-07-19 PROCEDURE — 85025 COMPLETE CBC W/AUTO DIFF WBC: CPT | Performed by: PHYSICIAN ASSISTANT

## 2021-07-19 PROCEDURE — G0378 HOSPITAL OBSERVATION PER HR: HCPCS

## 2021-07-19 PROCEDURE — U0002 COVID-19 LAB TEST NON-CDC: HCPCS | Performed by: STUDENT IN AN ORGANIZED HEALTH CARE EDUCATION/TRAINING PROGRAM

## 2021-07-19 PROCEDURE — 84703 CHORIONIC GONADOTROPIN ASSAY: CPT | Performed by: PHYSICIAN ASSISTANT

## 2021-07-19 PROCEDURE — 3E0337Z INTRODUCTION OF ELECTROLYTIC AND WATER BALANCE SUBSTANCE INTO PERIPHERAL VEIN, PERCUTANEOUS APPROACH: ICD-10-PCS | Performed by: STUDENT IN AN ORGANIZED HEALTH CARE EDUCATION/TRAINING PROGRAM

## 2021-07-19 PROCEDURE — 96374 THER/PROPH/DIAG INJ IV PUSH: CPT

## 2021-07-19 PROCEDURE — 96361 HYDRATE IV INFUSION ADD-ON: CPT

## 2021-07-19 PROCEDURE — 36415 COLL VENOUS BLD VENIPUNCTURE: CPT | Performed by: PHYSICIAN ASSISTANT

## 2021-07-19 PROCEDURE — 80307 DRUG TEST PRSMV CHEM ANLYZR: CPT | Performed by: PHYSICIAN ASSISTANT

## 2021-07-19 PROCEDURE — 63600000 HC DRUGS/DETAIL CODE: Performed by: PHYSICIAN ASSISTANT

## 2021-07-19 PROCEDURE — 80053 COMPREHEN METABOLIC PANEL: CPT | Performed by: PHYSICIAN ASSISTANT

## 2021-07-19 PROCEDURE — 99285 EMERGENCY DEPT VISIT HI MDM: CPT | Mod: 25

## 2021-07-19 PROCEDURE — 25800000 HC PHARMACY IV SOLUTIONS: Performed by: PHYSICIAN ASSISTANT

## 2021-07-19 PROCEDURE — 82248 BILIRUBIN DIRECT: CPT | Performed by: PHYSICIAN ASSISTANT

## 2021-07-19 PROCEDURE — 99220 PR INITIAL OBSERVATION CARE/DAY 70 MINUTES: CPT | Performed by: HOSPITALIST

## 2021-07-19 PROCEDURE — 3E033NZ INTRODUCTION OF ANALGESICS, HYPNOTICS, SEDATIVES INTO PERIPHERAL VEIN, PERCUTANEOUS APPROACH: ICD-10-PCS | Performed by: STUDENT IN AN ORGANIZED HEALTH CARE EDUCATION/TRAINING PROGRAM

## 2021-07-19 PROCEDURE — 93005 ELECTROCARDIOGRAM TRACING: CPT | Performed by: PHYSICIAN ASSISTANT

## 2021-07-19 RX ORDER — LORAZEPAM 2 MG/ML
1 INJECTION INTRAMUSCULAR ONCE
Status: COMPLETED | OUTPATIENT
Start: 2021-07-19 | End: 2021-07-19

## 2021-07-19 RX ORDER — VENLAFAXINE HYDROCHLORIDE 150 MG/1
150 CAPSULE, EXTENDED RELEASE ORAL DAILY
COMMUNITY
Start: 2021-07-18 | End: 2021-07-21 | Stop reason: HOSPADM

## 2021-07-19 RX ORDER — ATROPINE SULFATE 0.1 MG/ML
0.5 INJECTION INTRAVENOUS EVERY 5 MIN PRN
Status: DISCONTINUED | OUTPATIENT
Start: 2021-07-19 | End: 2021-07-21 | Stop reason: HOSPADM

## 2021-07-19 RX ORDER — NITROGLYCERIN 0.4 MG/1
0.4 TABLET SUBLINGUAL EVERY 5 MIN PRN
Status: DISCONTINUED | OUTPATIENT
Start: 2021-07-19 | End: 2021-07-21 | Stop reason: HOSPADM

## 2021-07-19 RX ORDER — IBUPROFEN 200 MG
16-32 TABLET ORAL AS NEEDED
Status: DISCONTINUED | OUTPATIENT
Start: 2021-07-19 | End: 2021-07-21 | Stop reason: HOSPADM

## 2021-07-19 RX ORDER — LORAZEPAM 2 MG/ML
0.5 INJECTION INTRAMUSCULAR ONCE
Status: COMPLETED | OUTPATIENT
Start: 2021-07-19 | End: 2021-07-19

## 2021-07-19 RX ORDER — HYDROXYZINE HYDROCHLORIDE 50 MG/1
50 TABLET, FILM COATED ORAL DAILY
COMMUNITY
Start: 2021-07-18 | End: 2021-07-21 | Stop reason: HOSPADM

## 2021-07-19 RX ORDER — DEXTROSE 50 % IN WATER (D50W) INTRAVENOUS SYRINGE
25 AS NEEDED
Status: DISCONTINUED | OUTPATIENT
Start: 2021-07-19 | End: 2021-07-21 | Stop reason: HOSPADM

## 2021-07-19 RX ORDER — IBUPROFEN 200 MG
1 TABLET ORAL
COMMUNITY
End: 2021-07-21 | Stop reason: HOSPADM

## 2021-07-19 RX ORDER — MIRTAZAPINE 7.5 MG/1
7.5 TABLET, FILM COATED ORAL DAILY
COMMUNITY
Start: 2021-07-18 | End: 2021-07-21 | Stop reason: HOSPADM

## 2021-07-19 RX ORDER — SODIUM CHLORIDE 9 MG/ML
INJECTION, SOLUTION INTRAVENOUS CONTINUOUS
Status: DISCONTINUED | OUTPATIENT
Start: 2021-07-19 | End: 2021-07-20

## 2021-07-19 RX ORDER — DEXTROSE 40 %
15-30 GEL (GRAM) ORAL AS NEEDED
Status: DISCONTINUED | OUTPATIENT
Start: 2021-07-19 | End: 2021-07-21 | Stop reason: HOSPADM

## 2021-07-19 RX ORDER — ACETAMINOPHEN 325 MG/1
650 TABLET ORAL EVERY 4 HOURS PRN
Status: DISCONTINUED | OUTPATIENT
Start: 2021-07-19 | End: 2021-07-21 | Stop reason: HOSPADM

## 2021-07-19 RX ADMIN — LORAZEPAM 0.5 MG: 2 INJECTION INTRAMUSCULAR; INTRAVENOUS at 17:57

## 2021-07-19 RX ADMIN — SODIUM CHLORIDE: 9 INJECTION, SOLUTION INTRAVENOUS at 20:15

## 2021-07-19 RX ADMIN — LORAZEPAM 1 MG: 2 INJECTION INTRAMUSCULAR; INTRAVENOUS at 19:17

## 2021-07-19 RX ADMIN — SODIUM CHLORIDE 1000 ML: 9 INJECTION, SOLUTION INTRAVENOUS at 18:26

## 2021-07-19 ASSESSMENT — ENCOUNTER SYMPTOMS
HEADACHES: 0
ABDOMINAL PAIN: 0
FEVER: 0
LOSS OF CONSCIOUSNESS: 0
SHORTNESS OF BREATH: 0
DIARRHEA: 0
COUGH: 0
VOMITING: 0
NAUSEA: 0

## 2021-07-19 NOTE — ED PROVIDER NOTES
Emergency Medicine Note  HPI   HISTORY OF PRESENT ILLNESS     20yo female, history of eating disorder, presenting to the ER for polysubstance overdose and suicide attempt.  Patient reports that at 9 AM she took approximately 7 of her 150 mg Effexor, 7 of her 7.5 mg Remeron, greater than 7 of her 50 mg hydroxyzine, 8-9 3mg melatonin pills, and 6 NyQuil pills. Patient brought in by father.  She reports no complaints.  She denies any dizziness, chest pain, shortness of breath, abdominal pain, nausea, vomiting, diarrhea.      History provided by:  Patient   used: No    Drug Overdose  Location:  Effexor, Remeron, hydroxyzine, melatonin, NyQuil  Severity:  Moderate  Onset quality:  Sudden  Timing:  Constant  Associated symptoms: no abdominal pain, no chest pain, no cough, no diarrhea, no fever, no headaches, no loss of consciousness, no nausea, no shortness of breath and no vomiting          Patient History   PAST HISTORY     Reviewed from Nursing Triage: Tobacco  Allergies  Meds  Problems  Med Hx  Surg Hx  Fam Hx  Soc Hx        Past Medical History:   Diagnosis Date   • Anxiety    • Depression    • Eating disorder    • Eating disorder    • Eating disorder    • H/O borderline personality disorder    • History of ITP    • Suicidal overdose (CMS/[facility])        History reviewed. No pertinent surgical history.    History reviewed. No pertinent family history.    Social History     Tobacco Use   • Smoking status: Current Some Day Smoker     Types: Electronic Cigarette   • Smokeless tobacco: Never Used   Substance Use Topics   • Alcohol use: Yes   • Drug use: Not Currently         Review of Systems   REVIEW OF SYSTEMS     Review of Systems   Constitutional: Negative for fever.   Respiratory: Negative for cough and shortness of breath.    Cardiovascular: Negative for chest pain.   Gastrointestinal: Negative for abdominal pain, diarrhea, nausea and vomiting.   Neurological: Negative for loss of  consciousness and headaches.         VITALS     ED Vitals    Date/Time Temp Pulse Resp BP SpO2 [facility]   07/19/21 2345 -- 134 17 -- 100 % [facility]   07/19/21 2315 -- 140 21 137/87 99 % [facility]   07/19/21 2201 -- 138 19 -- 100 % [facility]   07/19/21 2115 -- 140 22 -- 99 % [facility]   07/19/21 1959 -- 140 16 -- 100 % [facility]   07/19/21 1837 -- -- -- 149/93 -- [facility]   07/19/21 1826 36.9 °C (98.4 °F) 138 18 -- 99 % [facility]   07/19/21 1714 37.6 °C (99.6 °F) 144 16 165/82 98 % [facility]        Pulse Ox %: 98 % (07/19/21 1714)  Pulse Ox Interpretation: Normal (07/19/21 1714)  Heart Rate: 144 (07/19/21 1714)  Rhythm Strip Interpretation: Sinus Tachycardia (07/19/21 1714)     Physical Exam   PHYSICAL EXAM     Physical Exam  Vitals and nursing note reviewed.   Constitutional:       Appearance: Normal appearance.   HENT:      Head: Normocephalic and atraumatic.   Cardiovascular:      Rate and Rhythm: Regular rhythm. Tachycardia present.   Pulmonary:      Effort: Pulmonary effort is normal.      Breath sounds: Normal breath sounds.   Abdominal:      Palpations: Abdomen is soft.      Tenderness: There is no abdominal tenderness.   Musculoskeletal:         General: Normal range of motion.   Skin:     General: Skin is warm and dry.   Neurological:      Mental Status: She is alert and oriented to person, place, and time.   Psychiatric:         Mood and Affect: Affect is flat.         Thought Content: Thought content includes suicidal ideation. Thought content includes suicidal plan.           PROCEDURES     Procedures     DATA     Results     Procedure Component Value Units Date/Time    [place] Draw Panel [[id number]] Collected: 07/19/21 1747    Specimen: Blood, Venous Updated: 07/20/21 0200    Narrative:      The following orders were created for panel order [place] Draw Panel.  Procedure                               Abnormality         Status                     ---------                               -----------         ------                     RAINBOW RED[[id number]]                                       Final result               RAINBOW LT GREEN[[id number]]                                 Final result                 Please view results for these tests on the individual orders.    RAINBOW RED [[id number]] Collected: 07/19/21 1747    Specimen: Blood, Venous Updated: 07/20/21 0200    RAINBOW LT GREEN [[id number]] Collected: 07/19/21 1747    Specimen: Blood, Venous Updated: 07/20/21 0200    SARS-CoV-2 (COVID-19), PCR Nasopharynx [[id number]]  (Normal) Collected: 07/19/21 1914    Specimen: Nasopharyngeal Swab from Nasopharynx Updated: 07/19/21 2009    Narrative:      The following orders were created for panel order SARS-CoV-2 (COVID-19), PCR Nasopharynx.  Procedure                               Abnormality         Status                     ---------                               -----------         ------                     SARS-CoV-2 (COVID-19), P...[[id number]]  Normal              Final result                 Please view results for these tests on the individual orders.    SARS-CoV-2 (COVID-19), PCR Nasopharynx [[id number]]  (Normal) Collected: 07/19/21 1914    Specimen: Nasopharyngeal Swab from Nasopharynx Updated: 07/19/21 2009     SARS-CoV-2 (COVID-19) Negative     Comment: EUA/IVD       Narrative:      Nursing instructions: Obtain nasopharyngeal swab ONLY. Send swab in viral transport media.    Urine drug screen (UDS) [[id number]]  (Normal) Collected: 07/19/21 1913    Specimen: Urine, Clean Catch Updated: 07/19/21 1942     PCP Scrn, Ur Not Detected     Comment: Assay Detects: phencyclidine in urine. Lowest detectable concentration is 25 ng/mL of phencyclidine.        Benzodiazepine Ur Qual Not Detected     Comment: Assay Detects: benzodiazepines and metabolites at varying concentrations. Lowest detectable concentration is 200 ng/mL of oxazepam.        Cocaine Screen, Urine Not Detected     Comment: Assay Detects: benzoylecgonine and cocaine in urine. Lowest detectable  concentration is 300 ng/mL of benzoylecgonine.        Amphetamine+Methamphetamine Screen, Ur Not Detected     Comment: Assay Detects: d-methamphetamine, d-amphetamine, methlyenedioxyamphetamine (MDA), and methlyenendioxymethamphetamine (MDMA) in urine. Lowest detectable concentration is 1000 ng/mL of d-methamphetamine.  Assay is less sensitive to MDA and MDMA (lowest detectable concentration, 2500 ng/mL) and could produce a false negative result. If MDMA overdose is suspected and the result is negative, a more specific test should be requested.        Cannabinoid Screen, Urine Not Detected     Comment: Assay Detects: cannabinoid metabolites in urine. Lowest detectable concentration is 50 ng/mL        Opiate Scrn, Ur Not Detected     Comment: Assay Detects: codeine, dihydrocodeine, hydrocodone, hydromorphone, levorphanol, morphine, morphine-3-glucuronide, norcodeine, oxycodone in urine. Lowest detectable concentration is 300 ng/mL of morphine.        Barbiturate Screen, Ur Not Detected     Comment: Assay Detects: alphenal, amobarbital, aprobarbital, barbital, butabarbital, butalbital, butethal, diallybarbital, pentobarbital, secobarbital,talbutal, and thiopental. Lowest detectable concentration is 200 ng/mL of secobarbital.       ER toxicology screen, serum [[id number]]  (Abnormal) Collected: 07/19/21 1746    Specimen: Blood, Venous Updated: 07/19/21 1838     Salicylate <4.0 mg/dL      Acetaminophen <10.0 ug/mL      Ethanol <5 mg/dL     Hepatic function panel [[id number]]  (Abnormal) Collected: 07/19/21 1746    Specimen: Blood, Venous Updated: 07/19/21 1831     Albumin 4.4 g/dL      Bilirubin, Total 0.5 mg/dL      Bilirubin, Direct 0.1 mg/dL      Alkaline Phosphatase 63 IU/L      AST (SGOT) 45 IU/L      ALT (SGPT) 72 IU/L      Total Protein 7.2 g/dL     Basic metabolic panel [[id number]]  (Abnormal) Collected: 07/19/21 1746    Specimen: Blood, Venous Updated: 07/19/21 1829     Sodium 139 mEQ/L      Potassium 3.6 mEQ/L       Comment: Results obtained on plasma. Plasma Potassium values may be up to 0.4 mEQ/L less than serum values. The differences may be greater for patients with high platelet or white cell counts.        Chloride 106 mEQ/L      CO2 20 mEQ/L      BUN 11 mg/dL      Creatinine 0.8 mg/dL      Glucose 92 mg/dL      Calcium 9.3 mg/dL      eGFR >60.0 mL/min/1.73m*2      Anion Gap 13 mEQ/L     BhCG, Serum, Qual [[id number]]  (Normal) Collected: 07/19/21 1746    Specimen: Blood, Venous Updated: 07/19/21 1820     Preg Test, Serum Negative    CBC and differential [[id number]]  (Abnormal) Collected: 07/19/21 1746    Specimen: Blood, Venous Updated: 07/19/21 1753     WBC 9.67 K/uL      RBC 4.63 M/uL      Hemoglobin 13.5 g/dL      Hematocrit 41.4 %      MCV 89.4 fL      MCH 29.2 pg      MCHC 32.6 g/dL      RDW 12.0 %      Platelets 278 K/uL      MPV 9.7 fL      Differential Type Auto     nRBC 0.0 %      Immature Granulocytes 0.3 %      Neutrophils 54.2 %      Lymphocytes 33.7 %      Monocytes 8.9 %      Eosinophils 2.2 %      Basophils 0.7 %      Immature Granulocytes, Absolute 0.03 K/uL      Neutrophils, Absolute 5.24 K/uL      Lymphocytes, Absolute 3.26 K/uL      Monocytes, Absolute 0.86 K/uL      Eosinophils, Absolute 0.21 K/uL      Basophils, Absolute 0.07 K/uL           Imaging Results          X-RAY CHEST 1 VIEW (Final result)  Result time 07/19/21 20:56:38    Final result                 Impression:    IMPRESSION:  No evidence of acute disease in the chest.    COMMENT:    Comparison: None.    Erect portable AP view of the chest was performed.  There is no evidence of  pneumothorax or pleural effusion.  Lungs appear clear.  Normal appearance of the  cardiomediastinal contours.  No upper abdominal or bone findings of concern.                     Narrative:    CLINICAL HISTORY: tachycardia                    ED Interpretation    NAD                              ECG 12 lead   ED Interpretation   EKG 17:50 - st 142 bpm, nl axis,  good rwp, no st/t wave changes, qt/qtc 284/436 ms.                   Scoring tools                                 ED Course & MDM   MDM / ED COURSE and CLINICAL IMPRESSIONS     MDM    ED Course as of Jul 20 0740 Mon Jul 19, 2021 1738 Discussed with SABAS. Agree with plan.     [NS]   1750 EKG 17:50 - st 142 bpm, nl axis, good rwp, no st/t wave changes, qt/qtc 284/436 ms.     [NS]   1823 Pt seen and evaluated along with 1:1  and father at bedside with pts consent. Pt with depressed mood, admits to suicidal thoughts with attempt via polysubstance overdose at 9AM this morning. Denies self inflicted trauma and exam without traumatic findings. Pt seeking inpatient psychiatric care. Reviewed HPI and conversation with poison control; poison control recommending prolonged observation. Discussed with give IVF and BZD to see if improve vital signs. If remains persist concern for anticholinergic reaction or other toxidrome. Q/C addressed with pt/family. Agree with plan.      [NS]   1840 Pt remains tachycardic. LFT mildly elevated. Will perform bladder scan to assess for urine retention. Will discuss with [facility] for admission to step down for close monitoring and continued management.     [NS]   1855 After initial discussion with poison control, recommended observation for 6-8 hours, give benzos as needed. Pt appears well. Pt remains persistently tachycardic in 130s-140s. Temp normal. Pt without additional complaints. Will d/w poison control    [KL]   1859 D/w Kimberly from poison control who says it is unusual that pt would just have only tachycardia as anticholinergic effect. Pt re-evaluated. Pupils normal, no clonus on exam. Pt alert, flat affect but answering questions appropriately. EKG with normal intervals. Poison control recommended additional dose of Ativan, reassess.     [KL]   1937 Pt remains tachycardic in 140s without improvement, will admit to [facility] given polysubstance overdose and persistent tachycardia.     [KL]    1948 [facility] paged for admission.     [KL]   2131 Critical care - 30 minutes.     [NS]      ED Course User Index  [KL] Juan Daniel Clarke PA C  [NS] Price Villalobos,          Clinical Impressions as of Jul 20 0740   Polysubstance overdose, intentional self-harm, initial encounter (CMS/HCC)   Tachycardia   Elevated LFTs   Suicide attempt (CMS/HCC)   Depression, unspecified depression type            Juan Daniel Clarke PA C  07/19/21 2033       Juan Daniel Clarke PA C  07/19/21 2048       Juan Daniel Clarke PA C  07/20/21 0740

## 2021-07-19 NOTE — ED ATTESTATION NOTE
I saw and evaluated the patient, participated in the management, and agree with the findings in the above note. We discussed the case and the treatment plan.  Exam:  Patient Vitals for the past 72 hrs:   BP Temp Temp src Pulse Resp SpO2   07/19/21 1837 (!) 149/93 -- -- -- -- --   07/19/21 1826 -- 36.9 °C (98.4 °F) Tympanic (!) 138 18 99 %   07/19/21 1714 (!) 165/82 37.6 °C (99.6 °F) Tympanic (!) 144 16 98 %   vs reviewed, nad, nontoxic, head at/nc, normal speech, no resp distress, normal skin tone, lungs ctab, cardiac tachycardic without m/r/g, extremity exam unremarkable, coordinated movement, flat affect, depressed mood, admits to suicidal ideation.        Price Villalobos, DO  07/19/21 8051

## 2021-07-20 ENCOUNTER — APPOINTMENT (OUTPATIENT)
Dept: RADIOLOGY | Facility: HOSPITAL | Age: 21
Setting detail: OBSERVATION
End: 2021-07-20
Attending: HOSPITALIST
Payer: COMMERCIAL

## 2021-07-20 PROBLEM — H57.02 UNEQUAL PUPILS: Status: ACTIVE | Noted: 2021-07-20

## 2021-07-20 PROBLEM — F39 UNSPECIFIED MOOD (AFFECTIVE) DISORDER (CMS/HCC): Status: ACTIVE | Noted: 2021-07-20

## 2021-07-20 LAB
ALBUMIN SERPL-MCNC: 3.6 G/DL (ref 3.4–5)
ALP SERPL-CCNC: 50 IU/L (ref 35–126)
ALT SERPL-CCNC: 64 IU/L (ref 11–54)
ANION GAP SERPL CALC-SCNC: 9 MEQ/L (ref 3–15)
AST SERPL-CCNC: 38 IU/L (ref 15–41)
ATRIAL RATE: 142
BILIRUB SERPL-MCNC: 0.6 MG/DL (ref 0.3–1.2)
BUN SERPL-MCNC: 5 MG/DL (ref 8–20)
CALCIUM SERPL-MCNC: 8.6 MG/DL (ref 8.9–10.3)
CHLORIDE SERPL-SCNC: 107 MEQ/L (ref 98–109)
CO2 SERPL-SCNC: 22 MEQ/L (ref 22–32)
CREAT SERPL-MCNC: 0.5 MG/DL (ref 0.6–1.1)
FOLATE SERPL-MCNC: 15.2 NG/ML
GFR SERPL CREATININE-BSD FRML MDRD: >60 ML/MIN/1.73M*2
GLUCOSE SERPL-MCNC: 89 MG/DL (ref 70–99)
P AXIS: 68
POTASSIUM SERPL-SCNC: 4.1 MEQ/L (ref 3.6–5.1)
PR INTERVAL: 128
PROT SERPL-MCNC: 5.9 G/DL (ref 6–8.2)
QRS DURATION: 70
QT INTERVAL: 284
QTC CALCULATION(BAZETT): 436
R AXIS: 75
SODIUM SERPL-SCNC: 138 MEQ/L (ref 136–144)
T WAVE AXIS: 30
TSH SERPL DL<=0.05 MIU/L-ACNC: 4.4 MIU/L (ref 0.34–5.6)
VENTRICULAR RATE: 142
VIT B12 SERPL-MCNC: 564 PG/ML (ref 180–914)

## 2021-07-20 PROCEDURE — G0378 HOSPITAL OBSERVATION PER HR: HCPCS

## 2021-07-20 PROCEDURE — 25800000 HC PHARMACY IV SOLUTIONS: Performed by: HOSPITALIST

## 2021-07-20 PROCEDURE — 82746 ASSAY OF FOLIC ACID SERUM: CPT | Performed by: HOSPITALIST

## 2021-07-20 PROCEDURE — 3E0337Z INTRODUCTION OF ELECTROLYTIC AND WATER BALANCE SUBSTANCE INTO PERIPHERAL VEIN, PERCUTANEOUS APPROACH: ICD-10-PCS | Performed by: HOSPITALIST

## 2021-07-20 PROCEDURE — 80053 COMPREHEN METABOLIC PANEL: CPT | Performed by: HOSPITALIST

## 2021-07-20 PROCEDURE — 63700000 HC SELF-ADMINISTRABLE DRUG: Performed by: HOSPITALIST

## 2021-07-20 PROCEDURE — 99226 PR SBSQ OBSERVATION CARE/DAY 35 MINUTES: CPT | Performed by: HOSPITALIST

## 2021-07-20 PROCEDURE — 84443 ASSAY THYROID STIM HORMONE: CPT | Performed by: HOSPITALIST

## 2021-07-20 PROCEDURE — 90792 PSYCH DIAG EVAL W/MED SRVCS: CPT | Performed by: NURSE PRACTITIONER

## 2021-07-20 PROCEDURE — 36415 COLL VENOUS BLD VENIPUNCTURE: CPT | Performed by: HOSPITALIST

## 2021-07-20 PROCEDURE — 70450 CT HEAD/BRAIN W/O DYE: CPT | Mod: ME

## 2021-07-20 PROCEDURE — 82607 VITAMIN B-12: CPT | Performed by: HOSPITALIST

## 2021-07-20 PROCEDURE — 96361 HYDRATE IV INFUSION ADD-ON: CPT

## 2021-07-20 RX ORDER — DOCUSATE SODIUM 100 MG/1
100 CAPSULE, LIQUID FILLED ORAL 2 TIMES DAILY PRN
Status: DISCONTINUED | OUTPATIENT
Start: 2021-07-20 | End: 2021-07-21 | Stop reason: HOSPADM

## 2021-07-20 RX ORDER — LORAZEPAM 1 MG/1
1 TABLET ORAL EVERY 8 HOURS PRN
Status: DISCONTINUED | OUTPATIENT
Start: 2021-07-20 | End: 2021-07-21 | Stop reason: HOSPADM

## 2021-07-20 RX ORDER — POLYETHYLENE GLYCOL 3350 17 G/17G
17 POWDER, FOR SOLUTION ORAL DAILY
Status: DISCONTINUED | OUTPATIENT
Start: 2021-07-21 | End: 2021-07-21 | Stop reason: HOSPADM

## 2021-07-20 RX ORDER — SODIUM CHLORIDE 9 MG/ML
INJECTION, SOLUTION INTRAVENOUS CONTINUOUS
Status: DISCONTINUED | OUTPATIENT
Start: 2021-07-20 | End: 2021-07-21

## 2021-07-20 RX ADMIN — SODIUM CHLORIDE: 9 INJECTION, SOLUTION INTRAVENOUS at 06:28

## 2021-07-20 RX ADMIN — DOCUSATE SODIUM 100 MG: 100 CAPSULE, LIQUID FILLED ORAL at 21:52

## 2021-07-20 RX ADMIN — SODIUM CHLORIDE: 9 INJECTION, SOLUTION INTRAVENOUS at 14:29

## 2021-07-20 ASSESSMENT — PATIENT HEALTH QUESTIONNAIRE - PHQ9
SUM OF ALL RESPONSES TO PHQ QUESTIONS 1-9: 23
SUM OF ALL RESPONSES TO PHQ9 QUESTIONS 1 & 2: 6

## 2021-07-20 NOTE — PATIENT CARE CONFERENCE
Care Progression Rounds Note  Date: 7/20/2021  Time: 11:19 AM     Patient Name: Gemma Darnell     Medical Record Number: [id number]   YOB: 2000  Sex: Female      Room/Bed: G115    Admitting Diagnosis: Suicide attempt (CMS/HCC) [T14.91XA]  Tachycardia [R00.0]  Elevated LFTs [R79.89]  Polysubstance overdose, intentional self-harm, initial encounter (CMS/[facility]) [T50.902A]  OD (overdose of drug), undetermined intent, initial encounter [T50.904A]  Depression, unspecified depression type [F32.9]   Admit Date/Time: 7/19/2021  5:15 PM    Primary Diagnosis: Intentional drug overdose (CMS/HCC)  Principal Problem: Intentional drug overdose (CMS/HCC)    GMLOS: pending  Anticipated Discharge Date: 7/21/2021    AM-PAC:  Mobility Score:      Discharge Planning:       Barriers to Discharge:  Barriers to Discharge: Facility availability  Comment: Intentional drug overdose.No further recomm by poison control. Seen by psych.. Hopefully a bed in behavioral health this afternoon    Participants:  nursing, social work/services, advanced practice provider, physical therapy
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
## 2021-07-20 NOTE — PLAN OF CARE
Plan of Care Review  Plan of Care Reviewed With: patient  Progress: improving  Outcome Summary: Pt calm and cooperative overnight. HR improving 90s-110s at rest, IV fluids maintained. No complaints of pain. OOB to restroom. Awaiting psych consult.

## 2021-07-20 NOTE — PLAN OF CARE
Plan of Care Review  Plan of Care Reviewed With: patient  Outcome Summary: Pt Calm and cooperative- HR still tach up to 130 while ambulating- IV fluids maintained. Refusing Ativan

## 2021-07-20 NOTE — CONSULTS
Behavioral Health Crisis Clinician Note    Reason for Consult Discharge Planning, Suicide Risk    Presenting Problem  Suicide attempt (CMS/[facility]) [T14.91XA]  Tachycardia [R00.0]  Elevated LFTs [R79.89]  Polysubstance overdose, intentional self-harm, initial encounter (CMS/[facility]) [T50.902A]  OD (overdose of drug), undetermined intent, initial encounter [T50.904A]  Depression, unspecified depression type [F32.9]     Interval History  Gemma Darnell is a 21 y.o. female who was admitted for intentional overdose.  Pt seen and assessed by Psychiatry who is recommending inpatient psychiatric hospitalization when medically cleared.  Per [facility], pt is not medically cleared today.    Spoke with pt at bedside, introduced self and role in assisting with transitioning patient to an inpatient psychiatric unit when medically cleared.  Pt reported that she would prefer to return to [facility] if that is an option.  Pt [facility] that [facility] disorder and mental health treatment.  She provided verbal consent for the treatment team to make outreach to the facility.  Pt denied any active eating disorder symptoms including restricting, binging, or purging.    Call placed to [facility] ([phone]), spoke with Manisha in Admissions and provided update. She requested that they be updated when appropriate.  She provided a contact number of [phone].    Review of Systems  Current Medications:  •  acetaminophen, 650 mg, oral, q4h PRN  •  atropine, 0.5 mg, intravenous, q5 min PRN  •  glucose, 16-32 g of dextrose, oral, PRN **OR** dextrose, 15-30 g of dextrose, oral, PRN **OR** glucagon, 1 mg, intramuscular, PRN **OR** dextrose in water, 25 mL, intravenous, PRN  •  LORazepam, 1 mg, oral, q8h PRN  •  nitroglycerin, 0.4 mg, sublingual, q5 min PRN  •  sodium chloride 0.9 %, , intravenous, Continuous    Home Medications:  •  nicotine, Place 1 patch on the skin daily. Does not  have a patch on today  •  hydrOXYzine, Take 50 mg by mouth daily.    •  melatonin, Take 3 mg by mouth nightly.    •  mirtazapine, Take 7.5 mg by mouth daily.    •  venlafaxine XR, Take 150 mg by mouth daily.      Objective   Vital Signs for the last 24 hours:  Temp:  [36.8 °C (98.2 °F)-37.6 °C (99.6 °F)] 36.9 °C (98.5 °F)  Heart Rate:  [] 105  Resp:  [16-22] 16  BP: (132-165)/() 136/109    Assessment/Plan  21 y.o. female being consulted for discharge planning, suicide risk.  Psychiatry is recommending inpatient psychiatric hospitalization when medically cleared.  Pt is currently agreeable to this plan, would prefer [facility] Unit if possible.  -CC will continue to follow to facilitate discharge.

## 2021-07-20 NOTE — PLAN OF CARE
"  Problem: Adult Inpatient Plan of Care  Goal: Readiness for Transition of Care  Intervention: Mutually Develop Transition Plan  Flowsheets  Taken 7/20/2021 1825 by Luis Pires MSW  Anticipated Discharge Disposition: psychiatric facility  Equipment Needed After Discharge: none  Discharge Coordination/Progress: progressing  Outpatient/Agency/Support Group Needs: psychiatric facility  Transportation Concerns: car, none  Current Discharge Risk: psychiatric illness  Readmission Within the Last 30 Days: no previous admission in last 30 days  Patient/Family Anticipated Services at Transition: mental health services  Patient/Family Anticipates Transition to: (psychiatric placement) other (see comments)  Concerns to be Addressed: mental health  Offered/Gave Vendor List: no  Type of Outpatient Services: psychiatric care  Taken 7/20/2021 0000 by Lupe Yoo RN  Assistive Device/Animal Currently Used at Home: none     MSW met with Pt at bedside.  Also present was 1:1.  Pt lives in a 2 story house with her mother and brother.  Pt lives on the 3rd floor and has access to a full bath with a tub shower.  Pt drives and does not use any DME.  She last saw her PCP in May and uses the [facility] in [place].  Pt is not working with any specialists of any MH professionals (\"just stopped\" seeing her psychiatrist and therapist in May).  Pt's supports include her family.  Pt anticipates going to [facility]'s inpatient psych unit once medically cleared.  "

## 2021-07-20 NOTE — H&P
Hospital Medicine Service -  History & Physical        CHIEF COMPLAINT     Medication Overdose     HISTORY OF PRESENT ILLNESS        Gemma Darnell is a 21 y.o. female patient with past medical history of anxiety depression presents with complaints of intentional overdose of multiple medications.  Patient reports that at 9 AM she took 7 pills of Effexor 150 mg 7 of 7.5 mg Remeron, greater than 7 off 50 mg hydroxyzine, melatonin 3 mg 89 pills and 6 NyQuil pills.  Patient reports feeling depressed and had suicidal thoughts.  Currently she feels anxious, palpitations her heart rate in the ER was noted to be elevated and 100s.  She complained of occasional dizziness but no syncope no chest pain or shortness of breath no abdominal pain nausea vomiting or diarrhea no fever or chills cold cough sore throat.  No urinary symptoms no new focal weakness or numbness, no headache or visual changes.    PAST MEDICAL AND SURGICAL HISTORY        Past Medical History:   Diagnosis Date   • Anxiety    • Depression    • Eating disorder    • H/O borderline personality disorder    • History of ITP    • Suicidal overdose (CMS/[facility])        History reviewed. No pertinent surgical history.    MEDICATIONS        Prior to Admission medications    Medication Sig Start Date End Date Taking? Authorizing Provider   hydrOXYzine (ATARAX) 50 mg tablet  7/18/21   Fer Renteria MD   melatonin 3 mg tablet Take 3 mg by mouth. 11/30/20   Fer Renteria MD   mirtazapine (REMERON) 7.5 mg tablet  7/18/21   Fer Renteria MD   nicotine (NICODERM CQ) 14 mg/24 hr Place 1 patch on the skin daily. 5/31/21 6/30/21  Lidia Brewer MD   venlafaxine XR (EFFEXOR-XR) 150 mg 24 hr capsule  7/18/21   Fer Renteria MD   FLUoxetine (PROzac) 20 mg capsule Take 1 capsule (20 mg total) by mouth daily. 5/31/21 7/19/21  Lidia Brewer MD   CK 24 FE 1 mg-20 mcg (24)/75 mg (4) per tablet  4/21/21 7/19/21  Fer Renteria MD          ALLERGIES        Patient has no known allergies.    FAMILY HISTORY        History reviewed. No pertinent family history.    SOCIAL HISTORY        Social History     Socioeconomic History   • Marital status: Single     Spouse name: None   • Number of children: 0   • Years of education: 12   • Highest education level: High school graduate   Occupational History   • Occupation:    Tobacco Use   • Smoking status: Current Some Day Smoker     Types: Electronic Cigarette   • Smokeless tobacco: Never Used   Substance and Sexual Activity   • Alcohol use: Yes   • Drug use: Not Currently   • Sexual activity: None   Other Topics Concern   • None   Social History Narrative   • None     Social Determinants of Health     Financial Resource Strain:    • Difficulty of Paying Living Expenses:    Food Insecurity:    • Worried About Running Out of Food in the Last Year:    • Ran Out of Food in the Last Year:    Transportation Needs:    • Lack of Transportation (Medical):    • Lack of Transportation (Non-Medical):    Physical Activity:    • Days of Exercise per Week:    • Minutes of Exercise per Session:    Stress: Stress Concern Present   • Feeling of Stress : To some extent   Social Connections:    • Frequency of Communication with Friends and Family:    • Frequency of Social Gatherings with Friends and Family:    • Attends Taoism Services:    • Active Member of Clubs or Organizations:    • Attends Club or Organization Meetings:    • Marital Status:    Intimate Partner Violence:    • Fear of Current or Ex-Partner:    • Emotionally Abused:    • Physically Abused:    • Sexually Abused:        REVIEW OF SYSTEMS      All other systems reviewed and negative except as noted in HPI    PHYSICAL EXAMINATION        Visit Vitals  BP (!) 149/93   Pulse (!) 140   Temp 36.9 °C (98.4 °F) (Tympanic)   Resp (!) 22   SpO2 99%     There is no height or weight on file to calculate BMI.  Physical Exam  Vitals reviewed.   Constitutional:        Appearance: Normal appearance.   HENT:      Head: Normocephalic and atraumatic.      Mouth/Throat:      Mouth: Mucous membranes are moist.   Eyes:      Extraocular Movements: Extraocular movements intact.      Conjunctiva/sclera: Conjunctivae normal.   Cardiovascular:      Rate and Rhythm: Regular rhythm. Tachycardia present.      Heart sounds: Normal heart sounds.   Pulmonary:      Effort: Pulmonary effort is normal.      Breath sounds: Normal breath sounds.   Abdominal:      General: Bowel sounds are normal.      Palpations: Abdomen is soft.      Tenderness: There is no abdominal tenderness.   Musculoskeletal:      Cervical back: Neck supple.      Right lower leg: No edema.      Left lower leg: No edema.   Skin:     General: Skin is warm and dry.   Neurological:      General: No focal deficit present.      Mental Status: She is alert and oriented to person, place, and time.   Psychiatric:         Mood and Affect: Mood normal.           LABS / IMAGING / EKG        Labs  I have reviewed the patient's pertinent labs. Pertinent labs are within normal limits.  Results from last 7 days   Lab Units 07/19/21  1746   WBC K/uL 9.67   HEMOGLOBIN g/dL 13.5   HEMATOCRIT % 41.4   PLATELETS K/uL 278     Results from last 7 days   Lab Units 07/19/21  1746   SODIUM mEQ/L 139   POTASSIUM mEQ/L 3.6   CHLORIDE mEQ/L 106   CO2 mEQ/L 20*   BUN mg/dL 11   CREATININE mg/dL 0.8   GLUCOSE mg/dL 92   CALCIUM mg/dL 9.3     Imaging  I have independently reviewed the pertinent imaging from the last 24 hrs.  X-RAY CHEST 1 VIEW  Narrative: CLINICAL HISTORY: tachycardia  Impression: IMPRESSION:  No evidence of acute disease in the chest.    COMMENT:    Comparison: None.    Erect portable AP view of the chest was performed.  There is no evidence of  pneumothorax or pleural effusion.  Lungs appear clear.  Normal appearance of the  cardiomediastinal contours.  No upper abdominal or bone findings of concern.    ECG/Telemetry  I have  independently reviewed the ECG. No significant findings.   Tele- Sinus tachycardia    ASSESSMENT AND PLAN           * Intentional drug overdose (CMS/HCC)  Assessment & Plan  Suicidal  overdose on multiple meds  Case d/w ED who spoke with Poison control  Will monitor on tele  Supportive care  IV fluids  Ativan prn  Continue 1:1 obs  Psych  Consult   Sw consult for DC planning     Major depressive disorder with current active episode  Assessment & Plan  Consult Psychiatry   Hold effexor for now    Sinus tachycardia  Assessment & Plan  Likely from drug OD  Monitor on Tele  Hydrate  Check TSH    Anxiety  Assessment & Plan  Ativan PRN        VTE Assessment: Pad[place] VTE Score: 0  Code Status: Full Code  Estimated discharge date: 7/21/2021     Sunshine Rosa MD  7/19/2021  9:44 PM

## 2021-07-20 NOTE — ASSESSMENT & PLAN NOTE
- 2/2 drug overdose   - no events on tele besides sinus tach   - tsh negative   - has been resolving

## 2021-07-20 NOTE — ASSESSMENT & PLAN NOTE
Patient presenting following intentional overdose with psychiatric medications.  Per patient she planned overdose, wrote suicide note.  She endorses ongoing SI, hopelessness.  She is agreeable to acute inpatient psychiatric hospitalization once medically stable.  1) Disposition: acute inpatient psychiatric hospitalization once medically stable. Can offer 201. Please call our service if patient declines to sign 201.  Patient meets criteria for 302 for danger to self with act of furtherance.  The above was discussed with patient. Patient verbalized understanding.   2) Patient cannot leave AMA  3) Continue 1:1 until time of transfer

## 2021-07-20 NOTE — ASSESSMENT & PLAN NOTE
Patient with history of mood disorder, eating disorder, cluster B traits presenting following intentional overdose with psychiatric medications.  She was agreeable to speak with psychiatry, no clear evidence of delirium or distress.  She has been voiding without difficulty.  She endorses low mood, hopelessness, persistent negative thoughts about herself, loss of contact with her boyfriend, feelings of emptiness.  HR >110 on my exam.  1) Recommend hold psychiatric medications for now following intentional overdose. This was discussed with patient  2) Can offer ativan 1mg PO q8hr PRN for moderate anxiety  3) Recommend ativan 1mg IV q8hr PRN for severe agitation that places patient and/or staff safety at risk  4) Disposition: acute inpatient psych

## 2021-07-20 NOTE — ASSESSMENT & PLAN NOTE
- Suicidal  overdose on multiple meds  - poison control was contacted in ED who recommended observation   - patient has residual sinus tachycardia that has improved overnight    - psych consult appreciated   - supportive care   - continue 1:1   - medically stable today

## 2021-07-20 NOTE — NURSING NOTE
Pt requesting to use unit phone to call mom. Approved by Dr. Rosa. Pt had brief conversation with mom over phone, RN in room.

## 2021-07-20 NOTE — PATIENT CARE CONFERENCE
Care Progression Rounds Note  Date: 7/20/2021  Time: 2:51 PM     Patient Name: Gemma Darnell     Medical Record Number: [id number]   YOB: 2000  Sex: Female      Room/Bed: G115    Admitting Diagnosis: Suicide attempt (CMS/HCC) [T14.91XA]  Tachycardia [R00.0]  Elevated LFTs [R79.89]  Polysubstance overdose, intentional self-harm, initial encounter (CMS/[facility]) [T50.902A]  OD (overdose of drug), undetermined intent, initial encounter [T50.904A]  Depression, unspecified depression type [F32.9]   Admit Date/Time: 7/19/2021  5:15 PM    Primary Diagnosis: Intentional drug overdose (CMS/HCC)  Principal Problem: Intentional drug overdose (CMS/HCC)    GMLOS: pending  Anticipated Discharge Date: 7/21/2021    AM-PAC:  Mobility Score:      Discharge Planning:       Barriers to Discharge:  Barriers to Discharge: Medical issues not resolved  Comment: CT scan- pupils were uneven.CT negative. HR elevated to 130 when OOB    Participants:  nursing

## 2021-07-20 NOTE — NURSING NOTE
Pt requesting to keep stuffed animal pillow in room, had in ED. Item checked by RN, and Dr. Rosa notified with response. Continuous sitter at bedside.

## 2021-07-20 NOTE — PROGRESS NOTES
Hospital Medicine Service -  Daily Progress Note       SUBJECTIVE   Interval History: Patient seen and examined this am while sitting in bed upright saturating well on room air   She appears comfortable and in no acute distress   She is AAOx3 during interview with attending this am   She admits to smoking her nicotene pen that she has been hiding in her room   She admits to feeling her heart beating quickly that is unusual for her   She denies every being told before that her right pupil is larger than her left and denies recent falls or trauma at home   No additional acute events overnight      OBJECTIVE      Vital signs in last 24 hours:  Temp:  [36.8 °C (98.2 °F)-37.6 °C (99.6 °F)] 36.9 °C (98.5 °F)  Heart Rate:  [] 105  Resp:  [16-22] 16  BP: (132-165)/() 136/109    Intake/Output Summary (Last 24 hours) at 7/20/2021 1221  Last data filed at 7/20/2021 0800  Gross per 24 hour   Intake --   Output 0 ml   Net 0 ml       PHYSICAL EXAMINATION      Physical Exam  Constitutional:       General: She is not in acute distress.     Appearance: Normal appearance. She is normal weight. She is not ill-appearing, toxic-appearing or diaphoretic.      Comments: Appears comfortable    HENT:      Head: Atraumatic.      Nose: Nose normal.      Mouth/Throat:      Mouth: Mucous membranes are moist.   Eyes:      Extraocular Movements: Extraocular movements intact.      Comments: Right pupil larger than left but reactive to light with slower reaction time   Left pupil normal sized and reactive to light    Cardiovascular:      Rate and Rhythm: Regular rhythm. Tachycardia present.      Pulses: Normal pulses.      Heart sounds: No murmur heard.   No friction rub. No gallop.    Pulmonary:      Effort: Pulmonary effort is normal. No respiratory distress.      Breath sounds: No stridor. No wheezing.   Genitourinary:     Comments: Did not examine  Musculoskeletal:      Comments: No bruising, erythema, or edema noted on upper or  lower extremities   Strength 5/5 in upper extremities bilaterally    Skin:     General: Skin is warm.      Coloration: Skin is not jaundiced or pale.   Neurological:      General: No focal deficit present.      Mental Status: She is alert and oriented to person, place, and time.      Comments: CN 2-12 grossly in tact   NO pronator drift   Cerebellar coordination in tact         LINES, CATHETERS, DRAINS, AIRWAYS, AND WOUNDS   Lines, Drains, Airways, Wounds:  Peripheral IV (Adult) 07/19/21 Left Antecubital (Active)   Number of days: 1       Comments:      LABS / IMAGING / TELE      Labs  Results from last 7 days   Lab Units 07/19/21  1746   WBC K/uL 9.67   HEMOGLOBIN g/dL 13.5   HEMATOCRIT % 41.4   PLATELETS K/uL 278     Results from last 7 days   Lab Units 07/20/21  0413   SODIUM mEQ/L 138   POTASSIUM mEQ/L 4.1   CHLORIDE mEQ/L 107   CO2 mEQ/L 22   BUN mg/dL 5*   CREATININE mg/dL 0.5*   CALCIUM mg/dL 8.6*   ALBUMIN g/dL 3.6   BILIRUBIN TOTAL mg/dL 0.6   ALK PHOS IU/L 50   ALT IU/L 64*   AST IU/L 38   GLUCOSE mg/dL 89     Imaging  CT scan of head pending today     ECG/Telemetry  I have independently reviewed the telemetry. Significant findings include sinus tachycardia .    ASSESSMENT AND PLAN      * Intentional drug overdose (CMS/[facility])  Assessment & Plan  - Suicidal  overdose on multiple meds  - poison control was contacted in ED who recommended observation   - patient has residual sinus tachycardia   - continue IV fluids   - psych consult appreciated   - will continue ativan as needed   - supportive care   - continue 1:1   - plan to go to inpatient psych when medically stable     Unequal pupils  Assessment & Plan  - right pupil more dilated than left on exam   - both pupils reactive to light, however, right more sluggish in comparison   - will obtain CT scan of head   - patient denies any recent falls or trauma.. although bruise on back?  - no neuro deficits or abnormalities on exam     Sinus tachycardia  Assessment  & Plan  - 2/2 drug overdose   - no events on tele besides sinus tach   - continue on tele   - tsh negative     Anxiety  Assessment & Plan  - Ativan PRN     Major depressive disorder with current active episode  Assessment & Plan  - psych recs appreciated   - Hold effexor for now       VTE Assessment: Padua VTE Score: 0  VTE Prophylaxis Plan: ambulate   Code Status: Full Code  Estimated Discharge Date: 7/21/2021  Disposition Planning:  Anticipated discharge when medically stable      TELMA Valladares  7/20/2021

## 2021-07-20 NOTE — PLAN OF CARE
Problem: Adult Inpatient Plan of Care  Goal: Plan of Care Review  Flowsheets (Taken 7/20/2021 4408)  Plan of Care Reviewed With: patient  Outcome Summary: Nutrition consult for assessment. Spoke with pt in room - tearful at time of visit. Pt reports stable wt and appetite pta. Reports she ate well for breakfast and lunch, >75% meals. Reports #. Pt had no nutrition related questions or concerns. Declined ONS.     Goals  1. Pt to meet >/=75% est needs via PO    Recommendations  1. Continue regular diet  2. Monitor po intake, labs  3. Nutrition will remain available prn

## 2021-07-21 ENCOUNTER — HOSPITAL ENCOUNTER (INPATIENT)
Facility: HOSPITAL | Age: 21
LOS: 19 days | Discharge: HOME | DRG: 885 | End: 2021-08-09
Attending: PSYCHIATRY & NEUROLOGY | Admitting: PSYCHIATRY & NEUROLOGY
Payer: COMMERCIAL

## 2021-07-21 VITALS
OXYGEN SATURATION: 99 % | DIASTOLIC BLOOD PRESSURE: 77 MMHG | BODY MASS INDEX: 22.09 KG/M2 | SYSTOLIC BLOOD PRESSURE: 111 MMHG | HEART RATE: 99 BPM | TEMPERATURE: 98.1 F | HEIGHT: 64 IN | WEIGHT: 129.37 LBS | RESPIRATION RATE: 18 BRPM

## 2021-07-21 DIAGNOSIS — F32.A DEPRESSIVE DISORDER: ICD-10-CM

## 2021-07-21 DIAGNOSIS — Z00.8 ENCOUNTER FOR MEDICAL ASSESSMENT: Primary | ICD-10-CM

## 2021-07-21 LAB
ALBUMIN SERPL-MCNC: 3.9 G/DL (ref 3.4–5)
ALP SERPL-CCNC: 55 IU/L (ref 35–126)
ALT SERPL-CCNC: 64 IU/L (ref 11–54)
ANION GAP SERPL CALC-SCNC: 7 MEQ/L (ref 3–15)
AST SERPL-CCNC: 33 IU/L (ref 15–41)
BILIRUB SERPL-MCNC: 0.8 MG/DL (ref 0.3–1.2)
BUN SERPL-MCNC: 7 MG/DL (ref 8–20)
CALCIUM SERPL-MCNC: 9.2 MG/DL (ref 8.9–10.3)
CHLORIDE SERPL-SCNC: 107 MEQ/L (ref 98–109)
CO2 SERPL-SCNC: 25 MEQ/L (ref 22–32)
CREAT SERPL-MCNC: 0.7 MG/DL (ref 0.6–1.1)
ERYTHROCYTE [DISTWIDTH] IN BLOOD BY AUTOMATED COUNT: 12 % (ref 11.7–14.4)
GFR SERPL CREATININE-BSD FRML MDRD: >60 ML/MIN/1.73M*2
GLUCOSE SERPL-MCNC: 98 MG/DL (ref 70–99)
HCT VFR BLDCO AUTO: 39.8 % (ref 35–45)
HGB BLD-MCNC: 13 G/DL (ref 11.8–15.7)
MAGNESIUM SERPL-MCNC: 2 MG/DL (ref 1.8–2.5)
MCH RBC QN AUTO: 29.4 PG (ref 28–33.2)
MCHC RBC AUTO-ENTMCNC: 32.7 G/DL (ref 32.2–35.5)
MCV RBC AUTO: 90 FL (ref 83–98)
PDW BLD AUTO: 9.8 FL (ref 9.4–12.3)
PLATELET # BLD AUTO: 273 K/UL (ref 150–369)
POTASSIUM SERPL-SCNC: 4.4 MEQ/L (ref 3.6–5.1)
PROT SERPL-MCNC: 6.4 G/DL (ref 6–8.2)
RBC # BLD AUTO: 4.42 M/UL (ref 3.93–5.22)
SODIUM SERPL-SCNC: 139 MEQ/L (ref 136–144)
WBC # BLD AUTO: 8.04 K/UL (ref 3.8–10.5)

## 2021-07-21 PROCEDURE — 25800000 HC PHARMACY IV SOLUTIONS: Performed by: HOSPITALIST

## 2021-07-21 PROCEDURE — 3E0337Z INTRODUCTION OF ELECTROLYTIC AND WATER BALANCE SUBSTANCE INTO PERIPHERAL VEIN, PERCUTANEOUS APPROACH: ICD-10-PCS | Performed by: HOSPITALIST

## 2021-07-21 PROCEDURE — 99217 PR OBSERVATION CARE DISCHARGE MANAGEMENT: CPT | Performed by: HOSPITALIST

## 2021-07-21 PROCEDURE — 99213 OFFICE O/P EST LOW 20 MIN: CPT | Performed by: NURSE PRACTITIONER

## 2021-07-21 PROCEDURE — 85027 COMPLETE CBC AUTOMATED: CPT | Performed by: HOSPITALIST

## 2021-07-21 PROCEDURE — 80053 COMPREHEN METABOLIC PANEL: CPT | Performed by: HOSPITALIST

## 2021-07-21 PROCEDURE — 12400000 HC ROOM AND CARE SEMIPRIVATE PSYCH

## 2021-07-21 PROCEDURE — G0378 HOSPITAL OBSERVATION PER HR: HCPCS

## 2021-07-21 PROCEDURE — 96361 HYDRATE IV INFUSION ADD-ON: CPT

## 2021-07-21 PROCEDURE — 200200 PR NO CHARGE: Performed by: HOSPITALIST

## 2021-07-21 PROCEDURE — 83735 ASSAY OF MAGNESIUM: CPT | Performed by: HOSPITALIST

## 2021-07-21 PROCEDURE — 36415 COLL VENOUS BLD VENIPUNCTURE: CPT | Performed by: HOSPITALIST

## 2021-07-21 RX ORDER — ALUMINUM HYDROXIDE, MAGNESIUM HYDROXIDE, AND SIMETHICONE 1200; 120; 1200 MG/30ML; MG/30ML; MG/30ML
30 SUSPENSION ORAL EVERY 4 HOURS PRN
Status: DISCONTINUED | OUTPATIENT
Start: 2021-07-21 | End: 2021-08-09 | Stop reason: HOSPADM

## 2021-07-21 RX ORDER — NICOTINE 7MG/24HR
1 PATCH, TRANSDERMAL 24 HOURS TRANSDERMAL DAILY
Status: DISCONTINUED | OUTPATIENT
Start: 2021-07-22 | End: 2021-08-02

## 2021-07-21 RX ORDER — ACETAMINOPHEN 325 MG/1
650 TABLET ORAL EVERY 4 HOURS PRN
Status: DISCONTINUED | OUTPATIENT
Start: 2021-07-21 | End: 2021-08-02

## 2021-07-21 RX ORDER — HYDROXYZINE HYDROCHLORIDE 25 MG/1
50 TABLET, FILM COATED ORAL EVERY 6 HOURS PRN
Status: DISCONTINUED | OUTPATIENT
Start: 2021-07-21 | End: 2021-08-09 | Stop reason: HOSPADM

## 2021-07-21 RX ADMIN — SODIUM CHLORIDE: 9 INJECTION, SOLUTION INTRAVENOUS at 06:12

## 2021-07-21 ASSESSMENT — PATIENT HEALTH QUESTIONNAIRE - PHQ9
SUM OF ALL RESPONSES TO PHQ9 QUESTIONS 1 & 2: 6
SUM OF ALL RESPONSES TO PHQ QUESTIONS 1-9: 27

## 2021-07-21 NOTE — PROGRESS NOTES
"Psychiatry Progress Note    Chief Complaint/Reason for follow-up: Intentional overdose    Interval History: Patient received sitting upright in bed, awake, alert, oriented to place and time.  Mother at bedside.  Patient provided verbal permission for our team to speak with her Mother. Patient tearful today, states she feels \"not great.\"  She denies suicidal ideation however continues to endorse ambivalence about being alive.  She reports fair sleep.  Patient with questions about medications.  Mother endorsed concern patient has had several medication changes over the past two months.  Patient reports she was started on Effexor and remeron at [facility] but is not sure if these medications were helpful. She reports she was only taking Effexor and Remeron for past two weeks. Patient requested our team obtain medical records from [facility] facility.      Review of Systems:   no clear evidence of abnormal motor movement    Vital Signs for the last 24 hours:  Temp:  [36.4 °C (97.6 °F)-36.8 °C (98.3 °F)] 36.8 °C (98.3 °F)  Heart Rate:  [] 100  Resp:  [16-18] 18  BP: (103-121)/(65-77) 103/65    Labs:  Results from last 7 days   Lab Units 07/21/21  0416 07/20/21  0413 07/19/21  1746 07/19/21  1746   HEMOGLOBIN g/dL 13.0  --   --  13.5   WBC K/uL 8.04  --   --  9.67   PLATELETS K/uL 273  --   --  278   POTASSIUM mEQ/L 4.4 4.1   < > 3.6   SODIUM mEQ/L 139 138   < > 139   CREATININE mg/dL 0.7 0.5*   < > 0.8    < > = values in this interval not displayed.       Mental Status Evaluation:  Appears current age, dressed in hospital attire, fair eye contact.  Appears more withdrawn today as compared to yesterday.  AOx3.  Speech is clear, soft, decreased spontaneity.  TP coherent.  Patient endorses ambivalence about being alive.  No clear evidence of response to internal stimuli.  No clear delusions or paranoia.  I/J help accepting    Unspecified mood (affective) disorder (CMS/[facility])  Assessment & Plan  Patient with history of " mood disorder, eating disorder, cluster B traits presenting following intentional overdose with psychiatric medications.  She was agreeable to speak with psychiatry, no clear evidence of delirium or distress.  She has been voiding without difficulty.  She endorses low mood, hopelessness, persistent negative thoughts about herself, loss of contact with her boyfriend, feelings of emptiness.  HR >110 on my exam.  1) Recommend hold psychiatric medications for now following intentional overdose. This was discussed with patient  2) Can offer ativan 1mg PO q8hr PRN for moderate anxiety  3) Recommend ativan 1mg IV q8hr PRN for severe agitation that places patient and/or staff safety at risk  4) Disposition: acute inpatient psych    * Intentional drug overdose (CMS/[facility])  Assessment & Plan  Patient presenting following intentional overdose with psychiatric medications.  Per patient she planned overdose, wrote suicide note.  She endorses ongoing SI, hopelessness.  She is agreeable to acute inpatient psychiatric hospitalization once medically stable.  1) Disposition: acute inpatient psychiatric hospitalization once medically stable. Can offer 201. Please call our service if patient declines to sign 201.  Patient meets criteria for 302 for danger to self with act of furtherance.  The above was discussed with patient. Patient verbalized understanding.   2) Patient cannot leave AMA  3) Continue 1:1 until time of transfer    22 minutes were spent in direct face-to-face counseling/coordination of care. >50% of total minutes were spent reviewing medical record, in counseling and/or coordination of care

## 2021-07-21 NOTE — NURSING NOTE
"Admission Note:    Gemma Darnell is a 21 y.o.  female admitted on a 201 from Obs unit for recent drug overdose. Patient has  history of MDD, anxiety, mood disorder, eating disorder, cluster B traits, three suicide attempts presenting following intentional overdose with psychiatric medications. NKDA, no known medical issues. She has had 2 prior drug over doses/suicide attempts this past year. Pt stated she was recently discharged from [facility] for a drug over dose and on her way to [facility] for eating disorder when she checked into a hotel and took 7 pills of Effexor 150 mg 7 of 7.5 mg Remeron, greater than 7 off 50 mg hydroxyzine, melatonin 3 mg 89 pills and 6 NyQuil pills. Patient reports feeling depressed and had suicidal thoughts.  Recent stressor is break up with boyfriend and poor relationship with family. She reports poor eating, with Hx anorexia, binging and purging. Pt denies working or going to school currently; lives at home with Mom and brother in [place]. Pt stated Mom \"not sure if I can return there\". Denied Hx abuse. Pt reported formerly drinking 5-6 alcoholic drinks 3 x weekly over a month ago; denies illicit drug use (UDS negative). Skin check unremarkable. Pt currently reports anxiety and depression 10/10, tearful at times. Reports thoughts to self harm. Pt will remain on 1:1 for safety.   "

## 2021-07-21 NOTE — PROGRESS NOTES
MSW received a call from Rachana at [facility] ([phone]) requesting a status update.  MSW passed this information on to MERLE TOVAR.

## 2021-07-21 NOTE — NURSING NOTE
Report called to Eileen SOTO in [facility] Behav health unit. Behav health unit sending staff over to  pt and transport her via w/c over to their unit. 201 will be sent with pt.

## 2021-07-21 NOTE — BEHAVIORAL HEALTH CRISIS PROGRESS NOTE
Behavioral Health Crisis Clinician Note      Reason for Follow Up  Discharge Planning    Admitting Diagnoses  Suicide attempt (CMS/[facility]) [T14.91XA]  Tachycardia [R00.0]  Elevated LFTs [R79.89]  Polysubstance overdose, intentional self-harm, initial encounter (CMS/[facility]) [T50.902A]  OD (overdose of drug), undetermined intent, initial encounter [T50.904A]  Depression, unspecified depression type [F32.9]     Interval History  Pt medically cleared for transfer to inpatient psych.      Call placed to [facility], spoke with Gifty, no available bed.     Bed search assistance provided by ED BH-CC.  Clinical fax[facility] to [facility], [facility].  Pt has been declined at [facility].    Follow up to bed search:  [facility]: Spoke with [facility], unable to accept pt due to eating disorder.  [facility]: Spoke with [facility], unable to accept pt due to eating disorder.  [facility]: Unable to reach Admissions after multiple attempts.  [facility]: Had not received clinical.     Treatment team updated.  Pt will remain on medical floor until an appropriate placement is identified.     Assessment/Plan  Inpatient Psych    15:30 ADD: Received call from [facility] who reported that a bed was available on the unit.      Call placed to [facility], spoke with Caro who authorized 5 days IP Psych, 07/21/2021-07/25/2021, review 07/26/2021, auth # [id number].    Spoke with ED BH-CC who will facilitate transfer.  Treatment team updated via Secure Chat.

## 2021-07-21 NOTE — PATIENT CARE CONFERENCE
Care Progression Rounds Note  Date: 7/21/2021  Time: 11:11 AM     Patient Name: Gemma Darnell     Medical Record Number: [id number]   YOB: 2000  Sex: Female      Room/Bed: G115    Admitting Diagnosis: Suicide attempt (CMS/HCC) [T14.91XA]  Tachycardia [R00.0]  Elevated LFTs [R79.89]  Polysubstance overdose, intentional self-harm, initial encounter (CMS/[facility]) [T50.902A]  OD (overdose of drug), undetermined intent, initial encounter [T50.904A]  Depression, unspecified depression type [F32.9]   Admit Date/Time: 7/19/2021  5:15 PM    Primary Diagnosis: Intentional drug overdose (CMS/HCC)  Principal Problem: Intentional drug overdose (CMS/HCC)    GMLOS: pending  Anticipated Discharge Date: 7/21/2021    AM-PAC:  Mobility Score:      Discharge Planning:  Anticipated Discharge Disposition: psychiatric facility  Type of Outpatient Services: psychiatric care    Barriers to Discharge:  Barriers to Discharge: None  Comment: D/C to Behavioral Health today    Participants:  advanced practice provider, physical therapy, nursing, social work/services

## 2021-07-21 NOTE — PROGRESS NOTES
Hospital Medicine Service -  Daily Progress Note       SUBJECTIVE   Interval History: Patient seen and examined this am while sitting upright in bed and denies any additional complaints overnight      OBJECTIVE      Vital signs in last 24 hours:  Temp:  [36.4 °C (97.6 °F)-36.8 °C (98.3 °F)] 36.8 °C (98.3 °F)  Heart Rate:  [] 100  Resp:  [16-18] 18  BP: (103-121)/(65-77) 103/65  No intake or output data in the 24 hours ending 07/21/21 1415    PHYSICAL EXAMINATION      Physical Exam  Constitutional:       General: She is not in acute distress.     Appearance: Normal appearance. She is normal weight. She is not ill-appearing, toxic-appearing or diaphoretic.   HENT:      Head: Atraumatic.   Eyes:      Extraocular Movements: Extraocular movements intact.      Comments: Right pupil more dilated compared to left with slower constriction    Cardiovascular:      Rate and Rhythm: Normal rate and regular rhythm.      Pulses: Normal pulses.      Heart sounds: No murmur heard.     Pulmonary:      Effort: Pulmonary effort is normal. No respiratory distress.      Breath sounds: No stridor. No wheezing.   Abdominal:      General: Abdomen is flat. Bowel sounds are normal. There is no distension.      Palpations: Abdomen is soft. There is no mass.      Tenderness: There is no abdominal tenderness.   Genitourinary:     Comments: Did not examine  Musculoskeletal:         General: No swelling or tenderness.   Neurological:      General: No focal deficit present.      Mental Status: She is alert.        LINES, CATHETERS, DRAINS, AIRWAYS, AND WOUNDS   Lines, Drains, Airways, Wounds:  Peripheral IV (Adult) 07/19/21 Left Antecubital (Active)   Number of days: 2       Comments:      LABS / IMAGING / TELE      Labs  Results from last 7 days   Lab Units 07/21/21  0416   WBC K/uL 8.04   HEMOGLOBIN g/dL 13.0   HEMATOCRIT % 39.8   PLATELETS K/uL 273     Results from last 7 days   Lab Units 07/21/21  0416   SODIUM mEQ/L 139   POTASSIUM  mEQ/L 4.4   CHLORIDE mEQ/L 107   CO2 mEQ/L 25   BUN mg/dL 7*   CREATININE mg/dL 0.7   CALCIUM mg/dL 9.2   ALBUMIN g/dL 3.9   BILIRUBIN TOTAL mg/dL 0.8   ALK PHOS IU/L 55   ALT IU/L 64*   AST IU/L 33   GLUCOSE mg/dL 98     Imaging  CT HEAD WITHOUT IV CONTRAST    Result Date: 7/20/2021  Narrative: STUDY:   CT examination of the head performed without intravenous contrast following the [facility] ED standard protocol. Images reviewed in brain, stroke, subdural and bone windows. CT DOSE:  One or more dose reduction techniques (e.g. automated exposure control, adjustment of the mA and/or kV according to patient size, use of iterative reconstruction technique) utilized for this examination. CLINICAL HISTORY: 21-year-old female status post drug overdose and fall with head injury. COMPARISON: None.     Impression: IMPRESSION: 1. No acute posttraumatic abnormality. 2. No evidence of acute infarct, hemorrhage, mass or mass effect. COMMENT: Brain parenchyma: Normal CT appearance. Ventricles, cisterns, and sulci: Normal in size and configuration. Calvarium and extra cranial soft tissues: Unremarkable. Visualized paranasal sinuses and mastoid air cells: Clear bilaterally.     X-RAY CHEST 1 VIEW    Result Date: 7/19/2021  Narrative: CLINICAL HISTORY: tachycardia     Impression: IMPRESSION: No evidence of acute disease in the chest. COMMENT: Comparison: None. Erect portable AP view of the chest was performed.  There is no evidence of pneumothorax or pleural effusion.  Lungs appear clear.  Normal appearance of the cardiomediastinal contours.  No upper abdominal or bone findings of concern.     ECG/Telemetry  I have independently reviewed the ECG. No significant findings.    ASSESSMENT AND PLAN      * Intentional drug overdose (CMS/[facility])  Assessment & Plan  - Suicidal  overdose on multiple meds  - poison control was contacted in ED who recommended observation   - patient has residual sinus tachycardia that has improved  overnight    - psych consult appreciated   - supportive care   - continue 1:1   - medically stable today     Unequal pupils  Assessment & Plan  - right pupil more dilated than left on exam   - both pupils reactive to light, however, right more sluggish in comparison   - patient denies any recent falls or trauma.. although bruise on back?  - no neuro deficits or abnormalities on exam   - CT head negative     Sinus tachycardia  Assessment & Plan  - 2/2 drug overdose   - no events on tele besides sinus tach   - tsh negative   - has been resolving     Anxiety  Assessment & Plan  - Ativan PRN      Major depressive disorder with current active episode  Assessment & Plan  - psych recs appreciated   - Hold effexor        VTE Assessment: Padua VTE Score: 0  VTE Prophylaxis Plan: ambulate  Code Status: Full Code  Estimated Discharge Date: 7/22/2021  Disposition Planning: Anticipated discharge when inpatient psychiatric facility placement confirmed      TELMA Valladares  7/21/2021

## 2021-07-21 NOTE — DISCHARGE SUMMARY
[facility] Medicine Service -  Inpatient Discharge Summary        BRIEF OVERVIEW   Admitting Provider: Sunshine Rosa MD  Attending Provider: Francisco Gale DO Attending phys phone: [phone]    PCP: Arlette Tolentino [phone]    Admission Date: 7/19/2021  Discharge Date: 7/21/2021     DISCHARGE DIAGNOSES      Primary Discharge Diagnosis  Intentional drug overdose (CMS/[facility])    Secondary Discharge Diagnoses  Active Hospital Problems    Diagnosis Date Noted   • Intentional drug overdose (CMS/[facility]) 07/19/2021     Priority: High   • Unequal pupils 07/20/2021     Priority: Medium   • Major depressive disorder with current active episode 07/19/2021     Priority: Low   • Anxiety 07/19/2021     Priority: Low   • Sinus tachycardia 07/19/2021     Priority: Low   • Unspecified mood (affective) disorder (CMS/[facility]) 07/20/2021      Resolved Hospital Problems   No resolved problems to display.       Problem List on Day of Discharge  * Intentional drug overdose (CMS/[facility])  Assessment & Plan  - Suicidal  overdose on multiple meds  - poison control was contacted in ED who recommended observation   - patient has residual sinus tachycardia that has improved overnight    - psych consult appreciated   - supportive care   - continue 1:1   - medically stable today     Unequal pupils  Assessment & Plan  - right pupil more dilated than left on exam   - both pupils reactive to light, however, right more sluggish in comparison   - patient denies any recent falls or trauma.. although bruise on back?  - no neuro deficits or abnormalities on exam   - CT head negative     Sinus tachycardia  Assessment & Plan  - 2/2 drug overdose   - no events on tele besides sinus tach   - tsh negative   - has been resolving     Anxiety  Assessment & Plan  - Ativan PRN      Major depressive disorder with current active episode  Assessment & Plan  - psych recs appreciated   - Hold effexor     SUMMARY OF HOSPITALIZATION      Presenting Problem/History  "of Present Illness  Suicide attempt (CMS/[facility]) [T14.91XA]  Tachycardia [R00.0]  Elevated LFTs [R79.89]  Polysubstance overdose, intentional self-harm, initial encounter (CMS/[facility]) [T50.902A]  OD (overdose of drug), undetermined intent, initial encounter [T50.904A]  Depression, unspecified depression type [F32.9]    This is a 21 y.o. year-old female admitted on 7/19/2021 with Suicide attempt (CMS/[facility]) [T14.91XA]  Tachycardia [R00.0]  Elevated LFTs [R79.89]  Polysubstance overdose, intentional self-harm, initial encounter (CMS/[facility]) [T50.902A]  OD (overdose of drug), undetermined intent, initial encounter [T50.904A]  Depression, unspecified depression type [F32.9].    The following information was obtained from admission H and P  \"Gemma Darnell is a 21 y.o. female patient with past medical history of anxiety depression presents with complaints of intentional overdose of multiple medications.  Patient reports that at 9 AM she took 7 pills of Effexor 150 mg 7 of 7.5 mg Remeron, greater than 7 off 50 mg hydroxyzine, melatonin 3 mg 89 pills and 6 NyQuil pills.  Patient reports feeling depressed and had suicidal thoughts.  Currently she feels anxious, palpitations her heart rate in the ER was noted to be elevated and 100s.  She complained of occasional dizziness but no syncope no chest pain or shortness of breath no abdominal pain nausea vomiting or diarrhea no fever or chills cold cough sore throat.  No urinary symptoms no new focal weakness or numbness, no headache or visual changes.\"    Hospital Course  Gemma Darnell is a 21 y.o. female patient with past medical history of anxiety depression presented with complaints of intentional overdose of multiple medications.  Poison control was called in the emergency department who recommended holding overnight for observation with a one-to-one.  Psychiatry was consulted who recommended inpatient psychiatric rehabilitation upon discharge when she was medically stable.  TSH was " unremarkable although she continued to experience sinus tachycardia.  On exam her right pupil was noted to be larger with sluggish construction compared to the left.  CT scan of the head was performed which was unremarkable.  Social work was consulted for help with discharge planning.  On the day of discharge patient was medically stable without any new medication changes.    Exam on Day of Discharge  Physical Exam  Constitutional:       General: She is not in acute distress.     Appearance: Normal appearance. She is normal weight. She is not ill-appearing, toxic-appearing or diaphoretic.   HENT:      Head: Atraumatic.   Eyes:      Extraocular Movements: Extraocular movements intact.      Comments: Right pupil more dilated compared to left with slower constriction    Cardiovascular:      Rate and Rhythm: Normal rate and regular rhythm.      Pulses: Normal pulses.      Heart sounds: No murmur heard.     Pulmonary:      Effort: Pulmonary effort is normal. No respiratory distress.      Breath sounds: No stridor. No wheezing.   Abdominal:      General: Abdomen is flat. Bowel sounds are normal. There is no distension.      Palpations: Abdomen is soft. There is no mass.      Tenderness: There is no abdominal tenderness.   Genitourinary:     Comments: Did not examine  Musculoskeletal:         General: No swelling or tenderness.   Neurological:      General: No focal deficit present.      Mental Status: She is alert.         Consults During Admission  IP CONSULT TO SOCIAL WORK  IP CONSULT TO SOCIAL WORK  IP CONSULT TO NUTRITION SERVICES  IP CONSULT TO PSYCHIATRY    DISCHARGE MEDICATIONS        Medication List      STOP taking these medications    hydrOXYzine 50 mg tablet  Commonly known as: ATARAX     melatonin 3 mg tablet     mirtazapine 7.5 mg tablet  Commonly known as: REMERON     nicotine 14 mg/24 hr  Commonly known as: NICODERM CQ     venlafaxine  mg 24 hr capsule  Commonly known as: EFFEXOR-XR              Instructions for after discharge     Discharge diet      Diet Type / Texture:  Regular  Cardiac (Low Sodium, Low Fat)       Follow-up with department      Please follow up within two weeks of discharge for unequal pupils noted on exam    [facility]   [phone]    [address] [facility] [id number]       Follow-up with primary physician (PCP)      Within one week of discharge    Post-Discharge Activity: Normal activity as tolerated.      Normal activity as tolerated.             PROCEDURES / LABS / IMAGING      Operative Procedures  none    Other Procedures  none    Pertinent Labs  Results from last 7 days   Lab Units 07/21/21  0416   WBC K/uL 8.04   HEMOGLOBIN g/dL 13.0   HEMATOCRIT % 39.8   PLATELETS K/uL 273     Results from last 7 days   Lab Units 07/21/21  0416   SODIUM mEQ/L 139   POTASSIUM mEQ/L 4.4   CHLORIDE mEQ/L 107   CO2 mEQ/L 25   BUN mg/dL 7*   CREATININE mg/dL 0.7   CALCIUM mg/dL 9.2   ALBUMIN g/dL 3.9   BILIRUBIN TOTAL mg/dL 0.8   ALK PHOS IU/L 55   ALT IU/L 64*   AST IU/L 33   GLUCOSE mg/dL 98         SARS-CoV-2 (COVID-19) (no units)   Date/Time Value   07/19/2021 1914 Negative       Pertinent Imaging  CT HEAD WITHOUT IV CONTRAST    Result Date: 7/20/2021  IMPRESSION: 1. No acute posttraumatic abnormality. 2. No evidence of acute infarct, hemorrhage, mass or mass effect. COMMENT: Brain parenchyma: Normal CT appearance. Ventricles, cisterns, and sulci: Normal in size and configuration. Calvarium and extra cranial soft tissues: Unremarkable. Visualized paranasal sinuses and mastoid air cells: Clear bilaterally.     X-RAY CHEST 1 VIEW    Result Date: 7/19/2021  IMPRESSION: No evidence of acute disease in the chest. COMMENT: Comparison: None. Erect portable AP view of the chest was performed.  There is no evidence of pneumothorax or pleural effusion.  Lungs appear clear.  Normal appearance of the cardiomediastinal contours.  No upper abdominal or bone findings  of concern.       OUTPATIENT  FOLLOW-UP / REFERRALS / PENDING TESTS        Outpatient Follow-Up Appointments  Encounter Information    This patient does not currently have any appointments scheduled.         Referrals  No orders of the defined types were placed in this encounter.      Test Results Pending at Discharge  Unresulted Labs (From admission, onward)    None          Important Issues to Address in Follow-Up  You were admitted into the hospital after an intentional suicide overdose.  All of your previous medications have been discontinued.  You have been evaluated by psychiatry who is recommending inpatient psychiatric rehabilitation upon discharge.    DISCHARGE DISPOSITION      Disposition: Intermediate Care Facility    Code Status At Discharge: Full Code    Physician Order for Life-Sustaining Treatment Document Status      No documents found

## 2021-07-21 NOTE — BEHAVIORAL HEALTH CRISIS PROGRESS NOTE
07/21/2021 @ 11:40 - [facility] ED  assisted with bed search for this patient.  Please see below for the results from this search.    [facility] (Marvel) - No Beds  [facility] (Sarah) - No Beds  [facility] (Rgegory) - Fax  [facility] (Jose Juan) - Called three times and left message for Jose Juan.  [facility] (Antonette) - Fax  [facility] (Alba) - Fax  [facility] (Marvel) - Fax  [facility] (Jeannine) - No Beds  [facility] - No Answer  [facility] (Female Staff Member) - Fax      has faxed the patient's clinical packet to Reagan Higuera [place], [facility], and [facility] for review.    07/21/2021 @ 14:30 -  received a call from the Rivka at the [place] [facility] and she advised they were declining the patient due to her history of an eating disorder. Additional care coordination will be provided by Behavioral Health Clinician Rivka MORRIS

## 2021-07-21 NOTE — LETTER
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 overdose prior to hospitalization.     Describes thoughts as \"somewhat scrambled.\"  Mood \"down.\"  Hopes to reengage with previously enjoyable activities such as music and walking patient reports journaling has been helpful activity patient encouraged to request a notebook to provide option for same several days.     Review of Systems:   Sleep:  Sleep 10 PM to 8 AM, awake occasionally, Appetite: Fair and GI: No complaints     Vital Signs for the last 24 hours:  Temp:  [36.7 °C (98 °F)-36.9 °C (98.4 °F)] 36.9 °C (98.4 °F)  Heart Rate:  [] 111  Resp:  [12-15] 12  BP: ()/(62-74) 97/74     Labs:  I have reviewed the patient's labs.  Significant abnormals are ALT, 64.     Mental Status Exam:  Appearance: appropriate attire  Gait and Motor: slow and Minimal eye contact  Speech: slowed, soft and monotone  Mood: depressed  Affect: dysphoric  Associations: coherent  Thought Process: slowed  Thought Content: no auditory or visual hallucinations. and appropriate to situation  Suicidality/Homicidality: thoughts of being dead/ no desire or plan to die  Judgement/Insight: Poor to fair  Orientation: day, " month and year  Memory: recalls recent events and recalls remote events  Attention: withdrawn  Knowledge: appropriate for education  Language: normal     VTE Assessment: I have reassessed and the patient's VTE risk and treatment plan is appropriate.     Assessment/Plan  Patient is a 21-year-old woman with history of mood disorder, eating disorder, cluster B traits status post suicide attempt by multi drug ingestion.  Several medication trials, most recently venlafaxine and mirtazapine.  Patient in route to residential care when patient significantly deviated from plan and made a serious suicide attempt.     Major depressive disorder recurrent severe:  -Status post serious multidrug suicide attempt, history of inpatient  -Encourage group psychotherapy, journaling  -To consider antidepressant versus TMS versus ECT     Eating disorder:  -Mild elevation of heart rate, continue to monitor  -Self-report of average p.o. intake     Cluster B traits:  -Patient with significant impulsivity, feelings of abandonment and fluctuating/negative sense of self  -Review past experience with DBT and consider options for same going forward     Latoya Silva MD     History                 Latoya Silva MD   Physician   Psychiatry   Progress Notes       Signed   Date of Service:  7/25/2021  4:09 PM               Signed             []Hide copied text    []Hover for details  Psychiatry Progress Note     Chief Complaint/Reason for follow-up: Reviewed clinical presentation over the past 24 hours with nursing, chart reviewed.     Interval History: On interview, patient continues to endorse low mood, persistent worries, negative internal dialogue.  Denies hearing auditory hallucinations from an external source.  Reports experiencing thoughts as fast, primarily related to worries about her current and future plans.     Reviewed past vulnerabilities including alcohol.  Patient denies significant thoughts or cravings for alcohol.  States she was in  a residential substance used treatment program November 2020 which she reports was very helpful.  Also reviewed eating disorder symptoms, patient states currently she is eating balanced meals, denies feeling out of control with food including denying thoughts of restricting/binging/purging.     Reviewed medication history in detail including Prozac trial May 2021 Lamictal trial including subsequent overdose on same.  Remote history of Lexapro.  Most recent trial of Effexor and mirtazapine with less than 2-week trial as patient subsequently overdosed prompting this admission.     Reviewed potential benefits of SNRI with antidepressant/antianxiety targets.  Reviewed some risk that SNRI may be activating however given pronounced depressive symptoms and pattern of symptoms over the years most consistent with major depressive disorder, activation is relatively low risk.     Review of Systems:   Sleep:  Fair, Appetite: Fair and GI: No complaints     Vital Signs for the last 24 hours:  Temp:  [36.8 °C (98.2 °F)] 36.8 °C (98.2 °F)  Heart Rate:  [] 114  Resp:  [12] 12  BP: (100-109)/(70-82) 109/70     Labs:  No new labs.     Mental Status Exam:  Appearance: unkempt and appropriate attire  Gait and Motor: slow and Minimal eye contact  Speech: decreased, slowed, soft and monotone  Mood: depressed and anxious  Affect: constricted and dysphoric  Associations: coherent  Thought Process: slowed  Thought Content: no auditory or visual hallucinations. and appropriate to situation  Suicidality/Homicidality: denies  Judgement/Insight: Impaired  Orientation: day, month and year  Memory: recalls recent events and recalls remote events  Attention: withdrawn  Knowledge: below expected for education  Language: word finding difficulties     VTE Assessment: I have reassessed and the patient's VTE risk and treatment plan is appropriate.     Assessment/Plan  Patient is a 21-year-old woman with history of mood disorder, eating disorder,  cluster B traits status post suicide attempt by multi drug ingestion.  Presentation at this time most consistent with major depressive disorder recurrent severe as primary diagnosis. Patient in route to residential care when patient significantly deviated from plan and made a serious suicide attempt.  Review of symptom presentation and treatment as outlined above.  Patient continues to present with severe depressive symptoms, high anxiety and internal negative dialogue.  Patient continues to deny active suicidal ideation plan or intent, however, remains at high risk and requires ongoing one-to-one due to history of significant self-harm while in a treatment setting.     Reviewed pros and cons of specific medication trial options.  Encouraged patient to consider resuming venlafaxine to target mood and anxiety.  Will await review by Dr. Braun.     Plan:  Continue one-to-one monitoring due to high risk of self-harm  Consider retrial of venlafaxine to target mood and anxiety  Consider augmentation with aripiprazole, not yet discussed with patient  Encouraged journaling, patient states this has been helpful in the past and is willing to engage in same  Encouraged ongoing group psychotherapy/milieu therapy  Consider family meeting to increase database and cooperation over time                    Hari Braun MD   Physician   Psychiatry   Progress Notes       Signed   Date of Service:  7/26/2021  1:17 PM               Signed             []Hide copied text    []Hover for details  PSYCHIATRIC PROGRESS NOTE     Interval History: remains flat and withdrawn. +hopeless/helpless. Denies active si on unit, asking for one to one to be removed. Patient is calm with no evidence of impulsive behavior. We discussed suicidal ideation and patient agrees to seek out staff if this becomes more acute or with any thoughts of self harm while in the hospital. She is open to starting AD and possibly to TMS if possible. Also expressed  wanting to return to residential facility. Team spoke with mother who worries that daughter has given up. She is in support of tms option. Discussed option of her visiting unit to see Gemma. Gemma has been more visible on unit and did participate in one group over weekend. Eating, sleeping well. Behaviorally in control.            Vital Signs for the last 24 hours:  Temp:  [36.8 °C (98.2 °F)-37.1 °C (98.7 °F)] 36.8 °C (98.3 °F)  Heart Rate:  [] 106  Resp:  [14-16] 16  BP: (104-114)/(62-71) 104/62     Scheduled Meds:  • nicotine  1 patch transdermal Daily   • venlafaxine XR  37.5 mg oral Daily with breakfast         Labs:          Results from last 7 days   Lab Units 07/21/21  0416 07/20/21  0413 07/19/21  1746 07/19/21  1746   HEMOGLOBIN g/dL 13.0  --   --  13.5   WBC K/uL 8.04  --   --  9.67   PLATELETS K/uL 273  --   --  278   POTASSIUM mEQ/L 4.4 4.1   < > 3.6   SODIUM mEQ/L 139 138   < > 139   CREATININE mg/dL 0.7 0.5*   < > 0.8    < > = values in this interval not displayed.         MENTAL STATUS EXAM  Appearance: well groomed  Gait and Motor: no abnormal movements and normal  Speech: normal rate/rhythm/volume  Mood: depressed  Affect: constricted  Associations: coherent  Thought Process: goal-directed  Thought Content: no auditory or visual hallucinations.  Suicidality/Homicidality: denies  Judgement/Insight: minimizes severity level of illness and help rejecting  Orientation: day, month and year  Memory: recalls recent events and recalls remote events  Attention: alert  Knowledge: normal  Language: normal      Assessment/Plan  Major depressive disorder with current active episode  Assessment & Plan     -1:1 for safety given history of prior self harm in an inpatient facility, will decrease obs level as possible  -Structure and support, encourage group and milieu participation   -restart effexor today. Will look into option of receving tms on inpt unit. ect may be consideration as well.   -keep family  update  -dispo likely residential program     Sinus tachycardia  Assessment & Plan  Ctm, follow with Hari Mcclelland MD   Physician   Psychiatry   Progress Notes       Signed   Date of Service:  7/27/2021 10:45 AM               Signed             []Hide copied text    []Hover for details  PSYCHIATRIC PROGRESS NOTE     Interval History: remains flat and withdrawn. +hopeless/helpless. Denies active si on unit. Only vaguely future oriented, asking about possible residential transfer. Eating meals, sleeping well. Limited spontaneous interactions. Discussed options of tms, ect and some openness to latter. Mother updated on these options as well.         Vital Signs for the last 24 hours:  Temp:  [36.7 °C (98.1 °F)-36.8 °C (98.3 °F)] 36.7 °C (98.1 °F)  Heart Rate:  [] 125  Resp:  [12-16] 16  BP: (104-109)/(63-78) 105/78     Scheduled Meds:  • nicotine  1 patch transdermal Daily   • [START ON 7/28/2021] venlafaxine XR  75 mg oral Daily with breakfast         Labs:       Results from last 7 days   Lab Units 07/21/21  0416   HEMOGLOBIN g/dL 13.0   WBC K/uL 8.04   PLATELETS K/uL 273   POTASSIUM mEQ/L 4.4   SODIUM mEQ/L 139   CREATININE mg/dL 0.7         MENTAL STATUS EXAM  Appearance: well groomed  Gait and Motor: no abnormal movements and normal  Speech: normal rate/rhythm/volume  Mood: depressed  Affect: constricted  Associations: coherent  Thought Process: goal-directed  Thought Content: no auditory or visual hallucinations.  Suicidality/Homicidality: denies  Judgement/Insight: minimizes severity level of illness and help rejecting  Orientation: day, month and year  Memory: recalls recent events and recalls remote events  Attention: alert  Knowledge: normal  Language: normal      Assessment/Plan  Major depressive disorder with current active episode  Assessment & Plan     -1:1 for safety given history of prior self harm in an inpatient facility, will decrease obs level as possible  -Structure  and support, encourage group and milieu participation   -increase effexor as tolerated. Looking into neuromodulation options.   -keep family updated  -dispo likely residential program     Sinus tachycardia  Assessment & Plan  Ctm, follow with Hari Mcclelland MD   Physician   Psychiatry   Progress Notes       Signed   Date of Service:  7/28/2021 12:44 PM               Signed             []Hide copied text    []Hover for details  PSYCHIATRIC PROGRESS NOTE     Interval History: remains flat and withdrawn. +hopeless/helpless. Denies active si on unit. Only vaguely future oriented. Eating meals, sleeping well. Limited spontaneous interactions but is participating in groups with prompting. Some openness to ect expressed today. Asking appropriate questions about this treatment. Remains on one to one.     Vital Signs for the last 24 hours:  Temp:  [36.6 °C (97.9 °F)-36.8 °C (98.3 °F)] 36.6 °C (97.9 °F)  Heart Rate:  [] 129  Resp:  [16-17] 16  BP: (104-117)/(68-75) 117/74     Scheduled Meds:  • nicotine  1 patch transdermal Daily   • [START ON 7/29/2021] venlafaxine XR  150 mg oral Daily with breakfast         Labs:         MENTAL STATUS EXAM  Appearance: well groomed  Gait and Motor: no abnormal movements and normal  Speech: normal rate/rhythm/volume  Mood: depressed  Affect: constricted  Associations: coherent  Thought Process: goal-directed  Thought Content: no auditory or visual hallucinations.  Suicidality/Homicidality: denies  Judgement/Insight: minimizes severity level of illness and help rejecting  Orientation: day, month and year  Memory: recalls recent events and recalls remote events  Attention: alert  Knowledge: normal  Language: normal      Assessment/Plan  Major depressive disorder with current active episode  Assessment & Plan     -1:1 for safety given history of prior self harm in an inpatient facility, will decrease obs level as possible  -Structure and support, encourage group  and milieu participation   -increase effexor as tolerated. Considering ect course begninning Friday.  -keep family updated  -dispo likely residential program     Sinus tachycardia  Assessment & Plan  Ctm, follow with [facility]                 Shraddha Steward MD   Resident   Psychiatry   Progress Notes       Attested   Date of Service:  7/29/2021  9:48 AM            (important)  Sensitive      Attested        Attestation signed by Hari Braun MD at 7/29/2021 11:36 AM (Updated)   I performed a history and physical examination of the patient and discussed the management with the Resident. I reviewed the Resident's note and agree with the documented findings and plan of care, except for my comments below or within the additional notes today.        Remains flat and withdrawn but interacts appropriately when approached. Open to ect and medically cleared today. R/b discussed and pt understands, watched video yesterday. Remains hopeless, anhedonic and only vaguely future oriented. Eating partial meals, no ed sx. Sleeping through night. Denies active si on unit, behaviorally in control. Remains on one to one.     Major depressive disorder with current active episode  Assessment & Plan     -1:1 for safety given history of prior self harm in an inpatient facility, will decrease obs level as possible  -Structure and support, encourage group and milieu participation   -increase effexor as tolerated.   -plan to start rul ect tomorrow  -keep family updated  -dispo likely residential program     Sinus tachycardia  Assessment & Plan  Ctm, follow with [facility]           Hari Braun MD            []Hide copied text    []Hover for details  PSYCHIATRIC PROGRESS NOTE     Interval History: Overnight team reports that roman denied SI,endorses depressed mood. Mother visited and visit was positive. Watched TV with peers in the day room.      On interview this AM, Roman states that her visit last night with her mother was positive, says  she wants to go home. Was not able to describe any benefits of being home or specific things she was looking forward to/hopeful for. Denied current SI. Says that she is now open to ECT, discussed plan to start tomorrow and she was amenable to this.   Denied eating disorder symptoms, urge to binge/purge, and reports that she has been eating meals.         Vital Signs for the last 24 hours:  Temp:  [36.7 °C (98.1 °F)-36.9 °C (98.5 °F)] 36.7 °C (98.1 °F)  Heart Rate:  [] 115  Resp:  [16] 16  BP: (110-119)/(63-78) 115/64     Scheduled Meds:  • nicotine  1 patch transdermal Daily   • venlafaxine XR  150 mg oral Daily with breakfast         Labs:         MENTAL STATUS EXAM  Appearance: disheveled  Gait and Motor: no abnormal movements and normal  Speech: slowed and soft  Mood: depressed  Affect: constricted  Associations: coherent  Thought Process: goal-directed  Thought Content: no auditory or visual hallucinations.  Suicidality/Homicidality: denies  Judgement/Insight: minimizes severity level of illness and help rejecting  Orientation: day, month and year  Memory: recalls recent events and recalls remote events  Attention: alert  Knowledge: normal  Language: normal      Assessment/Plan  Sinus tachycardia  Assessment & Plan  Ctm, follow with [facility]     Major depressive disorder with current active episode  Assessment & Plan     -1:1 for safety given history of prior self harm in an inpatient facility, will decrease obs level as possible  -Structure and support, encourage group and milieu participation   -increase effexor as tolerated.   -Will have patient seen for ECT second opinion today, with plan to start Ect course Friday 7/30.  -keep family updated  -dispo likely residential program     Shraddha Steward MD   PGY-2             Cosigned by: Hari Braun MD at 7/29/2021 11:36 AM   Revision History                                        Shraddha Steward MD   Resident   Psychiatry   Progress Notes      "  Attested   Date of Service:  7/30/2021 12:07 PM            (important)  Sensitive      Attested        Attestation signed by Hari Braun MD at 7/30/2021 1:37 PM   I saw and evaluated the patient.  I discussed the case with the Resident and agree with the findings and plan as documented in the note except for my comments below or within the additional notes today.     Tolerated ect well today. No confusion afterward. Reported feeling more energetic today and seems a bit brighter future oriented with no si. Seen coloring in milieu and participated in groups later in day. Feels hopeful about contniued ect. Mother was present and updated. No manic sx. No ed sx. Interactions appropriate.     Major depressive disorder with current active episode  Assessment & Plan     -s/p rul ect #1, continue m/w/f  -improving  -Structure and support, encourage group and milieu participation. Will d/c 1 to 1.  -increase effexor as tolerated.   -keep family updated  -dispo likely residential program     Sinus tachycardia  Assessment & Plan  Ctm, follow with [facility]        Hari Braun MD            []Hide copied text    []Hover for details  PSYCHIATRIC PROGRESS NOTE     Interval History: Overnight team reports that roman denied SI,endorses depressed mood. Attended groups. compliant with medications. She ate 50% of meals and is drinking adequate fluids. Received ECT this morning without complications.      Roman was seen this morning post ECT in the milieu. Was coloring, drinking water. Said that she had a mild headache post ECT, but was feeling \"good\" and denied any urges to harm herself. Was still withdrawn and provided short mostly one word answers to questions, but appeared to have slightly widened affective range from prior encounters.         Vital Signs for the last 24 hours:  Temp:  [36.1 °C (97 °F)-37 °C (98.6 °F)] 36.5 °C (97.7 °F)  Heart Rate:  [] 76  Resp:  [12-18] 18  BP: ()/(57-91) 109/71  FiO2 (%) " "(Set):  [98 %-100 %] 100 %     Scheduled Meds:  • nicotine  1 patch transdermal Daily   • venlafaxine XR  150 mg oral Daily with breakfast         Labs:       Results from last 7 days   Lab Units 07/29/21  1010   HEMOGLOBIN g/dL 13.6   WBC K/uL 8.31   PLATELETS K/uL 327   POTASSIUM mEQ/L 4.7   SODIUM mEQ/L 137   CREATININE mg/dL 0.7         MENTAL STATUS EXAM  Appearance: disheveled  Gait and Motor: no abnormal movements and normal  Speech: slowed and soft  Mood: 'good'  Affect: Constricted but widening from prior encounters   Associations: coherent  Thought Process: goal-directed  Thought Content: no auditory or visual hallucinations.  Suicidality/Homicidality: denies  Judgement/Insight: minimizes severity level of illness and help rejecting  Orientation: day, month and year  Memory: recalls recent events and recalls remote events  Attention: alert  Knowledge: normal  Language: normal      Assessment/Plan  Sinus tachycardia  Assessment & Plan  Ctm, follow with [facility]     Major depressive disorder with current active episode  Assessment & Plan     -DC 1:1 given stability on unit and persistent denial of urge to self harm.   -Structure and support, encourage group and milieu participation   -increase effexor as tolerated.   -S/p ECT #1 7/30, plan for next session 8/2   -keep family updated  -dispo likely residential program     Shraddha Steward MD   PGY-2             Cosigned by: Hari Braun MD at 7/30/2021  1:37 PM   Revision History                                        Reina Jackson MD   Physician   Psychiatry   Progress Notes       Signed   Date of Service:  7/31/2021 10:32 AM            (important)  Sensitive      Signed             []Hide copied text    []Hover for details  PSYCHIATRIC PROGRESS NOTE     Interval History: Today, she is noted to be isolating in her room.  She states that she had ECT yesterday, which was \"good.\"  She denies suicidal thoughts, homicidal thoughts, or hallucinations.  " "She says that her mood is \"ok\" and that she is sleeping well and her appetite is \"ok.\"  She has no physical complaints today.        Vital Signs for the last 24 hours:  Temp:  [36.7 °C (98.1 °F)-37.2 °C (98.9 °F)] 36.7 °C (98.1 °F)  Heart Rate:  [] 106  Resp:  [12-18] 18  BP: ()/(46-74) 122/74     Scheduled Meds:  • nicotine  1 patch transdermal Daily   • venlafaxine XR  150 mg oral Daily with breakfast         Labs:       Results from last 7 days   Lab Units 07/29/21  1010   HEMOGLOBIN g/dL 13.6   WBC K/uL 8.31   PLATELETS K/uL 327   POTASSIUM mEQ/L 4.7   SODIUM mEQ/L 137   CREATININE mg/dL 0.7         MENTAL STATUS EXAM  Appearance: fairly groomed  Gait and Motor: no abnormal movements and normal  Speech: slowed and soft  Mood: 'ok'  Affect: constricted  Associations: coherent  Thought Process: goal-directed  Thought Content: no auditory or visual hallucinations.  Suicidality/Homicidality: denies  Judgement/Insight: minimizes severity level of illness and help rejecting        Assessment/Plan  Sinus tachycardia  Assessment & Plan  Ctm, follow with [facility]     Major depressive disorder with current active episode  Assessment & Plan     -Off 1;1 currently   -Structure and support, encourage group and milieu participation   -Continue to monitor response to Effexor  -S/p ECT #1 7/30, plan for next session 8/2   -keep family updated  -dispo likely residential program              Reina Jackson MD   Physician   Psychiatry   Progress Notes       Signed   Date of Service:  8/1/2021 10:04 AM            (important)  Sensitive      Signed             []Hide copied text    []Hover for details  PSYCHIATRIC PROGRESS NOTE     Interval History: Today, she is more visible in the milieu.  She slept better; she did take Atarax before bedtime which she feels was helpful.  She states that her mood is slightly improved; she denies suicidal thoughts and her appetite is somewhat improved.  She feels that ECT has been " helpful.        Vital Signs for the last 24 hours:  Temp:  [36.2 °C (97.2 °F)-36.9 °C (98.5 °F)] 36.8 °C (98.2 °F)  Heart Rate:  [101-121] 121  Resp:  [16] 16  BP: (101-118)/(61-82) 103/61     Scheduled Meds:  • nicotine  1 patch transdermal Daily   • venlafaxine XR  150 mg oral Daily with breakfast         Labs:       Results from last 7 days   Lab Units 07/29/21  1010   HEMOGLOBIN g/dL 13.6   WBC K/uL 8.31   PLATELETS K/uL 327   POTASSIUM mEQ/L 4.7   SODIUM mEQ/L 137   CREATININE mg/dL 0.7         MENTAL STATUS EXAM  Appearance: fairly groomed  Gait and Motor: no abnormal movements and normal  Speech: slowed and soft  Mood: 'a little better'  Affect: constricted  Associations: coherent  Thought Process: goal-directed  Thought Content: no auditory or visual hallucinations.  Suicidality/Homicidality: denies  Judgement/Insight: minimizes severity level of illness and help rejecting        Assessment/Plan  Sinus tachycardia  Assessment & Plan  Ctm, follow with [facility]     Major depressive disorder with current active episode  Assessment & Plan     -Off 1;1 currently   -Structure and support, encourage group and milieu participation   -Continue to monitor response to Effexor  -S/p ECT #1 7/30, plan for next session 8/2   -keep family updated  -dispo likely residential program              Hari Braun MD   Physician   Psychiatry   Progress Notes       Signed   Date of Service:  8/2/2021 12:15 PM               Signed             []Hide copied text    []Hover for details  PSYCHIATRIC PROGRESS NOTE     Interval History: reports mood much improved. Brighter and more interactive on unit. Tolerated ect well today. Mild ha/jaw pain relieved with motrin. Future oriented and denies si. Eating, sleeping well. Does not appear manic. Asking if she can go home and finish course as outpt. Team let her know that we will consider this as course moves forward and d/w her mother.            Vital Signs for the last 24 hours:  Temp:   [36.1 °C (97 °F)-37.2 °C (98.9 °F)] 36.7 °C (98.1 °F)  Heart Rate:  [] 77  Resp:  [16-18] 18  BP: ()/(56-87) 99/59  FiO2 (%) (Set):  [99 %-100 %] 99 %     Scheduled Meds:  • venlafaxine XR  150 mg oral Daily with breakfast         Labs:       Results from last 7 days   Lab Units 07/29/21  1010   HEMOGLOBIN g/dL 13.6   WBC K/uL 8.31   PLATELETS K/uL 327   POTASSIUM mEQ/L 4.7   SODIUM mEQ/L 137   CREATININE mg/dL 0.7         MENTAL STATUS EXAM  Appearance: well groomed  Gait and Motor: no abnormal movements and normal  Speech: normal rate/rhythm/volume  Mood: depressed  Affect congruent, reactive  Associations: coherent  Thought Process: goal-directed  Thought Content: no auditory or visual hallucinations.  Suicidality/Homicidality: denies  Judgement/Insight: minimizes severity level of illness and help rejecting  Orientation: day, month and year  Memory: recalls recent events and recalls remote events  Attention: alert  Knowledge: normal  Language: normal      Assessment/Plan  Major depressive disorder with current active episode  Assessment & Plan  -improving  -s/p rul ect #2, continue m/w/f  -improving  -Structure and support, encourage group and milieu participation.   -continue effexor  -keep family updated        Sinus tachycardia  Assessment & Plan  Ctm, follow with Hari Mcclelland MD   Physician   Psychiatry   Progress Notes       Signed   Date of Service:  8/3/2021 10:41 AM               Signed             []Hide copied text    []Hover for details  PSYCHIATRIC PROGRESS NOTE     Interval History: mood improving. Brighter and more interactive. Future oriented with no si. Tolerating ect well. Now discharge focused and stating she wants to go home rather than residential program. Bf visited last night, unclear how much this interaction plays into her wanting to leave. Eating, sleeping well. Interactions appropriate on unit. Open to continued ect and understands r/b. Family  updated.         Vital Signs for the last 24 hours:  Temp:  [36.8 °C (98.2 °F)-37.2 °C (99 °F)] 36.8 °C (98.2 °F)  Heart Rate:  [] 105  Resp:  [12-14] 12  BP: ()/(67-70) 103/68     Scheduled Meds:  • venlafaxine XR  150 mg oral Daily with breakfast         Labs:       Results from last 7 days   Lab Units 07/29/21  1010   HEMOGLOBIN g/dL 13.6   WBC K/uL 8.31   PLATELETS K/uL 327   POTASSIUM mEQ/L 4.7   SODIUM mEQ/L 137   CREATININE mg/dL 0.7         MENTAL STATUS EXAM  Appearance: well groomed  Gait and Motor: no abnormal movements and normal  Speech: normal rate/rhythm/volume  Mood: depressed  Affect congruent, reactive  Associations: coherent  Thought Process: goal-directed  Thought Content: no auditory or visual hallucinations.  Suicidality/Homicidality: denies  Judgement/Insight: minimizes severity level of illness and help rejecting  Orientation: day, month and year  Memory: recalls recent events and recalls remote events  Attention: alert  Knowledge: normal  Language: normal      Assessment/Plan  Major depressive disorder with current active episode  Assessment & Plan  -improving  -s/p rul ect #2, continue m/w/f  -improving  -Structure and support, encourage group and milieu participation.   -continue effexor  -keep family updated        Sinus tachycardia  Assessment & Plan  [place] [facility] [place]                 Hari Braun MD   Physician   Psychiatry   Progress Notes       Signed   Date of Service:  8/4/2021 10:59 AM               Signed             []Hide copied text    []Hover for details  PSYCHIATRIC PROGRESS NOTE     Interval History: continued improvement in mood. Bright and social on unit. Now discharge focused. Eating, sleeping well. Tolerated ect well today. Asks repeatedly about discharge. Team spoke with mother who is in agreement with team that best option would be to complete brief ect course in hospital. Team in discussions with patient about this. No cognitive  deficits grossly. Eating, sleeping well.      Vital Signs for the last 24 hours:  Temp:  [36.1 °C (97 °F)-37.2 °C (98.9 °F)] 36.1 °C (97 °F)  Heart Rate:  [] 87  Resp:  [14-16] 16  BP: (112-133)/(72-91) 133/91     Scheduled Meds:  • venlafaxine XR  150 mg oral Daily with breakfast         Labs:       Results from last 7 days   Lab Units 07/29/21  1010   HEMOGLOBIN g/dL 13.6   WBC K/uL 8.31   PLATELETS K/uL 327   POTASSIUM mEQ/L 4.7   SODIUM mEQ/L 137   CREATININE mg/dL 0.7         MENTAL STATUS EXAM  Appearance: well groomed  Gait and Motor: no abnormal movements and normal  Speech: normal rate/rhythm/volume  Mood: depressed  Affect congruent, reactive  Associations: coherent  Thought Process: goal-directed  Thought Content: no auditory or visual hallucinations.  Suicidality/Homicidality: denies  Judgement/Insight: minimizes severity level of illness and help rejecting  Orientation: day, month and year  Memory: recalls recent events and recalls remote events  Attention: alert  Knowledge: normal  Language: normal      Assessment/Plan  Major depressive disorder with current active episode  Assessment & Plan  -improving  -s/p rul ect #3, continue m/w/f  -Structure and support, encourage group and milieu participation.   -continue effexor  -dispo home with php f/u, keep family updated                    Hari Braun MD   Physician   Psychiatry   Progress Notes       Signed   Date of Service:  8/5/2021 10:33 AM               Signed             []Hide copied text    []Hover for details  PSYCHIATRIC PROGRESS NOTE     Interval History: continued improvement in mood. Bright and social on unit. Now discharge focused. Eating, sleeping well. Tolerating ect well. No confusion. Discussed possibility of continuing inpt ect until Monday and discharge to home after that with php f/u. Gemma was disappointed about this plan but somewhat open after discussion. Future oriented with no si. Discussed plan to resume working  and possibly go back to school. Sees her bf as strong support.            Vital Signs for the last 24 hours:  Temp:  [36.8 °C (98.3 °F)-37 °C (98.6 °F)] 36.9 °C (98.4 °F)  Heart Rate:  [] 129  Resp:  [16] 16  BP: (101-115)/(62-74) 115/62     Scheduled Meds:  • melatonin  3 mg oral Nightly   • venlafaxine XR  150 mg oral Daily with breakfast         Labs:         MENTAL STATUS EXAM  Appearance: well groomed  Gait and Motor: no abnormal movements and normal  Speech: normal rate/rhythm/volume  Mood: depressed  Affect congruent, reactive  Associations: coherent  Thought Process: goal-directed  Thought Content: no auditory or visual hallucinations.  Suicidality/Homicidality: denies  Judgement/Insight: minimizes severity level of illness and help rejecting  Orientation: day, month and year  Memory: recalls recent events and recalls remote events  Attention: alert  Knowledge: normal  Language: normal      Assessment/Plan  Major depressive disorder with current active episode  Assessment & Plan  -improving  -s/p rul ect #3, continue m/w/f  -Structure and support, encourage group and milieu participation.   -continue effexor  -tentative d/c Monday, dispo home with [facility] f/u, keep family updated                    Hari Braun MD   Physician   Psychiatry   Progress Notes       Signed   Date of Service:  8/6/2021 11:31 AM               Signed             []Hide copied text    []Hover for details  PSYCHIATRIC PROGRESS NOTE     Interval History: tolerated ect well today. Seen socializing in the milieu afterward. No new c/o. No confusion grossly. Discharge focused but agreeable to push back until after final ect Monday.         Vital Signs for the last 24 hours:  Temp:  [36.4 °C (97.6 °F)-37 °C (98.6 °F)] 36.4 °C (97.6 °F)  Heart Rate:  [] 89  Resp:  [16-18] 18  BP: (101-137)/(59-94) 106/59     Scheduled Meds:  • melatonin  3 mg oral Nightly   • venlafaxine XR  150 mg oral Daily with breakfast         Labs:          MENTAL STATUS EXAM  Appearance: well groomed  Gait and Motor: no abnormal movements and normal  Speech: normal rate/rhythm/volume  Mood: depressed  Affect congruent, reactive  Associations: coherent  Thought Process: goal-directed  Thought Content: no auditory or visual hallucinations.  Suicidality/Homicidality: denies  Judgement/Insight: minimizes severity level of illness and help rejecting  Orientation: day, month and year  Memory: recalls recent events and recalls remote events  Attention: alert  Knowledge: normal  Language: normal      Assessment/Plan  Major depressive disorder with current active episode  Assessment & Plan  -improving  -s/p rul ect #4, continue course with final treatment on Monday.  -Structure and support, encourage group and milieu participation.   -continue effexor  -d/c Monday, dispo home with [facility] f/u, family aware                    SARS-CoV-2 (COVID-19), PCR Nasopharynx  Order: [id number]  Collected:  8/1/2021 09:33 Status:  Final result   Visible to patient:  No (not released)  Specimen Information: Nasopharynx; Nasopharyngeal Swab         0 Result Notes        Specimen Collected: 08/01/21 09:33 Last Resulted: 08/01/21 10:45       Order Details     View Encounter     Lab and Collection Details     Routing     Result History              SARS-CoV-2 (COVID-19), PCR Nasopharynx  Order: [id number] - Part of Panel Order [id number]  Collected:  8/1/2021 09:33 Status:  Final result   Visible to patient:  No (not released)  Specimen Information: Nasopharynx; Nasopharyngeal Swab         0 Result Notes   Ref Range & Units    SARS-CoV-2 (COVID-19) Negative Negative    Comment: EUA/IVD   Resulting Agency  [facility]         Specimen Collected: 08/01/21 09:33 Last Resulted: 08/01/21 10:45       Order Details     View Encounter     Lab and Collection Details     Routing     Result History              Collection Information    Specimen ID: [id number]    Nasopharyngeal Swab    Nasopharynx     Collected:  2021  9:33 AM      Questions    Order Question Answer   Reason for testing: Inpatient 6-day testing      Symptomatic for COVID-19 as defined by CDC? No      Hospitalized for COVID-19? No      Admitted to ICU for COVID-19? No      Employed in healthcare setting? No      Resident in a congregate (group) care setting? No      Pregnant? Not pregnant      Release to patient Immediate            Result Read / Acknowledged    SARS-CoV-2 (COVID-19), PCR Nasopharynx (Order [id number])    No acknowledgement history exists for this order.   Guide for Clinicians to build Lab Component SmartPhrase    SARS-CoV-2 (COVID-19), PCR Nasopharynx (Order #[id number]) on 21   SARS-CoV-2 (COVID-19), PCR Nasopharynx  Order: [id number]  Status:  Final result   Visible to patient:  No (not released) Next appt:  2021 at 09:30 AM in [facility] ([facility] ECT/JOSSELYN)  Specimen Information: Nasopharynx; Nasopharyngeal Swab         0 Result Notes        Specimen Collected: 21 09:33 Last Resulted: 21 10:45       Order Details     View Encounter     Lab and Collection Details     Routing     Result History              SARS-CoV-2 (COVID-19), PCR Nasopharynx  Order: [id number] - Part of Panel Order [id number]  Status:  Final result   Visible to patient:  No (not released) Next appt:  2021 at 09:30 AM in [facility] ([facility] ECT/[facility]OSSELY[facility])  Specimen Information: Nasopharynx; Nasopharyngeal Swab         0 Result Notes    Ref Range & Units  Comments    SARS-CoV-2 (COVID-19) Negative Negative  EUA/IVD         Specimen Collected: 21 09:33 Last Resulted: 21 10:45       Order Details     View Encounter     Lab and Collection Details     Routing     Result History                Patient Information    Patient Name   Gemma Darnell ([id number]) Gender Identity   Female    2000   Results  CBC and differential [GMM965] (Order [id number])  Contains abnormal data CBC and differential  Order: [id number]  Status:  Final result    Visible to patient:  No (not released) Next appt with me:  None   0 Result Notes    Ref Range & Units 7/29/21 1010 7/21/21 0416 7/19/21 1746 5/27/21 1114 5/21/21 0544 5/17/21 2134    WBC 3.80 - 10.50 K/uL 8.31  8.04  9.67  7.95  9.80  8.55     RBC 3.93 - 5.22 M/uL 4.55  4.42  4.63  4.58  4.43  4.30     Hemoglobin 11.8 - 15.7 g/dL 13.6  13.0  13.5  13.6  13.3  12.7     Hematocrit 35.0 - 45.0 % 39.7  39.8  41.4  41.3  39.7  38.5     MCV 83.0 - 98.0 fL 87.3  90.0  89.4  90.2  89.6  89.5     MCH 28.0 - 33.2 pg 29.9  29.4  29.2  29.7  30.0  29.5     MCHC 32.2 - 35.5 g/dL 34.3  32.7  32.6  32.9  33.5  33.0     RDW 11.7 - 14.4 % 11.6Low   12.0  12.0  11.6Low   11.6Low   11.6Low      Platelets 150 - 369 K/uL 327  273  278  243  237  265     MPV 9.4 - 12.3 fL 9.3Low   9.8  9.7  10.2  10.0  9.9     Differential Type  Auto   Auto  Auto  Auto  Auto     nRBC <=0.0 % 0.0   0.0  0.0  0.0  0.0     Immature Granulocytes % 0.2   0.3  0.3  0.3  0.2     Neutrophils % 63.9   54.2  69.8  61.0  63.6     Lymphocytes % 27.6   33.7  22.9  28.1  26.7     Monocytes % 6.4   8.9  5.5  6.4  8.3     Eosinophils % 1.2   2.2  0.9  3.6  0.7     Basophils % 0.7   0.7  0.6  0.6  0.5     Immature Granulocytes, Absolute 0.00 - 0.08 K/uL 0.02   0.03  0.02  0.03  0.02     Neutrophils, Absolute 1.70 - 7.00 K/uL 5.31   5.24  5.55  5.98  5.44     Lymphocytes, Absolute 1.20 - 3.50 K/uL 2.29   3.26  1.82  2.75  2.28     Monocytes, Absolute 0.28 - 0.80 K/uL 0.53   0.86High   0.44  0.63  0.71     Eosinophils, Absolute 0.04 - 0.36 K/uL 0.10   0.21  0.07  0.35  0.06     Basophils, Absolute 0.01 - 0.10 K/uL 0.06   0.07  0.05  0.06  0.04          Specimen Collected: 07/29/21 10:10 Last Resulted: 07/29/21 10:12        Lab Flowsheet     Order Details     View Encounter     Lab and Collection Details     Routing     Result History              Order Providers    Authorizing Provider Encounter Provider   Hari Braun MD None   Supervision  Information    Supervising Provider Type of Supervision   Cy Avendaño MD Indirect Supervision (General supervision)   Order Report    CBC and differential (Order #[id number]) on 7/29/21   Collection Information    Specimen ID: [id number]    Blood    Blood, Venous    Venipuncture     Collected: 7/29/2021 10:10 AM    PADMINI GRANT ANGELA   Received: 7/29/2021 10:10 AM    Resulting Agency: [facility]    [address]   [place] [address]      Lab Information    Lab   [facility]   [address]   [place] [address]   : Tony Dallas MD              Additional Information    Specimen ID Bill Type Client ID   [id number]            Specimen Date Taken Specimen Time Taken Specimen Received Date Specimen Received Time Result Date Result Time   Jul 29, 2021 10:10 AM Jul 29, 2021 10:10 AM Jul 29, 2021 10:12 AM   Result Read / Acknowledged    No acknowledgement history exists for this order.   Guide for Clinicians to build Lab Component SmartPhrase    CBC and differential (Order #[id number]) on 7/29/21   Contains abnormal data CBC and differential  Order: [id number]  Status:  Final result   Visible to patient:  No (not released) Next appt:  08/09/2021 at 09:30 AM in [facility] ([facility] ECT/JOSSELYN)   0 Result Notes    Ref Range & Units 7/29/21 1010 7/21/21 0416 7/19/21 1746 5/27/21 1114 5/21/21 0544 5/17/21 2134    WBC 3.80 - 10.50 K/uL 8.31  8.04  9.67  7.95  9.80  8.55     RBC 3.93 - 5.22 M/uL 4.55  4.42  4.63  4.58  4.43  4.30     Hemoglobin 11.8 - 15.7 g/dL 13.6  13.0  13.5  13.6  13.3  12.7     Hematocrit 35.0 - 45.0 % 39.7  39.8  41.4  41.3  39.7  38.5     MCV 83.0 - 98.0 fL 87.3  90.0  89.4  90.2  89.6  89.5     MCH 28.0 - 33.2 pg 29.9  29.4  29.2  29.7  30.0  29.5     MCHC 32.2 - 35.5 g/dL 34.3  32.7  32.6  32.9  33.5  33.0     RDW 11.7 - 14.4 % 11.6Low   12.0  12.0  11.6Low   11.6Low   11.6Low      Platelets 150 - 369 K/uL 327  273  278  243  237  265     MPV 9.4 -  12.3 fL 9.3Low   9.8  9.7  10.2  10.0  9.9     Differential Type  Auto   Auto  Auto  Auto  Auto     nRBC <=0.0 % 0.0   0.0  0.0  0.0  0.0     Immature Granulocytes % 0.2   0.3  0.3  0.3  0.2     Neutrophils % 63.9   54.2  69.8  61.0  63.6     Lymphocytes % 27.6   33.7  22.9  28.1  26.7     Monocytes % 6.4   8.9  5.5  6.4  8.3     Eosinophils % 1.2   2.2  0.9  3.6  0.7     Basophils % 0.7   0.7  0.6  0.6  0.5     Immature Granulocytes, Absolute 0.00 - 0.08 K/uL 0.02   0.03  0.02  0.03  0.02     Neutrophils, Absolute 1.70 - 7.00 K/uL 5.31   5.24  5.55  5.98  5.44     Lymphocytes, Absolute 1.20 - 3.50 K/uL 2.29   3.26  1.82  2.75  2.28     Monocytes, Absolute 0.28 - 0.80 K/uL 0.53   0.86High   0.44  0.63  0.71     Eosinophils, Absolute 0.04 - 0.36 K/uL 0.10   0.21  0.07  0.35  0.06     Basophils, Absolute 0.01 - 0.10 K/uL 0.06   0.07  0.05  0.06  0.04          Specimen Collected: 21 10:10 Last Resulted: 21 10:12        Lab Flowsheet     Order Details     View Encounter     Lab and Collection Details     Routing     Result History                Patient Information    Patient Name   Gemma Darnell ([id number]) Gender Identity   Female    2000   Results  Basic metabolic panel [LAB15] (Order [id number])  Basic metabolic panel  Order: [id number]  Status:  Final result   Visible to patient:  No (not released) Next appt with me:  None   0 Result Notes    Ref Range & Units 21 1010 21 0416 21 0413 21 1746 21 1114 21 0544    Sodium 136 - 144 mEQ/L 137  139  138  139  136  138     Potassium 3.6 - 5.1 mEQ/L 4.7  4.4  4.1  3.6 CM  4.4  4.1     Chloride 98 - 109 mEQ/L 104  107  107  106  105  104     CO2 22 - 32 mEQ/L 27  25  22  20Low   23  26     BUN 8 - 20 mg/dL 8  7Low   5Low   11  9  8     Creatinine 0.6 - 1.1 mg/dL 0.7  0.7  0.5Low   0.8  0.8  0.7     Glucose 70 - 99 mg/dL 91  98  89  92  95  91     Calcium 8.9 - 10.3 mg/dL 9.7  9.2  8.6Low   9.3  9.5  9.6     eGFR >=60.0  mL/min/1.73m*2 >60.0  >60.0  >60.0  >60.0  >60.0  >60.0     Anion Gap 3 - 15 mEQ/L 6  7  9  13  8  8          Specimen Collected: 07/29/21 10:10 Last Resulted: 07/29/21 10:48        Lab Flowsheet     Order Details     View Encounter     Lab and Collection Details     Routing     Result History        CM=Additional comments        Related Result Highlights     TSH  Final result 7/29/2021                  Order Providers    Authorizing Provider Encounter Provider   Hari Braun MD None   Supervision Information    Supervising Provider Type of Supervision   Cy Avendaño MD Indirect Supervision (General supervision)   Order Report    Basic metabolic panel (Order #[id number]) on 7/29/21   Collection Information    Specimen ID: [id number]    Blood    Blood, Venous    Venipuncture     Collected: 7/29/2021 10:10 AM    PADMINI GRANT   Received: 7/29/2021 10:10 AM    Resulting Agency: [facility]    [address]   [place] [address]      Lab Information    Lab   [facility]   [address]   [place] [address]   : Tony Dallas MD              Additional Information    Specimen ID Bill Type Client ID   [id number]            Specimen Date Taken Specimen Time Taken Specimen Received Date Specimen Received Time Result Date Result Time   Jul 29, 2021 10:10 AM Jul 29, 2021 10:10 AM Jul 29, 2021 10:48 AM   Result Read / Acknowledged    No acknowledgement history exists for this order.   Guide for Clinicians to build Lab Component SmartPhrase    Basic metabolic panel (Order #[id number]) on 7/29/21   Basic metabolic panel  Order: [id number]  Status:  Final result   Visible to patient:  No (not released) Next appt:  08/09/2021 at 09:30 AM in No [facility] ([facility] GI ECT/JOSSELYN)   0 Result Notes    Ref Range & Units 7/29/21 1010 7/21/21 0416 7/20/21 0413 7/19/21 1746 5/27/21 1114 5/21/21 0544    Sodium 136 - 144 mEQ/L 137  139  138  139  136  138     Potassium 3.6 - 5.1  mEQ/L 4.7  4.4  4.1  3.6 CM  4.4  4.1     Chloride 98 - 109 mEQ/L 104  107  107  106  105  104     CO2 22 - 32 mEQ/L 27  25  22  20Low   23  26     BUN 8 - 20 mg/dL 8  7Low   5Low   11  9  8     Creatinine 0.6 - 1.1 mg/dL 0.7  0.7  0.5Low   0.8  0.8  0.7     Glucose 70 - 99 mg/dL 91  98  89  92  95  91     Calcium 8.9 - 10.3 mg/dL 9.7  9.2  8.6Low   9.3  9.5  9.6     eGFR >=60.0 mL/min/1.73m*2 >60.0  >60.0  >60.0  >60.0  >60.0  >60.0     Anion Gap 3 - 15 mEQ/L 6  7  9  13  8  8          Specimen Collected: 21 10:10 Last Resulted: 21 10:48        Lab Flowsheet     Order Details     View Encounter     Lab and Collection Details     Routing     Result History        CM=Additional comments        Related Result Highlights     TSH  Final result 2021                    Patient Information    Patient Name   Gemma Darnell ([id number]) Gender Identity   Female    2000   Results  TSH [UZF811] (Order [id number])  TSH  Order: [id number]  Status:  Final result   Visible to patient:  No (not released) Next appt with me:  None   0 Result Notes    Ref Range & Units 21 1010 21 0413 21 0544    TSH 0.34 - 5.60 mIU/L 1.18  4.40  2.07          Specimen Collected: 21 10:10 Last Resulted: 21 10:54        Lab Flowsheet     Order Details     View Encounter     Lab and Collection Details     Routing     Result History           Related Result Highlights     Basic metabolic panel  Final result 2021                  Order Providers    Authorizing Provider Encounter Provider   Hari Braun MD None   Supervision Information    Supervising Provider Type of Supervision   Cy Avendaño MD Indirect Supervision (General supervision)   Order Report    TSH (Order #[id number]) on 21   Collection Information    Specimen ID: [id number]    Blood    Blood, Venous    Venipuncture     Collected: 2021 10:10 AM    PADMINI GRANT   Received: 2021 10:10 AM    Resulting Agency:  [facility]    [address]   [place] [address]      Lab Information    Lab   [facility]   [address]   [place] [address]   : Tony Dallas MD              Additional Information    Specimen ID Bill Type Client ID   [id number]            Specimen Date Taken Specimen Time Taken Specimen Received Date Specimen Received Time Result Date Result Time   2021 10:10 AM 2021 10:10 AM 2021 10:54 AM   Result Read / Acknowledged    No acknowledgement history exists for this order.   Guide for Clinicians to build Lab Component SmartPhrase    TSH (Order #[id number]) on 21   TSH  Order: [id number]  Status:  Final result   Visible to patient:  No (not released) Next appt:  2021 at 09:30 AM in [facility] ([facility] ECT/JOSSELYN)   0 Result Notes    Ref Range & Units 21 1010 21 0413 21 0544    TSH 0.34 - 5.60 mIU/L 1.18  4.40  2.07          Specimen Collected: 21 10:10 Last Resulted: 21 10:54        Lab Flowsheet     Order Details     View Encounter     Lab and Collection Details     Routing     Result History           Related Result Highlights     Basic metabolic panel  Final result 2021                    Patient Information    Patient Name   Gemma Darnell ([id number]) Gender Identity   Female    2000   Results  Urine drug screen (UDS) [XYM179] (Order [id number])  Urine drug screen (UDS)  Order: [id number]  Status:  Final result   Visible to patient:  No (not released) Next appt with me:  None   0 Result Notes    Ref Range & Units 21 1913 21 2307    PCP Scrn, Ur Not Detected Not Detected  Not Detected CM    Comment: Assay Detects: phencyclidine in urine. Lowest detectable concentration is 25 ng/mL of phencyclidine.    Benzodiazepine Ur Qual Not Detected Not Detected  Not Detected CM    Comment: Assay Detects: benzodiazepines and metabolites at varying concentrations. Lowest detectable  concentration is 200 ng/mL of oxazepam.    Cocaine Screen, Urine Not Detected Not Detected  Not Detected CM    Comment: Assay Detects: benzoylecgonine and cocaine in urine. Lowest detectable concentration is 300 ng/mL of benzoylecgonine.    Amphetamine+Methamphetamine Screen, Ur Not Detected Not Detected  Not Detected CM    Comment: Assay Detects: d-methamphetamine, d-amphetamine, methlyenedioxyamphetamine (MDA), and methlyenendioxymethamphetamine (MDMA) in urine. Lowest detectable concentration is 1000 ng/mL of d-methamphetamine.   Assay is less sensitive to MDA and MDMA (lowest detectable concentration, 2500 ng/mL) and could produce a false negative result. If MDMA overdose is suspected and the result is negative, a more specific test should be requested.    Cannabinoid Screen, Urine Not Detected Not Detected  Not Detected CM    Comment: Assay Detects: cannabinoid metabolites in urine. Lowest detectable concentration is 50 ng/mL    Opiate Scrn, Ur Not Detected Not Detected  Not Detected CM    Comment: Assay Detects: codeine, dihydrocodeine, hydrocodone, hydromorphone, levorphanol, morphine, morphine-3-glucuronide, norcodeine, oxycodone in urine. Lowest detectable concentration is 300 ng/mL of morphine.    Barbiturate Screen, Ur Not Detected Not Detected  Not Detected CM    Comment: Assay Detects: alphenal, amobarbital, aprobarbital, barbital, butabarbital, butalbital, butethal, diallybarbital, pentobarbital, secobarbital,talbutal, and thiopental. Lowest detectable concentration is 200 ng/mL of secobarbital.         Specimen Collected: 07/19/21 19:13 Last Resulted: 07/19/21 19:42        Lab Flowsheet     Order Details     View Encounter     Lab and Collection Details     Routing     Result History        CM=Additional comments           Order Providers    Authorizing Provider Encounter Provider   Price Villalobos, DO None   Supervision Information    Supervising Provider Type of Supervision   Cy Avendaño MD  Indirect Supervision (General supervision)   Order Report    Urine drug screen (UDS) (Order #[id number]) on 7/19/21   Collection Information    Specimen ID: [id number]    Urine    Urine, Clean Catch    Non-blood Collection     Collected: 7/19/2021  7:13 PM    MARCY WALKER   Received: 7/19/2021  7:17 PM    Resulting Agency: [facility]    [address]   [place] [address]      Lab Information    Lab   [facility]   [address]   [place] [address]   : Tony Dallas MD              Additional Information    Specimen ID Bill Type Client ID   [id number]            Specimen Date Taken Specimen Time Taken Specimen Received Date Specimen Received Time Result Date Result Time   Jul 19, 2021  7:13 PM Jul 19, 2021  7:17 PM Jul 19, 2021  7:42 PM   Result Read / Acknowledged    No acknowledgement history exists for this order.   Guide for Clinicians to build Lab Component SmartPhrase    Urine drug screen (UDS) (Order #[id number]) on 7/19/21   Urine drug screen (UDS)  Order: [id number]  Status:  Final result   Visible to patient:  No (not released) Next appt:  08/09/2021 at 09:30 AM in [facility] ([facility])   0 Result Notes    Ref Range & Units 7/19/21 1913 5/17/21 2307    PCP Scrn, Ur Not Detected Not Detected  Not Detected CM    Comment: Assay Detects: phencyclidine in urine. Lowest detectable concentration is 25 ng/mL of phencyclidine.    Benzodiazepine Ur Qual Not Detected Not Detected  Not Detected CM    Comment: Assay Detects: benzodiazepines and metabolites at varying concentrations. Lowest detectable concentration is 200 ng/mL of oxazepam.    Cocaine Screen, Urine Not Detected Not Detected  Not Detected CM    Comment: Assay Detects: benzoylecgonine and cocaine in urine. Lowest detectable concentration is 300 ng/mL of benzoylecgonine.    Amphetamine+Methamphetamine Screen, Ur Not Detected Not Detected  Not Detected CM    Comment: Assay Detects:  d-methamphetamine, d-amphetamine, methlyenedioxyamphetamine (MDA), and methlyenendioxymethamphetamine (MDMA) in urine. Lowest detectable concentration is 1000 ng/mL of d-methamphetamine.   Assay is less sensitive to MDA and MDMA (lowest detectable concentration, 2500 ng/mL) and could produce a false negative result. If MDMA overdose is suspected and the result is negative, a more specific test should be requested.    Cannabinoid Screen, Urine Not Detected Not Detected  Not Detected CM    Comment: Assay Detects: cannabinoid metabolites in urine. Lowest detectable concentration is 50 ng/mL    Opiate Scrn, Ur Not Detected Not Detected  Not Detected CM    Comment: Assay Detects: codeine, dihydrocodeine, hydrocodone, hydromorphone, levorphanol, morphine, morphine-3-glucuronide, norcodeine, oxycodone in urine. Lowest detectable concentration is 300 ng/mL of morphine.    Barbiturate Screen, Ur Not Detected Not Detected  Not Detected CM    Comment: Assay Detects: alphenal, amobarbital, aprobarbital, barbital, butabarbital, butalbital, butethal, diallybarbital, pentobarbital, secobarbital,talbutal, and thiopental. Lowest detectable concentration is 200 ng/mL of secobarbital.         Specimen Collected: 07/19/21 19:13 Last Resulted: 07/19/21 19:42        Lab Flowsheet     Order Details     View Encounter     Lab and Collection Details     Routing     Result History        CM=Additional comments

## 2021-07-21 NOTE — STUDENT
"Psychiatry Progress Note    Chief Complaint/Reason for follow-up: Intentional Overdose    Interval History: Pt is received in bed, awake and alert, eating breakfast. Reports sleeping well last night. Denies pain, dizziness. Denies current thoughts of suicide. She is agreeable to transfer to acute inpatient psychiatric hospitalization once medically cleared.     Review of Systems:   No clear evidence of abnormal motor movement    Vital Signs for the last 24 hours:  Temp:  [36.4 °C (97.6 °F)-36.8 °C (98.3 °F)] 36.8 °C (98.3 °F)  Heart Rate:  [] 100  Resp:  [16-18] 18  BP: (106-121)/(66-77) 118/67    Labs:  Results from last 7 days   Lab Units 07/21/21  0416 07/20/21  0413 07/19/21  1746 07/19/21  1746   HEMOGLOBIN g/dL 13.0  --   --  13.5   WBC K/uL 8.04  --   --  9.67   PLATELETS K/uL 273  --   --  278   POTASSIUM mEQ/L 4.4 4.1   < > 3.6   SODIUM mEQ/L 139 138   < > 139   CREATININE mg/dL 0.7 0.5*   < > 0.8    < > = values in this interval not displayed.       Mental Status Evaluation:  Appearance:  pt appears stated age, sitting up, eating breakfast in bed, wearing hospital gown   Behavior:  calm and cooperative   Speech:  soft, clear   Mood:  \"okay\"   Affect:  blunted   Thought Process:  goal directed   Thought Content:  denies SI   Sensorium:  AOx3   Cognition:  grossly intact   Insight:  limited   Judgment:  currently accepting care     Unspecified mood (affective) disorder  Assessment & Plan  Patient with history of mood disorder, eating disorcer, cluster B presenting following intentional overdose with psychiatric medications. No clear evidence of delirium or distress.   1) recommend holding psychiatric medications pending transfer to acute inpatient psychiatric hospitalization  2) Can offer ativan 1mg PO q8hr PRN for moderate anxiety  3) Disposition: acute inpatient psychiatric hospitalization    Intentional drug overdose  Assessment & Plan  Patient presents following an intentional overdose with " psychiatric medications. Patient reports that this was a planned overdose and that she wrote a suicide note. She denies current suicidal ideation. Would llikely benefit from acute inpatient

## 2021-07-21 NOTE — NURSING NOTE
Pt discharged to Psych/Behav health unit via w/c with [facility] psych PCT and pt's 1:1 Tiara. IV was removed and tele monitor removed earlier today. Pt's belongings in bags will also be brought over by Psych staff. Pt mother was at bedside to say goodbye to pt. Mother and pt were tearful but calm.

## 2021-07-21 NOTE — LETTER
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 07/20/21  0413 07/19/21  1746 07/19/21  1746   HEMOGLOBIN g/dL 13.0  --   --  13.5   WBC K/uL 8.04  --   --  9.67   PLATELETS K/uL 273  --   --  278   POTASSIUM mEQ/L 4.4 4.1   < > 3.6   SODIUM mEQ/L 139 138   < > 139   CREATININE mg/dL 0.7 0.5*   < > 0.8    < > = values in this interval not displayed.         MENTAL STATUS EXAM  Appearance: well groomed  Gait and Motor: no abnormal movements and normal  Speech: normal rate/rhythm/volume  Mood: depressed  Affect: constricted  Associations: coherent  Thought Process: goal-directed  Thought Content: no auditory or visual hallucinations.  Suicidality/Homicidality: denies  Judgement/Insight: minimizes severity level of illness and help rejecting  Orientation: day, month and year  Memory: recalls recent events and recalls remote events  Attention: alert  Knowledge: normal  Language: normal      Assessment/Plan  Major depressive disorder with current active episode  Assessment & Plan     -1:1 for safety given history of prior self harm in an inpatient facility, will decrease obs level as possible  -Structure and support, encourage group and milieu participation   -restart effexor  today. Will look into option of receving tms on inpt unit. ect may be consideration as well.   -keep family update  -dispo likely residential program     Sinus tachycardia  Assessment & Plan  Ctm, follow with Hari Mcclelland MD   Physician   Psychiatry   Progress Notes       Signed   Date of Service:  7/27/2021 10:45 AM               Signed             []Hide copied text    []Hover for details  PSYCHIATRIC PROGRESS NOTE     Interval History: remains flat and withdrawn. +hopeless/helpless. Denies active si on unit. Only vaguely future oriented, asking about possible residential transfer. Eating meals, sleeping well. Limited spontaneous interactions. Discussed options of tms, ect and some openness to latter. Mother updated on these options as well.         Vital Signs for the last 24 hours:  Temp:  [36.7 °C (98.1 °F)-36.8 °C (98.3 °F)] 36.7 °C (98.1 °F)  Heart Rate:  [] 125  Resp:  [12-16] 16  BP: (104-109)/(63-78) 105/78     Scheduled Meds:  • nicotine  1 patch transdermal Daily   • [START ON 7/28/2021] venlafaxine XR  75 mg oral Daily with breakfast         Labs:       Results from last 7 days   Lab Units 07/21/21  0416   HEMOGLOBIN g/dL 13.0   WBC K/uL 8.04   PLATELETS K/uL 273   POTASSIUM mEQ/L 4.4   SODIUM mEQ/L 139   CREATININE mg/dL 0.7         MENTAL STATUS EXAM  Appearance: well groomed  Gait and Motor: no abnormal movements and normal  Speech: normal rate/rhythm/volume  Mood: depressed  Affect: constricted  Associations: coherent  Thought Process: goal-directed  Thought Content: no auditory or visual hallucinations.  Suicidality/Homicidality: denies  Judgement/Insight: minimizes severity level of illness and help rejecting  Orientation: day, month and year  Memory: recalls recent events and recalls remote events  Attention: alert  Knowledge: normal  Language: normal      Assessment/Plan  Major depressive disorder with current active episode  Assessment & Plan     -1:1 for  safety given history of prior self harm in an inpatient facility, will decrease obs level as possible  -Structure and support, encourage group and milieu participation   -increase effexor as tolerated. Looking into neuromodulation options.   -keep family updated  -dispo likely residential program     Sinus tachycardia  Assessment & Plan  Ctm[place] [facility]                 Patient Information    Patient Name   Gemma Darnell ([id number]) Gender Identity   Female    2000   Results  Comprehensive metabolic panel [LAB17] (Order [id number])  Contains abnormal data Comprehensive metabolic panel  Order: [id number]  Status:  Final result   Visible to patient:  No (not released) Next appt with me:  None   0 Result Notes    Ref Range & Units 21 0416 21 0413 21 1746 21 1746 21 1114 21 0544    Sodium 136 - 144 mEQ/L 139  138   139  136  138     Potassium 3.6 - 5.1 mEQ/L 4.4  4.1   3.6 CM  4.4  4.1     Chloride 98 - 109 mEQ/L 107  107   106  105  104     CO2 22 - 32 mEQ/L 25  22   20Low   23  26     BUN 8 - 20 mg/dL 7Low   5Low    11  9  8     Creatinine 0.6 - 1.1 mg/dL 0.7  0.5Low    0.8  0.8  0.7     Glucose 70 - 99 mg/dL 98  89   92  95  91     Calcium 8.9 - 10.3 mg/dL 9.2  8.6Low    9.3  9.5  9.6     AST (SGOT) 15 - 41 IU/L 33  38  45High    16  21     ALT (SGPT) 11 - 54 IU/L 64High   64High   72High    14  13     Alkaline Phosphatase 35 - 126 IU/L 55  50  63   44  48     Total Protein 6.0 - 8.2 g/dL 6.4  5.9Low   7.2   6.9  6.6     Albumin 3.4 - 5.0 g/dL 3.9  3.6  4.4   4.4  4.0     Bilirubin, Total 0.3 - 1.2 mg/dL 0.8  0.6  0.5   0.7  0.4     eGFR >=60.0 mL/min/1.73m*2 >60.0  >60.0   >60.0  >60.0  >60.0     Anion Gap 3 - 15 mEQ/L 7  9   13  8  8          Specimen Collected: 21 04:16 Last Resulted: 21 05:32        Lab Flowsheet     Order Details     View Encounter     Lab and Collection Details     Routing     Result History        CM=Additional comments        Related  Result Highlights     Magnesium  Final result 7/21/2021             CBC  Final result 7/21/2021                  Order Providers    Authorizing Provider Encounter Provider   Francisco Gale, DO None   Supervision Information    Supervising Provider Type of Supervision   Cy Avendaño MD Indirect Supervision (General supervision)   Order Report    Comprehensive metabolic panel (Order #[id number]) on 7/21/21   Collection Information    Specimen ID: [id number]    Blood    Blood, Venous    Venipuncture     Collected: 7/21/2021  4:16 AM    SHIMA ESTEVES   Received: 7/21/2021  4:28 AM    Resulting Agency: [facility]    [address]   [place] [address]      Lab Information    Lab   [facility]   [address]   [place] [address]   : Tony Dallas MD              Additional Information    Specimen ID Bill Type Client ID   [id number]            Specimen Date Taken Specimen Time Taken Specimen Received Date Specimen Received Time Result Date Result Time   Jul 21, 2021  4:16 AM Jul 21, 2021  4:28 AM Jul 21, 2021  5:32 AM   Result Read / Acknowledged    No acknowledgement history exists for this order.   Guide for Clinicians to build Lab Component SmartPhrase    Comprehensive metabolic panel (Order #[id number]) on 7/21/21   Contains abnormal data Comprehensive metabolic panel  Order: [id number]  Status:  Final result   Visible to patient:  No (not released) Next appt:  None   0 Result Notes    Ref Range & Units 7/21/21 0416 7/20/21 0413 7/19/21 1746 7/19/21 1746 5/27/21 1114 5/21/21 0544    Sodium 136 - 144 mEQ/L 139  138   139  136  138     Potassium 3.6 - 5.1 mEQ/L 4.4  4.1   3.6 CM  4.4  4.1     Chloride 98 - 109 mEQ/L 107  107   106  105  104     CO2 22 - 32 mEQ/L 25  22   20Low   23  26     BUN 8 - 20 mg/dL 7Low   5Low    11  9  8     Creatinine 0.6 - 1.1 mg/dL 0.7  0.5Low    0.8  0.8  0.7     Glucose 70 - 99 mg/dL 98  89   92  95  91     Calcium 8.9 - 10.3  mg/dL 9.2  8.6Low    9.3  9.5  9.6     AST (SGOT) 15 - 41 IU/L 33  38  45High    16  21     ALT (SGPT) 11 - 54 IU/L 64High   64High   72High    14  13     Alkaline Phosphatase 35 - 126 IU/L 55  50  63   44  48     Total Protein 6.0 - 8.2 g/dL 6.4  5.9Low   7.2   6.9  6.6     Albumin 3.4 - 5.0 g/dL 3.9  3.6  4.4   4.4  4.0     Bilirubin, Total 0.3 - 1.2 mg/dL 0.8  0.6  0.5   0.7  0.4     eGFR >=60.0 mL/min/1.73m*2 >60.0  >60.0   >60.0  >60.0  >60.0     Anion Gap 3 - 15 mEQ/L 7  9   13  8  8          Specimen Collected: 21 04:16 Last Resulted: 21 05:32        Lab Flowsheet     Order Details     View Encounter     Lab and Collection Details     Routing     Result History        CM=Additional comments        Related Result Highlights     Magnesium  Final result 2021             CBC  Final result 2021                    Patient Information    Patient Name   Gemma Darnell ([id number]) Gender Identity   Female    2000   Results  CBC [ETB037] (Order [id number])  CBC  Order: [id number]  Status:  Final result   Visible to patient:  No (not released) Next appt with me:  None   0 Result Notes    Ref Range & Units 21 0416 21 1746 21 1114 21 0544 21 2134 10/5/17 0102    WBC 3.80 - 10.50 K/uL 8.04  9.67  7.95  9.80  8.55  18.70High  R     RBC 3.93 - 5.22 M/uL 4.42  4.63  4.58  4.43  4.30  4.48 R     Hemoglobin 11.8 - 15.7 g/dL 13.0  13.5  13.6  13.3  12.7  13.1 R     Hematocrit 35.0 - 45.0 % 39.8  41.4  41.3  39.7  38.5  38.2 R     MCV 83.0 - 98.0 fL 90.0  89.4  90.2  89.6  89.5  85.3 R     MCH 28.0 - 33.2 pg 29.4  29.2  29.7  30.0  29.5  29.2 R     MCHC 32.2 - 35.5 g/dL 32.7  32.6  32.9  33.5  33.0  34.3 R     RDW 11.7 - 14.4 % 12.0  12.0  11.6Low   11.6Low   11.6Low   12.7 R     Platelets 150 - 369 K/uL 273  278  243  237  265  303 R     MPV 9.4 - 12.3 fL 9.8  9.7  10.2  10.0  9.9  10.5 R          Specimen Collected: 21 04:16 Last Resulted: 21 04:40        Lab  Flowsheet     Order Details     View Encounter     Lab and Collection Details     Routing     Result History        R=Reference range differs from displayed range        Related Result Highlights     Comprehensive metabolic panel  Final result 7/21/2021                  Order Providers    Authorizing Provider Encounter Provider   Francisco Gale, DO None   Supervision Information    Supervising Provider Type of Supervision   Cy Avendaño MD Indirect Supervision (General supervision)   Order Report    CBC (Order #[id number]) on 7/21/21   Collection Information    Specimen ID: [id number]    Blood    Blood, Venous    Venipuncture     Collected: 7/21/2021  4:16 AM    SHIMA ESTEVES   Received: 7/21/2021  4:28 AM    Resulting Agency: [facility]    [address]   [place] [address]      Lab Information    Lab   [facility]   [address]   [place] [address]   : Tony Dallas MD              Additional Information    Specimen ID Bill Type Client ID   [id number]            Specimen Date Taken Specimen Time Taken Specimen Received Date Specimen Received Time Result Date Result Time   Jul 21, 2021  4:16 AM Jul 21, 2021  4:28 AM Jul 21, 2021  4:40 AM   Result Read / Acknowledged    No acknowledgement history exists for this order.   Guide for Clinicians to build Lab Component SmartPhrase    CBC (Order #[id number]) on 7/21/21   CBC  Order: [id number]  Status:  Final result   Visible to patient:  No (not released) Next appt:  None   0 Result Notes    Ref Range & Units 7/21/21 0416 7/19/21 1746 5/27/21 1114 5/21/21 0544 5/17/21 2134 10/5/17 0102    WBC 3.80 - 10.50 K/uL 8.04  9.67  7.95  9.80  8.55  18.70High  R     RBC 3.93 - 5.22 M/uL 4.42  4.63  4.58  4.43  4.30  4.48 R     Hemoglobin 11.8 - 15.7 g/dL 13.0  13.5  13.6  13.3  12.7  13.1 R     Hematocrit 35.0 - 45.0 % 39.8  41.4  41.3  39.7  38.5  38.2 R     MCV 83.0 - 98.0 fL 90.0  89.4  90.2  89.6  89.5  85.3 R      MCH 28.0 - 33.2 pg 29.4  29.2  29.7  30.0  29.5  29.2 R     MCHC 32.2 - 35.5 g/dL 32.7  32.6  32.9  33.5  33.0  34.3 R     RDW 11.7 - 14.4 % 12.0  12.0  11.6Low   11.6Low   11.6Low   12.7 R     Platelets 150 - 369 K/uL 273  278  243  237  265  303 R     MPV 9.4 - 12.3 fL 9.8  9.7  10.2  10.0  9.9  10.5 R          Specimen Collected: 21 04:16 Last Resulted: 21 04:40        Lab Flowsheet     Order Details     View Encounter     Lab and Collection Details     Routing     Result History        R=Reference range differs from displayed range        Related Result Highlights     Comprehensive metabolic panel  Final result 2021                    Patient Information    Patient Name   Gemma Darnell ([id number]) Gender Identity   Female    2000   Results  Urine drug screen (UDS) [HVK042] (Order [id number])  Urine drug screen (UDS)  Order: [id number]  Status:  Final result   Visible to patient:  No (not released) Next appt with me:  None   0 Result Notes    Ref Range & Units 21 1913 21 2307    PCP Scrn, Ur Not Detected Not Detected  Not Detected CM    Comment: Assay Detects: phencyclidine in urine. Lowest detectable concentration is 25 ng/mL of phencyclidine.    Benzodiazepine Ur Qual Not Detected Not Detected  Not Detected CM    Comment: Assay Detects: benzodiazepines and metabolites at varying concentrations. Lowest detectable concentration is 200 ng/mL of oxazepam.    Cocaine Screen, Urine Not Detected Not Detected  Not Detected CM    Comment: Assay Detects: benzoylecgonine and cocaine in urine. Lowest detectable concentration is 300 ng/mL of benzoylecgonine.    Amphetamine+Methamphetamine Screen, Ur Not Detected Not Detected  Not Detected CM    Comment: Assay Detects: d-methamphetamine, d-amphetamine, methlyenedioxyamphetamine (MDA), and methlyenendioxymethamphetamine (MDMA) in urine. Lowest detectable concentration is 1000 ng/mL of d-methamphetamine.   Assay is less sensitive to MDA  and MDMA (lowest detectable concentration, 2500 ng/mL) and could produce a false negative result. If MDMA overdose is suspected and the result is negative, a more specific test should be requested.    Cannabinoid Screen, Urine Not Detected Not Detected  Not Detected CM    Comment: Assay Detects: cannabinoid metabolites in urine. Lowest detectable concentration is 50 ng/mL    Opiate Scrn, Ur Not Detected Not Detected  Not Detected CM    Comment: Assay Detects: codeine, dihydrocodeine, hydrocodone, hydromorphone, levorphanol, morphine, morphine-3-glucuronide, norcodeine, oxycodone in urine. Lowest detectable concentration is 300 ng/mL of morphine.    Barbiturate Screen, Ur Not Detected Not Detected  Not Detected CM    Comment: Assay Detects: alphenal, amobarbital, aprobarbital, barbital, butabarbital, butalbital, butethal, diallybarbital, pentobarbital, secobarbital,talbutal, and thiopental. Lowest detectable concentration is 200 ng/mL of secobarbital.         Specimen Collected: 07/19/21 19:13 Last Resulted: 07/19/21 19:42        Lab Flowsheet     Order Details     View Encounter     Lab and Collection Details     Routing     Result History        CM=Additional comments           Order Providers    Authorizing Provider Encounter Provider   Price Villalobos, DO None   Supervision Information    Supervising Provider Type of Supervision   Cy Avendaño MD Indirect Supervision (General supervision)   Order Report    Urine drug screen (UDS) (Order #[id number]) on 7/19/21   Collection Information    Specimen ID: [id number]    Urine    Urine, Clean Catch    Non-blood Collection     Collected: 7/19/2021  7:13 PM    MARCY WALKER   Received: 7/19/2021  7:17 PM    Resulting Agency: [facility]    [address]   [place] [address]      Lab Information    Lab   [facility]   [address]   [place] [address]   : Tony Dallas MD              Additional  Information    Specimen ID Bill Type Client ID   [id number]            Specimen Date Taken Specimen Time Taken Specimen Received Date Specimen Received Time Result Date Result Time   Jul 19, 2021  7:13 PM Jul 19, 2021  7:17 PM Jul 19, 2021  7:42 PM   Result Read / Acknowledged    No acknowledgement history exists for this order.   Guide for Clinicians to build Lab Component SmartPh[employer]    Urine drug screen (UDS) (Order #[id number]) on 7/19/21   Urine drug screen (UDS)  Order: [id number]  Status:  Final result   Visible to patient:  No (not released) Next appt:  None   0 Result Notes    Ref Range & Units 7/19/21 1913 5/17/21 2307    PCP Scrn, Ur Not Detected Not Detected  Not Detected CM    Comment: Assay Detects: phencyclidine in urine. Lowest detectable concentration is 25 ng/mL of phencyclidine.    Benzodiazepine Ur Qual Not Detected Not Detected  Not Detected CM    Comment: Assay Detects: benzodiazepines and metabolites at varying concentrations. Lowest detectable concentration is 200 ng/mL of oxazepam.    Cocaine Screen, Urine Not Detected Not Detected  Not Detected CM    Comment: Assay Detects: benzoylecgonine and cocaine in urine. Lowest detectable concentration is 300 ng/mL of benzoylecgonine.    Amphetamine+Methamphetamine Screen, Ur Not Detected Not Detected  Not Detected CM    Comment: Assay Detects: d-methamphetamine, d-amphetamine, methlyenedioxyamphetamine (MDA), and methlyenendioxymethamphetamine (MDMA) in urine. Lowest detectable concentration is 1000 ng/mL of d-methamphetamine.   Assay is less sensitive to MDA and MDMA (lowest detectable concentration, 2500 ng/mL) and could produce a false negative result. If MDMA overdose is suspected and the result is negative, a more specific test should be requested.    Cannabinoid Screen, Urine Not Detected Not Detected  Not Detected CM    Comment: Assay Detects: cannabinoid metabolites in urine. Lowest detectable concentration is 50 ng/mL    Opiate Scrn, Ur Not  Detected Not Detected  Not Detected CM    Comment: Assay Detects: codeine, dihydrocodeine, hydrocodone, hydromorphone, levorphanol, morphine, morphine-3-glucuronide, norcodeine, oxycodone in urine. Lowest detectable concentration is 300 ng/mL of morphine.    Barbiturate Screen, Ur Not Detected Not Detected  Not Detected CM    Comment: Assay Detects: alphenal, amobarbital, aprobarbital, barbital, butabarbital, butalbital, butethal, diallybarbital, pentobarbital, secobarbital,talbutal, and thiopental. Lowest detectable concentration is 200 ng/mL of secobarbital.         Specimen Collected: 07/19/21 19:13 Last Resulted: 07/19/21 19:42        Lab Flowsheet     Order Details     View Encounter     Lab and Collection Details     Routing     Result History        CM=Additional comments

## 2021-07-21 NOTE — BEHAVIORAL HEALTH CRISIS PROGRESS NOTE
201 signed by patient, 201 rights signed and copy provided, bill of rights provided, report called to unit, RN to call report.

## 2021-07-21 NOTE — NURSING NOTE
Wendy CHRISTENSEN from Psychiatry met with pt and her mother in pt room. Pt to continue 1:1 for SI.Waiting for available Psychiatric bed.IV d/c'd and removed. Tele monitoring discontinued and removed.

## 2021-07-21 NOTE — DISCHARGE INSTRUCTIONS
You were admitted into the hospital after an intentional suicide overdose.  All of your previous medications have been discontinued.  You have been evaluated by psychiatry who is recommending inpatient psychiatric rehabilitation upon discharge.

## 2021-07-22 PROBLEM — F32.A DEPRESSIVE DISORDER: Status: RESOLVED | Noted: 2021-07-21 | Resolved: 2021-07-22

## 2021-07-22 PROCEDURE — 12400000 HC ROOM AND CARE SEMIPRIVATE PSYCH

## 2021-07-22 PROCEDURE — 90791 PSYCH DIAGNOSTIC EVALUATION: CPT | Performed by: PSYCHIATRY & NEUROLOGY
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
## 2021-07-22 NOTE — PLAN OF CARE
Problem: Suicidal Behavior  Goal: Suicidal Behavior is Absent or Managed  7/21/2021 2148 by Eileen Noguera, RN  Outcome: Progressing  7/21/2021 2016 by Eileen Noguera RN  Outcome: Progressing  Flowsheets (Taken 7/21/2021 2016)  [place] Goal: shares suicidal thoughts  Individual Goal: Gemma will notify her RN of thoughts to self-harm this shift.  Goal Outcome: progressing   Problem: Depression  Goal: Improved Mood  7/21/2021 2148 by Eileen Noguera RN  Outcome: Progressing  7/21/2021 2016 by Eileen Noguera RN  Outcome: Progressing  Plan of Care Review  Plan of Care Reviewed With: patient  Patient Agreement with Plan of Care: agrees  Progress: improving  Intervention(s): Gemma will report any thoughts to self-harm to staff this shift.  Outcome Summary: Gemma remains calm, cooperative. Remains on 1:1 this shift for thoughts to self harm. Will ctm.

## 2021-07-22 NOTE — H&P
"Psychiatry History and Physical     Chief Complaint: \"I wrote a note and took all of my medications\".    HPI     Gemma Darnell is a 21 y.o. female with PMH of anorexia, PPH of MDD, borderline personality disorder, ETOH and cocaine use who presented to the [facility] s/p intentional overdose on psychiatric medications. She was monitored in observation unit on tele and then subsequently admitted to inpatient psych for management of depression and SI.     As per intake note from JENNIFER CHRISTENSEN on 7/20/21, patient reported overdose on Effexor, Mirtazapine, Hydroxyzine, Melatonin, and Nyquil on presentation to the ED. She was admitted to [facility] inpatient psych in May after SA by lamictal overdose. She was discharged from [facility] to [facility] eating disorder unit. During her admission at [place], she had another suicide attempt where she stabbed herself in the neck which required staples for closure. She was then discharged from [facility] in [place] on July 18th with the plan to be admitted to [facility] eating disorder program in [place]. Her overdose happened at 0900 on July 19. She had gone out to dinner with her mother and sister on the 18th after discharge. But then on the 19th instead of getting on plane to , she rented a hotel room, wrote a suicide note, and took OD. She did not alert anyone to her overdose, she was found by hotel staff.     This morning on interview, patient describes herself as \"withdrawn.\" Endorsed chronic feelings of suicidality but denied active suicidal ideation in the hospital. She did not identify any specific triggers that lead to her attempt. Stated she was not having urges to harm herself on the unit and acknowledged ability to make needs known on the unit. When asked about eating disorder symptoms, stated that eating disorder had not been a large problem for her for the last two years. Denied restrictive eating, binging and purging, laxative or diuretic use. Said " that there have been periods in her life where she has had amenorrhea, but that she is currently mensurating on a regular cycle. Endorsed poor sleep, low energy, decreased concentration, and anhedonia. Did endorse period of time in the past where she went multiple days without sleeping, but did not quantify timeline or behaviors during this period of time. Denied AVH, denied paranoia. 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 Said that her mother was her source of support, but did not identify anyone else. Was living at home with mother, brother, sister, and her father. Sister carries a diagnosis of Bipolar II, and mother carries a diagnosis of anxiety and depression.     Psychiatric History:  Suicide Attempts: yes - 2 prior, 3 attempt led to this admission      Risk Factors: Presence of a Mood Disorder, Alcohol and/or substance abuse and Hopelessness     Protective Factors: Connectedness to individuals, family, community, and social institutions   Current Psychiatrist: no  Past psychiatric Hospitalization: yes - [facility] in 5/2021, [facility] 9/2020  Medication Trials:  Lamictal and Lexapro, Effexor, Mirtazapine,   ECT trials: no      Substance Use History:  Substance use:   Drug Details     Questions Responses    Cocaine frequency Past regular use    Comment: Past regular use on 5/18/2021     Cocaine method Snort    Comment: Snort on 5/18/2021           Past D&A Treatment: Yes: [facility] for substance and psych treatment Sept-Oct 2020 followed by [facility] for eating d/o Oct-Nov.    Family History: No family history on file.    Social History:   Social History     Socioeconomic History   • Marital status: Single     Spouse name: Not on file   • Number of children: 0   • Years of education: 12   • Highest education level: High school  graduate   Occupational History   • Occupation:    Tobacco Use   • Smoking status: Current Some Day Smoker     Types: Electronic Cigarette   • Smokeless tobacco: Never Used   Substance and Sexual Activity   • Alcohol use: Yes   • Drug use: Not Currently   • Sexual activity: Not on file   Other Topics Concern   • Not on file   Social History Narrative   • Not on file     Social Determinants of Health     Financial Resource Strain:    • Difficulty of Paying Living Expenses:    Food Insecurity:    • Worried About Running Out of Food in the Last Year:    • Ran Out of Food in the Last Year:    Transportation Needs:    • Lack of Transportation (Medical):    • Lack of Transportation (Non-Medical):    Physical Activity:    • Days of Exercise per Week:    • Minutes of Exercise per Session:    Stress: Stress Concern Present   • Feeling of Stress : To some extent   Social Connections:    • Frequency of Communication with Friends and Family:    • Frequency of Social Gatherings with Friends and Family:    • Attends Yarsani Services:    • Active Member of Clubs or Organizations:    • Attends Club or Organization Meetings:    • Marital Status:    Intimate Partner Violence:    • Fear of Current or Ex-Partner:    • Emotionally Abused:    • Physically Abused:    • Sexually Abused:        Medical History:   Past Medical History:   Diagnosis Date   • Anxiety    • Depression    • Eating disorder    • Eating disorder    • Eating disorder    • H/O borderline personality disorder    • History of ITP    • Suicidal overdose (CMS/HCC)        Surgical History: No past surgical history on file.    Allergies: No Known Allergies    Current Medications:  •  acetaminophen, 650 mg, oral, q4h PRN  •  alum-mag hydroxide-simeth, 30 mL, oral, q4h PRN  •  hydrOXYzine, 50 mg, oral, q6h PRN  •  nicotine, 1 patch, transdermal, Daily    Home Medications:        Objective     Vital Signs for the last 24 hours:  Temp:  [36.7 °C (98.1 °F)-36.9 °C (98.4  °F)] 36.8 °C (98.3 °F)  Heart Rate:  [] 97  Resp:  [16-18] 16  BP: (103-117)/(65-77) 105/67        MENTAL STATUS EXAM  Appearance: disheveled  Gait and Motor: no abnormal movements  Speech: slow, low tone, no spontaneous production of speech, mostly one word answers   Mood: 'fine'  Affect: flat and incongruent with stated mood   Associations: coherent  Thought Process: goal-directed  Thought Content: no auditory or visual hallucinations.  Suicidality/Homicidality: thoughts of being dead/ no desire or plan to die  Judgement/Insight: minimizes severity level of illness  Orientation: day, month, year, type of place and name of place  Memory: recalls recent events and recalls remote events  Attention: alert  Knowledge: normal  Language: normal         Assessment/Plan     Patient presents status post second recent serious suicide attempt. On admission interview, patient is flat, withdrawn, and has limited engagement with treatment team. Will provide structure and support for patient in attempt to strengthen therapeutic alliance, and obtain collateral in order to better inform diagnostic impression. Current ddx includes both severe recurrent major depressive episode vs mood dysregulation due to cluster B personality disorder.     Sinus tachycardia  Assessment & Plan  -likely in the setting of recent OD, will CTM     Major depressive disorder with current active episode  Assessment & Plan  -1:1 for safety given history of prior self harm in an inpatient facility   -Expand database   -Structure and support, encourage group and milieu participation   -EKG 7/19 with      Shraddha Steward MD   PGY-2

## 2021-07-22 NOTE — ASSESSMENT & PLAN NOTE
-improved and at baseline  -s/p course of rul ect  -continue effexor  -d/c today with family. Op php f/u arranged.

## 2021-07-22 NOTE — STUDENT
Psychiatry - Select the appropriate Medical Student note template from the list: Medical Student Note: For educational purposes only.  Do not use for coding or billing.     Subjective     Patient: Gemma Darnell is a 21 y.o. female who presents today for management of a recent drug overdose. She has a past medical history of MDD, anxiety, eating disorder, borderline personality disorder, and three previous suicide attempts. She has no other known medical issues. She has a history of heavy alcohol use and cocaine use (for which she has previously attended a treatment center).     The overall timeline of her psychiatric history is as follows: She has been dealing with anorexia since 2016 (high school). She was voluntarily admitted to the [facility] in Sept/Oct 2020 because she tested positive for cocaine. At this time, she still wanted help. While there, she passed out because she wasn't eating. She was then transferred to [facility] (eating disorder rehabilitation [place]), where she was treated briefly. While there, she was diagnosed her with bipolar disorder. Upon returning, Gemma said that she didn't feel like it was the right place for her. She was then home for a while and felt stabilized due to support from her family, but when she returned to school, she again spiraled out of control. At this point, she was brought to [facility] for psychiatric treatment. She was discharged to the [facility], an [facility] program [facility]. While there, she stuck a knife in her neck. She was sent to [facility] for treatment. After being medically cleared, she was sent to the psych unit. She was discharged back to [facility]. Her mom dropped her off at the airport, but she never got on the flight and instead went to a nearby hotel to attempt to commit suicide (she left a note at this time). Yesterday, Gemma said that the only reason she has to live is so that her  "family isn't sad.  After this point, she was brought to our unit.    Today, Gemma is incredibly subdued and difficult to elicit information from. She regularly answers questions in short phrases without providing any additional information. She says that she slept \"okay,\" that her appetite is up and down, and that she has had difficulty focusing recently. She describes her mood as \"withdrawn.\" She says that while she currently has SI, she feels safe here. Her main support person is her mom, and she says that she doesn't have anyone else in her life who she can rely on. She also mentions that she has a history of anorexia, but that she doesn't deal with it anymore (this is contradicted by a conversation with her mother, which is documented in medical student collateral note). She says that she previously has had irregular periods, but that she is now regular. She notes that she has never had any hallucinations or feelings of paranoia.     She has tried the following medications:   - Lemictal (prior to initial presentation with us)  - Prozac (with us, 5/31)  - Abilify ([facility], 6/16)  - Remeron, effexor, b12 injection (madeline) madeline they changed it remeron and alexor and weekly b12 injection.     She lives at home with her parents, brother, and sister. Her family history is significant for bipolar disorder in her sister and depression in her mother.     Medical and Social History    Patient Active Problem List   Diagnosis   • Suicide attempt (CMS/HCC)   • Encounter for medical assessment   • Intentional drug overdose (CMS/HCC)   • Major depressive disorder with current active episode   • Anxiety   • Sinus tachycardia   • Unspecified mood (affective) disorder (CMS/HCC)   • Unequal pupils     Past Medical History:   Diagnosis Date   • Anxiety    • Depression    • Eating disorder    • Eating disorder    • Eating disorder    • H/O borderline personality disorder    • History of ITP    • Suicidal overdose " (CMS/[facility])      No past surgical history on file.  No Known Allergies  Current Facility-Administered Medications   Medication Dose Route Frequency Provider Last Rate Last Admin   • acetaminophen (TYLENOL) tablet 650 mg  650 mg oral q4h PRN Cherri Campos DO       • alum-mag hydroxide-simeth (MAALOX) 200-200-20 mg/5 mL suspension 30 mL  30 mL oral q4h PRN Cherri Campos DO       • hydrOXYzine (ATARAX) tablet 50 mg  50 mg oral q6h PRN Cherri Campos DO       • nicotine (NICODERM CQ) 7 mg/24 hr patch 1 patch  1 patch transdermal Daily Cherri Campos DO         Social History     Socioeconomic History   • Marital status: Single     Spouse name: Not on file   • Number of children: 0   • Years of education: 12   • Highest education level: High school graduate   Occupational History   • Occupation: nanny   Tobacco Use   • Smoking status: Current Some Day Smoker     Types: Electronic Cigarette   • Smokeless tobacco: Never Used   Substance and Sexual Activity   • Alcohol use: Yes   • Drug use: Not Currently   • Sexual activity: Not on file   Other Topics Concern   • Not on file   Social History Narrative   • Not on file     Social Determinants of Health     Financial Resource Strain:    • Difficulty of Paying Living Expenses:    Food Insecurity:    • Worried About Running Out of Food in the Last Year:    • Ran Out of Food in the Last Year:    Transportation Needs:    • Lack of Transportation (Medical):    • Lack of Transportation (Non-Medical):    Physical Activity:    • Days of Exercise per Week:    • Minutes of Exercise per Session:    Stress: Stress Concern Present   • Feeling of Stress : To some extent   Social Connections:    • Frequency of Communication with Friends and Family:    • Frequency of Social Gatherings with Friends and Family:    • Attends Caodaism Services:    • Active Member of Clubs or Organizations:    • Attends Club or Organization Meetings:    • Marital  Status:    Intimate Partner Violence:    • Fear of Current or Ex-Partner:    • Emotionally Abused:    • Physically Abused:    • Sexually Abused:        Objective    Review of Systems:   Eyes: negative for irritation and redness  Respiratory: negative for shortness of breath and chest pain/tightness  Genitourinary:negative for amenorrhea  Integument/breast: positive for skin lesion (scar) on neck  Behavioral/Psych: positive for depression, loss of interest in favorite activities, appetite disturbance and suicidal ideation    Mental Status Exam:   Appearance: unkempt  Speech: decreased, soft and monotone  Mood: depressed, sad and 'fine'  Affect: constricted and blunted  Associations: coherent  Thought Process: slowed  Thought Content: no auditory or visual hallucinations.  Suicidality/Homicidality: plan to kill self by overdose  Judgement/Insight: minimizes severity level of illness and denies anorexia  Attention: alert and somnolent  Language: normal    Assessment and Plan  1. Borderline personality disorder  Given her acute on chronic suicidal ideation, recurrent suicide attempts, periods of decreased sleep without discreet time periods of increased energy, unstable personal relationships, unstable personal image and history of anorexia, and general feeling of emptiness,  the most likely differential is Borderline personality disorder. Also on the differential is Major Depressive Disorder, however this is less likely due to the presence of unstable personal relationships and anorexia. Lower on the differential is bipolar disorder, however this is less likely due to the lack of discreet increases in energy level or specific time periods of symptoms.   - Discuss dialectical behavior therapy.

## 2021-07-22 NOTE — PLAN OF CARE
Plan of Care Review  Plan of Care Reviewed With: patient  Patient Agreement with Plan of Care: agrees  Progress: improving  Intervention(s): Gemma was provided with emotional support. Encouraged to attend unit activities. Gemma will report any thoughts of self harm to staff  Outcome Summary: Gemma remains on 1:1 supervision for safety. Gemma presents disheveled dressed in a hospital gown. On approach she is guarded, hesitant to engage, intermittent eye contact. She answers questions in short statements or yes/no. Mood is depressed, affect is blunted. Shared she always feels hopeless/helpless. Experiences negative thoughts and poor self esteem. Admits to passive SI but denies plan/intent. She walked laps and attended the second group. She was visible in the community at times. She did not eat any breakfast today. For lunch she ate 25% of less. She reports to be drinking fluids but this was not observed by this writer. Gemma was requesting to be taken off 1:1 supervision as she feels safe here on the unit and communicated that she will notify staff if having thoughts of self harm.

## 2021-07-23 PROCEDURE — 99222 1ST HOSP IP/OBS MODERATE 55: CPT | Performed by: PSYCHIATRY & NEUROLOGY

## 2021-07-23 PROCEDURE — 12400000 HC ROOM AND CARE SEMIPRIVATE PSYCH

## 2021-07-23 NOTE — STUDENT
"Medical Student Note: For educational purposes only.  Do not use for coding or billing.     Subjective     Patient: Gemma Darnell is a 21 y.o. female who presents today for continued management of a recent drug overdose.    Interval History: The nursing staff reports that yesterday Gemma was guarded and hesitant to engage, and did not socialize with any of her peers. She only ate 25% of her lunch/dinner. She remained on 1:1 supervision, as she continued to make suicidal comments.     Today, Gemma has remained mostly guarded but did attend all of the group sessions. She ate half of her breakfast and lunch (which is an improvement from yesterday). During our conversation today, she became more talkative and discussed more of what happened. While she still answered in short phrases, she was willing to provide much more detail about her past and what brought her to the hospital. She says she feels lost and detached, and became tearful when talking about the current state of her life. While she wasn't able to answer what she hoped to gain from this hospitalization or what she thought her future would hold, she expressed that she was \"tired\" and just wanted to have an answer of what's going on so that she can fix it and get better. She eventually asked for a little bit of time alone, which I told her wouldn't be possible given her history/current state.     Medical and Social History    Patient Active Problem List   Diagnosis   • Suicide attempt (CMS/HCC)   • Encounter for medical assessment   • Intentional drug overdose (CMS/HCC)   • Major depressive disorder with current active episode   • Anxiety   • Sinus tachycardia   • Unspecified mood (affective) disorder (CMS/HCC)   • Unequal pupils     Past Medical History:   Diagnosis Date   • Anxiety    • Depression    • Eating disorder    • Eating disorder    • Eating disorder    • H/O borderline personality disorder    • History of ITP    • Suicidal overdose (CMS/HCC)      No " past surgical history on file.  No Known Allergies  Current Facility-Administered Medications   Medication Dose Route Frequency Provider Last Rate Last Admin   • acetaminophen (TYLENOL) tablet 650 mg  650 mg oral q4h PRN Cherri Campos DO       • alum-mag hydroxide-simeth (MAALOX) 200-200-20 mg/5 mL suspension 30 mL  30 mL oral q4h PRN Cherri Campos DO       • hydrOXYzine (ATARAX) tablet 50 mg  50 mg oral q6h PRN Cherri Campos DO       • nicotine (NICODERM CQ) 7 mg/24 hr patch 1 patch  1 patch transdermal Daily Cherri Campos DO         Social History     Socioeconomic History   • Marital status: Single     Spouse name: Not on file   • Number of children: 0   • Years of education: 12   • Highest education level: High school graduate   Occupational History   • Occupation: [employer]   Tobacco Use   • Smoking status: Current Some Day Smoker     Types: Electronic Cigarette   • Smokeless tobacco: Never Used   Substance and Sexual Activity   • Alcohol use: Yes   • Drug use: Not Currently   • Sexual activity: Not on file   Other Topics Concern   • Not on file   Social History Narrative   • Not on file     Social Determinants of Health     Financial Resource Strain:    • Difficulty of Paying Living Expenses:    Food Insecurity:    • Worried About Running Out of Food in the Last Year:    • Ran Out of Food in the Last Year:    Transportation Needs:    • Lack of Transportation (Medical):    • Lack of Transportation (Non-Medical):    Physical Activity:    • Days of Exercise per Week:    • Minutes of Exercise per Session:    Stress: Stress Concern Present   • Feeling of Stress : To some extent   Social Connections:    • Frequency of Communication with Friends and Family:    • Frequency of Social Gatherings with Friends and Family:    • Attends Rastafarian Services:    • Active Member of Clubs or Organizations:    • Attends Club or Organization Meetings:    • Marital Status:    Intimate  Partner Violence:    • Fear of Current or Ex-Partner:    • Emotionally Abused:    • Physically Abused:    • Sexually Abused:        Objective    Mental Status Exam:   Appearance: disheveled  Mood: depressed, sad and 'okay'  Affect: dysphoric  Thought Process: slowed  Thought Content: no auditory or visual hallucinations. and exhaustion  Suicidality/Homicidality: denies  Judgement/Insight: minimizes severity level of illness    Assessment and Plan  1. Major depressive disorder with current active episode  - Given her recurrent history of depressive episodes, recurrent suicide attempts, general feeling of hopelessness, and decreased interest in things she normally loves, the most likely diagnosis is Major Depressive disorder. Also on the differential is Borderline personality disorder given her acute on chronic suicidal ideation, recurrent suicide attempts, periods of decreased sleep without discreet time periods of increased energy, unstable personal relationships, unstable personal image and history of anorexia, and general feeling of emptiness.   - Continue with 1:1 monitoring for safety given her history of prior self harm in an inpatient facility  - Will continue to discuss restarting antidepressant and ECT treatment if patient is open

## 2021-07-23 NOTE — PLAN OF CARE
"Plan of Care Review  Plan of Care Reviewed With: patient  Patient Agreement with Plan of Care: agrees  Progress: improving  Intervention(s): Gemma was encouraged to attend unit activities. Monitored for safety. 1:1 supervision for safety.  Outcome Summary: Gemma remains on 1:1 supervision. She presents disheveled today. She remains guarded and withdrawn. reports that she slept well last night. Today she is noticeably depressed exhibited by low energy, hopelessness, helplessness, poor sense of self. Gemma admits to continued SI but denies a plan or intent. When asked if she could keep herself safe if out of the hospital she states \"Probably not\". She denies active self harm thoughts. Gemma also admits to feeling anxious. Today she remained out of her room, she was minimally social peers but was visible in the community area. Gemma attended groups. She ate 50% of breakfast and lunch which is an improvement from yesterday. Will CTM/Support.   "

## 2021-07-23 NOTE — PLAN OF CARE
Problem: Adult Behavioral Health Plan of Care  Goal: Plan of Care Review  7/23/2021 1219 by Niki De RN  Outcome: Progressing  7/23/2021 1142 by Niki De RN  Outcome: Progressing  Flowsheets (Taken 7/23/2021 1142)  Progress: improving  Plan of Care Reviewed With: patient  Patient Agreement with Plan of Care: agrees  Intervention(s): Gemma was encouraged to attend unit activities. Monitored for safety. 1:1 supervision for safety.     Problem: Adult Behavioral Health Plan of Care  Goal: Adheres to Safety Considerations for Self and Others  7/23/2021 1219 by Niki De RN  Outcome: Progressing  7/23/2021 1218 by Niki De RN  Outcome: Progressing   Plan of Care Review  Plan of Care Reviewed With: patient  Patient Agreement with Plan of Care: agrees  Progress: improving  Intervention(s): Gemma was encouraged to attend unit activities. Monitored for safety. 1:1 supervision for safety.  Outcome Summary: Gemma

## 2021-07-23 NOTE — PLAN OF CARE
"Plan of Care Review  Plan of Care Reviewed With: patient  Patient Agreement with Plan of Care: agrees  Progress: improving  Intervention(s): Gemma will report any thoughts to self-harm to staff this shift.  Outcome Summary: Gemma remained on 1:1 this shift for SI/thoughts to self harm. Shge denied plan, but mentioned, \"I'd be better off dead\". She reported anxiety and depression 9/10, denied pain, HI/AVH. Flat, sad affect. Ate 50% of winter and hummus platter, is drinking fluids. No medications scheduled this shift. Pt is dressed appropriately. Pt spent time outside of room watching movies with 1:1 but did not socialize with peers tonight. Will ctm.  "

## 2021-07-23 NOTE — PROGRESS NOTES
PSYCHIATRIC PROGRESS NOTE    Interval History: a bit brighter today and attended groups. Not open to medications at this time as she feels they have not helped. Denies h/o gillian manic sx. Eating meals and sleeping well. Still appears withdrawn and dysphoric. Discussed options of tms/ect with pt and her mother, pt not open currently. Mother expressed anxiety and concern about what will happen after discharge and her h/o repeated si. Pt asked to come off one to one and told we will work toward that.     Vital Signs for the last 24 hours:  Temp:  [36.6 °C (97.8 °F)-36.8 °C (98.3 °F)] 36.8 °C (98.3 °F)  Heart Rate:  [108-133] 125  Resp:  [14-16] 14  BP: ()/(61-77) 98/61    Scheduled Meds:  • nicotine  1 patch transdermal Daily       Labs:  Results from last 7 days   Lab Units 07/21/21  0416 07/20/21  0413 07/19/21  1746 07/19/21  1746   HEMOGLOBIN g/dL 13.0  --   --  13.5   WBC K/uL 8.04  --   --  9.67   PLATELETS K/uL 273  --   --  278   POTASSIUM mEQ/L 4.4 4.1   < > 3.6   SODIUM mEQ/L 139 138   < > 139   CREATININE mg/dL 0.7 0.5*   < > 0.8    < > = values in this interval not displayed.       MENTAL STATUS EXAM  Appearance: well groomed  Gait and Motor: no abnormal movements and normal  Speech: normal rate/rhythm/volume  Mood: depressed  Affect: constricted  Associations: coherent  Thought Process: goal-directed  Thought Content: no auditory or visual hallucinations.  Suicidality/Homicidality: denies  Judgement/Insight: minimizes severity level of illness and help rejecting  Orientation: day, month and year  Memory: recalls recent events and recalls remote events  Attention: alert  Knowledge: normal  Language: normal     Assessment/Plan  Major depressive disorder with current active episode  Assessment & Plan  -mdd vs bipolar spectrum d/o  -1:1 for safety given history of prior self harm in an inpatient facility   -Structure and support, encourage group and milieu participation   -will plan restart  antidepressant if pt open. ect or tms may be consideration given treatment refractoriness, suicide risk    Sinus tachycardia  Assessment & Plan  Ctm, follow with hms

## 2021-07-23 NOTE — PLAN OF CARE
Raquel from [facility] () called ([phone] phone---[phone] fax) and stated that TK has completed phone assessment with Gemma and will admit Gemma when she is assessed to be ready for discharge from hospital. Raquel asked that clinical documentation be faxed to TK/Admissions.

## 2021-07-24 PROCEDURE — 12400000 HC ROOM AND CARE SEMIPRIVATE PSYCH

## 2021-07-24 PROCEDURE — 99221 1ST HOSP IP/OBS SF/LOW 40: CPT | Performed by: PSYCHIATRY & NEUROLOGY

## 2021-07-24 NOTE — PLAN OF CARE
Problem: Suicidal Behavior  Goal: Suicidal Behavior is Absent or Managed  Flowsheets  Taken 7/24/2021 1403 by Liliana Somers, RN  [place] Goal: identifies protective factors  Goal Outcome: progressing  Taken 7/21/2021 2016 by Eileen Noguera, RN  Individual Goal: Gemma will notify her RN of thoughts to self-harm this shift.   Plan of Care Review  Plan of Care Reviewed With: patient  Patient Agreement with Plan of Care: agrees  Progress: improving  Intervention(s): 1:1 support given, encouraged to spend time out of room  Outcome Summary: Gemma remained on 1:1 observation this shift.  She was visible at times, attending groups.  She remains flat and restricted, appears disheveld. Acknowledges feeling depressed and sad.   Denies SI, is willing to inform staff if she has urges to hurt herself.  Unwilling to engage in long conversations.  She declined Nicotine patch this shift.  States she is eating 50% of meals.  Gemma was able to make her needs known.

## 2021-07-24 NOTE — PROGRESS NOTES
"Psychiatry Progress Note    Chief Complaint/Reason for follow-up: Reviewed clinical course with nursing.  Chart reviewed.    Interval History: Confirmed recent history with patient including trial of venlafaxine and mirtazapine at [facility].  Patient states she is open to considering antidepressant options, reviewed potential benefits of medication, TMS, ECT.  Patient states her main concern at this time remains low mood.  Denies active suicidal ideation plan or intent though struggles to describe series of circumstances leading to significant overdose prior to hospitalization.    Describes thoughts as \"somewhat scrambled.\"  Mood \"down.\"  Hopes to reengage with previously enjoyable activities such as music and walking patient reports journaling has been helpful activity patient encouraged to request a notebook to provide option for same several days.    Review of Systems:   Sleep:  Sleep 10 PM to 8 AM, awake occasionally, Appetite: Fair and GI: No complaints    Vital Signs for the last 24 hours:  Temp:  [36.7 °C (98 °F)-36.9 °C (98.4 °F)] 36.9 °C (98.4 °F)  Heart Rate:  [] 111  Resp:  [12-15] 12  BP: ()/(62-74) 97/74    Labs:  I have reviewed the patient's labs.  Significant abnormals are ALT, 64.    Mental Status Exam:  Appearance: appropriate attire  Gait and Motor: slow and Minimal eye contact  Speech: slowed, soft and monotone  Mood: depressed  Affect: dysphoric  Associations: coherent  Thought Process: slowed  Thought Content: no auditory or visual hallucinations. and appropriate to situation  Suicidality/Homicidality: thoughts of being dead/ no desire or plan to die  Judgement/Insight: Poor to fair  Orientation: day, month and year  Memory: recalls recent events and recalls remote events  Attention: withdrawn  Knowledge: appropriate for education  Language: normal    VTE Assessment: I have reassessed and the patient's VTE risk and treatment plan is appropriate.    Assessment/Plan  Patient is a " 21-year-old woman with history of mood disorder, eating disorder, cluster B traits status post suicide attempt by multi drug ingestion.  Several medication trials, most recently venlafaxine and mirtazapine.  Patient in route to residential care when patient significantly deviated from plan and made a serious suicide attempt.    Major depressive disorder recurrent severe:  -Status post serious multidrug suicide attempt, history of inpatient  -Encourage group psychotherapy, journaling  -To consider antidepressant versus TMS versus ECT    Eating disorder:  -Mild elevation of heart rate, continue to monitor  -Self-report of average p.o. intake    Cluster B traits:  -Patient with significant impulsivity, feelings of abandonment and fluctuating/negative sense of self  -Review past experience with DBT and consider options for same going forward    Latoya Silva MD    History

## 2021-07-24 NOTE — PLAN OF CARE
"  Problem: Adult Behavioral Health Plan of Care  Goal: Plan of Care Review  Outcome: Progressing  Flowsheets (Taken 7/23/2021 2310)  Progress: improving  Plan of Care Reviewed With: patient  Patient Agreement with Plan of Care: agrees  Outcome Summary: Gemma was visible on the unit. Minimally social with her peers. She denies SI or thoughts of self harm this shift. Said she would let staff know if she had SI or self harm urges. Denies HI. Denies AVH. Said she feels \"a little\" anxious. Affect blunted. Coping skills encouraged, she said she talked to her mother today which helped. She was given a journal. She did not attend wrap up group. Staff will continue to monitor for safety and provide pt with support.  Intervention(s): Gemma was encouraged to tell staff when having SI or thoughts of self harm. She remains on 1:1 supervision.     "

## 2021-07-25 LAB — SARS-COV-2 RNA RESP QL NAA+PROBE: NEGATIVE

## 2021-07-25 PROCEDURE — 99231 SBSQ HOSP IP/OBS SF/LOW 25: CPT | Performed by: PSYCHIATRY & NEUROLOGY

## 2021-07-25 PROCEDURE — 12400000 HC ROOM AND CARE SEMIPRIVATE PSYCH

## 2021-07-25 PROCEDURE — U0002 COVID-19 LAB TEST NON-CDC: HCPCS | Performed by: PSYCHIATRY & NEUROLOGY

## 2021-07-25 NOTE — PLAN OF CARE
Plan of Care Review  Plan of Care Reviewed With: patient  Patient Agreement with Plan of Care: agrees  Progress: no change  Outcome Summary: Gemma has been visible in the milieu, minimally engaged with peers. Remains flat and guarded. Mood is low. Ate 75% of dinner.Tolerating 1:1 staff. Sedentary  Problem: Adult Behavioral Health Plan of Care  Goal: Plan of Care Review  Flowsheets (Taken 7/24/2021 2004)  Progress: no change  Plan of Care Reviewed With: patient  Outcome Summary: Gemma has been visible in the milieu, minimally engaged with peers. Remains flat and guarded. Mood is low and sedentary.  Ate 75% of dinner. Contracts for safety on the unit.

## 2021-07-25 NOTE — PLAN OF CARE
"  Problem: Suicidal Behavior  Goal: Suicidal Behavior is Absent or Managed  Flowsheets  Taken 7/25/2021 1355 by Liliana Somers, RN  [place] Goal: shares suicidal thoughts  Goal Outcome: progressing  Taken 7/21/2021 2016 by Eileen Noguera, RN  Individual Goal: Gemma will notify her RN of thoughts to self-harm this shift.   Plan of Care Review  Plan of Care Reviewed With: patient  Patient Agreement with Plan of Care: agrees  Progress: no change  Intervention(s): 1:1 support given, encouraged to spend time out of room  Outcome Summary: Gemma has been visible this shift.  Pt remains on 1:1 observation.  She spent time watching TV, briefly attended group, and walked laps. Guarded, flat, and restricted.  She reports \"not having a good day.\"  Acknowledges feeling depressed and hopeless.  Gemma denied suicidal ideation, states she would inform staff if she became suicidal.  No peer interaction, appears disheveled.  She reports eating 75% of meals.  Gemma did brighten at times while engaged with 1:1 sitter.  She was able to make her needs known.  "

## 2021-07-25 NOTE — PROGRESS NOTES
Psychiatry Progress Note    Chief Complaint/Reason for follow-up: Reviewed clinical presentation over the past 24 hours with nursing, chart reviewed.    Interval History: On interview, patient continues to endorse low mood, persistent worries, negative internal dialogue.  Denies hearing auditory hallucinations from an external source.  Reports experiencing thoughts as fast, primarily related to worries about her current and future plans.    Reviewed past vulnerabilities including alcohol.  Patient denies significant thoughts or cravings for alcohol.  States she was in a residential substance used treatment program November 2020 which she reports was very helpful.  Also reviewed eating disorder symptoms, patient states currently she is eating balanced meals, denies feeling out of control with food including denying thoughts of restricting/binging/purging.    Reviewed medication history in detail including Prozac trial May 2021 Lamictal trial including subsequent overdose on same.  Remote history of Lexapro.  Most recent trial of Effexor and mirtazapine with less than 2-week trial as patient subsequently overdosed prompting this admission.    Reviewed potential benefits of SNRI with antidepressant/antianxiety targets.  Reviewed some risk that SNRI may be activating however given pronounced depressive symptoms and pattern of symptoms over the years most consistent with major depressive disorder, activation is relatively low risk.    Review of Systems:   Sleep:  Fair, Appetite: Fair and GI: No complaints    Vital Signs for the last 24 hours:  Temp:  [36.8 °C (98.2 °F)] 36.8 °C (98.2 °F)  Heart Rate:  [] 114  Resp:  [12] 12  BP: (100-109)/(70-82) 109/70    Labs:  No new labs.    Mental Status Exam:  Appearance: unkempt and appropriate attire  Gait and Motor: slow and Minimal eye contact  Speech: decreased, slowed, soft and monotone  Mood: depressed and anxious  Affect: constricted and dysphoric  Associations:  coherent  Thought Process: slowed  Thought Content: no auditory or visual hallucinations. and appropriate to situation  Suicidality/Homicidality: denies  Judgement/Insight: Impaired  Orientation: day, month and year  Memory: recalls recent events and recalls remote events  Attention: withdrawn  Knowledge: below expected for education  Language: word finding difficulties    VTE Assessment: I have reassessed and the patient's VTE risk and treatment plan is appropriate.    Assessment/Plan  Patient is a 21-year-old woman with history of mood disorder, eating disorder, cluster B traits status post suicide attempt by multi drug ingestion.  Presentation at this time most consistent with major depressive disorder recurrent severe as primary diagnosis. Patient in route to residential care when patient significantly deviated from plan and made a serious suicide attempt.  Review of symptom presentation and treatment as outlined above.  Patient continues to present with severe depressive symptoms, high anxiety and internal negative dialogue.  Patient continues to deny active suicidal ideation plan or intent, however, remains at high risk and requires ongoing one-to-one due to history of significant self-harm while in a treatment setting.    Reviewed pros and cons of specific medication trial options.  Encouraged patient to consider resuming venlafaxine to target mood and anxiety.  Will await review by Dr. Braun.    Plan:  Continue one-to-one monitoring due to high risk of self-harm  Consider retrial of venlafaxine to target mood and anxiety  Consider augmentation with aripiprazole, not yet discussed with patient  Encouraged journaling, patient states this has been helpful in the past and is willing to engage in same  Encouraged ongoing group psychotherapy/milieu therapy  Consider family meeting to increase database and cooperation over time

## 2021-07-26 PROCEDURE — 99222 1ST HOSP IP/OBS MODERATE 55: CPT | Performed by: PSYCHIATRY & NEUROLOGY

## 2021-07-26 PROCEDURE — 12400000 HC ROOM AND CARE SEMIPRIVATE PSYCH

## 2021-07-26 PROCEDURE — 63700000 HC SELF-ADMINISTRABLE DRUG: Performed by: PSYCHIATRY & NEUROLOGY

## 2021-07-26 RX ORDER — VENLAFAXINE HYDROCHLORIDE 37.5 MG/1
37.5 CAPSULE, EXTENDED RELEASE ORAL
Status: DISCONTINUED | OUTPATIENT
Start: 2021-07-26 | End: 2021-07-27

## 2021-07-26 RX ADMIN — VENLAFAXINE HYDROCHLORIDE 37.5 MG: 37.5 CAPSULE, EXTENDED RELEASE ORAL at 12:44

## 2021-07-26 NOTE — PLAN OF CARE
Plan of Care Review  Plan of Care Reviewed With: patient  Patient Agreement with Plan of Care: agrees  Progress: no change  Intervention(s): encouraged patient to participate  Outcome Summary: Gemma has been visible in the living room all shift, watching television. She is flat, with low energy. Remains guarded, and depressed. Minimal interactions with peers or staff. Did not request PRN's. Denies active SI or intent. CFS. Remains on 1:1

## 2021-07-26 NOTE — PROGRESS NOTES
PSYCHIATRIC PROGRESS NOTE    Interval History: remains flat and withdrawn. +hopeless/helpless. Denies active si on unit, asking for one to one to be removed. Patient is calm with no evidence of impulsive behavior. We discussed suicidal ideation and patient agrees to seek out staff if this becomes more acute or with any thoughts of self harm while in the hospital. She is open to starting AD and possibly to TMS if possible. Also expressed wanting to return to residential facility. Team spoke with mother who worries that daughter has given up. She is in support of tms option. Discussed option of her visiting unit to see Gemma. Gemma has been more visible on unit and did participate in one group over weekend. Eating, sleeping well. Behaviorally in control.         Vital Signs for the last 24 hours:  Temp:  [36.8 °C (98.2 °F)-37.1 °C (98.7 °F)] 36.8 °C (98.3 °F)  Heart Rate:  [] 106  Resp:  [14-16] 16  BP: (104-114)/(62-71) 104/62    Scheduled Meds:  • nicotine  1 patch transdermal Daily   • venlafaxine XR  37.5 mg oral Daily with breakfast       Labs:  Results from last 7 days   Lab Units 07/21/21  0416 07/20/21  0413 07/19/21  1746 07/19/21  1746   HEMOGLOBIN g/dL 13.0  --   --  13.5   WBC K/uL 8.04  --   --  9.67   PLATELETS K/uL 273  --   --  278   POTASSIUM mEQ/L 4.4 4.1   < > 3.6   SODIUM mEQ/L 139 138   < > 139   CREATININE mg/dL 0.7 0.5*   < > 0.8    < > = values in this interval not displayed.       MENTAL STATUS EXAM  Appearance: well groomed  Gait and Motor: no abnormal movements and normal  Speech: normal rate/rhythm/volume  Mood: depressed  Affect: constricted  Associations: coherent  Thought Process: goal-directed  Thought Content: no auditory or visual hallucinations.  Suicidality/Homicidality: denies  Judgement/Insight: minimizes severity level of illness and help rejecting  Orientation: day, month and year  Memory: recalls recent events and recalls remote events  Attention: alert  Knowledge:  normal  Language: normal     Assessment/Plan  Major depressive disorder with current active episode  Assessment & Plan    -1:1 for safety given history of prior self harm in an inpatient facility, will decrease obs level as possible  -Structure and support, encourage group and milieu participation   -restart effexor today. Will look into option of receving tms on inpt unit. ect may be consideration as well.   -keep family update  -dispo likely residential program    Sinus tachycardia  Assessment & Plan  Ctm, follow with hms

## 2021-07-26 NOTE — PLAN OF CARE
Plan of Care Review  Plan of Care Reviewed With: patient  Patient Agreement with Plan of Care: agrees  Progress: improving  Intervention(s): Gemma was encouraged to attend unit activities. Monitored for safety and encouraged to notify staff if experiencing SI.  Outcome Summary: Gemma remains on 1:1 supervision. She presents disheveled but dressed appropriately. Gemma reports that she slept well last night but was still tired and slept from 9:45-11:30am. While awake Gemma was open to engage with this writer. She continues to present withdrawn. Mood is depressed, affect is flat/restricted. She denies SI at this time. She denies thoughts of self harm. She does tend to minimize her psychiatric condition. She was visible in the community throughout the shift watching TV or walking laps at times. She ate 50% of breakfast and lunch. She was agreeable to starting Effexer XR which was administered today. Will CTM/Support.

## 2021-07-27 PROCEDURE — 99222 1ST HOSP IP/OBS MODERATE 55: CPT | Performed by: PSYCHIATRY & NEUROLOGY

## 2021-07-27 PROCEDURE — 63700000 HC SELF-ADMINISTRABLE DRUG: Performed by: PSYCHIATRY & NEUROLOGY

## 2021-07-27 PROCEDURE — 12400000 HC ROOM AND CARE SEMIPRIVATE PSYCH

## 2021-07-27 RX ORDER — VENLAFAXINE HYDROCHLORIDE 75 MG/1
75 CAPSULE, EXTENDED RELEASE ORAL
Status: DISCONTINUED | OUTPATIENT
Start: 2021-07-28 | End: 2021-07-28

## 2021-07-27 RX ADMIN — VENLAFAXINE HYDROCHLORIDE 37.5 MG: 37.5 CAPSULE, EXTENDED RELEASE ORAL at 09:44

## 2021-07-27 NOTE — PLAN OF CARE
"Plan of Care Review  Plan of Care Reviewed With: patient  Patient Agreement with Plan of Care: agrees  Progress: improving  Intervention(s): Gemma was encouraged to attend unit activities. Monitored for safety and encouraged to notify staff if experiencing SI.  Outcome Summary: Gemma remains on 1:1 supervision. Today she is disheveled but dressed appropriately. Gemma reports she slept well last night. Today she rates her depression a 6/10. She denies SI at this time. She denies self harm thoughts. She said she always has \"generalized anxiety\". She is considering ECT but does have some hesitation/fear that she would get worse. Gemma attended groups and ate 50% of meals. She is drinking adequate fluids. Will CTM/Support.   "

## 2021-07-27 NOTE — PROGRESS NOTES
PSYCHIATRIC PROGRESS NOTE    Interval History: remains flat and withdrawn. +hopeless/helpless. Denies active si on unit. Only vaguely future oriented, asking about possible residential transfer. Eating meals, sleeping well. Limited spontaneous interactions. Discussed options of tms, ect and some openness to latter. Mother updated on these options as well.       Vital Signs for the last 24 hours:  Temp:  [36.7 °C (98.1 °F)-36.8 °C (98.3 °F)] 36.7 °C (98.1 °F)  Heart Rate:  [] 125  Resp:  [12-16] 16  BP: (104-109)/(63-78) 105/78    Scheduled Meds:  • nicotine  1 patch transdermal Daily   • [START ON 7/28/2021] venlafaxine XR  75 mg oral Daily with breakfast       Labs:  Results from last 7 days   Lab Units 07/21/21  0416   HEMOGLOBIN g/dL 13.0   WBC K/uL 8.04   PLATELETS K/uL 273   POTASSIUM mEQ/L 4.4   SODIUM mEQ/L 139   CREATININE mg/dL 0.7       MENTAL STATUS EXAM  Appearance: well groomed  Gait and Motor: no abnormal movements and normal  Speech: normal rate/rhythm/volume  Mood: depressed  Affect: constricted  Associations: coherent  Thought Process: goal-directed  Thought Content: no auditory or visual hallucinations.  Suicidality/Homicidality: denies  Judgement/Insight: minimizes severity level of illness and help rejecting  Orientation: day, month and year  Memory: recalls recent events and recalls remote events  Attention: alert  Knowledge: normal  Language: normal     Assessment/Plan  Major depressive disorder with current active episode  Assessment & Plan    -1:1 for safety given history of prior self harm in an inpatient facility, will decrease obs level as possible  -Structure and support, encourage group and milieu participation   -increase effexor as tolerated. Looking into neuromodulation options.   -keep family updated  -dispo likely residential program    Sinus tachycardia  Assessment & Plan  Ctm, follow with shira

## 2021-07-27 NOTE — PLAN OF CARE
"Plan of Care Review  Plan of Care Reviewed With: patient  Patient Agreement with Plan of Care: agrees  Progress: improving  Intervention(s): Gemma was encouraged to report any thoughts to self-harm to RN this shift.  Outcome Summary: Gemma remains on 1:1 for safety. She was visible on the unit but remained flat, withdrawn. She ate 50% of meals today, attends groups. Reports anxiety and depression 3/10, denied pain, SI/AVH but reports feeling \"hopeless\" at times. Will ctm.  "

## 2021-07-27 NOTE — STUDENT
"Medical Student Note: For educational purposes only.  Do not use for coding or billing.     Psychiatry Progress Note    Chief Complaint/Reason for follow-up: Gemma Darnell is a 21 year old female who presents today for continued follow up of a recent drug overdose and suicidal ideation.     Interval History: Overnight, Gemma remained flat and withdrawn. While she interacted with staff and peers, any sort of conversation was minimal. She ate 50% of her meals and has been attending all group meetings, which is an improvement. She did not report any SI, but she did say that she feels \"hopeless.\" She also began taking Effexor.     Today, Gemma remains flat and withdrawn, and she says that she is feeling \"fine.\" Conversation with her remained short, but she did mention that she is mostly focused on \"what comes next.\" She doesn't know what exactly that looks like or what her specific options are, which she feels is adding to her overall feeling of uncertainty. We also discussed the potential of her undergoing TMS or ECT therapy. While she was not interested in TMS, she was generally more amendable to the idea of ECT and said that she would think about it today. She said she slept well last night. She remains on 1:1 supervision, as her suicide risk is high.     Review of Systems:   Sleep:  Good, Appetite: Good and GI: No complaints    Vital Signs for the last 24 hours:  Temp:  [36.7 °C (98.1 °F)-36.8 °C (98.3 °F)] 36.7 °C (98.1 °F)  Heart Rate:  [] 125  Resp:  [12-16] 16  BP: (104-109)/(63-78) 105/78      Mental Status Exam:  Appearance: well groomed and appropriate attire  Speech: slowed and soft  Mood: depressed, anxious and 'okay'  Affect: blunted and anxious  Thought Process: goal-directed  Thought Content: no auditory or visual hallucinations.  Suicidality/Homicidality: denies  Judgement/Insight: minimizes severity level of illness      Assessment/Plan  1. Major Depressive Disorder with current active " episode    -Continue Effexor 37.5 mg QD  -Discuss ECT, as her depression has been refractory to other treatments  - Continue 1:1 for safety given history of prior self harm in an inpatient facility, will decrease observation level as possible  -Encourage group and milieu participation       Judson Love  7/27/2021

## 2021-07-27 NOTE — PLAN OF CARE
Problem: Adult Behavioral Health Plan of Care  Goal: Plan of Care Review  Flowsheets (Taken 7/27/2021 1153)  Plan of Care Reviewed With: other (see comments)  Patient Agreement with Plan of Care: agrees   Plan of Care Review  Plan of Care Reviewed With: other (see comments)  Patient Agreement with Plan of Care: agrees      LA conducted review with Maximilian Araiza) who authorized 07/26-07/30.

## 2021-07-28 PROBLEM — Z01.818 ENCOUNTER FOR OTHER PREPROCEDURAL EXAMINATION: Status: ACTIVE | Noted: 2021-07-28

## 2021-07-28 PROCEDURE — 63700000 HC SELF-ADMINISTRABLE DRUG: Performed by: PSYCHIATRY & NEUROLOGY

## 2021-07-28 PROCEDURE — 12400000 HC ROOM AND CARE SEMIPRIVATE PSYCH

## 2021-07-28 PROCEDURE — 99232 SBSQ HOSP IP/OBS MODERATE 35: CPT | Performed by: HOSPITALIST

## 2021-07-28 PROCEDURE — 93005 ELECTROCARDIOGRAM TRACING: CPT | Performed by: PSYCHIATRY & NEUROLOGY

## 2021-07-28 PROCEDURE — 93005 ELECTROCARDIOGRAM TRACING: CPT | Performed by: HOSPITALIST

## 2021-07-28 PROCEDURE — 99222 1ST HOSP IP/OBS MODERATE 55: CPT | Performed by: PSYCHIATRY & NEUROLOGY

## 2021-07-28 RX ORDER — VENLAFAXINE HYDROCHLORIDE 150 MG/1
150 CAPSULE, EXTENDED RELEASE ORAL
Status: DISCONTINUED | OUTPATIENT
Start: 2021-07-29 | End: 2021-08-09 | Stop reason: HOSPADM

## 2021-07-28 RX ADMIN — VENLAFAXINE HYDROCHLORIDE 75 MG: 75 CAPSULE, EXTENDED RELEASE ORAL at 09:11

## 2021-07-28 NOTE — STUDENT
Medical Student Note: For educational purposes only.  Do not use for coding or billing.     Psychiatry Progress Note     Chief Complaint/Reason for follow-up: Gemma Darnell is a 21 year old female who presents today for continued follow up of a recent drug overdose and suicidal ideation.      Interval History: Overnight, Gemma remained isolative although he was visible in the milieu. She watched part of a movie with the group and spoke with her mom on the phone, and at about 75% of her meals. She took her scheduled medications and did not need any PRN medications. There were no acute overnight events.     Today, Gemma remains isolative and doesn't interact with peers or staff. She continues to consider ECT and had many questions, which I was able to answer. She is currently leaning towards accepting treatment, although she feels worried that it will not work. She says that she slept well last night and has been tolerating her medications well. She currently remains on 1:! Supervision, as her suicide risk is high.    Review of Systems:   Sleep:  Good, Appetite: Good and GI: No complaints    Vital Signs for the last 24 hours:  Temp:  [36.6 °C (97.9 °F)-36.8 °C (98.3 °F)] 36.6 °C (97.9 °F)  Heart Rate:  [] 129  Resp:  [16-17] 16  BP: (104-117)/(68-75) 117/74      Mental Status Exam:  Appearance: well groomed and appropriate attire  Gait and Motor: no abnormal movements and normal  Speech: slowed and soft  Mood: depressed, anxious and 'good'  Affect: blunted and anxious  Associations: coherent  Thought Process: goal-directed and linear  Thought Content: no auditory or visual hallucinations.  Suicidality/Homicidality: denies  Judgement/Insight: understands severity level of illness  Orientation: day, month and year  Memory: recalls recent events and recalls remote events  Attention: alert  Knowledge: normal  Language: normal       Assessment/Plan  1. Major Depressive Disorder with current active episode     - Continue  Effexor 150 mg QD  - Discuss ECT, as her depression has been refractory to other treatments  - Continue 1:1 for safety given history of prior self harm in an inpatient facility, will decrease observation level as possible  - Encourage group and milieu participation     Judson Love  7/28/2021

## 2021-07-28 NOTE — PLAN OF CARE
Plan of Care Review  Plan of Care Reviewed With: patient  Patient Agreement with Plan of Care: agrees  Progress: improving  Intervention(s): Gemma was encouraged to report any thoughts to self-harm to RN this shift.  Outcome Summary: Gemma remains on 1:1, isolative to self, visible in milieu. No peer interaction. Effexor increased to 75 mg, tolerating well. Reports depression and anxiety 6/10, denied SI/HI/AVH. Ate 75% supper, drinking fluids. Dressed appropriately. Showered. Will ctm.

## 2021-07-28 NOTE — PROGRESS NOTES
PSYCHIATRIC PROGRESS NOTE    Interval History: remains flat and withdrawn. +hopeless/helpless. Denies active si on unit. Only vaguely future oriented. Eating meals, sleeping well. Limited spontaneous interactions but is participating in groups with prompting. Some openness to ect expressed today. Asking appropriate questions about this treatment. Remains on one to one.    Vital Signs for the last 24 hours:  Temp:  [36.6 °C (97.9 °F)-36.8 °C (98.3 °F)] 36.6 °C (97.9 °F)  Heart Rate:  [] 129  Resp:  [16-17] 16  BP: (104-117)/(68-75) 117/74    Scheduled Meds:  • nicotine  1 patch transdermal Daily   • [START ON 7/29/2021] venlafaxine XR  150 mg oral Daily with breakfast       Labs:        MENTAL STATUS EXAM  Appearance: well groomed  Gait and Motor: no abnormal movements and normal  Speech: normal rate/rhythm/volume  Mood: depressed  Affect: constricted  Associations: coherent  Thought Process: goal-directed  Thought Content: no auditory or visual hallucinations.  Suicidality/Homicidality: denies  Judgement/Insight: minimizes severity level of illness and help rejecting  Orientation: day, month and year  Memory: recalls recent events and recalls remote events  Attention: alert  Knowledge: normal  Language: normal     Assessment/Plan  Major depressive disorder with current active episode  Assessment & Plan    -1:1 for safety given history of prior self harm in an inpatient facility, will decrease obs level as possible  -Structure and support, encourage group and milieu participation   -increase effexor as tolerated. Considering ect course begninning Friday.  -keep family updated  -dispo likely residential program    Sinus tachycardia  Assessment & Plan  Ctm, follow with [facility]

## 2021-07-28 NOTE — PLAN OF CARE
Plan of Care Review  Plan of Care Reviewed With: patient  Patient Agreement with Plan of Care: agrees  Progress: improving  Intervention(s): Gemma will attend unit activities. Watch ECT video. Provide emotional support.  Outcome Summary: Gemma presents clean and appropriately dressed. Mood remains depressed, affect is flat. Thoughts are clear and logical. Reports her depression is a 6/10. She denies SI at this time. This writer educated about ECT and answered questions/concerns. Gemma is agreeable to ECT with anticipated start date for Friday 7/30/21. [facility] came for ECT clearance. Updated EKG completed. Gemma watched ECT video. Today she attended groups. She is eating 75% of meals and drinking adequate fluids. Will CTM/Support.

## 2021-07-28 NOTE — PROGRESS NOTES
[facility] Medicine Service -  Pre-Operative Consultation       Patient Name: Gemma Darnell   Reason for Referral: ECT eval  Other Providers:      PCP: Arlette Tolentino FNP        HISTORY OF PRESENT ILLNESS      Gemma Darnell is a 21 y.o. female  Coming to [facility] w cc of intentional drug overdose.     The patient denies any current or recent chest pain or pressure, dyspnea, cough, sputum, fevers, chills, abdominal pain, nausea, vomiting, diarrhea, urinary complaints, dizzy, lightheaded, blood in stool or other symptoms.     Functionally, the patient is able to ascend a flight or so of stairs with no dyspnea or chest pain.     The patient denies, on specific questioning, the following:  No history of MI, arrhythmia,or CHF.  No history of JEOVANY.  No history of DVT/PE.  No history of COPD.  No history of CVA.  No history of DM.   No history of CKD.   No history of seizures    PAST MEDICAL AND SURGICAL HISTORY      Past Medical History:   Diagnosis Date   • Anxiety    • Depression    • Eating disorder    • Eating disorder    • Eating disorder    • H/O borderline personality disorder    • History of ITP    • Suicidal overdose (CMS/[facility])        No past surgical history on file.    MEDICATIONS        Current Facility-Administered Medications:   •  acetaminophen (TYLENOL) tablet 650 mg, 650 mg, oral, q4h PRN, Cherri Campos, DO  •  alum-mag hydroxide-simeth (MAALOX) 200-200-20 mg/5 mL suspension 30 mL, 30 mL, oral, q4h PRN, Cherri Campos, DO  •  hydrOXYzine (ATARAX) tablet 50 mg, 50 mg, oral, q6h PRN, Cherri Campos DO  •  nicotine (NICODERM CQ) 7 mg/24 hr patch 1 patch, 1 patch, transdermal, Daily, Cherri Campos DO  •  [START ON 7/29/2021] venlafaxine XR (EFFEXOR-XR) 24 hr ER capsule 150 mg, 150 mg, oral, Daily with breakfast, Hari Braun MD    ALLERGIES      Patient has no known allergies.    FAMILY HISTORY      family history is not on file.    Denies any prior known family  "history of DVTs/PEs/clotting disorder    SOCIAL HISTORY      Social History     Tobacco Use   • Smoking status: Current Some Day Smoker     Types: Electronic Cigarette   • Smokeless tobacco: Never Used   Substance Use Topics   • Alcohol use: Yes   • Drug use: Not Currently       REVIEW OF SYSTEMS      All other systems reviewed and negative except as noted in HPI    PHYSICAL EXAMINATION      Visit Vitals  /74 (Patient Position: Standing)   Pulse (!) 129   Temp 36.6 °C (97.9 °F) (Oral)   Resp 16   Ht 1.626 m (5' 4.02\")   Wt 58.4 kg (128 lb 12.8 oz)   LMP  (LMP Unknown)   SpO2 99%   Breastfeeding No   BMI 22.10 kg/m²     Body mass index is 22.1 kg/m².    Physical Exam  Constitutional:       Appearance: She is not ill-appearing, toxic-appearing or diaphoretic.   Eyes:      General: No scleral icterus.     Extraocular Movements: Extraocular movements intact.   Cardiovascular:      Rate and Rhythm: Regular rhythm.      Heart sounds: No friction rub. No gallop.    Pulmonary:      Effort: Pulmonary effort is normal. No respiratory distress.      Breath sounds: No stridor. No wheezing, rhonchi or rales.   Abdominal:      General: Bowel sounds are normal. There is no distension.      Palpations: Abdomen is soft.      Tenderness: There is no abdominal tenderness. There is no guarding or rebound.   Musculoskeletal:         General: No signs of injury.   Skin:     General: Skin is warm.   Neurological:      Mental Status: She is alert and oriented to person, place, and time.   Psychiatric:         Judgment: Judgment normal.         LABS / EKG        Labs            IMAGING  CT HEAD WITHOUT IV CONTRAST    Result Date: 7/20/2021  IMPRESSION: 1. No acute posttraumatic abnormality. 2. No evidence of acute infarct, hemorrhage, mass or mass effect. COMMENT: Brain parenchyma: Normal CT appearance. Ventricles, cisterns, and sulci: Normal in size and configuration. Calvarium and extra cranial soft tissues: Unremarkable. Visualized " paranasal sinuses and mastoid air cells: Clear bilaterally.     X-RAY CHEST 1 VIEW    Result Date: 7/19/2021  IMPRESSION: No evidence of acute disease in the chest. COMMENT: Comparison: None. Erect portable AP view of the chest was performed.  There is no evidence of pneumothorax or pleural effusion.  Lungs appear clear.  Normal appearance of the cardiomediastinal contours.  No upper abdominal or bone findings of concern.       EKG   EKG: pending    ASSESSMENT AND PLAN         Encounter for other preprocedural examination  Pt sched for ECT  Pt has no cardiac symptoms. Pt exhibits no signs/symptoms of angina, arrythmia or decompenstated CHF. Will repeat EKG today as most recent EKG done when pt was quite tachy. I d/w RN.   EKG pending currently  Pt's labs are at an acceptable level to proceed w ECT  Pt is an intermediate but acceptable risk for intended procedure as long as repeat EKG is stable.       Unspecified mood (affective) disorder (CMS/HCC)  Further management per psych       In regards to perioperative cardiac risk:  The patient denies any history of ischemic heart disease, denies any history of CHF, denies any history of CVA, is not on pre-operative treatment with insulin, and does not have a pre-operative creatinine > 2 mg/dL.   Pt's revised cardiac risk index  (RCRI) is 0.4 % which equates to low risk for cardiac death, nonfatal myocardial infarction, and nonfatal cardiac arrest     Further comments:  Resume supplements when OK with surgical team.  I would encourage incentive spirometry to assist with minimizing jenn-operative pulmonary risk.  DVT prophylaxis and timing of such per the discretion of the surgeon.     Please do not hesitate to contact [facility] during the upcoming hospitalization with any questions or concerns.     Broderick Patterson. DO [place]  7/28/2021

## 2021-07-28 NOTE — ASSESSMENT & PLAN NOTE
Pt sched for ECT  Pt has no cardiac symptoms. Pt exhibits no signs/symptoms of angina, arrythmia or decompenstated CHF. Will repeat EKG today as most recent EKG done when pt was quite tachy. I d/w RN.   EKG pending currently  Pt's labs are at an acceptable level to proceed w ECT  Pt is an intermediate but acceptable risk for intended procedure as long as repeat EKG is stable.

## 2021-07-28 NOTE — PLAN OF CARE
Problem: Adult Behavioral Health Plan of Care  Goal: Plan of Care Review  Flowsheets (Taken 7/28/2021 7905)  Plan of Care Reviewed With: other (see comments)  Patient Agreement with Plan of Care: agrees   Plan of Care Review  Plan of Care Reviewed With: other (see comments)  Patient Agreement with Plan of Care: agrees      SW spoke with Eors Araiza) who states no separate auth is required for inpat ECT.

## 2021-07-28 NOTE — PLAN OF CARE
Problem: Adult Behavioral Health Plan of Care  Goal: Plan of Care Review  Flowsheets (Taken 7/28/2021 1600)  Progress: no change  Outcome Summary: Pt seen for LOS, pt was in group at time of visit. Pt know to nutrition from medical admission. Spoke to RN pt has been eating good (50-75%), pt with 1:1, and pt is going for possible ECT tomorrow.   Goals  Pt to meet >/=75% est needs via po intake     Recommendations  Agree with regular diet  Continue to encourage good po intake at meals  Will remain available as needed

## 2021-07-28 NOTE — NURSING NOTE
MICHELE CAI came to clear Gemma this AM for ECT. At 12:30pm Gemma watched the ECT video with 1:1 present.

## 2021-07-29 LAB
ANION GAP SERPL CALC-SCNC: 6 MEQ/L (ref 3–15)
ATRIAL RATE: 85
BASOPHILS # BLD: 0.06 K/UL (ref 0.01–0.1)
BASOPHILS NFR BLD: 0.7 %
BUN SERPL-MCNC: 8 MG/DL (ref 8–20)
CALCIUM SERPL-MCNC: 9.7 MG/DL (ref 8.9–10.3)
CHLORIDE SERPL-SCNC: 104 MEQ/L (ref 98–109)
CO2 SERPL-SCNC: 27 MEQ/L (ref 22–32)
CREAT SERPL-MCNC: 0.7 MG/DL (ref 0.6–1.1)
DIFFERENTIAL METHOD BLD: ABNORMAL
EOSINOPHIL # BLD: 0.1 K/UL (ref 0.04–0.36)
EOSINOPHIL NFR BLD: 1.2 %
ERYTHROCYTE [DISTWIDTH] IN BLOOD BY AUTOMATED COUNT: 11.6 % (ref 11.7–14.4)
GFR SERPL CREATININE-BSD FRML MDRD: >60 ML/MIN/1.73M*2
GLUCOSE SERPL-MCNC: 91 MG/DL (ref 70–99)
HCT VFR BLDCO AUTO: 39.7 % (ref 35–45)
HGB BLD-MCNC: 13.6 G/DL (ref 11.8–15.7)
IMM GRANULOCYTES # BLD AUTO: 0.02 K/UL (ref 0–0.08)
IMM GRANULOCYTES NFR BLD AUTO: 0.2 %
LYMPHOCYTES # BLD: 2.29 K/UL (ref 1.2–3.5)
LYMPHOCYTES NFR BLD: 27.6 %
MCH RBC QN AUTO: 29.9 PG (ref 28–33.2)
MCHC RBC AUTO-ENTMCNC: 34.3 G/DL (ref 32.2–35.5)
MCV RBC AUTO: 87.3 FL (ref 83–98)
MONOCYTES # BLD: 0.53 K/UL (ref 0.28–0.8)
MONOCYTES NFR BLD: 6.4 %
NEUTROPHILS # BLD: 5.31 K/UL (ref 1.7–7)
NEUTS SEG NFR BLD: 63.9 %
NRBC BLD-RTO: 0 %
P AXIS: 69
PDW BLD AUTO: 9.3 FL (ref 9.4–12.3)
PLATELET # BLD AUTO: 327 K/UL (ref 150–369)
POTASSIUM SERPL-SCNC: 4.7 MEQ/L (ref 3.6–5.1)
PR INTERVAL: 126
QRS DURATION: 76
QT INTERVAL: 356
QTC CALCULATION(BAZETT): 423
R AXIS: 67
RBC # BLD AUTO: 4.55 M/UL (ref 3.93–5.22)
SODIUM SERPL-SCNC: 137 MEQ/L (ref 136–144)
T WAVE AXIS: 66
TSH SERPL DL<=0.05 MIU/L-ACNC: 1.18 MIU/L (ref 0.34–5.6)
VENTRICULAR RATE: 85
WBC # BLD AUTO: 8.31 K/UL (ref 3.8–10.5)

## 2021-07-29 PROCEDURE — 200200 PR NO CHARGE: Performed by: HOSPITALIST

## 2021-07-29 PROCEDURE — 12400000 HC ROOM AND CARE SEMIPRIVATE PSYCH

## 2021-07-29 PROCEDURE — 93010 ELECTROCARDIOGRAM REPORT: CPT | Performed by: INTERNAL MEDICINE

## 2021-07-29 PROCEDURE — 36415 COLL VENOUS BLD VENIPUNCTURE: CPT | Performed by: PSYCHIATRY & NEUROLOGY

## 2021-07-29 PROCEDURE — 63700000 HC SELF-ADMINISTRABLE DRUG: Performed by: PSYCHIATRY & NEUROLOGY

## 2021-07-29 PROCEDURE — 85025 COMPLETE CBC W/AUTO DIFF WBC: CPT | Performed by: PSYCHIATRY & NEUROLOGY

## 2021-07-29 PROCEDURE — 80048 BASIC METABOLIC PNL TOTAL CA: CPT | Performed by: PSYCHIATRY & NEUROLOGY

## 2021-07-29 PROCEDURE — 84443 ASSAY THYROID STIM HORMONE: CPT | Performed by: PSYCHIATRY & NEUROLOGY

## 2021-07-29 RX ADMIN — VENLAFAXINE HYDROCHLORIDE 150 MG: 150 CAPSULE, EXTENDED RELEASE ORAL at 09:45

## 2021-07-29 NOTE — PROGRESS NOTES
"ECT Consult Note    I was asked to evaluate Ms. Darnell as a candidate for ECT.     Today she describes to me that she has been feeling \"down\" for some time. There are times when she feel \"OK\" but she cannot identify the last time that was. She reports difficulty enjoying things recently. I ask her about sleep and appetite and she feels that both of these have been erratic \"since I've been in treatment for a long time\" but on the unit she feels they have been \"normal.\" Energy had been low prior to admission but \"a little better here.\" She is able to concentrate on things on the unit. Experiencing low self esteem and severe self blame. Endorses that she does think about death and dying, but denies any plan or impulse to harm herself on the unit.     We discuss ECT as a treatment option for her MDD. I discuss in detail the logistics, risks, and benefits of the treatment. We discuss that potential risks range from headache, muscle ache, jaw pain, tooth injury, short term memory loss (that is experienced by some to more long-term) to much more rare but serious side effects such as heart attack, stroke, and death. We discuss that the benefits of doing ECT are to treat her depression and that the alternative would be medication. She understands the risks and benefits, understands that she cannot eat for at least 8 hours prior to the procedure, and that recommended course of treatment would start at 3 times per week.     MSE:  Ms. Darnell appears stated age, dressed in sweatsuit, amenable to interview. PMR is noted. Speech is minimal, low in volume, articulate. Mood is \"down.\" Affect is depressed and restricted. Thought process is linear and goal directed. Thought content: no evidence of delusions, +thoughts of death and dying but denies current impulse or plan to harm self. No perceptual disturbance. Cognition is grossly intact, alert. She interacts appropriately, tells me she understands what has been discussed.     MsDebra " Carie is an appropriate candidate for ECT. I discussed with her that it is a voluntary procedure and she can discontinue at any time. Per Dr. Chowdhury's medical evaluation, she is an intermediate but acceptable risk for ECT.

## 2021-07-29 NOTE — PLAN OF CARE
Plan of Care Review  Plan of Care Reviewed With: patient  Patient Agreement with Plan of Care: agrees  Progress: improving  Intervention(s): Gemma was provided support and encouragment about ECT tomorrow. Encouraged her to attend unit activities.  Outcome Summary: Gemma remains on 1:1 supervision. She presents disheveled but dressed in street clothes. Gemma remains depressed and reports feeling anxious about ECT tomorrow. She did not have any additional questions and feels well informed. Gemma attended groups today. She was visible throughout the day in the community area. Was on the phone at times talking to her mother. She was compliant with medications. She ate 50% of meals and is drinking adequate fluids. Will CTM/Support.

## 2021-07-29 NOTE — STUDENT
"Medical Student Note: For educational purposes only.  Do not use for coding or billing.     Psychiatry Progress Note    Chief Complaint/Reason for follow-up: Gemma Darnell is a 21 year old female who presents today for continued follow up of a recent drug overdose and suicidal ideation.     Interval History: Overnight, Gemma was present in the milieu although she was did not interact much with peers or staff. She again watched a movie and at times displayed laughter, but mostly seemed to be depressed, flat, and restrictive.  When asked about ECT, she says that \"she isn't worried about side effects, I am worried about it not working.\" Her mother came to visit last night, which made her happy. She denied any SI, thoughts of self harm, or urges to restrict or purge. She slept well and ate most of her meal.     Today, Gemma is much more restricted in conversation than usual. In general, her mood seems to be more down in the mornings than in the afternoon. She says that she is ready to undergo ECT, although she remains a little bit anxious about if the treatment will work or not. She is not future oriented and doesn't know what her next steps will be, but misses home a lot. She continues to deny any SI, self harm, or body image issues.     Review of Systems:   Sleep:  Good, Appetite: Good and GI: No complaints    Vital Signs for the last 24 hours:  Temp:  [36.7 °C (98.1 °F)-36.9 °C (98.5 °F)] 36.7 °C (98.1 °F)  Heart Rate:  [] 115  Resp:  [16] 16  BP: (110-119)/(63-78) 115/64      Mental Status Exam:  Appearance: well groomed  Gait and Motor: no abnormal movements and normal gait  Speech: soft and mutism  Mood: depressed and 'okay'  Affect: constricted, blunted and melancholy  Associations: coherent  Thought Process: goal-directed and linear  Thought Content: no auditory or visual hallucinations. and appropriate to situation  Suicidality/Homicidality: denies  Judgement/Insight: minimizes severity level of illness and " help rejecting  Orientation: day, month, year, type of place and name of place  Memory: recalls recent events and recalls remote events  Attention: alert  Knowledge: normal  Language: normal      Assessment/Plan  Major depressive disorder with current active episode    - 1:1 for safety given history of prior self harm in an inpatient facility, will decrease obs level as possible  - Continue effexor 150 QD, increase as tolerated.   - Begin ect course Friday.  - Structure and support, encourage group and milieu participation   - Keep family updated  - Dispo likely residential program    Judson Lvoe  7/29/2021

## 2021-07-29 NOTE — PROGRESS NOTES
PSYCHIATRIC PROGRESS NOTE    Interval History: Overnight team reports that gemma denied SI,endorses depressed mood. Mother visited and visit was positive. Watched TV with peers in the day room.     On interview this AM, Gemma states that her visit last night with her mother was positive, says she wants to go home. Was not able to describe any benefits of being home or specific things she was looking forward to/hopeful for. Denied current SI. Says that she is now open to ECT, discussed plan to start tomorrow and she was amenable to this.   Denied eating disorder symptoms, urge to binge/purge, and reports that she has been eating meals.       Vital Signs for the last 24 hours:  Temp:  [36.7 °C (98.1 °F)-36.9 °C (98.5 °F)] 36.7 °C (98.1 °F)  Heart Rate:  [] 115  Resp:  [16] 16  BP: (110-119)/(63-78) 115/64    Scheduled Meds:  • nicotine  1 patch transdermal Daily   • venlafaxine XR  150 mg oral Daily with breakfast       Labs:        MENTAL STATUS EXAM  Appearance: disheveled  Gait and Motor: no abnormal movements and normal  Speech: slowed and soft  Mood: depressed  Affect: constricted  Associations: coherent  Thought Process: goal-directed  Thought Content: no auditory or visual hallucinations.  Suicidality/Homicidality: denies  Judgement/Insight: minimizes severity level of illness and help rejecting  Orientation: day, month and year  Memory: recalls recent events and recalls remote events  Attention: alert  Knowledge: normal  Language: normal     Assessment/Plan  Sinus tachycardia  Assessment & Plan  Ctm, follow with [facility]    Major depressive disorder with current active episode  Assessment & Plan    -1:1 for safety given history of prior self harm in an inpatient facility, will decrease obs level as possible  -Structure and support, encourage group and milieu participation   -increase effexor as tolerated.   -Will have patient seen for ECT second opinion today, with plan to start Ect course Friday 7/30.  -keep  family updated  -dispo likely residential program    Shraddha Steward MD   PGY-2

## 2021-07-29 NOTE — PROGRESS NOTES
EKg independently reviewed - sr w hr of 85. No st elevations or any ischemic changes. No QT prolongation. Pt is an intermediate but acceptable risk for ECT

## 2021-07-29 NOTE — PLAN OF CARE
"  Problem: Suicidal Behavior  Goal: Suicidal Behavior is Absent or Managed  Flowsheets  Taken 7/28/2021 2045 by Liliana Somers, RN  Frost Goal: sets future-oriented goal  Goal Outcome: progressing  Taken 7/21/2021 2016 by Eileen Noguera, RN  Individual Goal: Gemma will notify her RN of thoughts to self-harm this shift.   Plan of Care Review  Plan of Care Reviewed With: patient  Patient Agreement with Plan of Care: agrees  Progress: no change  Intervention(s): Gemma will inform staff if having houghts to harm herself  Outcome Summary: Gemma was visible on the unit this shift.  She watch TV with peers in the living room.  She appears disheveled, flat and restricted.  She denied SI,endorses depressed mood.  She acknowledged feeling anxious about ECt treatments.  States \"I am not really worried about the side effects.  I am more worried about it not working.\"  She reports feeling homesick, was happy her mother came to visit.  She denies having urges to restrict or purge, states her appetite and sleep have been \"good.\"  Gemma smiled at times during interactions and was able to make her needs known.  "

## 2021-07-30 ENCOUNTER — ANESTHESIA EVENT (INPATIENT)
Dept: ENDOSCOPY | Facility: HOSPITAL | Age: 21
DRG: 885 | End: 2021-07-30
Payer: COMMERCIAL

## 2021-07-30 ENCOUNTER — ANESTHESIA (INPATIENT)
Dept: ENDOSCOPY | Facility: HOSPITAL | Age: 21
DRG: 885 | End: 2021-07-30
Payer: COMMERCIAL

## 2021-07-30 ENCOUNTER — APPOINTMENT (INPATIENT)
Dept: ENDOSCOPY | Facility: HOSPITAL | Age: 21
DRG: 885 | End: 2021-07-30
Payer: COMMERCIAL

## 2021-07-30 VITALS — OXYGEN SATURATION: 100 % | HEART RATE: 84 BPM

## 2021-07-30 LAB
ATRIAL RATE: 85
P AXIS: 69
PR INTERVAL: 126
QRS DURATION: 76
QT INTERVAL: 356
QTC CALCULATION(BAZETT): 423
R AXIS: 67
SARS-COV-2 RNA RESP QL NAA+PROBE: NEGATIVE
T WAVE AXIS: 66
VENTRICULAR RATE: 85

## 2021-07-30 PROCEDURE — 63700000 HC SELF-ADMINISTRABLE DRUG: Performed by: PSYCHIATRY & NEUROLOGY

## 2021-07-30 PROCEDURE — 99232 SBSQ HOSP IP/OBS MODERATE 35: CPT | Mod: 25 | Performed by: PSYCHIATRY & NEUROLOGY

## 2021-07-30 PROCEDURE — 12400000 HC ROOM AND CARE SEMIPRIVATE PSYCH

## 2021-07-30 PROCEDURE — U0002 COVID-19 LAB TEST NON-CDC: HCPCS | Performed by: PSYCHIATRY & NEUROLOGY

## 2021-07-30 PROCEDURE — 71000011 HC PACU PHASE 1 EA ADDL MIN

## 2021-07-30 PROCEDURE — 25800000 HC PHARMACY IV SOLUTIONS: Performed by: ANESTHESIOLOGY

## 2021-07-30 PROCEDURE — 90870 ELECTROCONVULSIVE THERAPY: CPT | Performed by: STUDENT IN AN ORGANIZED HEALTH CARE EDUCATION/TRAINING PROGRAM

## 2021-07-30 PROCEDURE — 93010 ELECTROCARDIOGRAM REPORT: CPT | Performed by: INTERNAL MEDICINE

## 2021-07-30 PROCEDURE — 63600000 HC DRUGS/DETAIL CODE: Performed by: ANESTHESIOLOGY

## 2021-07-30 PROCEDURE — GZB4ZZZ OTHER ELECTROCONVULSIVE THERAPY: ICD-10-PCS | Performed by: PSYCHIATRY & NEUROLOGY

## 2021-07-30 PROCEDURE — 71000001 HC PACU PHASE 1 INITIAL 30MIN

## 2021-07-30 PROCEDURE — 25000000 HC PHARMACY GENERAL: Performed by: ANESTHESIOLOGY

## 2021-07-30 PROCEDURE — 37000001 HC ANESTHESIA GENERAL

## 2021-07-30 PROCEDURE — 90870 ELECTROCONVULSIVE THERAPY: CPT

## 2021-07-30 RX ORDER — LABETALOL HYDROCHLORIDE 5 MG/ML
INJECTION, SOLUTION INTRAVENOUS AS NEEDED
Status: DISCONTINUED | OUTPATIENT
Start: 2021-07-30 | End: 2021-07-30 | Stop reason: SURG

## 2021-07-30 RX ORDER — SODIUM CHLORIDE 9 MG/ML
INJECTION, SOLUTION INTRAVENOUS CONTINUOUS PRN
Status: DISCONTINUED | OUTPATIENT
Start: 2021-07-30 | End: 2021-07-30 | Stop reason: SURG

## 2021-07-30 RX ORDER — METHOHEXITAL SODIUM 500 MG/500MG
INJECTION INTRAMUSCULAR; INTRAVENOUS; RECTAL AS NEEDED
Status: DISCONTINUED | OUTPATIENT
Start: 2021-07-30 | End: 2021-07-30 | Stop reason: SURG

## 2021-07-30 RX ADMIN — ACETAMINOPHEN 650 MG: 325 TABLET, FILM COATED ORAL at 16:13

## 2021-07-30 RX ADMIN — METHOHEXITAL SODIUM 100 MG: 500 INJECTION, POWDER, LYOPHILIZED, FOR SOLUTION INTRAMUSCULAR; INTRAVENOUS; RECTAL at 09:45

## 2021-07-30 RX ADMIN — SUCCINYLCHOLINE CHLORIDE 100 MG: 20 INJECTION, SOLUTION INTRAMUSCULAR; INTRAVENOUS; PARENTERAL at 09:45

## 2021-07-30 RX ADMIN — VENLAFAXINE HYDROCHLORIDE 150 MG: 150 CAPSULE, EXTENDED RELEASE ORAL at 11:05

## 2021-07-30 RX ADMIN — LABETALOL HYDROCHLORIDE 10 MG: 5 INJECTION INTRAVENOUS at 09:48

## 2021-07-30 RX ADMIN — SODIUM CHLORIDE: 0.9 INJECTION, SOLUTION INTRAVENOUS at 10:00

## 2021-07-30 RX ADMIN — SODIUM CHLORIDE: 0.9 INJECTION, SOLUTION INTRAVENOUS at 09:40

## 2021-07-30 ASSESSMENT — ENCOUNTER SYMPTOMS: DEPRESSION: 1

## 2021-07-30 NOTE — NURSING NOTE
Report called to CHARLES Prince in Psych. Pt is AAOx3 and offers no complaints. Pt tolerated procedure well. Pt is ok to return to room after recovery period is complete. Pt will be taken back to room in a wheelchair via transport escort.   rx has been prepared and is up front for pu.

## 2021-07-30 NOTE — PLAN OF CARE
"Plan of Care Review  Plan of Care Reviewed With: patient  Patient Agreement with Plan of Care: agrees  Progress: improving  Intervention(s): Gemma was provided with emotional support, Monitored for safety.  Outcome Summary: Gemma was up at the start of the shift showered and dressed in a gown ready for ECT. She was bright and smiling and did not appear anxious/scared. She left for her ECT at 0815 and her mother accompanied her for the transport and in the waiting room. Gemma returned at 1050. She denied headache or pain and states \"I feel fine, just a little tired\". Gemma reports feeling depressed rated 5/10. She denies SI. 1:1 supervision was discontinued at 1200. Gemma was visible in the community coloring at times. She does avoid social contact while in the milieu. She was compliant with medication. Will CTM/Support.   "

## 2021-07-30 NOTE — ANESTHESIA POSTPROCEDURE EVALUATION
Patient: Gemma Darnell    Procedure Summary     Date: 07/30/21 Room / Location: [facility]    Anesthesia Start: 0940 Anesthesia Stop: 1001    Procedure: ECT W SEDATION Diagnosis:     Scheduled Providers:  Responsible Provider: Sunny Shetty DO    Anesthesia Type: general ASA Status: 1          Anesthesia Type: general  PACU Vitals    No data found in the last 10 encounters.           Anesthesia Post Evaluation    Pain management: adequate  Patient participation: complete - patient participated  Level of consciousness: awake and alert  Cardiovascular status: acceptable  Airway Patency: adequate  Respiratory status: acceptable  Hydration status: acceptable  Anesthetic complications: no

## 2021-07-30 NOTE — ANESTHESIA PREPROCEDURE EVALUATION
Relevant Problems   NEUROLOGY   (+) Anxiety   (+) Major depressive disorder with current active episode       Anesthesia ROS/MED HX    Anesthesia History    Previous anesthetics  Pulmonary - neg  Neuro/Psych    Depression   Anxiety  Cardiovascular- neg   Covid19 Test Reviewed  Hematological - neg  GI/Hepatic- neg  Musculoskeletal- neg  Renal Disease- neg  Endo/Other- neg  Body Habitus: Normal       Past Surgical History:   Procedure Laterality Date   • WOUND EXPLORATION         Physical Exam    Airway   Mallampati: II   TM distance: >3 FB   Neck ROM: full  Cardiovascular - normal   Rhythm: regular   Rate: normalPulmonary - normal   clear to auscultation  Dental - normal        Anesthesia Plan    Plan: general    Technique: general mask     Lines and Monitors: PIV     Airway: natural airway / supplemental oxygen   ASA 1  Anesthetic plan and risks discussed with: patient  Induction:    intravenous   Postop Plan:   Patient Disposition: phase II then home

## 2021-07-30 NOTE — PLAN OF CARE
Problem: Adult Behavioral Health Plan of Care  Goal: Plan of Care Review  Flowsheets (Taken 7/30/2021 1037)  Plan of Care Reviewed With: other (see comments)  Patient Agreement with Plan of Care: agrees   Plan of Care Review  Plan of Care Reviewed With: other (see comments)  Patient Agreement with Plan of Care: agrees      LA conducted review with Brittany Araiza) who was able to approve 07/31-08/03.

## 2021-07-30 NOTE — PLAN OF CARE
"  Problem: Adult Behavioral Health Plan of Care  Goal: Plan of Care Review  Outcome: Progressing  Flowsheets (Taken 7/29/2021 2041)  Progress: improving  Plan of Care Reviewed With: patient  Patient Agreement with Plan of Care: agrees  Outcome Summary: Gemma has been visible on the unit. Minimal peer interaction. She said that she feels nervous for ECT tomorrow. Reminded pt that she is to be NPO at midnight. She reports feeling \"a little depressed but I'm ok\". Denies SI and thoughts of self harm. Agreed to tell staff if she gets SI or thoughts of self harm. Denies HI. Denies AVH. Pt had visit with her mother tonight. She went to wrap up group. Staff will continue to monitor for safety and provide pt with support.  Intervention(s): Gemma was encouraged to attend wrap up group this shift. She remains on 1:1 observation.     "

## 2021-07-30 NOTE — PLAN OF CARE
Problem: Adult Behavioral Health Plan of Care  Goal: Plan of Care Review  7/30/2021 1133 by Wendy Horn LSW  Flowsheets (Taken 7/30/2021 1133)  Plan of Care Reviewed With: patient  Patient Agreement with Plan of Care: agrees   Plan of Care Review  Plan of Care Reviewed With: patient  Patient Agreement with Plan of Care: agrees      SW met with pt to review tx plan.  Pt was in agreement and signed document.  SW will continue to follow.

## 2021-07-30 NOTE — ANESTHESIOLOGIST PRE-PROCEDURE ATTESTATION
Pre-Procedure Patient Identification:  I am the Primary Anesthesiologist and have identified the patient on 07/30/21 at 9:05 AM.   I have confirmed the procedure(s) will be performed by the following surgeon/proceduralist * No surgeons listed *.

## 2021-07-31 PROCEDURE — 63700000 HC SELF-ADMINISTRABLE DRUG: Performed by: PSYCHIATRY & NEUROLOGY

## 2021-07-31 PROCEDURE — 99232 SBSQ HOSP IP/OBS MODERATE 35: CPT | Performed by: PSYCHIATRY & NEUROLOGY

## 2021-07-31 PROCEDURE — 12400000 HC ROOM AND CARE SEMIPRIVATE PSYCH

## 2021-07-31 RX ADMIN — VENLAFAXINE HYDROCHLORIDE 150 MG: 150 CAPSULE, EXTENDED RELEASE ORAL at 08:36

## 2021-07-31 RX ADMIN — HYDROXYZINE HYDROCHLORIDE 50 MG: 25 TABLET, FILM COATED ORAL at 21:44

## 2021-07-31 NOTE — PROGRESS NOTES
"PSYCHIATRIC PROGRESS NOTE    Interval History: Today, she is noted to be isolating in her room.  She states that she had ECT yesterday, which was \"good.\"  She denies suicidal thoughts, homicidal thoughts, or hallucinations.  She says that her mood is \"ok\" and that she is sleeping well and her appetite is \"ok.\"  She has no physical complaints today.      Vital Signs for the last 24 hours:  Temp:  [36.7 °C (98.1 °F)-37.2 °C (98.9 °F)] 36.7 °C (98.1 °F)  Heart Rate:  [] 106  Resp:  [12-18] 18  BP: ()/(46-74) 122/74    Scheduled Meds:  • nicotine  1 patch transdermal Daily   • venlafaxine XR  150 mg oral Daily with breakfast       Labs:  Results from last 7 days   Lab Units 07/29/21  1010   HEMOGLOBIN g/dL 13.6   WBC K/uL 8.31   PLATELETS K/uL 327   POTASSIUM mEQ/L 4.7   SODIUM mEQ/L 137   CREATININE mg/dL 0.7       MENTAL STATUS EXAM  Appearance: fairly groomed  Gait and Motor: no abnormal movements and normal  Speech: slowed and soft  Mood: 'ok'  Affect: constricted  Associations: coherent  Thought Process: goal-directed  Thought Content: no auditory or visual hallucinations.  Suicidality/Homicidality: denies  Judgement/Insight: minimizes severity level of illness and help rejecting      Assessment/Plan  Sinus tachycardia  Assessment & Plan  Ctm[place] [facility]     Major depressive disorder with current active episode  Assessment & Plan     -Off 1;1 currently   -Structure and support, encourage group and milieu participation   -Continue to monitor response to Effexor  -S/p ECT #1 7/30, plan for next session 8/2   -keep family updated  -dispo likely residential program  "

## 2021-07-31 NOTE — PLAN OF CARE
"  Problem: Adult Behavioral Health Plan of Care  Goal: Plan of Care Review  Outcome: Progressing  Flowsheets (Taken 7/30/2021 2153)  Progress: improving  Plan of Care Reviewed With: patient  Patient Agreement with Plan of Care: agrees  Outcome Summary: Gemma has been visible on the unit. Minimally social with her peers. She said that she feels \"pretty good actually\". Said that ECT went well. C/o headache and took PRN tylenol for pain. Rated her depression a 6/10 and her anxiety a 5/10, with 10 being the worst. She denied SI or thoughts of self harm. She said that she would tell staff if she has any SI or self harm thoughts or urges before she acts on them. Pt denies HI. Denies AVH. She did attend wrap up group. Staff will continue to monitor for safety and provide pt with support.  Intervention(s): Gemma was encouraged to tell staff when having SI or self harm thoughts or urges this shift.     "

## 2021-07-31 NOTE — PLAN OF CARE
Plan of Care Review  Plan of Care Reviewed With: patient  Patient Agreement with Plan of Care: agrees  Progress: improving  Intervention(s): Gemma will attend and participate in groups this shift.  Outcome Summary: Gemma is visible on the unit, attended group, keeps mostly to self with some peer interaction. ADLs improved, reports sleeping well, mood improved overall. She denies SI, denies plan or intent to self harm . Brighter affect at times, appetite good. She reports ECT is going well, denies headache today. Calm and cooperative this shift. Staff will continue to monitor and encourage healthy coping skills.

## 2021-08-01 LAB — SARS-COV-2 RNA RESP QL NAA+PROBE: NEGATIVE

## 2021-08-01 PROCEDURE — U0002 COVID-19 LAB TEST NON-CDC: HCPCS | Performed by: PSYCHIATRY & NEUROLOGY

## 2021-08-01 PROCEDURE — 63700000 HC SELF-ADMINISTRABLE DRUG: Performed by: PSYCHIATRY & NEUROLOGY

## 2021-08-01 PROCEDURE — 99232 SBSQ HOSP IP/OBS MODERATE 35: CPT | Performed by: PSYCHIATRY & NEUROLOGY

## 2021-08-01 PROCEDURE — 12400000 HC ROOM AND CARE SEMIPRIVATE PSYCH

## 2021-08-01 RX ADMIN — HYDROXYZINE HYDROCHLORIDE 50 MG: 25 TABLET, FILM COATED ORAL at 22:40

## 2021-08-01 RX ADMIN — VENLAFAXINE HYDROCHLORIDE 150 MG: 150 CAPSULE, EXTENDED RELEASE ORAL at 10:58

## 2021-08-01 NOTE — PLAN OF CARE
Plan of Care Review  Plan of Care Reviewed With: patient  Patient Agreement with Plan of Care: agrees  Progress: improving  Intervention(s): Gemma will attend and  participate in groups this shift.  Outcome Summary: Gemma is visible on the unit, reports sleeping better last night, more social this shift. smiling appropriately, bright affect. reports improved mood, feels as though ECT has been helpful already. She reports she will get her second ECT treatment tomorrow, verbalizes understanding of need to be NPO at midnight tonight. Appetite improved this shift. Denies SI, plan or intent to self harm. Staff will continue to monitor and provide positive reinforcement.

## 2021-08-01 NOTE — PROGRESS NOTES
PSYCHIATRIC PROGRESS NOTE    Interval History: Today, she is more visible in the milieu.  She slept better; she did take Atarax before bedtime which she feels was helpful.  She states that her mood is slightly improved; she denies suicidal thoughts and her appetite is somewhat improved.  She feels that ECT has been helpful.      Vital Signs for the last 24 hours:  Temp:  [36.2 °C (97.2 °F)-36.9 °C (98.5 °F)] 36.8 °C (98.2 °F)  Heart Rate:  [101-121] 121  Resp:  [16] 16  BP: (101-118)/(61-82) 103/61    Scheduled Meds:  • nicotine  1 patch transdermal Daily   • venlafaxine XR  150 mg oral Daily with breakfast       Labs:  Results from last 7 days   Lab Units 07/29/21  1010   HEMOGLOBIN g/dL 13.6   WBC K/uL 8.31   PLATELETS K/uL 327   POTASSIUM mEQ/L 4.7   SODIUM mEQ/L 137   CREATININE mg/dL 0.7       MENTAL STATUS EXAM  Appearance: fairly groomed  Gait and Motor: no abnormal movements and normal  Speech: slowed and soft  Mood: 'a little better'  Affect: constricted  Associations: coherent  Thought Process: goal-directed  Thought Content: no auditory or visual hallucinations.  Suicidality/Homicidality: denies  Judgement/Insight: minimizes severity level of illness and help rejecting      Assessment/Plan  Sinus tachycardia  Assessment & Plan  Ctm, follow with hms     Major depressive disorder with current active episode  Assessment & Plan     -Off 1;1 currently   -Structure and support, encourage group and milieu participation   -Continue to monitor response to Effexor  -S/p ECT #1 7/30, plan for next session 8/2   -keep family updated  -dispo likely residential program

## 2021-08-02 ENCOUNTER — ANESTHESIA (INPATIENT)
Dept: ENDOSCOPY | Facility: HOSPITAL | Age: 21
DRG: 885 | End: 2021-08-02
Payer: COMMERCIAL

## 2021-08-02 ENCOUNTER — APPOINTMENT (INPATIENT)
Dept: ENDOSCOPY | Facility: HOSPITAL | Age: 21
DRG: 885 | End: 2021-08-02
Payer: COMMERCIAL

## 2021-08-02 ENCOUNTER — ANESTHESIA EVENT (INPATIENT)
Dept: ENDOSCOPY | Facility: HOSPITAL | Age: 21
DRG: 885 | End: 2021-08-02
Payer: COMMERCIAL

## 2021-08-02 VITALS — HEART RATE: 96 BPM | OXYGEN SATURATION: 100 %

## 2021-08-02 LAB — B-HCG UR QL: NEGATIVE

## 2021-08-02 PROCEDURE — 81025 URINE PREGNANCY TEST: CPT | Performed by: PSYCHIATRY & NEUROLOGY

## 2021-08-02 PROCEDURE — 63600000 HC DRUGS/DETAIL CODE: Performed by: ANESTHESIOLOGY

## 2021-08-02 PROCEDURE — 63700000 HC SELF-ADMINISTRABLE DRUG: Performed by: PSYCHIATRY & NEUROLOGY

## 2021-08-02 PROCEDURE — 25000000 HC PHARMACY GENERAL: Performed by: ANESTHESIOLOGY

## 2021-08-02 PROCEDURE — 12400000 HC ROOM AND CARE SEMIPRIVATE PSYCH

## 2021-08-02 PROCEDURE — 37000001 HC ANESTHESIA GENERAL

## 2021-08-02 PROCEDURE — 90870 ELECTROCONVULSIVE THERAPY: CPT

## 2021-08-02 PROCEDURE — 71000011 HC PACU PHASE 1 EA ADDL MIN

## 2021-08-02 PROCEDURE — 90870 ELECTROCONVULSIVE THERAPY: CPT | Performed by: PSYCHIATRY & NEUROLOGY

## 2021-08-02 PROCEDURE — 99232 SBSQ HOSP IP/OBS MODERATE 35: CPT | Mod: 25 | Performed by: PSYCHIATRY & NEUROLOGY

## 2021-08-02 PROCEDURE — 25800000 HC PHARMACY IV SOLUTIONS: Performed by: ANESTHESIOLOGY

## 2021-08-02 PROCEDURE — 71000001 HC PACU PHASE 1 INITIAL 30MIN

## 2021-08-02 RX ORDER — LABETALOL HYDROCHLORIDE 5 MG/ML
INJECTION, SOLUTION INTRAVENOUS AS NEEDED
Status: DISCONTINUED | OUTPATIENT
Start: 2021-08-02 | End: 2021-08-02 | Stop reason: SURG

## 2021-08-02 RX ORDER — DEXTROSE 40 %
15-30 GEL (GRAM) ORAL AS NEEDED
Status: CANCELLED | OUTPATIENT
Start: 2021-08-02

## 2021-08-02 RX ORDER — DEXTROSE 50 % IN WATER (D50W) INTRAVENOUS SYRINGE
25 AS NEEDED
Status: CANCELLED | OUTPATIENT
Start: 2021-08-02

## 2021-08-02 RX ORDER — KETOROLAC TROMETHAMINE 30 MG/ML
INJECTION, SOLUTION INTRAMUSCULAR; INTRAVENOUS AS NEEDED
Status: DISCONTINUED | OUTPATIENT
Start: 2021-08-02 | End: 2021-08-02 | Stop reason: SURG

## 2021-08-02 RX ORDER — METHOHEXITAL SODIUM 500 MG/500MG
INJECTION INTRAMUSCULAR; INTRAVENOUS; RECTAL AS NEEDED
Status: DISCONTINUED | OUTPATIENT
Start: 2021-08-02 | End: 2021-08-02 | Stop reason: SURG

## 2021-08-02 RX ORDER — SODIUM CHLORIDE 9 MG/ML
INJECTION, SOLUTION INTRAVENOUS CONTINUOUS PRN
Status: DISCONTINUED | OUTPATIENT
Start: 2021-08-02 | End: 2021-08-02 | Stop reason: SURG

## 2021-08-02 RX ORDER — IBUPROFEN 200 MG
16-32 TABLET ORAL AS NEEDED
Status: CANCELLED | OUTPATIENT
Start: 2021-08-02

## 2021-08-02 RX ORDER — IBUPROFEN 400 MG/1
400 TABLET ORAL EVERY 6 HOURS PRN
Status: DISCONTINUED | OUTPATIENT
Start: 2021-08-02 | End: 2021-08-09 | Stop reason: HOSPADM

## 2021-08-02 RX ORDER — ACETAMINOPHEN 325 MG/1
650 TABLET ORAL EVERY 4 HOURS PRN
Status: DISCONTINUED | OUTPATIENT
Start: 2021-08-02 | End: 2021-08-09 | Stop reason: HOSPADM

## 2021-08-02 RX ADMIN — HYDROXYZINE HYDROCHLORIDE 50 MG: 25 TABLET, FILM COATED ORAL at 21:31

## 2021-08-02 RX ADMIN — SUCCINYLCHOLINE CHLORIDE 100 MG: 20 INJECTION, SOLUTION INTRAMUSCULAR; INTRAVENOUS; PARENTERAL at 09:08

## 2021-08-02 RX ADMIN — SODIUM CHLORIDE: 0.9 INJECTION, SOLUTION INTRAVENOUS at 09:05

## 2021-08-02 RX ADMIN — ACETAMINOPHEN 650 MG: 325 TABLET, FILM COATED ORAL at 10:19

## 2021-08-02 RX ADMIN — KETOROLAC TROMETHAMINE 30 MG: 30 INJECTION, SOLUTION INTRAMUSCULAR at 09:15

## 2021-08-02 RX ADMIN — METHOHEXITAL SODIUM 100 MG: 500 INJECTION, POWDER, LYOPHILIZED, FOR SOLUTION INTRAMUSCULAR; INTRAVENOUS; RECTAL at 09:08

## 2021-08-02 RX ADMIN — VENLAFAXINE HYDROCHLORIDE 150 MG: 150 CAPSULE, EXTENDED RELEASE ORAL at 10:19

## 2021-08-02 RX ADMIN — LABETALOL HYDROCHLORIDE 10 MG: 5 INJECTION INTRAVENOUS at 09:08

## 2021-08-02 ASSESSMENT — ENCOUNTER SYMPTOMS: DEPRESSION: 1

## 2021-08-02 NOTE — PLAN OF CARE
"Plan of Care Review  Plan of Care Reviewed With: patient  Patient Agreement with Plan of Care: agrees  Progress: improving  Intervention(s): Support provided post ECT tx, emotional support provided.  Outcome Summary: Gemma had ECT #2 this am. She returned at 1015. She immediately ate breakfast. She was C/O 7/10 headache pain and was given PRN Tylenol. She attended group after. Gemma reports her anxiety and depression as \"mild\". She denies SI at this time. She denies thoughts of self harm. Gemma is noticeably brighter and more talkative. She feels as though her symptoms have improved. She is now discharge focused and is re-thinking her aftercare plan and is no longer wanting to step down to a residential tx program. Gemma was compliant with medication. She is eating 75% of meals. Will CTM/Support.   "

## 2021-08-02 NOTE — PROGRESS NOTES
PSYCHIATRIC PROGRESS NOTE    Interval History: reports mood much improved. Brighter and more interactive on unit. Tolerated ect well today. Mild ha/jaw pain relieved with motrin. Future oriented and denies si. Eating, sleeping well. Does not appear manic. Asking if she can go home and finish course as outpt. Team let her know that we will consider this as course moves forward and d/w her mother.         Vital Signs for the last 24 hours:  Temp:  [36.1 °C (97 °F)-37.2 °C (98.9 °F)] 36.7 °C (98.1 °F)  Heart Rate:  [] 77  Resp:  [16-18] 18  BP: ()/(56-87) 99/59  FiO2 (%) (Set):  [99 %-100 %] 99 %    Scheduled Meds:  • venlafaxine XR  150 mg oral Daily with breakfast       Labs:  Results from last 7 days   Lab Units 07/29/21  1010   HEMOGLOBIN g/dL 13.6   WBC K/uL 8.31   PLATELETS K/uL 327   POTASSIUM mEQ/L 4.7   SODIUM mEQ/L 137   CREATININE mg/dL 0.7       MENTAL STATUS EXAM  Appearance: well groomed  Gait and Motor: no abnormal movements and normal  Speech: normal rate/rhythm/volume  Mood: depressed  Affect congruent, reactive  Associations: coherent  Thought Process: goal-directed  Thought Content: no auditory or visual hallucinations.  Suicidality/Homicidality: denies  Judgement/Insight: minimizes severity level of illness and help rejecting  Orientation: day, month and year  Memory: recalls recent events and recalls remote events  Attention: alert  Knowledge: normal  Language: normal     Assessment/Plan  Major depressive disorder with current active episode  Assessment & Plan  -improving  -s/p rul ect #2, continue m/w/f  -improving  -Structure and support, encourage group and milieu participation.   -continue effexor  -keep family updated      Sinus tachycardia  Assessment & Plan  Ctm, follow with hms

## 2021-08-02 NOTE — ANESTHESIOLOGIST PRE-PROCEDURE ATTESTATION
Pre-Procedure Patient Identification:  I am the Primary Anesthesiologist and have identified the patient on 08/02/21 at 8:53 AM.   I have confirmed the procedure(s) will be performed by the following surgeon/proceduralist * No surgeons listed *.

## 2021-08-02 NOTE — ANESTHESIA POSTPROCEDURE EVALUATION
Patient: Gemma Darnell    Procedure Summary     Date: 08/02/21 Room / Location: [facility]    Anesthesia Start: 0905 Anesthesia Stop: 0923    Procedure: ECT W SEDATION Diagnosis:     Scheduled Providers:  Responsible Provider: Sunny Shetty DO    Anesthesia Type: general ASA Status: 1          Anesthesia Type: general  PACU Vitals    No data found in the last 10 encounters.           Anesthesia Post Evaluation    Pain management: adequate  Patient participation: complete - patient participated  Level of consciousness: awake and alert  Cardiovascular status: acceptable  Airway Patency: adequate  Respiratory status: acceptable  Hydration status: acceptable  Anesthetic complications: no

## 2021-08-03 PROCEDURE — 63700000 HC SELF-ADMINISTRABLE DRUG: Performed by: PSYCHIATRY & NEUROLOGY

## 2021-08-03 PROCEDURE — 12400000 HC ROOM AND CARE SEMIPRIVATE PSYCH

## 2021-08-03 PROCEDURE — 99232 SBSQ HOSP IP/OBS MODERATE 35: CPT | Performed by: PSYCHIATRY & NEUROLOGY

## 2021-08-03 RX ADMIN — HYDROXYZINE HYDROCHLORIDE 50 MG: 25 TABLET, FILM COATED ORAL at 21:41

## 2021-08-03 RX ADMIN — VENLAFAXINE HYDROCHLORIDE 150 MG: 150 CAPSULE, EXTENDED RELEASE ORAL at 09:20

## 2021-08-03 NOTE — PROGRESS NOTES
"PSYCHIATRIC PROGRESS NOTE    Interval History:   Per Staffs: Visible on the unit, minimally social w/ peers. Reports feeling better with ECT. Visit w/ her boyfriend in the evening. Requested PRN Atarax and received.     On interview: Patient reports feeling \"frustrated\" that she has expressed not wanting to go to residential program after discharge, but that the team hasn't changed the plan. Discussed why she doesn't want to go to residential program. Patient reports that she has been in residential prior to her previous hospitalization and she feels she knows what it is like and it hasn't helped her much. Reports that she's interested in IOP as she can stay at home, which is more comfortable environment for her, and that she has a bit more freedom to do what she likes to do. Discussed with patient pros and cons of residential program vs. IOPs, and patient continues to refuse residential program. Reports that she is otherwise feeling good, and eating at least 70% of her meals. Denies SI/HI/AVH.     Vital Signs for the last 24 hours:  Temp:  [36.8 °C (98.2 °F)-37.2 °C (99 °F)] 36.8 °C (98.2 °F)  Heart Rate:  [] 105  Resp:  [12-14] 12  BP: ()/(67-70) 103/68    Scheduled Meds:  • venlafaxine XR  150 mg oral Daily with breakfast       Labs:  Selected Electrolytes  Results from last 7 days   Lab Units 07/29/21  1010   POTASSIUM mEQ/L 4.7   SODIUM mEQ/L 137   CREATININE mg/dL 0.7       Recent EKG HR/QTC  Lab Results   Component Value Date    VENTRICRATE 85 07/28/2021    VENTRICRATE 85 07/28/2021    QTCCALCULAT 423 07/28/2021    QTCCALCULAT 423 07/28/2021         MENTAL STATUS EXAM  Appearance: well groomed and appropriate attire  Gait and Motor: normal  Speech: normal rate/rhythm/volume and fluent  Mood: \"frustrated\"  Affect: euthymic  Associations: coherent, logical and organized  Thought Process: goal-directed  Thought Content: no auditory or visual hallucinations. and appropriate to " situation  Suicidality/Homicidality: denies  Judgement/Insight: limited/limited  Orientation: day, month and year  Memory: recalls recent events and recalls remote events  Attention: alert  Knowledge: normal  Language: normal     Assessment/Plan  Sinus tachycardia  Assessment & Plan  Ctm, follow with [facility]  bp stable    Major depressive disorder with current active episode  Assessment & Plan  -improving  -s/p rul ect #2, continue m/w/f  -improving  -Structure and support, encourage group and milieu participation.   -continue effexor  -keep family updated          Celia Fofana MD  Psychiatry, PGY2

## 2021-08-03 NOTE — PLAN OF CARE
Problem: Adult Behavioral Health Plan of Care  Goal: Plan of Care Review  Flowsheets (Taken 8/3/2021 1038)  Plan of Care Reviewed With: other (see comments)  Patient Agreement with Plan of Care: agrees   Plan of Care Review  Plan of Care Reviewed With: other (see comments)  Patient Agreement with Plan of Care: agrees      SW conducted review with Brittany Araiza).  Brittany states she has to review with doc due to pt's length of stay.  SW awaiting determination.     Pt was approved from 8/4-8/8.

## 2021-08-03 NOTE — PLAN OF CARE
"  Problem: Adult Behavioral Health Plan of Care  Goal: Plan of Care Review  Outcome: Progressing  Flowsheets (Taken 8/2/2021 2142)  Progress: improving  Plan of Care Reviewed With: patient  Patient Agreement with Plan of Care: agrees  Outcome Summary: Gemma has been visible on the unit and minimally social with her peers. She had ECT treatment this morning. States she feels better with ECT, and that she feels more \"energized\". She denies SI and thoughts of self harm. Denies HI and AVH. Affect restricted. She had a visit with her boyfriend this evening. Pt requested PRN atarax this shift. She went to wrap up group. Staff will continue to monitor for safety and provide pt with support.  Intervention(s): Gemma was encouraged to tell staff when having SI or thoughts of self harm this shift.     "

## 2021-08-03 NOTE — PLAN OF CARE
Problem: Adult Behavioral Health Plan of Care  Goal: Plan of Care Review  8/3/2021 1442 by Wendy Horn LSW  Flowsheets (Taken 8/3/2021 1442)  Plan of Care Reviewed With: patient  Patient Agreement with Plan of Care: agrees   Plan of Care Review  Plan of Care Reviewed With: patient  Patient Agreement with Plan of Care: agrees      SW spoke with pt re discharge planning.  The pt maintains she is ready for discharge and reiterated that she does not want to go to [facility] at d/c.  The pt does not feel that she would benefit from it.  The SW stressed that the pt should consider some type of program at discharge.  The pt states she is agreeable to participating in a PHP.  SW spoke with pt about attending PHP and possibly stepping down to [facility] IOP.  SW made referral to [facility] IOP supervisor, Wendy EDGAR for review.  SW will continue to follow.

## 2021-08-03 NOTE — PROGRESS NOTES
PSYCHIATRIC PROGRESS NOTE    Interval History: mood improving. Brighter and more interactive. Future oriented with no si. Tolerating ect well. Now discharge focused and stating she wants to go home rather than residential program. Bf visited last night, unclear how much this interaction plays into her wanting to leave. Eating, sleeping well. Interactions appropriate on unit. Open to continued ect and understands r/b. Family updated.       Vital Signs for the last 24 hours:  Temp:  [36.8 °C (98.2 °F)-37.2 °C (99 °F)] 36.8 °C (98.2 °F)  Heart Rate:  [] 105  Resp:  [12-14] 12  BP: ()/(67-70) 103/68    Scheduled Meds:  • venlafaxine XR  150 mg oral Daily with breakfast       Labs:  Results from last 7 days   Lab Units 07/29/21  1010   HEMOGLOBIN g/dL 13.6   WBC K/uL 8.31   PLATELETS K/uL 327   POTASSIUM mEQ/L 4.7   SODIUM mEQ/L 137   CREATININE mg/dL 0.7       MENTAL STATUS EXAM  Appearance: well groomed  Gait and Motor: no abnormal movements and normal  Speech: normal rate/rhythm/volume  Mood: depressed  Affect congruent, reactive  Associations: coherent  Thought Process: goal-directed  Thought Content: no auditory or visual hallucinations.  Suicidality/Homicidality: denies  Judgement/Insight: minimizes severity level of illness and help rejecting  Orientation: day, month and year  Memory: recalls recent events and recalls remote events  Attention: alert  Knowledge: normal  Language: normal     Assessment/Plan  Major depressive disorder with current active episode  Assessment & Plan  -improving  -s/p rul ect #2, continue m/w/f  -improving  -Structure and support, encourage group and milieu participation.   -continue effexor  -keep family updated      Sinus tachycardia  Assessment & Plan  Ctm, follow with [facility]  bp stable

## 2021-08-03 NOTE — PLAN OF CARE
Plan of Care Review  Plan of Care Reviewed With: patient  Patient Agreement with Plan of Care: agrees  Progress: improving  Intervention(s): Gemma was encouraged to attent unit activities.  Outcome Summary: Gemma was visible on the unit today. Minimally social with peers. Mood is brighter. Feels less depressed and denying SI. Remains discharge focused but is agreeable to taking psychiatrists recommendations for ECT. Gemma ate 75% of meals. She was medication compliant. Will CTM/Support.

## 2021-08-04 ENCOUNTER — ANESTHESIA EVENT (INPATIENT)
Dept: ENDOSCOPY | Facility: HOSPITAL | Age: 21
DRG: 885 | End: 2021-08-04
Payer: COMMERCIAL

## 2021-08-04 ENCOUNTER — TELEPHONE (OUTPATIENT)
Dept: ADMISSIONS | Facility: HOSPITAL | Age: 21
End: 2021-08-04

## 2021-08-04 ENCOUNTER — ANESTHESIA (INPATIENT)
Dept: ENDOSCOPY | Facility: HOSPITAL | Age: 21
DRG: 885 | End: 2021-08-04
Payer: COMMERCIAL

## 2021-08-04 ENCOUNTER — APPOINTMENT (INPATIENT)
Dept: ENDOSCOPY | Facility: HOSPITAL | Age: 21
DRG: 885 | End: 2021-08-04
Payer: COMMERCIAL

## 2021-08-04 PROBLEM — R00.0 SINUS TACHYCARDIA: Status: RESOLVED | Noted: 2021-07-19 | Resolved: 2021-08-04

## 2021-08-04 PROCEDURE — 90870 ELECTROCONVULSIVE THERAPY: CPT | Performed by: PSYCHIATRY & NEUROLOGY

## 2021-08-04 PROCEDURE — 71000001 HC PACU PHASE 1 INITIAL 30MIN

## 2021-08-04 PROCEDURE — 63600000 HC DRUGS/DETAIL CODE: Performed by: SPECIALIST

## 2021-08-04 PROCEDURE — 12400000 HC ROOM AND CARE SEMIPRIVATE PSYCH

## 2021-08-04 PROCEDURE — 25000000 HC PHARMACY GENERAL: Performed by: SPECIALIST

## 2021-08-04 PROCEDURE — 63700000 HC SELF-ADMINISTRABLE DRUG: Performed by: PSYCHIATRY & NEUROLOGY

## 2021-08-04 PROCEDURE — 37000001 HC ANESTHESIA GENERAL

## 2021-08-04 PROCEDURE — 90870 ELECTROCONVULSIVE THERAPY: CPT

## 2021-08-04 PROCEDURE — 71000011 HC PACU PHASE 1 EA ADDL MIN

## 2021-08-04 PROCEDURE — 99232 SBSQ HOSP IP/OBS MODERATE 35: CPT | Mod: 25 | Performed by: PSYCHIATRY & NEUROLOGY

## 2021-08-04 RX ORDER — METHOHEXITAL SODIUM 500 MG/500MG
INJECTION INTRAMUSCULAR; INTRAVENOUS; RECTAL AS NEEDED
Status: DISCONTINUED | OUTPATIENT
Start: 2021-08-04 | End: 2021-08-04 | Stop reason: SURG

## 2021-08-04 RX ORDER — KETOROLAC TROMETHAMINE 30 MG/ML
INJECTION, SOLUTION INTRAMUSCULAR; INTRAVENOUS AS NEEDED
Status: DISCONTINUED | OUTPATIENT
Start: 2021-08-04 | End: 2021-08-04 | Stop reason: SURG

## 2021-08-04 RX ORDER — IBUPROFEN/PSEUDOEPHEDRINE HCL 200MG-30MG
3 TABLET ORAL NIGHTLY
Status: DISCONTINUED | OUTPATIENT
Start: 2021-08-04 | End: 2021-08-09 | Stop reason: HOSPADM

## 2021-08-04 RX ADMIN — Medication 3 MG: at 21:17

## 2021-08-04 RX ADMIN — KETOROLAC TROMETHAMINE 30 MG: 30 INJECTION, SOLUTION INTRAMUSCULAR at 10:18

## 2021-08-04 RX ADMIN — SUCCINYLCHOLINE CHLORIDE 100 MG: 20 INJECTION, SOLUTION INTRAMUSCULAR; INTRAVENOUS; PARENTERAL at 10:14

## 2021-08-04 RX ADMIN — VENLAFAXINE HYDROCHLORIDE 150 MG: 150 CAPSULE, EXTENDED RELEASE ORAL at 11:53

## 2021-08-04 RX ADMIN — HYDROXYZINE HYDROCHLORIDE 50 MG: 25 TABLET, FILM COATED ORAL at 21:17

## 2021-08-04 RX ADMIN — METHOHEXITAL SODIUM 100 MG: 500 INJECTION, POWDER, LYOPHILIZED, FOR SOLUTION INTRAMUSCULAR; INTRAVENOUS; RECTAL at 10:14

## 2021-08-04 RX ADMIN — ACETAMINOPHEN 650 MG: 325 TABLET, FILM COATED ORAL at 11:53

## 2021-08-04 ASSESSMENT — PAIN SCALES - GENERAL: PAIN_LEVEL: 0

## 2021-08-04 ASSESSMENT — ENCOUNTER SYMPTOMS: DEPRESSION: 1

## 2021-08-04 NOTE — ANESTHESIA PREPROCEDURE EVALUATION
Relevant Problems   NEUROLOGY   (+) Anxiety   (+) Major depressive disorder with current active episode       Anesthesia ROS/MED HX    Anesthesia History    Previous anesthetics  Pulmonary - neg  Neuro/Psych    Depression   Anxiety  Cardiovascular- neg   Covid19 Test Reviewed  Hematological - neg  GI/Hepatic- neg  Musculoskeletal- neg  Endo/Other- neg  Body Habitus: Normal       Past Surgical History:   Procedure Laterality Date   • WOUND EXPLORATION         Physical Exam    Airway   Mallampati: II   TM distance: >3 FB   Neck ROM: full  Cardiovascular - normal   Rhythm: regular   Rate: normalPulmonary - normal   clear to auscultation  Dental - normal        Anesthesia Plan    Plan: general    Technique: total IV anesthesia and general mask     Airway: natural airway / supplemental oxygen       patient did not smoke on day of surgery  ASA 2  Blood Products:   Use of Blood Products Discussed: No   Anesthetic plan and risks discussed with: patient  Induction:    intravenous   Postop Plan:   Patient Disposition: inpatient floor planned admission   Pain Management: IV analgesics

## 2021-08-04 NOTE — ANESTHESIA POSTPROCEDURE EVALUATION
Patient: Gemma Darnell    Procedure Summary     Date: 08/04/21 Room / Location: [facility]    Anesthesia Start: 1009 Anesthesia Stop: 1029    Procedure: ECT W SEDATION Diagnosis:     Scheduled Providers:  Responsible Provider: Claribel Miguel MD    Anesthesia Type: general ASA Status: 2          Anesthesia Type: general  PACU Vitals    No data found in the last 10 encounters.           Anesthesia Post Evaluation    Pain score: 0  Pain management: adequate  Patient location during evaluation: PACU  Patient participation: complete - patient participated  Level of consciousness: awake and alert  Cardiovascular status: acceptable  Airway Patency: adequate  Respiratory status: acceptable  Hydration status: acceptable  Anesthetic complications: no

## 2021-08-04 NOTE — PROGRESS NOTES
PSYCHIATRIC PROGRESS NOTE    Interval History: continued improvement in mood. Bright and social on unit. Now discharge focused. Eating, sleeping well. Tolerated ect well today. Asks repeatedly about discharge. Team spoke with mother who is in agreement with team that best option would be to complete brief ect course in hospital. Team in discussions with patient about this. No cognitive deficits grossly. Eating, sleeping well.     Vital Signs for the last 24 hours:  Temp:  [36.1 °C (97 °F)-37.2 °C (98.9 °F)] 36.1 °C (97 °F)  Heart Rate:  [] 87  Resp:  [14-16] 16  BP: (112-133)/(72-91) 133/91    Scheduled Meds:  • venlafaxine XR  150 mg oral Daily with breakfast       Labs:  Results from last 7 days   Lab Units 07/29/21  1010   HEMOGLOBIN g/dL 13.6   WBC K/uL 8.31   PLATELETS K/uL 327   POTASSIUM mEQ/L 4.7   SODIUM mEQ/L 137   CREATININE mg/dL 0.7       MENTAL STATUS EXAM  Appearance: well groomed  Gait and Motor: no abnormal movements and normal  Speech: normal rate/rhythm/volume  Mood: depressed  Affect congruent, reactive  Associations: coherent  Thought Process: goal-directed  Thought Content: no auditory or visual hallucinations.  Suicidality/Homicidality: denies  Judgement/Insight: minimizes severity level of illness and help rejecting  Orientation: day, month and year  Memory: recalls recent events and recalls remote events  Attention: alert  Knowledge: normal  Language: normal     Assessment/Plan  Major depressive disorder with current active episode  Assessment & Plan  -improving  -s/p rul ect #3, continue m/w/f  -Structure and support, encourage group and milieu participation.   -continue effexor  -dispo home with [facility] f/u, keep family updated

## 2021-08-04 NOTE — PLAN OF CARE
"Plan of Care Review  Plan of Care Reviewed With: patient  Patient Agreement with Plan of Care: agrees  Progress: improving  Intervention(s): Gemma was supported post ECT, encouraged to attend unit activities  Outcome Summary: Gemma left for ECT this AM at 0815. She returned at 1130 as there was a delay at endo. Gemma received her morning medication after returning then had lunch. She requested PRN Tylenol for 6/10 headache. Gemma expressed that she is no longer feeling depressed or anxious and stated \"I want to talk to the doctor about discharge today or tomorrow. I can do ECT outpatient and being here isn't helping anymore\". Gemma plans to talk to her mother. This writer met with her to encourage her to remain in treatment, complete the recommended ECT TX plan, and respect and have empathy for her mothers position being fearful of accepting her back. Will CTM/Support.   "

## 2021-08-04 NOTE — PLAN OF CARE
Plan of Care Review  Plan of Care Reviewed With: patient  Patient Agreement with Plan of Care: agrees  Progress: improving  Intervention(s): Gemma was encouraged to report any thoughts to self-harm to RN this shift.  Outcome Summary: Gemma is visible and active in the milieu. SCT # 3 tomorrow. Bright affect, cooperative and pleasant. Bf visited. Compliant with medications, eating and drinking meals. Attends to ADLs. Anxiety and depression 3/10, denied SI/HI/AVH and pain. Will ctm.

## 2021-08-04 NOTE — DISCHARGE INSTR - APPOINTMENTS
The patient has been sent an e mail with additional information regarding the program and screening assessments that need to be completed.  The patient should arrive to the appointment 15 minutes early and where a mask.

## 2021-08-04 NOTE — TELEPHONE ENCOUNTER
[facility] Initial Intake    Gemma Darnell, a 21 y.o. female.  [facility] Intake           General  Reason for seeking services (in client's own words)?: Pt was admitted to [facility]  and is discharging either Fri  or Mon . Pt had a suicide attempt that led her to IP. She is recommended MH PHP as a stepdown.    Mental Health History  Mental Health History: Yes  Depression: Dysphoria, Anhedonia    Mental Health Treatment History  Have you had any mental health treatment in the last 6 months?: Has had multiple IP admissions (2x [facility], 1x in [place], and a Residential tx facility before that)    Suicide Thoughts/Plans  Have you had suicidal thoughts in the past 72 hours?: No  Have you ever had suicidal thoughts?: Yes  Describe: Pt has hx of suicidality - multiple times. None since starting ECT while inpatient at [facility].  Do you have a plan?: No  Have you ever attempted?: Yes  Have you ever harmed yourself?: Yes  In what way did you harm yourself? Select all that apply.: Suicide Attempt  How many times?: Multiple times  When was the last time?:  approx  Do you have easy access to firearms?: No    Violence/Trauma  Do you currently have, or have you ever had, thoughts of harming someone else?: No  Any history of an event that you would consider emotionally/psychologically/physically traumatic?: None reported     Period  Are you seeking treatment at the [facility] related to the  period (before, during, after pregnancy/adoption)? : No    Pregnancy/Breastfeeding  Are you pregnant?: No  Are you currently breastfeeding or bottle feeding?: No    Substance Use Details:   Substance Use Includes::  (None reported)    Substance Use Treatment History  Are you currently experiencing, or have you ever experienced, withdrawal symptoms?: No  Prior treatment reported?: No    Eating Disorders  Do you have any problematic food related behaviors?: Yes  Details: Has had ED behaviors (restrictive) in the past -- not active now,.  Have you  had any prior treatment?: Yes  If yes, insert comments: RS did not have details    Additional Information  How would you describe your gender identity?: Female  Determination: [facility] [place]      Referring Facility:  [facility]  Referring Facility Comments:  Referred by Wendy Horn at [facility] IP  Documentation Requested: Yes  Documentation Comments: Information will be available in EPIC  Other Referral Source:    Other Referral Source Comments:   Wendy wong# [phone]

## 2021-08-04 NOTE — ANESTHESIOLOGIST PRE-PROCEDURE ATTESTATION
Pre-Procedure Patient Identification:  I am the Primary Anesthesiologist and have identified the patient on 08/04/21 at 10:05 AM.   I have confirmed the procedure(s) will be performed by the following surgeon/proceduralist * No surgeons listed *.

## 2021-08-05 PROCEDURE — 12400000 HC ROOM AND CARE SEMIPRIVATE PSYCH

## 2021-08-05 PROCEDURE — 99232 SBSQ HOSP IP/OBS MODERATE 35: CPT | Performed by: PSYCHIATRY & NEUROLOGY

## 2021-08-05 PROCEDURE — 63700000 HC SELF-ADMINISTRABLE DRUG: Performed by: PSYCHIATRY & NEUROLOGY

## 2021-08-05 RX ADMIN — HYDROXYZINE HYDROCHLORIDE 50 MG: 25 TABLET, FILM COATED ORAL at 20:49

## 2021-08-05 RX ADMIN — Medication 3 MG: at 20:49

## 2021-08-05 RX ADMIN — VENLAFAXINE HYDROCHLORIDE 150 MG: 150 CAPSULE, EXTENDED RELEASE ORAL at 09:22

## 2021-08-05 NOTE — STUDENT
Medical Student Note: For educational purposes only.  Do not use for coding or billing.     Psychiatry Progress Note    Chief Complaint/Reason for follow-up: suicide attempt, suicidal ideation, depression,     Interval History: Today, Gemma shows a continued improvement in her mood. She is regularly social with peers and is regularly participating in groups. She continues to tolerate ECT well and does not report any confusion or amnesia. She reports that she is eating and sleeping well.     She remains very discharge focused and is resistant to the idea of staying until Monday when she is done with her ECT. She feels that there is no benefit to her by staying here for any additional time. However, she is somewhat open to considering this after some discussion about our/her mom's concerns. She is future oriented and is most looking forward to spending time with her boyfriend and playing with her cats. She does not endorse any SI, SH, or AVH.     Review of Systems:   Sleep:  Good, Appetite: Good and GI: No complaints    Vital Signs for the last 24 hours:  Temp:  [36.8 °C (98.3 °F)-37 °C (98.6 °F)] 36.9 °C (98.4 °F)  Heart Rate:  [] 129  Resp:  [16] 16  BP: (105-115)/(62-74) 115/62      Mental Status Exam:  Appearance: well groomed and appropriate attire  Gait and Motor: no abnormal movements and normal gait  Speech: normal rate/rhythm/volume  Mood: 'good'  Affect: constricted and disappointed  Associations: coherent  Thought Process: goal-directed and linear  Thought Content: no auditory or visual hallucinations.  Suicidality/Homicidality: denies  Judgement/Insight: minimizes severity level of illness  Orientation: day, month and year  Memory: recalls recent events and recalls remote events  Attention: alert  Knowledge: normal  Language: normal      Assessment/Plan  Gemma Darnell is a 21 year old female who presents today for continued follow up of a recent drug overdose and suicidal ideation.     Major depressive  disorder with current active episode     -She continues to improve  -S/p ect #3, continue m/w/f  -Structure and support, encourage group and milieu participation.   -Continue effexor 150mg QD  -tentative d/c Monday, dispo home with php f/u, keep family updated    Judson Love  8/5/2021

## 2021-08-05 NOTE — PLAN OF CARE
Plan of Care Review  Plan of Care Reviewed With: patient  Patient Agreement with Plan of Care: agrees  Progress: improving  Intervention(s): Gemma was encouraged to attend unit activities. Provided with emotional support.  Outcome Summary: Gemma was visible in the community today. She presents with a bright affect. She denies feeling depressed or thoughts of suicide. She is discharge focused and shared that she was hoping that she would discharge tomorrow but learned her anticipated D/C plan is for Monday. Although disappointed, she is agreeable to this plan. She attended group today and was compliant with medication. She is eating well and drinking adequate fluids. Will CTM/Support.

## 2021-08-05 NOTE — PLAN OF CARE
"Plan of Care Review  Plan of Care Reviewed With: patient  Patient Agreement with Plan of Care: agrees  Progress: improving  Intervention(s): Gemma was encouraged to report any thoughts to self-harm to RN this shift.  Outcome Summary: Gemma presents as brighter, more social, cooperative with meds and eating better. Her menus were placed for tomorrow. Stated, \"ECT went well\". Denied headache tonight. Denied pain, depression, anxiety, SI/HI/AVH. D/C to Mom's Friday. Pt provided 50 mg Atarax @ 2119 for sleep per pt request. Will continue with care plan.   "

## 2021-08-05 NOTE — PROGRESS NOTES
"PSYCHIATRIC PROGRESS NOTE    Interval History:   Per Staffs: Brighter, more social. Cooperative w/ medications. Improved eating. Pt requested Atarax for sleep and was given @2119.    On interview: On approach, patient was lying in the bed in her room. States that she is \"good\" but frustrated that she is not being discharged tomorrow. States that she is still hoping to be discharged tomorrow despite talking to attending psychiatrist and medical student about staying over the weekend to complete 5th ECT on Monday. States that she is eating okay and sleeps well with the scheduled melatonin. Denies HA or confusion. Does not engage in the interview readily, and answers most of the questions in one word. Does not want to go to residential or sub-acute program after the discharge and states that she wants to follow up at [facility] for outpatient program. States that she spoke to her mother yesterday, and that mother is in agreement with the PHP plan. Denies SI/HI/AVH.     Vital Signs for the last 24 hours:  Temp:  [36.1 °C (97 °F)-37 °C (98.6 °F)] 36.9 °C (98.4 °F)  Heart Rate:  [] 129  Resp:  [16] 16  BP: (101-133)/(62-91) 115/62    Scheduled Meds:  • melatonin  3 mg oral Nightly   • venlafaxine XR  150 mg oral Daily with breakfast       Labs:  Selected Electrolytes  Results from last 7 days   Lab Units 07/29/21  1010   POTASSIUM mEQ/L 4.7   SODIUM mEQ/L 137   CREATININE mg/dL 0.7       Recent EKG HR/QTC  Lab Results   Component Value Date    VENTRICRATE 85 07/28/2021    VENTRICRATE 85 07/28/2021    QTCCALCULAT 423 07/28/2021    QTCCALCULAT 423 07/28/2021         MENTAL STATUS EXAM  Appearance: well groomed, appropriate attire and lying in her bed  Gait and Motor: normal  Speech: normal rate/rhythm/volume and fluent  Mood: \"good\" but \"frustrated\"  Affect: euthymic  Associations: coherent, logical and organized  Thought Process: goal-directed  Thought Content: no auditory or visual hallucinations. and appropriate to " situation  Suicidality/Homicidality: denies  Judgement/Insight: limited/limited  Orientation: time, place, person  Memory: recalls recent events and recalls remote events  Attention: alert  Knowledge: normal  Language: normal     Assessment/Plan  Major depressive disorder with current active episode  Assessment & Plan  -improving  -s/p rul ect #3, continue m/w/f  -Structure and support, encourage group and milieu participation.   -continue effexor  -dispo home with php f/u, keep family updated          Celia Fofana MD  Psychiatry, PGY2

## 2021-08-05 NOTE — PROGRESS NOTES
PSYCHIATRIC PROGRESS NOTE    Interval History: continued improvement in mood. Bright and social on unit. Now discharge focused. Eating, sleeping well. Tolerating ect well. No confusion. Discussed possibility of continuing inpt ect until Monday and discharge to home after that with php f/u. Gemma was disappointed about this plan but somewhat open after discussion. Future oriented with no si. Discussed plan to resume working and possibly go back to school. Sees her bf as strong support.         Vital Signs for the last 24 hours:  Temp:  [36.8 °C (98.3 °F)-37 °C (98.6 °F)] 36.9 °C (98.4 °F)  Heart Rate:  [] 129  Resp:  [16] 16  BP: (101-115)/(62-74) 115/62    Scheduled Meds:  • melatonin  3 mg oral Nightly   • venlafaxine XR  150 mg oral Daily with breakfast       Labs:        MENTAL STATUS EXAM  Appearance: well groomed  Gait and Motor: no abnormal movements and normal  Speech: normal rate/rhythm/volume  Mood: depressed  Affect congruent, reactive  Associations: coherent  Thought Process: goal-directed  Thought Content: no auditory or visual hallucinations.  Suicidality/Homicidality: denies  Judgement/Insight: minimizes severity level of illness and help rejecting  Orientation: day, month and year  Memory: recalls recent events and recalls remote events  Attention: alert  Knowledge: normal  Language: normal     Assessment/Plan  Major depressive disorder with current active episode  Assessment & Plan  -improving  -s/p rul ect #3, continue m/w/f  -Structure and support, encourage group and milieu participation.   -continue effexor  -tentative d/c Monday, dispo home with php f/u, keep family updated

## 2021-08-06 ENCOUNTER — APPOINTMENT (INPATIENT)
Dept: ENDOSCOPY | Facility: HOSPITAL | Age: 21
DRG: 885 | End: 2021-08-06
Payer: COMMERCIAL

## 2021-08-06 ENCOUNTER — ANESTHESIA (INPATIENT)
Dept: ENDOSCOPY | Facility: HOSPITAL | Age: 21
DRG: 885 | End: 2021-08-06
Payer: COMMERCIAL

## 2021-08-06 ENCOUNTER — ANESTHESIA EVENT (INPATIENT)
Dept: ENDOSCOPY | Facility: HOSPITAL | Age: 21
DRG: 885 | End: 2021-08-06
Payer: COMMERCIAL

## 2021-08-06 PROCEDURE — 71000001 HC PACU PHASE 1 INITIAL 30MIN

## 2021-08-06 PROCEDURE — 99232 SBSQ HOSP IP/OBS MODERATE 35: CPT | Mod: 25 | Performed by: PSYCHIATRY & NEUROLOGY

## 2021-08-06 PROCEDURE — 71000011 HC PACU PHASE 1 EA ADDL MIN

## 2021-08-06 PROCEDURE — 63700000 HC SELF-ADMINISTRABLE DRUG: Performed by: PSYCHIATRY & NEUROLOGY

## 2021-08-06 PROCEDURE — 37000001 HC ANESTHESIA GENERAL

## 2021-08-06 PROCEDURE — 25800000 HC PHARMACY IV SOLUTIONS: Performed by: ANESTHESIOLOGY

## 2021-08-06 PROCEDURE — 90870 ELECTROCONVULSIVE THERAPY: CPT | Performed by: PSYCHIATRY & NEUROLOGY

## 2021-08-06 PROCEDURE — 12400000 HC ROOM AND CARE SEMIPRIVATE PSYCH

## 2021-08-06 PROCEDURE — 63600000 HC DRUGS/DETAIL CODE: Performed by: ANESTHESIOLOGY

## 2021-08-06 PROCEDURE — 25000000 HC PHARMACY GENERAL: Performed by: ANESTHESIOLOGY

## 2021-08-06 RX ORDER — KETOROLAC TROMETHAMINE 30 MG/ML
INJECTION, SOLUTION INTRAMUSCULAR; INTRAVENOUS AS NEEDED
Status: DISCONTINUED | OUTPATIENT
Start: 2021-08-06 | End: 2021-08-06 | Stop reason: SURG

## 2021-08-06 RX ORDER — SODIUM CHLORIDE 9 MG/ML
INJECTION, SOLUTION INTRAVENOUS CONTINUOUS PRN
Status: DISCONTINUED | OUTPATIENT
Start: 2021-08-06 | End: 2021-08-06 | Stop reason: SURG

## 2021-08-06 RX ORDER — METHOHEXITAL SODIUM 500 MG/500MG
INJECTION INTRAMUSCULAR; INTRAVENOUS; RECTAL AS NEEDED
Status: DISCONTINUED | OUTPATIENT
Start: 2021-08-06 | End: 2021-08-06 | Stop reason: SURG

## 2021-08-06 RX ORDER — VENLAFAXINE HYDROCHLORIDE 150 MG/1
150 CAPSULE, EXTENDED RELEASE ORAL
Qty: 30 CAPSULE | Refills: 0 | Status: SHIPPED | OUTPATIENT
Start: 2021-08-06 | End: 2023-05-18 | Stop reason: ALTCHOICE

## 2021-08-06 RX ORDER — IBUPROFEN/PSEUDOEPHEDRINE HCL 200MG-30MG
3 TABLET ORAL NIGHTLY
Qty: 30 TABLET | Refills: 0 | Status: SHIPPED | OUTPATIENT
Start: 2021-08-06 | End: 2023-05-18

## 2021-08-06 RX ADMIN — KETOROLAC TROMETHAMINE 30 MG: 30 INJECTION, SOLUTION INTRAMUSCULAR at 09:39

## 2021-08-06 RX ADMIN — METHOHEXITAL SODIUM 100 MG: 500 INJECTION, POWDER, LYOPHILIZED, FOR SOLUTION INTRAMUSCULAR; INTRAVENOUS; RECTAL at 09:39

## 2021-08-06 RX ADMIN — HYDROXYZINE HYDROCHLORIDE 50 MG: 25 TABLET, FILM COATED ORAL at 20:43

## 2021-08-06 RX ADMIN — ACETAMINOPHEN 650 MG: 325 TABLET, FILM COATED ORAL at 10:40

## 2021-08-06 RX ADMIN — VENLAFAXINE HYDROCHLORIDE 150 MG: 150 CAPSULE, EXTENDED RELEASE ORAL at 10:40

## 2021-08-06 RX ADMIN — Medication 3 MG: at 20:43

## 2021-08-06 RX ADMIN — SUCCINYLCHOLINE CHLORIDE 100 MG: 20 INJECTION, SOLUTION INTRAMUSCULAR; INTRAVENOUS; PARENTERAL at 09:39

## 2021-08-06 RX ADMIN — SODIUM CHLORIDE: 0.9 INJECTION, SOLUTION INTRAVENOUS at 09:34

## 2021-08-06 NOTE — DISCHARGE INSTRUCTIONS
Electroconvulsive Therapy, Care After  This sheet gives you information about how to care for yourself after your procedure. Your health care provider may also give you more specific instructions. If you have problems or questions, contact your health care provider.  What can I expect after the procedure?  After the procedure, it is common to have:  · Headache.  · Confusion.  · Nausea.  · Muscle aches.  · Temporary short-term memory loss.  Follow these instructions at home:    · Continue to follow your regular treatment plan for your mental health condition as told by your health care provider. ECT does not cure your mental health condition.  · Take over-the-counter and prescription medicines only as told by your health care provider.  · Do not drive for 24 hours if you were given a sedative or a muscle relaxant during your procedure. Ask your health care provider when it is safe for you to drive.  · Keep all follow-up visits as told by your health care provider. This is important.  Contact a health care provider if:  · Your symptoms do not improve after several treatments.  · Your symptoms get worse.  · You have memory problems that last more than two months.  Get help right away if:  · You have thoughts of hurting yourself or others.  If you ever feel like you may hurt yourself or others, or have thoughts about taking your own life, get help right away. You can go to your nearest emergency department or call:  · Your local emergency services (911 in the [place]  · A suicide crisis helpline, such as the National Suicide Prevention Lifeline at 1-801.999.4552. This is open 24 hours a day.  This information is not intended to replace advice given to you by your health care provider. Make sure you discuss any questions you have with your health care provider.  Document Released: 06/13/2017 Document Revised: 11/30/2018 Document Reviewed: 08/23/2017  Elsevier Patient Education © 2020 [employer] Inc[employer]

## 2021-08-06 NOTE — ANESTHESIA POSTPROCEDURE EVALUATION
Patient: Gemma Darnell    Procedure Summary     Date: 08/06/21 Room / Location: [facility]    Anesthesia Start: 0934 Anesthesia Stop: 0954    Procedure: ECT W SEDATION Diagnosis:     Scheduled Providers:  Responsible Provider: Delilah Alston DO    Anesthesia Type: general ASA Status: 2          Anesthesia Type: general  PACU Vitals    No data found in the last 10 encounters.           Anesthesia Post Evaluation    Pain management: adequate  Patient location during evaluation: PACU  Patient participation: complete - patient participated  Level of consciousness: awake and alert  Cardiovascular status: acceptable  Airway Patency: adequate  Respiratory status: acceptable  Hydration status: acceptable  Anesthetic complications: no

## 2021-08-06 NOTE — PROGRESS NOTES
PSYCHIATRIC PROGRESS NOTE    Interval History: tolerated ect well today. Seen socializing in the milieu afterward. No new c/o. No confusion grossly. Discharge focused but agreeable to push back until after final ect Monday.       Vital Signs for the last 24 hours:  Temp:  [36.4 °C (97.6 °F)-37 °C (98.6 °F)] 36.4 °C (97.6 °F)  Heart Rate:  [] 89  Resp:  [16-18] 18  BP: (101-137)/(59-94) 106/59    Scheduled Meds:  • melatonin  3 mg oral Nightly   • venlafaxine XR  150 mg oral Daily with breakfast       Labs:        MENTAL STATUS EXAM  Appearance: well groomed  Gait and Motor: no abnormal movements and normal  Speech: normal rate/rhythm/volume  Mood: depressed  Affect congruent, reactive  Associations: coherent  Thought Process: goal-directed  Thought Content: no auditory or visual hallucinations.  Suicidality/Homicidality: denies  Judgement/Insight: minimizes severity level of illness and help rejecting  Orientation: day, month and year  Memory: recalls recent events and recalls remote events  Attention: alert  Knowledge: normal  Language: normal     Assessment/Plan  Major depressive disorder with current active episode  Assessment & Plan  -improving  -s/p rul ect #4, continue course with final treatment on Monday.  -Structure and support, encourage group and milieu participation.   -continue effexor  -d/c Monday, dispo home with [facility] f/u, family aware

## 2021-08-06 NOTE — PLAN OF CARE
Problem: Adult Behavioral Health Plan of Care  Goal: Plan of Care Review  8/6/2021 1451 by Mayda Aranda RN  Outcome: Progressing  Flowsheets (Taken 8/6/2021 1451)  Progress: improving  Outcome Summary: Gemma was visible in the community at times and was social with select peers. She attended groups today after returning from her ECT treatment. She took prn tylenol after the treatment for complaints of a headache. She denies SI/HI and denies hallucinations. She was pleasant on approach. She did not ask this shift about discharge so she was less focused on having to be here until Monday. She accepts that she will not be discharging until then. She was compliant with medications and ate her meals. Staff will continue to monitor for safety with Q15 minute observations.  Intervention(s): Staff assessed for suicidal ideation. Staff offered support throughout the shift.     Problem: Suicidal Behavior  Goal: Suicidal Behavior is Absent or Managed  8/6/2021 1451 by Mayda Aranda RN  Outcome: Progressing  Flowsheets (Taken 8/6/2021 1451)  [place] Goal: shares suicidal thoughts  Individual Goal: Gemma will notify staff if she is having suicidal thoughts this shift.  Goal Outcome: progressing     Problem: Cognitive Impairment Post-Electroconvulsive Therapy  Goal: Baseline Cognitive Function Maintained  8/6/2021 1451 by Mayda Aranda RN  Outcome: Progressing  Flowsheets (Taken 8/6/2021 1451)  Individual Goal: Gemma will show improved mood after ECT treatment today.  Goal Outcome: progressing

## 2021-08-06 NOTE — PLAN OF CARE
Problem: Adult Behavioral Health Plan of Care  Goal: Plan of Care Review  Flowsheets (Taken 8/6/2021 7107)  Plan of Care Reviewed With: parent  Patient Agreement with Plan of Care: agrees   Plan of Care Review  Plan of Care Reviewed With: parent  Patient Agreement with Plan of Care: agrees        LA spoke with pt's mother, Cindi, chacho discharge planning.  LA explained pt has been scheduled for intake at [facility] next Tuesday.  Cindi asked LA to make referral to [facility] as well.  LA will continue to follow.

## 2021-08-06 NOTE — PROGRESS NOTES
"PSYCHIATRIC PROGRESS NOTE    Interval History:   Per Staffs: Voices frustration about not being discharged on Fri, but with some support, ws more open to accepting staying the weekend. Visible in the milieu. Cooperative w/ medications. Requested PRN Atarax and received.    On interview: Patient seen at the pre-op area prior to ECT in the morning. Reports that she was feeling \"fine.\" Denies any issues with sleep or eating. Reports she is still not happy about her discharge plan, but appears more accepting about the decision than yesterday.    Vital Signs for the last 24 hours:  Temp:  [36.4 °C (97.6 °F)-37 °C (98.6 °F)] 36.4 °C (97.6 °F)  Heart Rate:  [] 89  Resp:  [16-18] 18  BP: (101-137)/(59-94) 106/59    Scheduled Meds:  • melatonin  3 mg oral Nightly   • venlafaxine XR  150 mg oral Daily with breakfast       Labs:  Selected Electrolytes        Recent EKG HR/QTC  Lab Results   Component Value Date    VENTRICRATE 85 07/28/2021    VENTRICRATE 85 07/28/2021    QTCCALCULAT 423 07/28/2021    QTCCALCULAT 423 07/28/2021         MENTAL STATUS EXAM  Appearance: well groomed and appropriate attire  Gait and Motor: normal  Speech: normal rate/rhythm/volume and fluent  Mood: \"fine\"  Affect: euthymic  Associations: coherent, logical and organized  Thought Process: goal-directed  Thought Content: no auditory or visual hallucinations. and appropriate to situation  Suicidality/Homicidality: denies  Judgement/Insight: limited/limited  Orientation: time, place, person  Memory: recalls recent events and recalls remote events  Attention: alert  Knowledge: normal  Language: normal     Assessment/Plan  Major depressive disorder with current active episode  Assessment & Plan  -improving  -s/p rul ect #4, continue course with final treatment on Monday.  -Structure and support, encourage group and milieu participation.   -continue effexor  -d/c Monday, dispo home with php f/u, family aware        Celia Fofana MD  Psychiatry, PGY2  "

## 2021-08-06 NOTE — PLAN OF CARE
Problem: Adult Behavioral Health Plan of Care  Goal: Plan of Care Review  8/6/2021 1235 by Wendy Horn LSW  Flowsheets (Taken 8/6/2021 1235)  Plan of Care Reviewed With: patient  Patient Agreement with Plan of Care: agrees   Plan of Care Review  Plan of Care Reviewed With: patient  Patient Agreement with Plan of Care: agrees      SW met with pt to review tx plan.  Pt was in agreement and signed document. SW and pt spoke with mom, Cindi, to discuss expectations re pt's return to her mother's home at [facility] continue to follow.  Pt and Cindi discussed parameters and disagreed on one point, which was the pt sleeping out of the house for two nights per week.  Cindi states that the pt cannot return to her home is pt is not willing to comply.  The pt became upset and SW ended the call.  SW and pt processed and pt states she understands that her mother is worried and that she needs to establish trust, compromise and focus on her recovery.  SW and pt to call Crystal again on Monday prior to d/c.  SW will continue to follow.

## 2021-08-06 NOTE — STUDENT
Medical Student Note: For educational purposes only.  Do not use for coding or billing.      Psychiatry Progress Note     Chief Complaint/Reason for follow-up: suicide attempt, suicidal ideation, depression    Interval History: Today, Gemma tolerated ECT well and shows a continued improvement in her mood. She does not report any confusion or amnesia. She is regularly social with peers and is regularly participating in groups. She reports that she is eating and sleeping well.     She remains discharge focused, but is understanding that this will occur after her final ECT on Monday. She is forward oriented and is looking forward to spending time with her boyfriend and cats.     Review of Systems:   Sleep:  Good, Appetite: Good and GI: No complaints    Vital Signs for the last 24 hours:  Temp:  [36.4 °C (97.6 °F)-37 °C (98.6 °F)] 36.4 °C (97.6 °F)  Heart Rate:  [] 89  Resp:  [16-18] 18  BP: (101-137)/(59-94) 106/59      Mental Status Exam:  Appearance: well groomed  Gait and Motor: no abnormal movements and normal gait  Speech: normal rate/rhythm/volume  Mood: 'fine'  Affect: normal and reactive  Associations: coherent  Thought Process: goal-directed  Thought Content: no auditory or visual hallucinations.  Suicidality/Homicidality: denies  Judgement/Insight: minimizes severity level of illness and help rejecting  Orientation: day, month, year, season, type of place, name of place, city and situation  Memory: recalls recent events and recalls remote events  Attention: alert  Knowledge: normal  Language: normal      Assessment/Plan  Major depressive disorder with current active episode  Assessment & Plan  - mood continues to improve  - s/p rul ect #4, continue course with final treatment on Monday.  - Structure and support, encourage group and milieu participation.   - continue effexor 150 mg QD  -d/c Monday, dispo home with php f/u, family aware    Judson Love  8/6/2021

## 2021-08-06 NOTE — PLAN OF CARE
Plan of Care Review  Plan of Care Reviewed With: patient  Patient Agreement with Plan of Care: agrees with comment (describe) (frustrated with d/c plans)  Progress: improving  Intervention(s): Assess for safety and encourage Gemma to attend and participate in unit activities  Outcome Summary: Gemma continued to voice frustration about not being discharged tomorrow, but with some support she was more open to accepting staying the weekend. She kept herself busy by staying visible in the community. She denied any thoughts of self-harm and SI. She was compliant with meds and requested PRN atarax. Staff will continue to monitor.

## 2021-08-06 NOTE — NURSING NOTE
Patient was transferred back to the Psychiatric Unit. The patient's vitals were stable throughout the recovery process. The patient had no complaints of pain. The procedural MD spoke with the patient when they awoke from anesthesia. Report was called to the RN looking after the patient on the unit. The patient was sent back down to the Psych unit in a wheelchair with a member of the transport team.

## 2021-08-06 NOTE — ANESTHESIA PREPROCEDURE EVALUATION
Relevant Problems   NEUROLOGY   (+) Anxiety   (+) Major depressive disorder with current active episode     CBC Results       07/29/21 07/21/21 07/19/21                    1010 0416 1746         WBC 8.31 8.04 9.67         RBC 4.55 4.42 4.63         HGB 13.6 13.0 13.5         HCT 39.7 39.8 41.4         MCV 87.3 90.0 89.4         MCH 29.9 29.4 29.2         MCHC 34.3 32.7 32.6          273 278                         ROS/Med Hx     Past Surgical History:   Procedure Laterality Date   • WOUND EXPLORATION         Physical Exam    Airway   Mallampati: II   TM distance: >3 FB   Neck ROM: full  Cardiovascular - normal   Rhythm: regular   Rate: normalPulmonary - normal   clear to auscultation  Dental - normal        Anesthesia Plan    Plan: general    Technique: general mask     Lines and Monitors: PIV     instructed to abstain from smoking on day of procedure    Patient did not smoke on day of surgery  ASA 2  Blood Products:   Use of Blood Products Discussed: No   Anesthetic plan and risks discussed with: patient  Induction:    intravenous   Postop Plan:   Patient Disposition: inpatient floor planned admission   Pain Management: IV analgesics

## 2021-08-06 NOTE — ANESTHESIOLOGIST PRE-PROCEDURE ATTESTATION
Pre-Procedure Patient Identification:  I am the Primary Anesthesiologist and have identified the patient on 08/06/21 at 8:54 AM.   I have confirmed the procedure(s) will be performed by the following surgeon/proceduralist * No surgeons listed *.

## 2021-08-07 LAB — SARS-COV-2 RNA RESP QL NAA+PROBE: NEGATIVE

## 2021-08-07 PROCEDURE — 63700000 HC SELF-ADMINISTRABLE DRUG: Performed by: PSYCHIATRY & NEUROLOGY

## 2021-08-07 PROCEDURE — U0002 COVID-19 LAB TEST NON-CDC: HCPCS | Performed by: PSYCHIATRY & NEUROLOGY

## 2021-08-07 PROCEDURE — 99231 SBSQ HOSP IP/OBS SF/LOW 25: CPT | Performed by: PSYCHIATRY & NEUROLOGY

## 2021-08-07 PROCEDURE — 200200 PR NO CHARGE: Performed by: PSYCHIATRY & NEUROLOGY

## 2021-08-07 PROCEDURE — 12400000 HC ROOM AND CARE SEMIPRIVATE PSYCH

## 2021-08-07 RX ADMIN — VENLAFAXINE HYDROCHLORIDE 150 MG: 150 CAPSULE, EXTENDED RELEASE ORAL at 09:16

## 2021-08-07 RX ADMIN — HYDROXYZINE HYDROCHLORIDE 50 MG: 25 TABLET, FILM COATED ORAL at 20:20

## 2021-08-07 RX ADMIN — Medication 3 MG: at 20:20

## 2021-08-07 NOTE — PLAN OF CARE
Plan of Care Review  Plan of Care Reviewed With: patient  Patient Agreement with Plan of Care: agrees  Progress: improving  Intervention(s): Gemma was encouraged to make her needs known this shift.  Outcome Summary: Gemma had ECT #4 today. She has been visible, pleasant and cooperative with meds. She ate 100% of meals tonight, is drinking adequate fluids. She denied pain, SI/HI/AVH. She reports sleeping well, discharge Monday. Will ctm.

## 2021-08-07 NOTE — PLAN OF CARE
Plan of Care Review  Plan of Care Reviewed With: patient  Patient Agreement with Plan of Care: agrees  Progress: improving  Intervention(s): Gemma was encouraged to attend unit activities. Provided with emotional support.  Outcome Summary: Gemma was visible in the milieu today watching TV or coloring. She attended group and was compliant with medication. She ate 75% of breakfast and lunch. Gemma is discharge focused and eager/excited for anticipated D/C on Monday. She plans to have her BF visit this evening.

## 2021-08-07 NOTE — PLAN OF CARE
Problem: Adult Behavioral Health Plan of Care  Goal: Optimized Coping Skills in Response to Life Stressors  Outcome: Progressing    Gemma will continue to explore multiple wellness tools for dysregulation and ID at least 2 for daily use.

## 2021-08-07 NOTE — PROGRESS NOTES
Psychiatry Progress Note    Chief Complaint/Reason for follow-up:   Major depressive disorder    Interval History:   Was seen in room, pt was in bed. She has been receiving ECT.  Mood is good, slept thru the night, 7-8 hrs.   Appetite is good.  She denies si/hi/plan/intent, no passive si.  She is hopeful for the future.  Looking forward to seeing her sister.  She denies ah/vh/paranoia/no grandiosity.  She plans to do groups today.     Review of Systems:    Appetite is good, no gi upset, sleep is good    Vital Signs for the last 24 hours:  Temp:  [37 °C (98.6 °F)-37.1 °C (98.7 °F)] 37.1 °C (98.7 °F)  Heart Rate:  [101-119] 114  Resp:  [16] 16  BP: (101-123)/(69-77) 104/69     Scheduled Meds:  • melatonin  3 mg oral Nightly   • venlafaxine XR  150 mg oral Daily with breakfast         Mental Status Exam:  Appearance: well groomed  Gait and Motor: no abnormal movements and normal  Speech: normal rate/rhythm/volume  Mood: good  Affect not labile, not expansive, reactive  Associations: coherent  Thought Process: goal-directed  Thought Content: no auditory or visual hallucinations.  Suicidality/Homicidality: denies  Judgement/Insight: minimizes severity level of illness and help rejecting  Orientation: day, month and year  Memory: recalls recent events and recalls remote events  Attention: alert  Knowledge: normal  Language: normal       Assessment/Plan  Major depressive disorder with current active episode  -improving  -s/p rul ect #4, continue course with final treatment on Monday.  -Structure and support, encourage group and milieu participation.   -continue effexor xr 150mg daily  -d/c Monday, dispo home with php f/u, family aware

## 2021-08-08 PROCEDURE — 63700000 HC SELF-ADMINISTRABLE DRUG: Performed by: PSYCHIATRY & NEUROLOGY

## 2021-08-08 PROCEDURE — 12400000 HC ROOM AND CARE SEMIPRIVATE PSYCH

## 2021-08-08 PROCEDURE — 99231 SBSQ HOSP IP/OBS SF/LOW 25: CPT | Performed by: PSYCHIATRY & NEUROLOGY

## 2021-08-08 RX ADMIN — HYDROXYZINE HYDROCHLORIDE 50 MG: 25 TABLET, FILM COATED ORAL at 20:32

## 2021-08-08 RX ADMIN — VENLAFAXINE HYDROCHLORIDE 150 MG: 150 CAPSULE, EXTENDED RELEASE ORAL at 09:11

## 2021-08-08 RX ADMIN — Medication 3 MG: at 20:32

## 2021-08-08 NOTE — PROGRESS NOTES
Psychiatry Progress Note    Chief Complaint/Reason for follow-up:   Major depressive disorder    Interval History:  Pt was seen in room, staying in room.  Mood is good. She was visited by her bf last night and stated that went well. She read last night.  She slept through the night. Appetite is good.  She plans to do groups and read.  She has been receiving ECT.  No si/hi/plan/intent, no passive si.  Reports hope for the future.     Review of Systems:    Appetite is good,  No gi complaints, sleep is good    Vital Signs for the last 24 hours:  Temp:  [36.7 °C (98 °F)-37.1 °C (98.8 °F)] 36.7 °C (98 °F)  Heart Rate:  [106-123] 112  Resp:  [14-16] 14  BP: ()/(58-83) 113/80       Scheduled Meds:  • melatonin  3 mg oral Nightly   • venlafaxine XR  150 mg oral Daily with breakfast          Mental Status Exam:   Appearance: well groomed  Gait and Motor: no abnormal movements and normal  Speech: normal rate/rhythm/volume  Mood: good  Affect congruent, reactive  Associations: coherent  Thought Process: goal-directed  Thought Content: no auditory or visual hallucinations.  Suicidality/Homicidality: denies  Judgement/Insight: minimizes severity level of illness and help rejecting  Orientation: day, month and year  Memory: recalls recent events and recalls remote events  Attention: alert  Knowledge: normal  Language: normal            Assessment/Plan  Major depressive disorder with current active episode  -relatively stable  -s/p rul ect #4, continue course with final treatment on Monday.  -Structure and support, encourage group and milieu participation.   -continue effexor xr 150mg daily  -d/c Monday, dispo home with php f/u, family aware

## 2021-08-08 NOTE — PLAN OF CARE
Problem: Adult Behavioral Health Plan of Care  Goal: Plan of Care Review  Outcome: Progressing  Flowsheets (Taken 8/7/2021 2158)  Progress: improving  Plan of Care Reviewed With: patient  Patient Agreement with Plan of Care: agrees  Outcome Summary: Gemma has been visible on the unit. She is social with her peers. She denies SI and thoughts of self harm this shift. Denies HI. Denies AVH. She had a visit with her boyfriend this evening. She was medication compliant and requested PRN atarax with her HS medication. Pt went to wrap up group. Staff will continue to monitor for safety and provide pt with support.  Intervention(s): Gemma was encouraged to attend groups this shift.

## 2021-08-08 NOTE — PLAN OF CARE
Plan of Care Review  Plan of Care Reviewed With: patient  Patient Agreement with Plan of Care: agrees  Progress: improving  Intervention(s): Gemma was provided with emotional support. Encouraged to attend unit activities.  Outcome Summary: Gemma was visible in the community at times. Napped in the morning after breakfast and medication administration. She is eating 75% of meals and drinking adequate fluids. Gemma denies feeling anxious or depressed. She denies SI. She is eager and excited for discharge. She is agreeable to her aftercare plan: PHP. Will CTM/Support.

## 2021-08-09 ENCOUNTER — ANESTHESIA EVENT (INPATIENT)
Dept: ENDOSCOPY | Facility: HOSPITAL | Age: 21
DRG: 885 | End: 2021-08-09
Payer: COMMERCIAL

## 2021-08-09 ENCOUNTER — ANESTHESIA (INPATIENT)
Dept: ENDOSCOPY | Facility: HOSPITAL | Age: 21
DRG: 885 | End: 2021-08-09
Payer: COMMERCIAL

## 2021-08-09 ENCOUNTER — APPOINTMENT (INPATIENT)
Dept: ENDOSCOPY | Facility: HOSPITAL | Age: 21
DRG: 885 | End: 2021-08-09
Payer: COMMERCIAL

## 2021-08-09 VITALS
HEIGHT: 64 IN | DIASTOLIC BLOOD PRESSURE: 81 MMHG | TEMPERATURE: 97 F | BODY MASS INDEX: 21.17 KG/M2 | SYSTOLIC BLOOD PRESSURE: 123 MMHG | WEIGHT: 124 LBS | HEART RATE: 96 BPM | RESPIRATION RATE: 16 BRPM | OXYGEN SATURATION: 98 %

## 2021-08-09 VITALS — OXYGEN SATURATION: 79 % | HEART RATE: 103 BPM

## 2021-08-09 PROBLEM — H57.02 UNEQUAL PUPILS: Status: RESOLVED | Noted: 2021-07-20 | Resolved: 2021-08-09

## 2021-08-09 PROCEDURE — 71000001 HC PACU PHASE 1 INITIAL 30MIN

## 2021-08-09 PROCEDURE — 63700000 HC SELF-ADMINISTRABLE DRUG: Performed by: PSYCHIATRY & NEUROLOGY

## 2021-08-09 PROCEDURE — 71000012 HC PACU PHASE 2 EA ADDL MIN

## 2021-08-09 PROCEDURE — 37000001 HC ANESTHESIA GENERAL

## 2021-08-09 PROCEDURE — 99232 SBSQ HOSP IP/OBS MODERATE 35: CPT | Mod: 25 | Performed by: PSYCHIATRY & NEUROLOGY

## 2021-08-09 PROCEDURE — 90870 ELECTROCONVULSIVE THERAPY: CPT | Performed by: PSYCHIATRY & NEUROLOGY

## 2021-08-09 PROCEDURE — 71000011 HC PACU PHASE 1 EA ADDL MIN

## 2021-08-09 PROCEDURE — 71000002 HC PACU PHASE 2 INITIAL 30MIN

## 2021-08-09 RX ORDER — HYDROXYZINE HYDROCHLORIDE 50 MG/1
50 TABLET, FILM COATED ORAL EVERY 6 HOURS PRN
Qty: 14 TABLET | Refills: 0 | Status: SHIPPED | OUTPATIENT
Start: 2021-08-09 | End: 2023-05-18

## 2021-08-09 RX ORDER — IBUPROFEN 200 MG
16-32 TABLET ORAL AS NEEDED
Status: CANCELLED | OUTPATIENT
Start: 2021-08-09

## 2021-08-09 RX ORDER — DEXTROSE 50 % IN WATER (D50W) INTRAVENOUS SYRINGE
25 AS NEEDED
Status: CANCELLED | OUTPATIENT
Start: 2021-08-09

## 2021-08-09 RX ORDER — DEXTROSE 40 %
15-30 GEL (GRAM) ORAL AS NEEDED
Status: CANCELLED | OUTPATIENT
Start: 2021-08-09

## 2021-08-09 RX ADMIN — ACETAMINOPHEN 650 MG: 325 TABLET, FILM COATED ORAL at 10:30

## 2021-08-09 RX ADMIN — VENLAFAXINE HYDROCHLORIDE 150 MG: 150 CAPSULE, EXTENDED RELEASE ORAL at 10:30

## 2021-08-09 NOTE — PROGRESS NOTES
"PSYCHIATRIC PROGRESS NOTE    Interval History:   Per Staffs: Visible on the unit, social w/ peers. Mood is \"fine, stable, neutral.\" Looking forward to discharge tomorrow. Cooperative w/ medications. Went to ECT this morning.    On interview: Patient reports that ECT this morning went well, showing a 'thumbs-up.' States that she is feeling good, and is looking forward to discharge today. Reports that she is going to try to get a job and \"stay out of the hospital.\" States that she will hang out with her boyfriend and her sister after the discharge. Denies SI/HI.     Vital Signs for the last 24 hours:  Temp:  [36.1 °C (97 °F)-37 °C (98.6 °F)] 36.1 °C (97 °F)  Heart Rate:  [] 96  Resp:  [16-18] 16  BP: ()/(60-81) 123/81  FiO2 (%) (Set):  [95.4 %-95.8 %] 95.6 %    Scheduled Meds:  • melatonin  3 mg oral Nightly   • venlafaxine XR  150 mg oral Daily with breakfast       Labs:  Selected Electrolytes        Recent EKG HR/QTC  Lab Results   Component Value Date    VENTRICRATE 85 07/28/2021    VENTRICRATE 85 07/28/2021    QTCCALCULAT 423 07/28/2021    QTCCALCULAT 423 07/28/2021         MENTAL STATUS EXAM  Appearance: well groomed and appropriate attire  Gait and Motor: normal  Speech: normal rate/rhythm/volume and fluent  Mood: 'good'  Affect: euthymic  Associations: coherent, logical and organized  Thought Process: goal-directed  Thought Content: no auditory or visual hallucinations. and appropriate to situation  Suicidality/Homicidality: denies  Judgement/Insight: limited/limited  Orientation: time, place, person  Memory: recalls recent events and recalls remote events  Attention: alert  Knowledge: normal  Language: normal     Assessment/Plan  Major depressive disorder with current active episode  Assessment & Plan  -improved and at baseline  -s/p course of rul ect  -continue effexor  -d/c today with family. Op php f/u arranged.         Celia Fofana MD  Psychiatry, PGY2  "

## 2021-08-09 NOTE — PLAN OF CARE
Gemma went for ECT #5 this AM. She returned at 1000. She received her belongings from [employer] and the [employer]. She understands her discharge information including after care appointments and current medication regimen. She will be transported by her mother, returning to the family home. Gemma is eager and excited for discharge.

## 2021-08-09 NOTE — PROGRESS NOTES
PSYCHIATRIC PROGRESS NOTE    Interval History: tolerated ect well today. Mood improved and no current c/o. Future oriented and no si. No vegetative sx. looking forward to d/c today. On board with aftercare plan.    Vital Signs for the last 24 hours:  Temp:  [36.1 °C (97 °F)-37 °C (98.6 °F)] 36.1 °C (97 °F)  Heart Rate:  [] 96  Resp:  [16-18] 16  BP: ()/(60-81) 123/81  FiO2 (%) (Set):  [95.4 %-95.8 %] 95.6 %    Scheduled Meds:  • melatonin  3 mg oral Nightly   • venlafaxine XR  150 mg oral Daily with breakfast       Labs:        MENTAL STATUS EXAM  Appearance: well groomed  Gait and Motor: no abnormal movements and normal  Speech: normal rate/rhythm/volume  Mood: depressed  Affect congruent, reactive  Associations: coherent  Thought Process: goal-directed  Thought Content: no auditory or visual hallucinations.  Suicidality/Homicidality: denies  Judgement/Insight: minimizes severity level of illness and help rejecting  Orientation: day, month and year  Memory: recalls recent events and recalls remote events  Attention: alert  Knowledge: normal  Language: normal     Assessment/Plan  Major depressive disorder with current active episode  Assessment & Plan  -improved and at baseline  -s/p course of rul ect  -continue effexor  -d/c today with family. Op php f/u arranged.

## 2021-08-09 NOTE — ANESTHESIA POSTPROCEDURE EVALUATION
Patient: Gemma Darnell    Procedure Summary     Date: 08/09/21 Room / Location: [facility] Endoscopy    Anesthesia Start: 0919 Anesthesia Stop: 0941    Procedure: ECT W SEDATION Diagnosis:     Scheduled Providers:  Responsible Provider: Cecilia Myers MD    Anesthesia Type: general ASA Status: 2          Anesthesia Type: general  PACU Vitals    No data found in the last 10 encounters.           Anesthesia Post Evaluation    Pain management: adequate  Patient participation: complete - patient participated  Level of consciousness: awake and alert  Cardiovascular status: acceptable  Airway Patency: adequate  Respiratory status: acceptable  Hydration status: acceptable  Anesthetic complications: no

## 2021-08-09 NOTE — ANESTHESIOLOGIST PRE-PROCEDURE ATTESTATION
Patient Seen in: [facility] AND [facility] Emergency Department      History   Patient presents with:  Back Pain    Stated Complaint: sciatica    HPI    78-year-old female presents for complaint of lower back pain.   Patient states that back pain has been worseni Pre-Procedure Patient Identification:  I am the Primary Anesthesiologist and have identified the patient on 08/09/21 at 9:05 AM.   I have confirmed the procedure(s) will be performed by the following surgeon/proceduralist * No surgeons listed *.   Respiratory: Negative. Cardiovascular: Negative. Gastrointestinal: Negative. Genitourinary: Negative. Musculoskeletal: Negative. Skin: Negative. Neurological: Negative. All other systems reviewed and are negative.       Positive for s General: No focal deficit present. Mental Status: She is alert and oriented to person, place, and time. Cranial Nerves: Cranial nerves are intact. No cranial nerve deficit, dysarthria or facial asymmetry. Sensory: Sensation is intact.       Jessica Osorio L4-L5 disease. She is neurovascularly intact. Patient's magnesium and potassium were both critically low. She was given 4 mg of IV magnesium followed by 40 mEq of p.o. and 40 mEq of IV potassium.   She did have some urinary retention and a Berkowitz catheter Advanced facet arthrosis. 7 mm of grade 1 degenerative spondylolisthesis. Severe central narrowing. Moderate-severe left foraminal narrowing. L5-S1: Decrease in disc height with a spondylotic disc bulge accentuated laterally. Mild facet arthrosis.   Mil Admission  Date Reviewed: 1/23/2020          ICD-10-CM Noted POA    Hypomagnesemia E83.42 10/8/2019 Unknown

## 2021-08-09 NOTE — PLAN OF CARE
"  Problem: Adult Behavioral Health Plan of Care  Goal: Plan of Care Review  Outcome: Progressing  Flowsheets (Taken 8/8/2021 2149)  Progress: improving  Plan of Care Reviewed With: patient  Patient Agreement with Plan of Care: agrees  Outcome Summary: Gemma has been visible on the unit and social with her peers. She said that her mood is \"fine\" \"stable\" and \"neutral\". She denies SI and thoughts of self harm this shift, and said she would let staff know if she were to get thoughts of SI or self harm. Denies HI. Denies AVH. She is looking forward to discharge tomorrow. Was heard yelling once on phone this evening. Pt said she was on the phone with her mom. She did attend wrap up group. Was complaint with medications. Staff will continue to monitor for safety and provide pt with support.  Intervention(s): Gemma was encouraged to tell staff when having SI or thoughts of self harm this shift.     "

## 2021-08-09 NOTE — ANESTHESIA PREPROCEDURE EVALUATION
Relevant Problems   NEUROLOGY   (+) Anxiety   (+) Major depressive disorder with current active episode       ROS/Med Hx     Past Surgical History:   Procedure Laterality Date   • WOUND EXPLORATION         Physical Exam    Airway   Mallampati: II   TM distance: >3 FB   Neck ROM: full  Cardiovascular - normal   Rhythm: regular   Rate: normalPulmonary - normal   clear to auscultation  Dental - normal        Anesthesia Plan    Plan: general    Technique: general mask   ASA 2  Anesthetic plan and risks discussed with: patient  Induction:    intravenous   Postop Plan:   Patient Disposition: inpatient floor planned admission   Pain Management: IV analgesics
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
## 2021-08-10 ENCOUNTER — PHP (OUTPATIENT)
Dept: PSYCHIATRY | Facility: HOSPITAL | Age: 21
End: 2021-08-10
Attending: PSYCHIATRY & NEUROLOGY
Payer: COMMERCIAL

## 2021-08-10 ENCOUNTER — PHP (OUTPATIENT)
Dept: PSYCHIATRY | Facility: HOSPITAL | Age: 21
End: 2021-08-10
Payer: COMMERCIAL

## 2021-08-10 DIAGNOSIS — F17.200 NICOTINE USE DISORDER: ICD-10-CM

## 2021-08-10 DIAGNOSIS — F39 MOOD DISORDER (CMS/HCC): Primary | ICD-10-CM

## 2021-08-10 DIAGNOSIS — F10.21 ALCOHOL USE DISORDER, MODERATE, IN EARLY REMISSION (CMS/HCC): ICD-10-CM

## 2021-08-10 DIAGNOSIS — F15.90 STIMULANT USE DISORDER: ICD-10-CM

## 2021-08-10 DIAGNOSIS — F14.21 COCAINE USE DISORDER, MODERATE, IN SUSTAINED REMISSION (CMS/HCC): ICD-10-CM

## 2021-08-10 DIAGNOSIS — F50.014 ANOREXIA NERVOSA, RESTRICTING TYPE, IN PARTIAL REMISSION, MODERATE: ICD-10-CM

## 2021-08-10 PROBLEM — F31.81 BIPOLAR II DISORDER (CMS/HCC): Status: RESOLVED | Noted: 2021-08-10 | Resolved: 2021-08-10

## 2021-08-10 PROBLEM — F31.81 BIPOLAR II DISORDER (CMS/HCC): Status: ACTIVE | Noted: 2021-08-10

## 2021-08-10 PROCEDURE — 90792 PSYCH DIAG EVAL W/MED SRVCS: CPT | Performed by: PSYCHIATRY & NEUROLOGY

## 2021-08-10 PROCEDURE — 90791 PSYCH DIAGNOSTIC EVALUATION: CPT | Performed by: COUNSELOR

## 2021-08-10 RX ORDER — PNV NO.95/FERROUS FUM/FOLIC AC 28MG-0.8MG
100 TABLET ORAL DAILY
Status: ON HOLD | COMMUNITY
End: 2023-07-14 | Stop reason: ENTERED-IN-ERROR

## 2021-08-10 ASSESSMENT — COGNITIVE AND FUNCTIONAL STATUS - GENERAL
LIBIDO: NO CHANGE
REMOTE MEMORY: WNL
CONCENTRATION: WNL
ATTENTION: WNL
MOOD: EUTHYMIC (NORMAL)
THOUGHT_PROCESS: WNL
PSYCHOMOTOR FUNCTIONING: WNL
EYE_CONTACT: WNL
INSIGHT: INTACT
SLEEP_WAKE_CYCLE: INCREASED;DECREASED
THOUGHT_CONTENT: APPROPRIATE
AROUSAL LEVEL: ALERT
APPETITE: NO CHANGE
IMPULSE CONTROL: INTACT
EST. PREMORBID INTELLIGENCE: AVERAGE
SPEECH: REGULAR
DELUSIONS: NONE OR AGE APPROPRIATE
ORIENTATION: FULLY ORIENTED
PERCEPTUAL FUNCTION: NORMAL
RECENT MEMORY: WNL
APPEARANCE: WELL GROOMED
AFFECT: FULL RANGE
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
## 2021-08-10 NOTE — PROGRESS NOTES
"[facility] PSYCHIATRY EVALUATION     Gemma Darnell is a 21 y.o. female who presents for Depression.      HPI    Had [facility] IP admission 7/21/21 to 8/9/21, discharge dx MDD, recurrent, w/o psychotic features. Admitted for intentional overdose in suicide attempt, poison control contacted in the ED, recommended observation. Pt with some sinus tachycardia that resolved. NCHCHT done for anisocoria but was negative for an acute process.    Per Consult Note by AMPARO Dejesus on 7/20/21: [\"presented to the ED following an intentional overdose with psychiatric medications.  Per HPI patient reported overdose of Effexor, mirtazapine, hydroxyzine, melatonin, NyQuil....Per patient she was discharged from the psychiatric unit at [facility] May 31, 2021 to [facility] for eating disorder.  While at [facility] patient reports \"I was in a dark place.\"  She reports she stabbed herself in the neck with a knife and required staples to the wound.  She reports she transferred to a nearby psychiatric hospital in [place].  Per patient she was discharged Sunday July 18 and picked up by her Mother and sister with plan to fly to [facility] for residential eating disorder program.  Patient reports she went to dinner with her Mother and sister, states she was planning to end her life however she \"made them think I was fine.\"  Patient reports instead of getting on a plane she rented a hotel room, wrote a suicide note and took an overdose of her medication.  She did not attempt to reach out for help.  Per patient her family called hotel security and found her.  Patient is ambivalent about surviving suicide attempt.  She endorses perseverative negative thoughts about herself as well as persistent fears that she will never be able to get pregnant or that one day all her hair will fall out.  She denies any history of past trauma in childhood.  She reports she attended  at [employer] then college at [employer] until the pandemic.  She worked " "in childcare and feels she was doing well in January, 2021.  She feels she decompensated in March, 2021 and endorses poor insight into her triggers for suicidal ideation and self harm.  She does acknowledge recent disruption to relationship with her boyfriend and feeling different from her peers contributes to negative thoughts about herself\"].     Per Dr. Braun's [facility] discharge summary : [\"Patient was withdrawn, dysphoric, and refused medications as she felt that they haven't helped in the past. Patient was restarted on Effexor for her mood symptoms, which was uptitrated to 150 mg QD. Patient tolerated medications well without side effects, and patient became more visible in the unit and more actively participated in groups. Patient however continued to report helplessness and anxiety. Patient also became more interested in ECT, and ultimately agreed to a trial of ECT. Patient was treated with total of 5 courses of ECT between 7/30 and 8/9, and tolerated the procedure well. Patient noted significant improvement in her mood after the first ECT, and continued to show improvement of mood and affect with subsequent ECT. Patient was no longer suicidal, and she was future oriented, discussing about possible job search. Of note, patient's mother expressed that patient discharged to residential program, but patient refused. After discussing the follow up options with the patient and mother, it was decided that patient follow up with [facility]. Patient was discharged home with plan to continue Effexor 150 mg daily, Atarax 50 mg q6h PRN for anxiety, and melatonin 3 mg nightly for insomnia.\"]     Today, patient reports that mood is \"stable.\" Denies peristent depression since leaving the hospital. Difficult to fall asleep last night, had been sleeping well in the hospital., typically taking hydroxyzine in the hospital but did not take it last night. Energy is \"normal.\" Focus improving compared to where it was prior to " "hospitalization. Self-worth is \"not that bad.\"    Patient reports true intent to die by suicide in July prior to her hospitalization. She wrote suicide note and even arranged for her medications be sent to her instead of residential after hospital discharge so that she could overdose on them. She reports she had ongoing thoughts of suicide in the first few days of her hospitalization, but in seeing how her actions affected family, reports now she is \"glad the attempt didn't work.\" Denies any thoughts of wanting to be dead, denies any thoughts of suicide, no plan, no intent for at least the past week. Mother holding onto medication, no access to firearms. Spontaneously future oriented. Has a job interview ([employer]) tomorrow at 3:30 PM, wants to go back to school, good boyfriend. Wants to live for family/friends. No HI.    Not interested in returning to RTF. Wants to be near friends/family.    Has had fleeting urges to restrict recently, but able to tolerate it, last restricting in June. No hx of purging behaviors.     24 hour recall:  B: decaf coffee, yogurt/granola  S: pretzels  L: Ramen and egg for lunch  S: pickles  D: snacks at a movie with disorder    Did not take venlafaxine this AM because went to the wrong pharmacy.     Psychiatric ROS:   Depression: Started around age 17 when eating disorder symptoms started. Reports has had periods of time when she is not depressed.   Esther/Hypomania: May 2021, \"I think I was manic.\" inability to fall asleep, needed to take benadryl to get herself to sleep. Episodes of her rocking back and forth and not acknowleding her boyfriend. + hypersexuality. + racing thoughts. No increased productiveness. Pt denies regular alcohol use or cocaine use during this time,. Past hospitalization records suggest daily alcohol as well as at least occasional cocaine use.  Harmfulness: As per HPI. No past/current HI.  Psychosis: Reports that prior to her May 2021 hospitalization, had feelings " "like people were out to get her, thought she her voices in her head telling her derogatory things. Denies any paranoid ideation, IOR, AVH since then.  Anxiety: Denies frequent worry out of control,  Panic: Last had a panic attack in May   OCD: Denies significant obsessions or compulsions  Eating Disorder: Weight 98 lb at lowest, height 5'4.5\", has lost period in past when eating disorder at its worst. Recent symptoms as per HPI.  Traumatic Stress: Denies    Past Psychiatric History:  Past Diagnoses: MDD versus BPAD type 2,  Borderline personality disorder, Anorexia Nerovsa, ETOH use disorder,   Past Hospitalizations/Suicide attempts: 3 past inpatient hospitalizations and 3 past suicide attempst 1) [facility] 5/17/2021 -  5/31/2021 for intentional overdose (10-15 tabs on lamotrigine, told mom the next day), then discharged to [facility] (3 weeks). While at RTF, working in the kitchen and had a knife and stabbed herself in the neck to kill herself, limited insight into why she did so when asked today (\"I don't really remember it). Required medical evaluation and stitches, then referred back to inpatient 2) [facility] (3 weeks), then went to [facility] in [place]. Mother dropped her off airport to go to [facility], Patient planned suicide attempt ahead of time. (I.e. made sure she had meds sent her instead of residential), went and got a hotel room instead of going onto the airplane. Cannot recall how many pills she took, had more than 50 pills all together, had phone/locaiton tracker on her phone. [facility] called, mother called her, ultimately security found her. Led to [facility] admission as per HPI    Also attended [facility] residential for eating d/o Oct-Nov 2020.    Past SIB: Denies  Past Violence: Denies  Past Med Trials:  lamictal, trazodone, effexor, remeron, prozac  - Escitalopram discontinued during 5/2021 [facility] admission, then started on fluoxetine  - Recommended trials of " risperidone, olanzapine, quetiapine during 5/2021 hospitalization, but pt declined  Past Treaters:  - Saw Dr. Cathy Simpson x 1 in April, unsure if she established care with her, does not want to return to her      Substance Use History:    Alcohol: Last drank in fall 2020 per her report today (though this conflicts with prior [facility] IP notes). Did [facility] in fall 2020. Was drinking 3-4x/week leading up to [facility] in 9/2020, pt sent there by parents for positive UDS (cocaine)  Marijuana: None in 2 years   Illicit Drugs: Cocaine last in 5/2021 per record, pt repots to me last was in January. Never using daily, 1-2x/week.  last used Katharina one year ago. Abused Adderall in HS and then taking it everyday unprescribed in college, using to get high and lose weight, did not come up on drug test parents had her complete  Tobacco: 1 cartridge every 2 days    OB History    No obstetric history on file.       Medical History:   Past Medical History:   Diagnosis Date   • Anxiety    • Depression    • Eating disorder    • Eating disorder    • Eating disorder    • H/O borderline personality disorder    • History of ITP    • Suicidal overdose (CMS/[facility])      Surgical History:    Past Surgical History:   Procedure Laterality Date   • WOUND EXPLORATION       Family History:   Family History   Problem Relation Age of Onset   • Alcohol abuse Biological Mother    • Alcohol abuse Biological Father    • Bipolar disorder Biological Sister         Currently age 21; diagnosed age 19, lamotrigine was helpful for her; also had psychiatric hospitalizaiton   • Alcohol abuse Biological Sister    • Suicidality Biological Sister         Attempted suicide   • Anxiety disorder Maternal Grandmother    • Depression Maternal Grandmother    • Eating disorder Maternal Grandmother    • Cancer Maternal Grandfather      Social/Development History: As per BPS. Parents , currently living with mother and little brother (age 14). Father currently  "struggling with alcohol use, mother has overdrank in past but no current misuse.     +`    Allergies: No Known Allergies    Current Outpatient Medications:   •  cyanocobalamin (VITAMIN B12) 100 mcg tablet, Take 100 mcg by mouth daily., , Disp: , Rfl:   •  venlafaxine XR (EFFEXOR-XR) 150 mg 24 hr capsule, Take 1 capsule (150 mg total) by mouth daily with breakfast., , Disp: 30 capsule, Rfl: 0  •  hydrOXYzine (ATARAX) 50 mg tablet, Take 1 tablet (50 mg total) by mouth every 6 (six) hours as needed for anxiety for up to 14 days., , Disp: 14 tablet, Rfl: 0  •  melatonin ODT, Take 1 tablet (3 mg total) by mouth nightly. (Patient taking differently: Take 3 mg by mouth nightly as needed.  ), , Disp: 30 tablet, Rfl: 0  Review of Systems   Cons: As per HPI  GI: Denies  Neuro: Denies  CV: Denies    Objective   Visit Vitals  LMP  (LMP Unknown)            Mental Status Exam:  Appearance: well groomed, good hygiene  Gait and Motor: no abnormal movements, no tremor, no PMR/PMA  Speech: normal rate/rhythm/volume  Mood: \"stable\"  Affect: euthymic, superficial, not consistent with content of material discussed in interview  Associations: intact  Thought Process: linear, logical, goal-directed  Thought Content: no auditory or visual hallucinations, no delusionary content  Suicidality/Homicidality: denies current SI, denies HI  Judgement/Insight: limited/limited   Orientation: Intact to interview  Memory: Intact to interview  Attention: alert  Knowledge: normal  Language: normal         PHQ-9: Total Score-OWL Transcribed: 20  Thoughts that You Would be Better Off Dead or of Hurting Yourself in Some Way: 2-->more than half the days  JAY-7 Total Score-OWL Transcribed: 19    [place] Suicide Severity Rating Scale:  Done today  1. Within the past month, have you wished you were dead or wished you could go to sleep and not wake up?: Yes  2. Within the past month, have you actually had any thoughts of killing yourself?: Yes  3. Within the " "past month, have you been thinking about how you might kill yourself?: Yes  4. Within the past month, have you had these thoughts and had some intention of acting on them?: Yes  5. Within the past month, have you started to work out or worked out the details of how to kill yourself? Do you intend to carry out this plan?: Yes  6. Have you ever done anything, started to do anything, or prepared to do anything to end your life?: Yes  If yes, was this within the past 3 months?: Yes    Safe-T Assessment:  Done today  Suicidal Behavior: History of prior suicide attempts (8/10/2021 10:09 AM)  Current/Past Psychiatric Disorders: Mood disorders;Clust B personality disorders (8/10/2021 10:09 AM)  Key symptoms: Impulsivity;Hopelessness;Anxiety/panic (8/10/2021 10:09 AM)  Family History Risk Factors: Suicide attempts;Axis 1 psychiatric disorders requiring hospitalization;Suicidal behavior (8/10/2021 10:09 AM)  Precipitants/Stressors/Interpersonal/Triggers: Events leading to humiliation, shame or despair;Family turmoil/chaos;Social isolation;Perceived burden on others (8/10/2021 10:09 AM)  Change in Treatment: Recent discharge from psychiatric hospital (8/10/2021 10:09 AM)  Access to fire arms.: No (8/10/2021 10:09 AM)  Internal factors: Identifies reasons for living (8/10/2021 10:09 AM)  External Factors: Supportive social network of family or friends;Other (\"I have things to look forward to.\") (8/10/2021 10:09 AM)   In the last month, how many times have you had suicidal thoughts?: 2-5 times in week (8/10/2021 10:09 AM)  In the last month, when you have had suicidal thoughts, how long do they last?: More than 8 hours/persistent or continuous (8/10/2021 10:09 AM)  In the last month, could/can you stop thinking about killing yourself or wanting to die if you want to?: can control thoughts with some difficulty (8/10/2021 10:09 AM)  In the last month, are there things - anyone or anything (i.e. family, Yarsani, pain of death) - " that stopped you from wanting to die or acting on thoughts of suicide? : Deterrents definitely did not stop you (8/10/2021 10:09 AM)  In the last month, what sorts of reasons did you have for thinking about wanting to die or killing yourself?: Completely to end or stop the pain (you couldn’t go on living with the pain or how you were feeling) (8/10/2021 10:09 AM)  Safe T Assessment of Risk: : Moderate Suicide Risk (8/10/2021 10:09 AM)  Interventions: Develop Safety Plan;Symptom reduction.;Provide Emergency/Crisis numbers (8/10/2021 10:09 AM)      Labs  I have reviewed the patient's labs.  Current labs are largely within normal limits with exception of mildly elevated ALT    Imaging  Not applicable.     ECG   Not applicable.    Physical Exam    Brief Psychiatric Formulation: 21 year old woman with past psych hx of MDD versus BPAD type 2, anorexia nervosa, ETOH use disorder in early remission, cocaine use disorder in early remission, stimulant (Adderall) in sustained remission, 3 past inpatient hospitalizations and 3 suicide attempts all since May 2021, hx of residential stay fall 2020, no hx of SIB, who presents for management of mood and suicidal ideation after hospital discharge.    Pt with family hx of psych illness (sister - BPAD, suicidality; parents- ETOH use disorder; MGM - depression/anxiety, eating disorder). Pt denies hx of trauma, denies any acute recent stressors that led to acute worsening since May 2021, though I suspect parents' ongoing divorce may be playing a role. Pt with limited insight re: recent actions (I.e. reason why she stabbed self in her neck) and at times superficial in her description of potentially life-threatening events. Given impulsivity, affective instability, suicidal ideation/attempts, eating disorder and substance use, suspect borderline character structure, though will continue to evaluate. Pt reports self-defined manic episode in May 2021 but review of records suggests ongoing  substance component so unclear if true BPAD versus substance-induced. Pt denies purposeful restriction and with current normal BMI. Pt denies substance use since May 2021.    Pt is at chronic elevated risk of intentional harm to self given her hx of depression, hx of suicide attempts, family hx of suicidality, hx of substance use, hx of eating disorder, hx of impulsivity. Her acute risk is elevated by most recent suicide attempt leading up to most recent hospitalization, recent hospitalization discharge, family/work/academic stressors, though this acute risk is mitigated by lack of current suicidal ideation, lack of plan/intent, lack of prior SIB, lack of recent substance use, commitment to family, treatment-seeking behavior, and future orientation. In sum, her acute risk of intentional harm to self is not significantly elevated from usual baseline as to warrant hospitalization, but given her degree of symptoms and impact on functioning, she is appropriate for PHP. She denies any thoughts of harming others and has no history of violence, so risk to others is low.          Based on [place] suicide screen, Safe-T Assessment, patient is determined  moderate     Suicide Risk/Suicidal Ideation will be added to patient's treatment plan.     Plan:     Admit to Partial Hospitalization Program     - Obtain UDS within next 48 hours, review need for weekly UDS while in PHP given history of substance use disorder. Reviewed expectation of no alcohol/drug use during PHP and if that substance use occurs, may refer to SUDS program    - Eat at least 3 meals/day with 2 snacks, monitor eating closely, pt aware we will refer to eating disorder tx if sx recur during PHP  - Continue venlafaxine  mg daily, reviewed risks of SNRIs in my usual fashion    - Reviewed with Dr. Cy Avendaño that no plan for ongoing ECT treatment at this time    - Pt declining to f/u with Dr. Simpson for outpatient psychiatry care. Outlined need to obtain  new psychiatrist ASAP if she would like new provider with expectation she would see someone within 2 weeks of PHP discharge date, otherwise expectation will be that she follows up with Dr. Simpson.     - Next PHP psych visit: 1 week    - PHP therapist has called pt's mother to confirm she has secure all knives/sharps in the home away from patient, also to confirm mother is holding/administering all of pt's medications and that pt has no access to her medications at this time  - Patient aware of how to contact office prior to next appointment with any issues, after-hours resources. Patient instructed to call 911 or go to nearest ED if thoughts of self-harm, suicide, or violence towards others.     I certify that Gemma Darnell requires [facility] level of care to treat her Mood disorder (CMS/HCC)  (primary encounter diagnosis)    Alcohol use disorder, moderate, in early remission (CMS/HCC)  Plan: Drug screen panel, urine, Drug screen panel,         urine    Cocaine use disorder, moderate, in sustained remission (CMS/HCC)  Plan: Drug screen panel, urine, Drug screen panel,         urine    Stimulant use disorder    Nicotine use disorder    Anorexia nervosa, restricting type, in partial remission, moderate  .  Without this medically necessary care as outlined in the individualized multidisciplinary treatment plan, inpatient psychiatric hospitalization would be required.      Orders Placed This Encounter   Procedures   • Drug screen panel, urine     *To enlarge: place cursor in text box and press F3*    [facility] Labs:    [facility]:         Amphetamines   Amphetamines     Barbiturates   Barbiturates      Benzodiazepines  Benzodiazepines     Cannabinoid (THC)  Cannabinoid (THC)     Cocaine   Cocaine Metabolites     Opiates   Opiates (Codeine, morphine only)   Phencyclidine (PCP)  Phencyclidine (PCP)                              [facility]:          Amphetamines     Barbiturates     Benzodiazepines     Cannabinoid (THC)     Cocaine  Metabolites     Methadone     Methaqualone     Methaqualone     Opiates     Phencyclidine (PCP)     Propoxphene       Standing Status:   Future     Number of Occurrences:   1     Standing Expiration Date:   8/10/2022     Order Specific Question:   Release to patient     Answer:   Immediate       Visit Diagnosis:    ICD-10-CM ICD-9-CM   1. Mood disorder (CMS/HCC)  F39 296.90   2. Alcohol use disorder, moderate, in early remission (CMS/HCC)  F10.21 305.03   3. Cocaine use disorder, moderate, in sustained remission (CMS/HCC)  F14.21 305.63   4. Stimulant use disorder  F15.90 292.9   5. Nicotine use disorder  F17.200 305.1   6. Anorexia nervosa, restricting type, in partial remission, moderate  F50.01 307.1          Sheron Shelton MD @ 2:02 PM

## 2021-08-11 ENCOUNTER — PHP (OUTPATIENT)
Dept: PSYCHIATRY | Facility: HOSPITAL | Age: 21
End: 2021-08-11
Attending: PSYCHIATRY & NEUROLOGY
Payer: COMMERCIAL

## 2021-08-11 ENCOUNTER — TELEPHONE (OUTPATIENT)
Dept: PSYCHIATRY | Facility: HOSPITAL | Age: 21
End: 2021-08-11

## 2021-08-11 DIAGNOSIS — F14.21 COCAINE USE DISORDER, MODERATE, IN SUSTAINED REMISSION (CMS/HCC): ICD-10-CM

## 2021-08-11 DIAGNOSIS — F39 MOOD DISORDER (CMS/HCC): ICD-10-CM

## 2021-08-11 DIAGNOSIS — F50.014 ANOREXIA NERVOSA, RESTRICTING TYPE, IN PARTIAL REMISSION, MODERATE: ICD-10-CM

## 2021-08-11 DIAGNOSIS — F15.90 STIMULANT USE DISORDER: ICD-10-CM

## 2021-08-11 DIAGNOSIS — F10.21 ALCOHOL USE DISORDER, MODERATE, IN EARLY REMISSION (CMS/HCC): Primary | ICD-10-CM

## 2021-08-11 PROCEDURE — H0035 MH PARTIAL HOSP TX UNDER 24H: HCPCS | Performed by: SOCIAL WORKER

## 2021-08-11 NOTE — GROUP NOTE
Date: 8/11/2021  Start Time:   9:30 AM  End Time: 10:30 AM  Gemma Darnell, YOB: 2000,  was an active group participant.    Community Check In Group  6 attended group today.     Group Focus: Goal of group was to welcome new and returning PHP members to the PHP, check in regarding group member needs, and assess safety.    About the Group: Clinician reviewed [facility] Group Code of Conduct and answered any questions that arose.  Clinician welcomed new members to the group and facilitated brief introductions of group members to one another.  Introduced topic/theme for the treatment day:Trauma.  Encouraged group members to request support throughout the day as needed.  Clinician reviewed group members’ Morning Reflection Sheets and did a verbal check in with each group member regarding safety (SI/HI/self harm), safety planning, medication compliance, if they needed to consult with anyone today and individual treatment needs/goals for the day.  Session ended with a brief meditation/calming activity.   Sheron Shelton MD was on site when services rendered.    Request for Consent  Patient provided verbal consent to treat via telemedicine. Clinician introduced the secure telemedicine platform that we are utilizing to provide care during the COVID-19 pandemic. Patient understands the session will be billed to their insurance or patient directly.  Patient was informed only the group patients  and the clinician are permitted on?the video conference, sessions are not recorded by the clinician, and the patient is not permitted to record the session.? Patient was provided clinician's unique meeting ID prior to session, patient was asked to arrive to virtual session on time just as patient would if we were in the office. Clinician confirmed identification of patient by name and birthdate, provider name, location of patient and clinician, and callback number in case disconnected. Patient consents to behavioral health  treatment    Patient Response to Request for Consent: Yes  Visit Type performed: Audio and Video        Morning Self reflection:   Depression: 5/5  Anxiety: 4/5  Anger: 3/5  Suicidal Ideation:  1/5 - Fleeting  Self Injury: 0/5 - None  Engaged in Self Injury since yesterday: No  Homicidal Ideation:   0/5 - None  Are you able to follow your Safety Plan? Yes.  I can/will:  Journal  Substance Use:  No  Self Care:  No.  I am satisfied with my daily hygiene:  yes  Nourish:Number of meals eaten yesterday? 1 and Describe:  Restrict    Sleep:  Uses sleep aid? no    and Describe:  Broken and Number of hours:  4  Social Interaction:Moderate: Family and Friends  Physical Activity: None   Mindful Activity: None   Medication: Prescribed  Thought Content: racing   Pt reported use of coping skills including successful follow through and barriers to follow through: talked with family   Pt reported significant or positive event/step: began PHP tx   Pt identified goal for the treatment day: Be present in groups   Pt treatment plan areas addressed:  Depression and Anxiety  Pt requested consult with: None  Visit Diagnosis:      ICD-10-CM ICD-9-CM   1. Alcohol use disorder, moderate, in early remission (CMS/HCC)  F10.21 305.03   2. Cocaine use disorder, moderate, in sustained remission (CMS/HCC)  F14.21 305.63   3. Stimulant use disorder  F15.90 292.9   4. Anorexia nervosa, restricting type, in partial remission, moderate  F50.01 307.1   5. Mood disorder (CMS/[facility])  F39 296.90         Assessment/Observations:  LPC inquired about pt's restricting behavior. Pt shared that she had one large meal yesterday but no snacks. LPC inquired if pt had eaten breakfast this morning to which pt replied that she had. LPC inquired if pt made something or brought something to eat for lunch. Pt stated that she did. LPC encouraged pt to remain committed to meals. LPC informed pt that [facility] staff will continue to support her in nourishing self. Pt shared that  she is feeling anxious today. Pt shared that she broke up with her boyfriend last evening and just recently left the hospital. Pt shared that she will open up more as the groups go on today. Pt was welcomed to the group.   Plan:  Continue with PHP   Pt to F/U with treatment goals as outlined in treatment plan.  Pt to F/U with local ED/call 911 should SI/HI arises.    Deanna Crook, LPC

## 2021-08-11 NOTE — GROUP NOTE
"Date: 8/11/2021  Start Time:  1:20 PM  End Time:   2:20 PM  Gemma Darnell, YOB: 2000,  was an active group participant.    Wrap Up Group    7 attended group today.   Group Focus: Goal of group was to wrap up and process the treatment day.  Assist  members in planning for the evening ahead and to assess and plan surrounding any  safety needs.      About the Group:  Clinician reviewed group members Afternoon  Reflection Sheets and did a verbal check in with each group member regarding safety  (SI/HI/self harm) and any necessary safety planning .  Session ended with a brief  meditation/calming activity.  Sheron Shelton MD was onsite when  services rendered.        Afternoon Self Reflection  Depression: 4/5  Anxiety: 2/5  Anger: 2/5  Suicidal Ideation:   0/5 - None  Self Injury: 0/5 - None  Homicidal Ideation:   0/5 - None  Are you able to follow your Safety Plan? Yes.  I can/will:  Journal, Use a coping skill and Listen to music  Pt reported significant moment/insight of the day: \"Learning EFT/tapping.\"  Pt reported gratitude list: \"My mom, support, my health.\"  Pt identified goal progress for the day: \"Goal was to be present.\"  Pt reported evening self care plan:  \"Make sure I eat a full meal for dinner, get more sleep tonight so I don't feel so on edge.\"  Pt treatment plan areas addressed: Depression    Visit Diagnosis:      ICD-10-CM ICD-9-CM   1. Alcohol use disorder, moderate, in early remission (CMS/HCC)  F10.21 305.03   2. Cocaine use disorder, moderate, in sustained remission (CMS/HCC)  F14.21 305.63   3. Stimulant use disorder  F15.90 292.9   4. Anorexia nervosa, restricting type, in partial remission, moderate  F50.01 307.1   5. Mood disorder (CMS/HCC)  F39 296.90       Assessment/Observations:  Pt shared her self reflections and her plan for self care today.  Plan: Continue with PHP   Pt to F/U with treatment goals as outlined in treatment plan.  Pt to F/U with local ED/call 911 should SI/HI " arises.    Ling Cifuentes, \[facility] [place] [facility]\""W

## 2021-08-11 NOTE — GROUP NOTE
Date:  8/11/2021  Start Time:  12:10 PM  End Time:   1:10 PM  Gemma Darnell, YOB: 2000  Psychoeducational/Skills Based Group  7 members attended group today    Group Focus: Goal of group was to introduce skills and psychoeducation related to topic of PHP day.   Group members were provided instruction and opportunities to practice presented skills.  Clinician answered questions as they arose and shared how presented skills can be utilized on a daily basis to increase emotional wellness.      About the Group: Trauma (day 1) Session 3: Trauma (Day 1) Psycho-Ed/Skills Based Group Summary  Topic of curriculum was “Trauma”. Clinician began group by briefly reviewing description of trauma from group 1. Clinician provided psychoeducation about the body’s response to trauma using a Fight or Flight handout. Clinician introduced two techniques used in the context of trauma to help a person to stay in the present moment and cope with triggers. The first technique is called “Grounding” and was described using a worksheet from [employer]. The second is a technique called “EFT tapping” and was illustrated using a YouTube video and paired with a worksheet on tapping points. Clinician then led group members through an open discussion on their experience with the two techniques and usefulness in managing triggers. Clinician ended group with a grounding meditation from [email or web address].    Sheron Shelton MD was onsite when services were rendered.        Patient  was a passive group participant.  Assessment/observation of group participation/presentation: Pt was quiet throughout group with intermittent eye contact. She did not make any spontaneous contributions.   Treatment plan areas addressed: Depression and Anxiety  Visit Diagnosis:      ICD-10-CM ICD-9-CM   1. Alcohol use disorder, moderate, in early remission (CMS/HCC)  F10.21 305.03   2. Cocaine use disorder, moderate, in sustained remission (CMS/HCC)  F14.21  305.63   3. Stimulant use disorder  F15.90 292.9   4. Anorexia nervosa, restricting type, in partial remission, moderate  F50.01 307.1   5. Mood disorder (CMS/HCC)  F39 296.90     Plan: Continue with PHP   Pt to F/U with treatment goals as outlined in treatment plan.  Pt to F/U with local ED/call 911 should SI/HI arises.    Eileen Stewart, LPC

## 2021-08-11 NOTE — TELEPHONE ENCOUNTER
LPC spoke with pt's mother to introduce self as pt's PHP therapist while she is in [facility] PHP tx. Initally, pt was assigned to another clinician. LPC wanted to speak with mom and update her on changes. Pt's mother was appreciative of this information. Pt's mother, Cindi, expressed her concerns and exhaustion regarding pt's behavior and constant lying. She inquired if pt gave clinical team the contract that pt and her mother have established in order for pt to remain living in her home. Pt did not present contract to PHP tx team so LPC will inquire about it with pt tomorrow. Pt's mother indicated that pt's boyfriend broke up with her yesterday and pt was sobbing about this all day yesterday. Pt's mother expressed concern about pt regressing into eating disorder behaviors. Pt's mother is happy with pt's weight currently but is fearful of her regressing. Cindi expressed a desire to be optimistic but also expressed feeling tired with pt's constant sx. Pt's mother was concerned that pt may not be honest with tx team. LPC discussed the support that PHP tx can offer. LPC educated pt's mother about the process that happens for most patients at the start of PHP tx where they are hesitant and establishing trust. Pt's mother thanked LPC for calling. LPC also inquired about Cindi's willingness to engage in a family session while pt is in tx. And she was agreeable to this. LPC encouraged pt's mother to call [facility] with any questions or concerns regarding pt's care.

## 2021-08-11 NOTE — GROUP NOTE
Date:  8/11/2021  Start Time:  10:40 AM  End Time:  11:40 AM  Gemma Darnell, YOB: 2000   6  members attended group today.    Group Focus:  Goal of group was to establish and build social support, review coping skills, normalize the struggles of being human, and increase self awareness and self compassion.    About the Group:  Clinician started group with a prompting quote/song to facilitate group discussion.  Group engaged in active and supportive discussion. Topics discussed included relationships, boundaries, stages of development and how trauma in early childhood can impact future relationships.  Group members were able to offer insightful and supportive feedback and suggestions about how to manage difficult situations.  Group ended with a meditation/song.  Sheron Shelton MD was onsite when services were rendered.    Request for Consent  Patient provided verbal consent to treat via telemedicine. Clinician introduced the secure telemedicine platform that we are utilizing to provide care during the COVID-19 pandemic. Patient understands the session will be billed to their insurance or patient directly.  Patient was informed only the group patients  and the clinician are permitted on?the video conference, sessions are not recorded by the clinician, and the patient is not permitted to record the session.? Patient was provided clinician's unique meeting ID prior to session, patient was asked to arrive to virtual session on time just as patient would if we were in the office. Clinician confirmed identification of patient by name and birthdate, provider name, location of patient and clinician, and callback number in case disconnected. Patient consents to behavioral health treatment    Patient Response to Request for Consent: Yes  Visit Type performed: Audio and Video        Patient  was quiet but attentive.  Assessment/observation of group participation/presentation: Pt did not offer much in group aside  from relating to a peer about not being able to contain her emotions when arguing with her mother. Pt was present and displayed non verbal cues that she was following the discussion.   Treatment plan areas addressed: Depression and Anxiety  Visit Diagnosis:      ICD-10-CM ICD-9-CM   1. Alcohol use disorder, moderate, in early remission (CMS/HCC)  F10.21 305.03   2. Cocaine use disorder, moderate, in sustained remission (CMS/HCC)  F14.21 305.63   3. Stimulant use disorder  F15.90 292.9   4. Anorexia nervosa, restricting type, in partial remission, moderate  F50.01 307.1   5. Mood disorder (CMS/[facility])  F39 296.90       Plan: Continue with PHP   Pt to F/U with treatment goals as outlined in treatment plan.  Pt to F/U with local ED/call 911 should SI/HI arises.    Deanna Crook, LPC
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
## 2021-08-12 ENCOUNTER — TELEPHONE (OUTPATIENT)
Dept: PSYCHIATRY | Facility: HOSPITAL | Age: 21
End: 2021-08-12

## 2021-08-12 ENCOUNTER — PHP (OUTPATIENT)
Dept: PSYCHIATRY | Facility: HOSPITAL | Age: 21
End: 2021-08-12
Attending: COUNSELOR
Payer: COMMERCIAL

## 2021-08-12 ENCOUNTER — APPOINTMENT (OUTPATIENT)
Dept: LAB | Facility: CLINIC | Age: 21
End: 2021-08-12
Attending: PSYCHIATRY & NEUROLOGY
Payer: COMMERCIAL

## 2021-08-12 ENCOUNTER — PHP (OUTPATIENT)
Dept: PSYCHIATRY | Facility: HOSPITAL | Age: 21
End: 2021-08-12
Attending: PSYCHIATRY & NEUROLOGY
Payer: COMMERCIAL

## 2021-08-12 ENCOUNTER — NURSE ONLY (OUTPATIENT)
Dept: PSYCHIATRY | Facility: HOSPITAL | Age: 21
End: 2021-08-12

## 2021-08-12 VITALS — HEIGHT: 64 IN | BODY MASS INDEX: 20.96 KG/M2 | WEIGHT: 122.8 LBS

## 2021-08-12 DIAGNOSIS — F14.21 COCAINE USE DISORDER, MODERATE, IN SUSTAINED REMISSION (CMS/HCC): ICD-10-CM

## 2021-08-12 DIAGNOSIS — F10.21 ALCOHOL USE DISORDER, MODERATE, IN EARLY REMISSION (CMS/HCC): Primary | ICD-10-CM

## 2021-08-12 DIAGNOSIS — F15.90 STIMULANT USE DISORDER: ICD-10-CM

## 2021-08-12 DIAGNOSIS — F50.014 ANOREXIA NERVOSA, RESTRICTING TYPE, IN PARTIAL REMISSION, MODERATE: ICD-10-CM

## 2021-08-12 DIAGNOSIS — F10.21 ALCOHOL USE DISORDER, MODERATE, IN EARLY REMISSION (CMS/HCC): ICD-10-CM

## 2021-08-12 DIAGNOSIS — F39 MOOD DISORDER (CMS/HCC): ICD-10-CM

## 2021-08-12 PROCEDURE — H0035 MH PARTIAL HOSP TX UNDER 24H: HCPCS | Performed by: COUNSELOR

## 2021-08-12 PROCEDURE — 80307 DRUG TEST PRSMV CHEM ANLYZR: CPT

## 2021-08-12 ASSESSMENT — ENCOUNTER SYMPTOMS
EYES NEGATIVE: 1
MUSCULOSKELETAL NEGATIVE: 1
GASTROINTESTINAL NEGATIVE: 1
DYSPHORIC MOOD: 1
HEMATOLOGIC/LYMPHATIC NEGATIVE: 1
ENDOCRINE NEGATIVE: 1
APPETITE CHANGE: 1
CARDIOVASCULAR NEGATIVE: 1
RESPIRATORY NEGATIVE: 1
ALLERGIC/IMMUNOLOGIC NEGATIVE: 1
NEUROLOGICAL NEGATIVE: 1

## 2021-08-12 NOTE — GROUP NOTE
Date: 8/12/2021  Start Time:   9:30 AM  End Time: 10:30 AM  Gemma Darnell, YOB: 2000,  was an active group participant.    Community Check In Group  8 attended group today.     Group Focus: Goal of group was to welcome new and returning PHP members to the [facility], check in regarding group member needs, and assess safety.    About the Group: Clinician reviewed [facility] Group Code of Conduct and answered any questions that arose.  Clinician welcomed new members to the group and facilitated brief introductions of group members to one another.  Introduced topic/theme for the treatment day:CBT.  Encouraged group members to request support throughout the day as needed.  Clinician reviewed group members’ Morning Reflection Sheets and did a verbal check in with each group member regarding safety (SI/HI/self harm), safety planning, medication compliance, if they needed to consult with anyone today and individual treatment needs/goals for the day.  Session ended with a brief meditation/calming activity.   Sheron Shelton MD was on site when services rendered.    Morning Self reflection:   Depression: 3/5  Anxiety: 3/5  Anger: 0/5  Suicidal Ideation:   0/5 - None  Self Injury: 0/5 - None  Engaged in Self Injury since yesterday: No  Homicidal Ideation:   0/5 - None  Are you able to follow your Safety Plan? Yes.  I can/will:  Call someone and Journal  Substance Use:  No  Self Care:  I completed my self care activity last night:  Hygiene.  I am satisfied with my daily hygiene:  yes  Nourish:Number of meals eaten yesterday? 2    Sleep:  Describe:  Restless  Social Interaction:Moderate: Family  Physical Activity: No.  Mindful Activity: No.  Medication: Prescribed  Thought Content: Racing.  Pt reported use of coping skills including successful follow through and barriers to follow through: Journaling.  Pt reported significant or positive event/step: Had dinner with my mom.  Pt identified goal for the treatment day: Have a  good session.  Pt treatment plan areas addressed:  Depression  Pt requested consult with: None  Visit Diagnosis:      ICD-10-CM ICD-9-CM   1. Alcohol use disorder, moderate, in early remission (CMS/HCC)  F10.21 305.03   2. Cocaine use disorder, moderate, in sustained remission (CMS/HCC)  F14.21 305.63   3. Anorexia nervosa, restricting type, in partial remission, moderate  F50.01 307.1   4. Mood disorder (CMS/HCC)  F39 296.90         Assessment/Observations:  Pt shared her self reflections and identified that she did not eat breakfast this morning.  Plan:  Continue with PHP   Pt to F/U with treatment goals as outlined in treatment plan.  Pt to F/U with local ED/call 911 should SI/HI arises.    Ling Cifuentes LCSW

## 2021-08-12 NOTE — GROUP NOTE
Date:  8/12/2021  Start Time:  12:10 PM  End Time:   1:10 PM  Gemma Darnell, YOB: 2000  Psychoeducational/Skills Based Group  9 members attended group today    Group Focus: Goal of group was to introduce skills and psychoeducation related to topic of PHP day.   Group members were provided instruction and opportunities to practice presented skills.  Clinician answered questions as they arose and shared how presented skills can be utilized on a daily basis to increase emotional wellness.      About the Group: CBT Session 4: CBT (Cognitive Behavioral Therapy) Psycho-Ed/Skills Based Group   Topic of curriculum was “CBT (Cognitive Behavioral Therapy)”. Clinician began group by briefly reviewing description of CBT from group 1. Clinician showed a video entitled “Parable of the Two Wolves” to describe how our cognitions, both positive and negative are strengthened or intensified when we give them attention and the power of mindfulness. Clinician provided psychoeducation about “core beliefs” and “cognitive distortions” and discussed how core beliefs impact our current thoughts, feelings and behaviors. Clinician provided a core beliefs worksheet as a way of helping group members challenge the negative thoughts associated with negative core beliefs. Clinician then introduced the “ATR worksheet” as a way of describing the relationship between thoughts, feelings and behaviors (cognitive triangle) and the process of challenging and reframing thoughts. Clinician also introduced “ANTS” worksheet to further describe the process of reframing thoughts. Clinician then facilitated an open discussion on CBT concepts presented and assessed for group member understanding. Clinician ended group by showing a YouTube video of [employer]’s [employer] show and an interview with Louis Comer.    Sheron Shelton MD was onsite when services were rendered.        Patient  was a passive group participant.  Assessment/observation of  group participation/presentation: Pt was passive in group with intermittent eye contact. She followed along with the packet and discussion on CBT but did not make spontaneous contributions.   Treatment plan areas addressed: Depression and Anxiety  Visit Diagnosis:      ICD-10-CM ICD-9-CM   1. Alcohol use disorder, moderate, in early remission (CMS/[facility])  F10.21 305.03   2. Cocaine use disorder, moderate, in sustained remission (CMS/[facility])  F14.21 305.63   3. Anorexia nervosa, restricting type, in partial remission, moderate  F50.01 307.1   4. Mood disorder (CMS/[facility])  F39 296.90     Plan: Continue with PHP   Pt to F/U with treatment goals as outlined in treatment plan.  Pt to F/U with local ED/call 911 should SI/HI arises.    Eileen Stewart, [facility]

## 2021-08-12 NOTE — GROUP NOTE
Date: 8/12/2021  Start Time:  1:20 PM  End Time:   2:20 PM  Gemma Darnell, YOB: 2000,  was an active group participant.    Wrap Up Group   9  attended group today.   Group Focus: Goal of group was to wrap up and process the treatment day.  Assist  members in planning for the evening ahead and to assess and plan surrounding any  safety needs.      About the Group:  Clinician reviewed group members Afternoon  Reflection Sheets and did a verbal check in with each group member regarding safety  (SI/HI/self harm) and any necessary safety planning .  Session ended with a brief  meditation/calming activity.  Sheron Shelton MD was onsite when  services rendered.    Request for Consent  Patient provided verbal consent to treat via telemedicine. Clinician introduced the secure telemedicine platform that we are utilizing to provide care during the COVID-19 pandemic. Patient understands the session will be billed to their insurance or patient directly.  Patient was informed only the group patients  and the clinician are permitted on?the video conference, sessions are not recorded by the clinician, and the patient is not permitted to record the session.? Patient was provided clinician's unique meeting ID prior to session, patient was asked to arrive to virtual session on time just as patient would if we were in the office. Clinician confirmed identification of patient by name and birthdate, provider name, location of patient and clinician, and callback number in case disconnected. Patient consents to behavioral health treatment    Patient Response to Request for Consent: Yes  Visit Type performed: Audio and Video      Afternoon Self Reflection  Depression: 3/5  Anxiety: 3/5  Anger: 0/5  Suicidal Ideation:   0/5 - None  Self Injury: 0/5 - None  Homicidal Ideation:   0/5 - None  Are you able to follow your Safety Plan? Yes.  I can/will:  Call someone  Pt reported significant moment/insight of the day: Learning  about CBT  Pt reported gratitude list: mom, water, sunshine  Pt identified goal progress for the day: Have a good therapy session with PHP therapist   Pt reported evening self care plan: Get outside and spend time with Giovanna   Pt treatment plan areas addressed: Depression and Anxiety    Visit Diagnosis:      ICD-10-CM ICD-9-CM   1. Alcohol use disorder, moderate, in early remission (CMS/HCC)  F10.21 305.03   2. Cocaine use disorder, moderate, in sustained remission (CMS/HCC)  F14.21 305.63   3. Anorexia nervosa, restricting type, in partial remission, moderate  F50.01 307.1   4. Mood disorder (CMS/[facility])  F39 296.90       Assessment/Observations:  Pt shared that she wants to spend time with her grandmother. Pt denied any further needs for this afternoon or evening. Pt has 1:1 session with [facility] therapist following group session.   Plan: Continue with PHP  and Meet with [facility] Therapist  Pt to F/U with treatment goals as outlined in treatment plan.  Pt to F/U with local ED/call 911 should SI/HI arises.    Deanna Crook, LPC

## 2021-08-12 NOTE — TELEPHONE ENCOUNTER
Pt's mother called LPC this afternoon at 4:00pm to disucss concerns about pt lying. Pt's mother informed LPC that she planned to confront the pt about lying in regards to taking laxatives and not being honest about eating. LPC informed pt's mom that [facility] tx team is aware of pt's dishonesty or incongruence. LPC spoke with pt about the importance of being honest in tx. LPC encouraged mom to allow LPC and tx team to establish trust with the pt and that the team feels that pt is able to be treated in PHP tx at this time. Pt's mother became irate and stated that the pt needs residential tx and that she knows the pt is lying to the tx team. LPC assured pt's mother that the team is assessing the pt each day and will make the best recommendation for the pt as needed. Pt's mother continues to reiterate that the pt is lying, that she does not want to get better, and that she needs residential tx. LPC thanked mother for calling and encouraged her to talk with pt about her concerns as needed.

## 2021-08-12 NOTE — PROGRESS NOTES
PHP INITIAL PSYCHIATRY NURSING ASSESSMENT    Patient is a 22 yo female presenting for initial nursing assessment. Patient is fully oriented, c/o depression. Referred by [facility] (7/21-8/9/21) for OD. Significant hx for SUDS, eating d/o. Denies SI, denies HI, denies SIB, hx SA, denies A/V/H. Denies substance use.     Hx Eating d/o - restricted month ago.   Trying to eat 3 meals per day. Skipped breakfast today, Reported eating lunch (noodle stir dubon) today. denies restricting or any other eating d/o behaviors. Denies laxative use. Denies seeing a nutritionist.    - 122.8 lbs. last weight was 124 lbs last week. 1.2 lb weight loss - BMI WNL.  RN will check weight next week    Medical appts - none scheduled     History Reviewed: Yes, no changes.    Outpatient Encounter Medications as of 8/12/2021   Medication Sig   • cyanocobalamin (VITAMIN B12) 100 mcg tablet Take 100 mcg by mouth daily.   • hydrOXYzine (ATARAX) 50 mg tablet Take 1 tablet (50 mg total) by mouth every 6 (six) hours as needed for anxiety for up to 14 days.   • melatonin ODT Take 1 tablet (3 mg total) by mouth nightly. (Patient taking differently: Take 3 mg by mouth nightly as needed.  )   • venlafaxine XR (EFFEXOR-XR) 150 mg 24 hr capsule Take 1 capsule (150 mg total) by mouth daily with breakfast.       Review of Systems   Constitutional: Positive for appetite change.        Tries to eat 3 meals per day   HENT: Negative.    Eyes: Negative.    Respiratory: Negative.    Cardiovascular: Negative.    Gastrointestinal: Negative.    Endocrine: Negative.    Genitourinary: Negative.    Musculoskeletal: Negative.    Skin: Negative.    Allergic/Immunologic: Negative.    Neurological: Negative.    Hematological: Negative.    Psychiatric/Behavioral: Positive for dysphoric mood.       There were no vitals filed for this visit.    Physical Exam    Labs Reviewed  UDS ordered    Does the patient worry about falling?  no  Has the patient fallen in the last 3 months?   no    Screenings done this visit:         PHQ-9: Total Score-OWL Transcribed: 20  Thoughts that You Would be Better Off Dead or of Hurting Yourself in Some Way: 2-->more than half the days  JAY-7 Total Score-OWL Transcribed: 19            Metabolic Monitoring Log Completed/Updated:  Not Indicated    Assessment/Plan: Follow-up with PHP as scheduled; recommended routine gynecology appt    Time spent with patient:  10 minutes  Shraddha Marks RN @ 11:06 AM

## 2021-08-12 NOTE — GROUP NOTE
Date:  8/12/2021  Start Time:  10:45 AM  End Time:  11:45 AM  Gemma Darnell, YOB: 2000  9 members attended group today.    Group Focus:  Goal of group was to establish and build social support, review coping skills, normalize the struggles of being human, and increase self awareness and self compassion.    About the Group:  Clinician started group with a prompting quote/song to facilitate group discussion.  Group engaged in active and supportive discussion. Topics discussed included CBT.  Group members were able to offer insightful and supportive feedback and suggestions about how to manage difficult situations.  Group ended with a meditation/song.  Sheron Shelton MD was onsite when services were rendered.        Patient  was a passive group participant.  Assessment/observation of group participation/presentation: Pt did not participate in the discussion related to body image.  Treatment plan areas addressed: Depression  Visit Diagnosis:      ICD-10-CM ICD-9-CM   1. Alcohol use disorder, moderate, in early remission (CMS/HCC)  F10.21 305.03   2. Cocaine use disorder, moderate, in sustained remission (CMS/HCC)  F14.21 305.63   3. Anorexia nervosa, restricting type, in partial remission, moderate  F50.01 307.1   4. Mood disorder (CMS/HCC)  F39 296.90       Plan: Continue with PHP   Pt to F/U with treatment goals as outlined in treatment plan.  Pt to F/U with local ED/call 911 should SI/HI arises.    Ling Cifuentes LCSW

## 2021-08-12 NOTE — TELEPHONE ENCOUNTER
Pt's mother called [facility] asking to speak with LPC on 8/11/21 at 4:10pm. LPC spoke with pt's mother to hear her concerns about pt's behavior. Pt's mother informed LPC that pt lied to her about eating a granola bar today while in [facility] tx. Pt's mother also informed LPC that she found laxatives in pt's bag. Pt's mother reported that she does not plan to confront pt this evening about the laxatives because she feels that pt is in a very fragile state and she does not want her to act out. LPC thanked pt's mother for this information and informed pt's mother that LPC will talk with [facility] tx team in the morning. LPC informed pt's mother that pt was given clear expectations for PHP tx that she needed to be eating 3 meals per day and not engaging in disordered eating behavior. LPC expressed concern that pt may need specialty care to address eating disorder behavior. Pt's mother expressed concern that the pt has attempted suicide in the past 2 or so weeks and she fears that the pt is at risk to do so again if she were to be transferred elsewhere without proper care. LPC validated Cindi's concerns and assured her that the tx team will triage about how [facility] can best support the pt at this time. Pt's mother also informed LPC that when she confronted pt about not being honest with LPC during intake appt, that pt told her the LPC lied. Pt seems to have a tendency to split staff in an effort to protect her maladaptive behavior.

## 2021-08-13 ENCOUNTER — TELEPHONE (OUTPATIENT)
Dept: PSYCHIATRY | Facility: HOSPITAL | Age: 21
End: 2021-08-13

## 2021-08-13 ENCOUNTER — PHP (OUTPATIENT)
Dept: PSYCHIATRY | Facility: HOSPITAL | Age: 21
End: 2021-08-13
Attending: PSYCHIATRY & NEUROLOGY
Payer: COMMERCIAL

## 2021-08-13 ENCOUNTER — DOCUMENTATION (OUTPATIENT)
Dept: PSYCHIATRY | Facility: HOSPITAL | Age: 21
End: 2021-08-13

## 2021-08-13 DIAGNOSIS — F10.21 ALCOHOL USE DISORDER, MODERATE, IN EARLY REMISSION (CMS/HCC): Primary | ICD-10-CM

## 2021-08-13 DIAGNOSIS — F50.014 ANOREXIA NERVOSA, RESTRICTING TYPE, IN PARTIAL REMISSION, MODERATE: ICD-10-CM

## 2021-08-13 DIAGNOSIS — F14.21 COCAINE USE DISORDER, MODERATE, IN SUSTAINED REMISSION (CMS/HCC): ICD-10-CM

## 2021-08-13 DIAGNOSIS — F39 MOOD DISORDER (CMS/HCC): ICD-10-CM

## 2021-08-13 LAB
AMPHET UR QL SCN: NOT DETECTED
BARBITURATES UR QL SCN: NOT DETECTED
BENZODIAZ UR QL SCN: NOT DETECTED
CANNABINOIDS UR QL SCN: NOT DETECTED
COCAINE UR QL SCN: NOT DETECTED
OPIATES UR QL SCN: NOT DETECTED
PCP UR QL SCN: NOT DETECTED

## 2021-08-13 PROCEDURE — H0035 MH PARTIAL HOSP TX UNDER 24H: HCPCS | Performed by: COUNSELOR

## 2021-08-13 ASSESSMENT — COGNITIVE AND FUNCTIONAL STATUS - GENERAL
INSIGHT: IMPAIRED, MINIMALLY
IMPULSE CONTROL: INTACT
CONCENTRATION: IMPAIRED, MILD
ATTENTION: WNL
APPETITE: NO CHANGE
EYE_CONTACT: WNL
EST. PREMORBID INTELLIGENCE: AVERAGE
SPEECH: SOFT
REMOTE MEMORY: WNL
DELUSIONS: NONE OR AGE APPROPRIATE
THOUGHT_PROCESS: WORRY
LIBIDO: NO CHANGE
APPEARANCE: WELL GROOMED
ORIENTATION: FULLY ORIENTED
PERCEPTUAL FUNCTION: NORMAL
PSYCHOMOTOR FUNCTIONING: RESTLESS;AGITATED
SLEEP_WAKE_CYCLE: NO CHANGE
RECENT MEMORY: MILD LOSS
MOOD: DEPRESSED;HOPELESS
AFFECT: FLAT;TENSE
AROUSAL LEVEL: AWAKE
THOUGHT_CONTENT: CONFUSED;GUARDED

## 2021-08-13 NOTE — GROUP NOTE
Date:  8/13/2021  Start Time:  10:40 AM  End Time:  11:40 AM  Gemma Darnell, YOB: 2000   9 members attended group today.    Group Focus:  Goal of group was to establish and build social support, review coping skills, normalize the struggles of being human, and increase self awareness and self compassion.    About the Group:  Clinician started group with a prompting quote/song to facilitate group discussion.  Group engaged in active and supportive discussion. Topics discussed included Resiliency Building.  Group members were able to offer insightful and supportive feedback and suggestions about how to manage difficult situations.  Group ended with a meditation/song.  Sheron Shelton MD was onsite when services were rendered.        Patient  was a passive group participant.  Assessment/observation of group participation/presentation: Pt did not participate in the discussion related to body image and ways to reframe negative self talk.  Treatment plan areas addressed: Depression  Visit Diagnosis:      ICD-10-CM ICD-9-CM   1. Alcohol use disorder, moderate, in early remission (CMS/HCC)  F10.21 305.03   2. Cocaine use disorder, moderate, in sustained remission (CMS/HCC)  F14.21 305.63   3. Anorexia nervosa, restricting type, in partial remission, moderate  F50.01 307.1   4. Mood disorder (CMS/HCC)  F39 296.90       Plan: Continue with PHP   Pt to F/U with treatment goals as outlined in treatment plan.  Pt to F/U with local ED/call 911 should SI/HI arises.    Ling Cifuentes LCSW

## 2021-08-13 NOTE — GROUP NOTE
Date: 8/13/2021  Start Time:   9:30 AM  End Time: 10:30 AM  Gemma Darnell, YOB: 2000,  was an active group participant.    Community Check In Group  9 attended group today.     Group Focus: Goal of group was to welcome new and returning PHP members to the [facility], check in regarding group member needs, and assess safety.    About the Group: Clinician reviewed [facility] Group Code of Conduct and answered any questions that arose.  Clinician welcomed new members to the group and facilitated brief introductions of group members to one another.  Introduced topic/theme for the treatment day:Resiliency Building.  Encouraged group members to request support throughout the day as needed.  Clinician reviewed group members’ Morning Reflection Sheets and did a verbal check in with each group member regarding safety (SI/HI/self harm), safety planning, medication compliance, if they needed to consult with anyone today and individual treatment needs/goals for the day.  Session ended with a brief meditation/calming activity.   Sheron Shelton MD was on site when services rendered.    Morning Self reflection:   Depression: 3/5  Anxiety: 3/5  Anger: 0/5  Suicidal Ideation:   0/5 - None  Self Injury: 0/5 - None  Engaged in Self Injury since yesterday: No  Homicidal Ideation:   0/5 - None  Are you able to follow your Safety Plan? Yes.  I can/will:  Call someone and Use a coping skill  Substance Use:  No  Self Care:  I completed my self care activity last night:  hygiene  I am satisfied with my daily hygiene:  yes  Nourish:Number of meals eaten yesterday? 2    Sleep:  Describe:  Number of hours:  6  Social Interaction:Minimal:  Family  Physical Activity: No.  Mindful Activity: No.  Medication: Prescribed  Thought Content: Clear.  Pt reported use of coping skills including successful follow through and barriers to follow through: Deep breathing.  Pt reported significant or positive event/step: Went for a walk.  Pt identified goal  "for the treatment day: Stay present.  Pt treatment plan areas addressed:  Depression  Pt requested consult with: None  Visit Diagnosis:      ICD-10-CM ICD-9-CM   1. Alcohol use disorder, moderate, in early remission (CMS/HCC)  F10.21 305.03   2. Cocaine use disorder, moderate, in sustained remission (CMS/HCC)  F14.21 305.63   3. Anorexia nervosa, restricting type, in partial remission, moderate  F50.01 307.1   4. Mood disorder (CMS/HCC)  F39 296.90         Assessment/Observations:  Pt shared her self reflections, and then when prompted by LCSW identified that it was hard for her to come to group today, but that she knows \"I need to be here.\"  Plan:  Continue with PHP   Pt to F/U with treatment goals as outlined in treatment plan.  Pt to F/U with local ED/call 911 should SI/HI arises.    NEREYDA NievesW          "

## 2021-08-13 NOTE — GROUP NOTE
"Date: 8/13/2021  Start Time:  1:20 PM  End Time:   2:20 PM  Gemma Darnell, YOB: 2000,  was quiet but attentive.    Wrap Up Group    9 attended group today.   Group Focus: Goal of group was to wrap up and process the treatment day.  Assist  members in planning for the evening ahead and to assess and plan surrounding any  safety needs.      About the Group:  Clinician reviewed group members Afternoon  Reflection Sheets and did a verbal check in with each group member regarding safety  (SI/HI/self harm) and any necessary safety planning .  Session ended with a brief  meditation/calming activity.  Sheron Shelton MD was onsite when  services rendered.        Afternoon Self Reflection  Depression: 2/5  Anxiety: 2/5  Anger: 0/5  Suicidal Ideation:   0/5 - None  Self Injury: 0/5 - None  Homicidal Ideation:   0/5 - None  Are you able to follow your Safety Plan? Yes.  I can/will:  Call someone  Pt reported significant moment/insight of the day: \"Listening to body image talk.\"  Pt reported gratitude list: \"Support, my mom, sunshine.\"  Pt identified goal progress for the day: \"Yes, tried to stay present.\"  Pt reported evening self care plan: \"Make sure I get a full meal.\"  Pt treatment plan areas addressed: Depression and Anxiety    Visit Diagnosis:      ICD-10-CM ICD-9-CM   1. Alcohol use disorder, moderate, in early remission (CMS/HCC)  F10.21 305.03   2. Cocaine use disorder, moderate, in sustained remission (CMS/HCC)  F14.21 305.63   3. Anorexia nervosa, restricting type, in partial remission, moderate  F50.01 307.1   4. Mood disorder (CMS/HCC)  F39 296.90     Assessment/Observations: Pt was quiet and withdrawn in group. In check-out she acknowledged having paid attention to body image discussion and was apologetic for not having participated. Therapist praised pt for her disclosure and invited her to join future discussions as she feels comfortable. She had some difficulty identifying a support plan for " the weekend; with support from therapist she agreed to go for walks.   Plan: Continue with PHP   Pt to F/U with treatment goals as outlined in treatment plan.  Pt to F/U with local ED/call 911 should SI/HI arises.    Eileen Stewart, LPC

## 2021-08-13 NOTE — GROUP NOTE
Date:  8/13/2021  Start Time:  12:10 PM  End Time:   1:10 PM  Gemma Darnell, YOB: 2000  Psychoeducational/Skills Based Group  9 members attended group today    Group Focus: Goal of group was to introduce skills and psychoeducation related to topic of PHP day.   Group members were provided instruction and opportunities to practice presented skills.  Clinician answered questions as they arose and shared how presented skills can be utilized on a daily basis to increase emotional wellness.      About the Group: Resiliency Building Session 5: Resiliency Building Psycho-Ed/Skills Based Group Summary  Topic of curriculum was “Resiliency Building”. Clinician began group by discussing hos protector factors can help foster resiliency, using a worksheet from [employer]. Group members were encouraged to identify protective factors that they would like to develop/strengthen in order to help them overcome obstacles. Clinician led the group through a time of reflection on prioritizing values and time management, with the goal of help group members identify their values to build up their resilience. Group members were asked the following questions: 1) in what areas of your life do you want to invest your time/energy/resources? 2) What areas of your life do you want to prioritize to assist in healthy decision-making? 3) How can you cultivate more time for play/creativity/rest? 4) How do or can we manage the barriers that interfere with prioritizing our values? 5) What happens when our actions are not aligned with our values/priorities? How does that make use feel? How can we start living more aligned with our values? Clinician then encouraged group members to set goals related to developing/strengthening their values, encouraging them to set small, achievable goals to build optimism and confidence. Clinician closed group by asking each group member to choose 3 strengths from the Strengths Deck, and to assist one  another if struggling to identify strengths. Group members were encouraged to write down or note their strengths for use at check-out group.     Sheron Shelton MD was onsite when services were rendered.    Request for Consent  Patient provided verbal consent to treat via telemedicine. Clinician introduced the secure telemedicine platform that we are utilizing to provide care during the COVID-19 pandemic. Patient understands the session will be billed to their insurance or patient directly.  Patient was informed only the group patients  and the clinician are permitted on?the video conference, sessions are not recorded by the clinician, and the patient is not permitted to record the session.? Patient was provided clinician's unique meeting ID prior to session, patient was asked to arrive to virtual session on time just as patient would if we were in the office. Clinician confirmed identification of patient by name and birthdate, provider name, location of patient and clinician, and callback number in case disconnected. Patient consents to behavioral health treatment    Patient Response to Request for Consent: Yes  Visit Type performed: Audio and Video          Patient  was withdrawn in today's session.  Assessment/observation of group participation/presentation: Pt did not verbalize any thoughts or feelings in group session. Pt did not engage in written exercise with protective factors. Pt made eye contact with LPC and was listening to peers. Pt did not engage in art therapy exercise during session. Toward the end of group, pt wrote a few sentences relating to the art therapy exercise.   Treatment plan areas addressed: Depression, Anxiety and Suicidal Ideation & Safety Planning  Visit Diagnosis:      ICD-10-CM ICD-9-CM   1. Alcohol use disorder, moderate, in early remission (CMS/HCC)  F10.21 305.03   2. Cocaine use disorder, moderate, in sustained remission (CMS/HCC)  F14.21 305.63   3. Anorexia nervosa,  restricting type, in partial remission, moderate  F50.01 307.1   4. Mood disorder (CMS/HCC)  F39 296.90       Plan: Continue with PHP   Pt to F/U with treatment goals as outlined in treatment plan.  Pt to F/U with local ED/call 911 should SI/HI arises.    Deanna Crook, LPC

## 2021-08-13 NOTE — PROGRESS NOTES
"PHP Psychotherapy Note    Gemma Darnell is a 21 y.o. female who presents for Follow-up.     Treatment Plan areas addressed during this visit:  LPC met with pt to assess how pt is adjusting to PHP tx, and to discuss expectations regarding concerns that pt's mother and PHP tx team have related to pt not being honest about behaviors related to eating disorder.     Mental Status Exam:  Arousal Level: Awake  Appearance: Well Groomed  Speech: Soft  Psychomotor Functioning: Restless, Agitated (pt was picking at her nails and finger tips during session)  Eye Contact: WNL  Est. Premorbid Intelligence: Average  Orientation: Fully oriented  Attention: WNL  Concentration: Impaired, Mild  Recent Memory: Mild Loss  Remote Memory: WNL  Thought Content: Confused, Guarded  Thought Process: Worry  Insight: Impaired, minimally  Perceptual Function: Normal  Delusions: None or age appropriate  Sleeping: No Change  Appetite: No Change  Libido: No change  Affect: Flat, Tense  Mood: Depressed, Hopeless     PHQ-9: Total Score-OWL Transcribed: 20  Thoughts that You Would be Better Off Dead or of Hurting Yourself in Some Way: 2-->more than half the days  JAY-7 Total Score-OWL Transcribed: 19    Taylor Suicide Severity Rating Scale:  Not done today     Safe-T Assessment:  Not done today       Assessment:   Pt was very closed off and shut down during beginning of session. Pt was providing minimal answers to LPC's questions. LPC pointed out that pt had not been talking in PHP group sessions over the last 2 days. Pt agreed and stated that she feels \"shut down\". LPC probed about pt's recent break up with boyfriend and how she is feeling emotionally. Pt eventually started to talk about reliving the past and rehashing the recent events with her boyfriend feeling as though if she had done something different they would still be together. Pt also stated that she feels that she has pushed people away. Pt discussed her recent hospilizations and stays " "in res tx over the past year. Pt stated that she felt more engaged in inpatient and res tx than she does currently. Pt stated that she feels as though she isn't participating in her own life currently. Pt stated that she feels more depressed than anxious at this time. LPC spoke with pt about stages of change. LPC provided pt handout about stages of change and asked her to ID what stage she believes that she is in. Pt stated that she feels as though she is in the \"contemplation stage\". LPC agreed with this and spoke with pt about how she can move into the preparation and action stages of change. LPC also pointed out that attending PHP tx and therapy sessions are action steps. Pt inquired if LPC spoke with her mother. LPC discussed with pt that LPC did speak with her mother and everyone is working to support her in [facility] tx. Pt provided LPC with a copy of the contract that she and her mom made in order for pt to remain living with her mom. Pt and LPC agreed that the contract is reasonable at this time. LPC spoke with pt about concerns regarding splitting behavior and lying to PHP tx staff as well as to her mom. LPC spoke about conscious vs unconscious attempts to protect maladative coping skills when she is experiencing unpleasant feelings or low self-worth. LPC spoke with pt about the need for honesty while in PHP tx and the need for pt to eat 3 meals per day. LPC questioned if pt is currently using laxatives or any other means to lose weight or suppress appetite. Pt denied using laxatives. LPC again, asked pt if she was being truthful (given pt's mother found laxatives in her bad the day prior). Pt again denied using laxatives. Pt stated that it would be good to have a family session with her mom while in [facility] tx. LPC agreed and stated that LPC will call pt's mom to schedule this for next week. LPC also spoke with pt about eating habits while at [facility]. Pt informed [facility] nurse over the phone yesterday that she ate " "rice noodles for lunch. LPC saw pt only eating a granola bar yesterday at lunch. LPC confronted pt about this, and pt admitted that she lied about eating the rice noodles. Pt stated that she \"had rice noodles\" but did not eat them. Pt also discussed concerns about feeling \"foggy and confused\". Pt stated that she does not remember completing PHQ-9 and JAY 7 over the [employer] [facility] a few days ago. Pt stated that her memory is foggy at times. LPC spoke with pt about the importance of nourishing her self so that her brain can recover. Pt has non verbal signs when talking about eating where she physically is struggling to find motivation to eat. LPC spoke with pt about continuing to work with her OP therapist and having this therapist involved with care. LPC inquired about substance use and cravings/urges to use substances. Pt denied any cravings at this time and stated that she has not used substances since May 2021. Pt was willing to get labwork and UDS this afternoon following therapy session. LPC asked pt how she can be more engaged in her life this afternoon and evening. Pt stated that she could move her things from the 3rd floor bedroom to the 2nd floor bedroom given this is part of the contract. LPC encouraged pt to follow through on this and to think about using music to also make it a more pleasurable experience. Pt agreed with this. LPC encouraged pt to eat a meal with her mother this evening as well. LPC affirmed pt for opening up during session and being receptive to LPC's direction and feedback. Pt stated that she feels frustrated with herself but that she wants to make changes in her life.   Psychological condition is generally: showing no change    Plan:    Patient to F/U with PHP.  Patient to F/U with treatment goals as outlined in treatment plan.  Patient to F/U with local ED or call 911 should SI/HI arise.  Visit Diagnosis:    ICD-10-CM ICD-9-CM   1. Alcohol use disorder, moderate, in early remission (CMS/HCC) "  F10.21 305.03   2. Cocaine use disorder, moderate, in sustained remission (CMS/[facility])  F14.21 305.63   3. Anorexia nervosa, restricting type, in partial remission, moderate  F50.01 307.1   4. Mood disorder (CMS/[facility])  F39 296.90   5. Stimulant use disorder  F15.90 292.9       Time  Start Time: 1420  End Time: 1520  Total Time: 60  Deanna Crook LPC @ 9:01 AM

## 2021-08-13 NOTE — TELEPHONE ENCOUNTER
Met with Gemma on zoom with patient permission.   Patient ate breakfast (granola bar), ate raviolis for lunch (6/7), denies purging. admitted to using laxatives but only reporting using 2 OTC pills on Wednesday, denies use yesterday or today. RN explained all of the medical consequences that can occur & advised her to give the laxatives to her mom which she is willing to do this afternoon. Denies access to Laxatives in PHP.     Patient verbalized understanding of medical consequences of using Laxatives including but not limited to dehydration, electrolyte imbalances causing damage to vital organs, damage to GI system, dependency causing issues with bowel regularity.

## 2021-08-13 NOTE — TELEPHONE ENCOUNTER
Pt's mother called LPC to inquire about how pt did today in PHP tx. LPC discussed progress with pt's mother. Pt's mother asked about realistic expectations of pts in PHP tx. LPC spoke about ADLs, medication compliance and recognizing that pts may have difficulty completing other tasks outside of ADLs. Pt's mother reported that this is helpful information to have so that she can better understand how to help pt and hold her accountable. Pt's mother thanked LPC for talking with her and LPC agreed to re connect with pt's mother after the weekend.

## 2021-08-13 NOTE — TELEPHONE ENCOUNTER
MONALISA left VM for pt's OP therapist whom she has seen on and off since last summer. MONALISA left contact for [facility] so that Cindy can reach out and hopefully triage pt's care. Pt is aware that LPC was going to call OP therapist. Pt signed TORITO for Cnidy.

## 2021-08-13 NOTE — PROGRESS NOTES
Called and left message with Dr. Cathy Simpson's  asking for a call back from Dr. Simpson in order to collaborate on patient's care.    Sheron Shelton MD

## 2021-08-13 NOTE — TELEPHONE ENCOUNTER
"Pt's mother called  asking to speak with LPC this morning at 9:20am. [facility] spoke with pt's mother Cindi. Cindi reported that the pt was very flat in her affect and did not achieve the goal that they had disucssed last evening to move part of her bedroom closet from the 3rd floor to the 2nd floor. Pt's mother informed LPC that pt did not want to attend PHP tx today. Pt's mother called pt's father to come help escort pt to PHP tx. Cindi informed [facility] that pt's father typically just cries when he comes over to support the pt. Cindi mentioned that she has been  from the pt's father since January. Cindi informed [facility] that pt was in route to PHP tx with her father and that her father will pick pt up at end of tx day. Cindi informed [facility] that pt was given grapes, egg and granola to eat by her mother this morning and was supposed to eat it in front of her father on the way to tx. Cindi informed [facility] that pt was given Ravioli to eat for lunch. Cindi acknowledged that she is feeling hyperaroused and was dysregulated this morning toward the pt due to her own frustration. [facility] validated Cindi's concerns and her exasperation. [facility] spoke with Cindi about the importance of being aware of her own reactions and dsyregulation and the impact this could have on the pt. [facility] inquired about Cindi's supports. She reported that she has a therapist and has been to support groups. She then stated that \"she could be a therapist\". [facility] assured Cindi that the tx team will be assessing the pt and we will take PHP tx one day at a time based on pt's presentation and needs. Cindi again expressed her desire to have pt in residential tx given that she feels pt cannot maintain outside of 24 hr structure. Cindi would like for the pt to be successful in outpatient treatment but fears that this is not possible. Cindi informed [facility] that she did confront her about using laxatives. Pt did admit to her mother that she was using laxatives. She was confused " about her mother knew about the laxatives. Pt's mother confirmed that pt ate dinner with her last evening. LPC wished pt's mother well and encouraged her to do self care. LPC spoke with pt's mother about potential family session via zoom for next week. Pt's mother is available next week for a family session. Pt mother expressed relief that she was able to connect with LPC this morning.

## 2021-08-16 ENCOUNTER — PHP (OUTPATIENT)
Dept: PSYCHIATRY | Facility: HOSPITAL | Age: 21
End: 2021-08-16
Attending: PSYCHIATRY & NEUROLOGY
Payer: COMMERCIAL

## 2021-08-16 DIAGNOSIS — F10.21 ALCOHOL USE DISORDER, MODERATE, IN EARLY REMISSION (CMS/HCC): Primary | ICD-10-CM

## 2021-08-16 DIAGNOSIS — F14.21 COCAINE USE DISORDER, MODERATE, IN SUSTAINED REMISSION (CMS/HCC): ICD-10-CM

## 2021-08-16 DIAGNOSIS — F50.014 ANOREXIA NERVOSA, RESTRICTING TYPE, IN PARTIAL REMISSION, MODERATE: ICD-10-CM

## 2021-08-16 DIAGNOSIS — F39 MOOD DISORDER (CMS/HCC): ICD-10-CM

## 2021-08-16 PROCEDURE — H0035 MH PARTIAL HOSP TX UNDER 24H: HCPCS | Performed by: COUNSELOR

## 2021-08-16 NOTE — GROUP NOTE
Date:  8/16/2021  Start Time:  10:40 AM  End Time:  11:40 AM  Gemma Darnell, YOB: 2000   6 members attended group today.    Group Focus:  Goal of group was to establish and build social support, review coping skills, normalize the struggles of being human, and increase self awareness and self compassion.    About the Group:  Clinician started group with a prompting quote/song to facilitate group discussion.  Group engaged in active and supportive discussion. Topics discussed included Self-Care.  Group members were able to offer insightful and supportive feedback and suggestions about how to manage difficult situations.  Group ended with a meditation/song.  Latoya Silva MD was onsite when services were rendered.        Patient  was withdrawn in today's session.  Assessment/observation of group participation/presentation: Pt did not participate in the discussion of grief and loss, even when invited directly by LCSW.  Treatment plan areas addressed: Depression  Visit Diagnosis:      ICD-10-CM ICD-9-CM   1. Alcohol use disorder, moderate, in early remission (CMS/HCC)  F10.21 305.03   2. Cocaine use disorder, moderate, in sustained remission (CMS/HCC)  F14.21 305.63   3. Anorexia nervosa, restricting type, in partial remission, moderate  F50.01 307.1   4. Mood disorder (CMS/HCC)  F39 296.90       Plan: Continue with PHP   Pt to F/U with treatment goals as outlined in treatment plan.  Pt to F/U with local ED/call 911 should SI/HI arises.    Ling Cifuentes LCSW

## 2021-08-16 NOTE — GROUP NOTE
Date: 8/16/2021  Start Time:   9:30 AM  End Time: 10:30 AM  Gemma Darnell, YOB: 2000,  was an active group participant.    Community Check In Group  6 attended group today.     Group Focus: Goal of group was to welcome new and returning PHP members to the [facility], check in regarding group member needs, and assess safety.    About the Group: Clinician reviewed [facility] Group Code of Conduct and answered any questions that arose.  Clinician welcomed new members to the group and facilitated brief introductions of group members to one another.  Introduced topic/theme for the treatment day:Self Care.  Encouraged group members to request support throughout the day as needed.  Clinician reviewed group members’ Morning Reflection Sheets and did a verbal check in with each group member regarding safety (SI/HI/self harm), safety planning, medication compliance, if they needed to consult with anyone today and individual treatment needs/goals for the day.  Session ended with a brief meditation/calming activity.   Latoya Silva MD was on site when services rendered.    Morning Self reflection:   Depression: 2/5  Anxiety: 2/5  Anger: 0/5  Suicidal Ideation:   0/5 - None  Self Injury: 0/5 - None  Engaged in Self Injury since yesterday: No  Homicidal Ideation:   0/5 - None  Are you able to follow your Safety Plan? Yes.  I can/will:  Call someone  Substance Use:  No  Self Care:  I completed my self care activity last night:  showered.  I am satisfied with my daily hygiene:  yes  Nourish:Number of meals eaten yesterday? 2    Sleep:  Uses sleep aid? no     Social Interaction:Moderate: Family  Physical Activity: Went swimming.  Mindful Activity: No.  Medication: Prescribed  Thought Content: Clear.  Pt reported use of coping skills including successful follow through and barriers to follow through: Went for a walk.  Pt reported significant or positive event/step: Went to beach with family on Saturday.  Pt identified goal for the  treatment day: Be present.  Pt treatment plan areas addressed:  Depression  Pt requested consult with: None  Visit Diagnosis:      ICD-10-CM ICD-9-CM   1. Alcohol use disorder, moderate, in early remission (CMS/HCC)  F10.21 305.03   2. Cocaine use disorder, moderate, in sustained remission (CMS/HCC)  F14.21 305.63   3. Anorexia nervosa, restricting type, in partial remission, moderate  F50.01 307.1   4. Mood disorder (CMS/[facility])  F39 296.90         Assessment/Observations:  Pt shared her self reflections and introduced herself to a new group member.  Plan:  Continue with PHP   Pt to F/U with treatment goals as outlined in treatment plan.  Pt to F/U with local ED/call 911 should SI/HI arises.    Ling Cifuentes LCSW

## 2021-08-16 NOTE — GROUP NOTE
Date:  8/16/2021  Start Time:  12:10 PM  End Time:   1:10 PM  Gemma Darnell, YOB: 2000  Psychoeducational/Skills Based Group  6 members attended group today    Group Focus: Goal of group was to introduce skills and psychoeducation related to topic of PHP day.   Group members were provided instruction and opportunities to practice presented skills.  Clinician answered questions as they arose and shared how presented skills can be utilized on a daily basis to increase emotional wellness.      About the Group: Self Care Session 6: Self Care Psycho-Ed/Skills Based Group Summary  Topic of curriculum was “Self Care.”  Clinician introduced the idea of setting goals for self-care and the importance of taking time for self amidst people’s busy lives. Clinician facilitated discussion on  “[place] of Control” to help group members practice recognizing what they can and cannot control.  Clinician utilized worksheet on “[place] of Control” to help group members increase identification of what is within their control. Clinician led a group discussion encouraging group members to identify ideas for self care. Group members highlighted strategies for self care that they have used past, present and will use in the future. Clinician utilized “Behavior Activation Worksheet”  from [employer] to help members set goal for self care that is realistic and able to be managed in a realistic time frame. Clinician closed the group session with a mindful breathing meditation from [employer] for self care.     Latoya Silva MD was onsite when services were rendered.    Request for Consent  Patient provided verbal consent to treat via telemedicine. Clinician introduced the secure telemedicine platform that we are utilizing to provide care during the COVID-19 pandemic. Patient understands the session will be billed to their insurance or patient directly.  Patient was informed only the group patients  and the clinician are  permitted on?the video conference, sessions are not recorded by the clinician, and the patient is not permitted to record the session.? Patient was provided clinician's unique meeting ID prior to session, patient was asked to arrive to virtual session on time just as patient would if we were in the office. Clinician confirmed identification of patient by name and birthdate, provider name, location of patient and clinician, and callback number in case disconnected. Patient consents to behavioral health treatment    Patient Response to Request for Consent: Yes  Visit Type performed: Audio and Video          Patient  participated when called upon by the .  Assessment/observation of group participation/presentation: Pt was quiet throughout group session. LPC invited pt to ID a self care activity. Pt shared that getting outside is helpful for her. Pt shared that she enjoys baking as well. Pt speaks softly and does not engage on her own. But pt was responsive when LPC asked her for input.   Treatment plan areas addressed: Depression, Anxiety and Suicidal Ideation & Safety Planning  Visit Diagnosis:      ICD-10-CM ICD-9-CM   1. Alcohol use disorder, moderate, in early remission (CMS/HCC)  F10.21 305.03   2. Cocaine use disorder, moderate, in sustained remission (CMS/HCC)  F14.21 305.63   3. Anorexia nervosa, restricting type, in partial remission, moderate  F50.01 307.1   4. Mood disorder (CMS/HCC)  F39 296.90       Plan: Continue with PHP   Pt to F/U with treatment goals as outlined in treatment plan.  Pt to F/U with local ED/call 911 should SI/HI arises.    Deanna Crook LPC

## 2021-08-16 NOTE — GROUP NOTE
Date: 8/16/2021  Start Time:  1:20 PM  End Time:   2:20 PM  Gemma Darnell, YOB: 2000,  was an active group participant.    Wrap Up Group   6 attended group today.   Group Focus: Goal of group was to wrap up and process the treatment day.  Assist  members in planning for the evening ahead and to assess and plan surrounding any  safety needs.      About the Group:  Clinician reviewed group members Afternoon  Reflection Sheets and did a verbal check in with each group member regarding safety  (SI/HI/self harm) and any necessary safety planning .  Session ended with a brief  meditation/calming activity.  Latoya Silva MD was onsite when  services rendered.    Request for Consent  Patient provided verbal consent to treat via telemedicine. Clinician introduced the secure telemedicine platform that we are utilizing to provide care during the COVID-19 pandemic. Patient understands the session will be billed to their insurance or patient directly.  Patient was informed only the group patients  and the clinician are permitted on?the video conference, sessions are not recorded by the clinician, and the patient is not permitted to record the session.? Patient was provided clinician's unique meeting ID prior to session, patient was asked to arrive to virtual session on time just as patient would if we were in the office. Clinician confirmed identification of patient by name and birthdate, provider name, location of patient and clinician, and callback number in case disconnected. Patient consents to behavioral health treatment    Patient Response to Request for Consent: Yes  Visit Type performed: Audio and Video      Afternoon Self Reflection  Depression: 2/5  Anxiety: 2/5  Anger: 0/5  Suicidal Ideation:   0/5 - None  Self Injury: 0/5 - None  Homicidal Ideation:   0/5 - None  Are you able to follow your Safety Plan? Yes.  I can/will:  Call someone  Pt reported significant moment/insight of the day: Thinking about self  care  Pt reported gratitude list: my mom, support, sun   Pt identified goal progress for the day: met goal to be present in groups   Pt reported evening self care plan: Continue to move room from 3rd floor to 2nd. Go outside this afternoon.   Pt treatment plan areas addressed: Depression, Anxiety and Suicidal Ideation & Safety Planning    Visit Diagnosis:      ICD-10-CM ICD-9-CM   1. Alcohol use disorder, moderate, in early remission (CMS/HCC)  F10.21 305.03   2. Cocaine use disorder, moderate, in sustained remission (CMS/HCC)  F14.21 305.63   3. Anorexia nervosa, restricting type, in partial remission, moderate  F50.01 307.1   4. Mood disorder (CMS/HCC)  F39 296.90       Assessment/Observations:  Pt shared that thinking about self care was good because she does not do well with that. Pt shared that she plans to continue to move her room this evening. LPC inquired about something pleasurable that pt could do as well this evening. Pt shared that she enjoys going outside.   Plan: Continue with PHP   Pt to F/U with treatment goals as outlined in treatment plan.  Pt to F/U with local ED/call 911 should SI/HI arises.    Deanna Crook, LPC

## 2021-08-17 ENCOUNTER — PHP (OUTPATIENT)
Dept: PSYCHIATRY | Facility: HOSPITAL | Age: 21
End: 2021-08-17
Attending: COUNSELOR
Payer: COMMERCIAL

## 2021-08-17 ENCOUNTER — PHP (OUTPATIENT)
Dept: PSYCHIATRY | Facility: HOSPITAL | Age: 21
End: 2021-08-17
Attending: PSYCHIATRY & NEUROLOGY
Payer: COMMERCIAL

## 2021-08-17 VITALS — WEIGHT: 122.6 LBS | HEIGHT: 64 IN | BODY MASS INDEX: 20.93 KG/M2

## 2021-08-17 DIAGNOSIS — F39 MOOD DISORDER (CMS/HCC): ICD-10-CM

## 2021-08-17 DIAGNOSIS — F50.014 ANOREXIA NERVOSA, RESTRICTING TYPE, IN PARTIAL REMISSION, MODERATE: ICD-10-CM

## 2021-08-17 DIAGNOSIS — F10.21 ALCOHOL USE DISORDER, MODERATE, IN EARLY REMISSION (CMS/HCC): Primary | ICD-10-CM

## 2021-08-17 DIAGNOSIS — F10.21 ALCOHOL USE DISORDER, MODERATE, IN EARLY REMISSION (CMS/HCC): ICD-10-CM

## 2021-08-17 DIAGNOSIS — F14.21 COCAINE USE DISORDER, MODERATE, IN SUSTAINED REMISSION (CMS/HCC): ICD-10-CM

## 2021-08-17 DIAGNOSIS — F15.90 STIMULANT USE DISORDER: ICD-10-CM

## 2021-08-17 DIAGNOSIS — F15.90 STIMULANT USE DISORDER: Primary | ICD-10-CM

## 2021-08-17 PROCEDURE — 99214 OFFICE O/P EST MOD 30 MIN: CPT | Performed by: PSYCHIATRY & NEUROLOGY

## 2021-08-17 PROCEDURE — H0035 MH PARTIAL HOSP TX UNDER 24H: HCPCS | Performed by: COUNSELOR

## 2021-08-17 ASSESSMENT — COGNITIVE AND FUNCTIONAL STATUS - GENERAL
EYE_CONTACT: WNL
ORIENTATION: FULLY ORIENTED
PERCEPTUAL FUNCTION: NORMAL
ATTENTION: WNL
REMOTE MEMORY: WNL
AFFECT: FLAT
LIBIDO: NO CHANGE
MOOD: DEPRESSED;APATHETIC
SLEEP_WAKE_CYCLE: NO CHANGE
AROUSAL LEVEL: ALERT
SPEECH: SOFT
DELUSIONS: NONE OR AGE APPROPRIATE
APPETITE: NO CHANGE
THOUGHT_CONTENT: GUARDED
THOUGHT_PROCESS: NEGATIVITY
INSIGHT: IMPAIRED, MINIMALLY
RECENT MEMORY: WNL
CONCENTRATION: WNL
PSYCHOMOTOR FUNCTIONING: RESTLESS
EST. PREMORBID INTELLIGENCE: AVERAGE
APPEARANCE: WELL GROOMED
IMPULSE CONTROL: INTACT

## 2021-08-17 NOTE — PROGRESS NOTES
PHP Psychotherapy Note    Gemma Darnell is a 21 y.o. female who presents for Follow-up.     Treatment Plan areas addressed during this visit:  LPC met with pt and her mother,Cindi ( via Zoom) in the context of PHP individual therapy appt for family session. LPC will also meet with pt today for tx plan update.     Mental Status Exam:  Arousal Level: Alert  Appearance: Well Groomed  Speech: Soft  Psychomotor Functioning: Restless  Eye Contact: WNL  Est. Premorbid Intelligence: Average  Orientation: Fully oriented  Attention: WNL  Concentration: WNL  Recent Memory: WNL  Remote Memory: WNL  Thought Content: Guarded  Thought Process: Negativity  Insight: Impaired, minimally  Perceptual Function: Normal  Delusions: None or age appropriate  Sleeping: No Change  Appetite: No Change  Libido: No change  Affect: Flat  Mood: Depressed, Apathetic     PHQ-9: Total Score-OWL Transcribed: 20  Thoughts that You Would be Better Off Dead or of Hurting Yourself in Some Way: 2-->more than half the days  JAY-7 Total Score-OWL Transcribed: 19    [place] Suicide Severity Rating Scale:  Not done today     Safe-T Assessment:  Not done today       Assessment:   LPC met with pt and her mother Cindi to discuss progress in tx, aftercare recommendations and pt's stability/safety at this time. Pt has denied any SI or safety issues since joining PHP tx. Pt has struggled with internal motivation to fully engage in PHP tx. Pt has been monitored by [facility] tx team and her mother in regards to eating and weight. Pt's mother emphasized the steps that the pt is taking since being out of the hospital, such as ADLs are improved and pt has been making meals with her mother in the evening. Pt's mother is concerned that pt is in denial about severity of ED and current sx. LPC spoke with pt and her mother about recommendation for assessment at Out tx center that specializes in ED such as [facility]. Pt's mother agrees that pt will benefit from this  "assessment and on going care to address ED sx. Pt's mother also discussed an OP therapist that she spoke with who specializes in ED tx. Pt appeared hesitant to agree with ED assessment for aftercare. Pt offered minimal feedback when asked about how she feels in regards to this recommendation. Pt stated that she feels \"ok\" about it. When confronted by LPC and mother, pt then said that she feels \"crappy\" about it. LPC and pt's mother encouraged pt to be honest about her feelings and thoughts. Upon exploring this further, it was assessed that pt has resistance to confronting her ED sx. Pt recognizes that she needs help with Anorexia and difficulty with eating. Pt is externally motivated at this time to engage in tx. LPC confronted pt about the concern that her ED sx can \"hide\" in  tx versus being in specialized care to address the sx. Given that pt is completing ADLs and is following safety plan, she would benefit from ED tx. LPC and pt's mother both affirmed pt for the steps that she is making and addressed her tendency to internalize thoughts and feelings. Pt continued to talk about things that she is not doing or accomplishing. Pt mentioned that she was supposed to go to [facility] but then did not. Pt is assessed to have shame based thinking and then justifies her resistance and non commitment to change due to her lack of motivation. LPC confronted this and educated pt about her ED sx and how they also show up in justification, rationalization, minimization, and denial. Pt agreed to attend an assessment for ED sx and tx following [facility] [place] tx. Pt agreed to talk with her mother about OP therapist who specializes in ED. LPC asked pt what she can do to take control of her recovery today. Pt shared that she can share more in group sessions. LPC agreed with pt and encouraged her to share more about her resistance, feelings, and shame. LPC also informed pt that she can control her relationship with food and making sure " that she eats 3 meals per day to help nourish her brain and body. Pt's mother and pt agreed to call [facility] to set up intake for assessment for next week.   Psychological condition is generally: improving    Plan:    Patient to F/U with PHP.  Patient to F/U with treatment goals as outlined in treatment plan.  Patient to F/U with local ED or call 911 should SI/HI arise.  Visit Diagnosis:    ICD-10-CM ICD-9-CM   1. Alcohol use disorder, moderate, in early remission (CMS/HCC)  F10.21 305.03   2. Cocaine use disorder, moderate, in sustained remission (CMS/HCC)  F14.21 305.63   3. Anorexia nervosa, restricting type, in partial remission, moderate  F50.01 307.1   4. Mood disorder (CMS/HCC)  F39 296.90   5. Stimulant use disorder  F15.90 292.9       Time  Start Time: 0830  End Time: 0930  Total Time: 60  Deanna Crook LPC @ 10:04 AM

## 2021-08-17 NOTE — PROGRESS NOTES
"[facility] PSYCHIATRY FOLLOW UP/MEDICATION CHECK    Gemma Darnell is a 21 y.o. female who presents for Follow-up (PHP day 5) and Med Management.      21 year old woman with past psych hx of MDD versus BPAD type 2, anorexia nervosa, ETOH use disorder in early remission, cocaine use disorder in early remission, stimulant (Adderall) in sustained remission, 3 past inpatient hospitalizations and 3 suicide attempts all since May 2021, hx of residential stay fall 2020, no hx of SIB, family history significant for sister with BPAD/suicidality, parents with alcohol use disorder and maternal grandmother with depression, anxiety, eating disorder.  Patient presents for psychiatry follow-up on PHP day 5.    Over the past week patient with gradually improved self-care, reports decreased frequency and intensity of suicidal ideation.  Denies suicidal intent or plan, able to utilize safety plan.  Overall increased p.o. intake by patient report.     In addition, patient engaged in conversation with therapist and mother regarding concern for ongoing patterns of restriction, laxative use and importance of targeted monitoring and treatment for same.  Upon review of this conversation from this morning, patient continues to verbalize understanding of concern and remains open to exploring evaluation for eating disorder program.    Today patient reports p.o. intake so far today and ate for breakfast, quinoa and apple mixture for lunch and plans to eat a small dinner.    Reports sleep has been \"okay\" averaging 10 PM to 6 AM.  Describes mood as stable.  Denies suicidal ideation plan or intent.    Recent history significant for [facility]  7/21/21 to 8/9/21, discharge dx MDD, recurrent, w/o psychotic features. Admitted for intentional overdose in suicide attempt, poison control contacted in the ED, recommended observation.  Patient was treated with total of 5 courses of ECT between 7/30 and 8/9, and tolerated the procedure well. Patient noted significant " improvement in her mood after the first ECT, and continued to show improvement of mood and affect with subsequent ECT.    Past SIB: Denies  Past Violence: Denies  Past Med Trials:  lamictal, trazodone, effexor, remeron, prozac  - Escitalopram discontinued during 5/2021 [facility] admission, then started on fluoxetine  - Recommended trials of risperidone, olanzapine, quetiapine during 5/2021 hospitalization, but pt declined  Past Treaters:  - Saw Dr. Cathy Simpson x 1 in April, unsure if she established care with her, does not want to return to her  ECT : Aug 2021 with good response      Psychiatric ROS:   Sleep:Fair  Appetite: Comments: See above      ETOH/Substances:denies     Medical Review of Systems  Review of Systems    PMSH: No changes were made to non-psychiatric medications/allergies/medical history.     Risk/Benefit/side Effects discussed regarding the following medications: See above  PDMP Queried: Yes    Allergies: No Known Allergies    Current Outpatient Medications:   •  cyanocobalamin (VITAMIN B12) 100 mcg tablet, Take 100 mcg by mouth daily., , Disp: , Rfl:   •  hydrOXYzine (ATARAX) 50 mg tablet, Take 1 tablet (50 mg total) by mouth every 6 (six) hours as needed for anxiety for up to 14 days., , Disp: 14 tablet, Rfl: 0  •  melatonin ODT, Take 1 tablet (3 mg total) by mouth nightly. (Patient taking differently: Take 3 mg by mouth nightly as needed.  ), , Disp: 30 tablet, Rfl: 0  •  venlafaxine XR (EFFEXOR-XR) 150 mg 24 hr capsule, Take 1 capsule (150 mg total) by mouth daily with breakfast., , Disp: 30 capsule, Rfl: 0       Objective     Physical Exam    Visit Vitals  LMP  (LMP Unknown)       Mental Status Exam  Appearance: appropriate attire  Gait and Motor: no abnormal movements and normal gait  Speech: decreased, slowed and soft  Mood: 'okay' and Stable  Affect: constricted  Associations: coherent  Thought Process: slowed  Thought Content: no auditory or visual hallucinations. and appropriate to  "situation  Suicidality/Homicidality: denies  Judgement/Insight: help accepting  Orientation: day, month and year  Memory: recalls recent events and recalls remote events  Attention: withdrawn  Knowledge: appropriate for education  Language: normal       PHQ-9: Total Score-OWL Transcribed: 11  Thoughts that You Would be Better Off Dead or of Hurting Yourself in Some Way: 1-->several days  JAY-7 Total Score-OWL Transcribed: 10    [place] Suicide Severity Rating Scale:  Not done today     Safe-T Assessment:  Not done today  Suicidal Behavior: History of prior suicide attempts (8/10/2021 10:09 AM)  Current/Past Psychiatric Disorders: Mood disorders;Clust B personality disorders (8/10/2021 10:09 AM)  Key symptoms: Impulsivity;Hopelessness;Anxiety/panic (8/10/2021 10:09 AM)  Family History Risk Factors: Suicide attempts;Axis 1 psychiatric disorders requiring hospitalization;Suicidal behavior (8/10/2021 10:09 AM)  Precipitants/Stressors/Interpersonal/Triggers: Events leading to humiliation, shame or despair;Family turmoil/chaos;Social isolation;Perceived burden on others (8/10/2021 10:09 AM)  Change in Treatment: Recent discharge from psychiatric hospital (8/10/2021 10:09 AM)  Access to fire arms.: No (8/10/2021 10:09 AM)  Internal factors: Identifies reasons for living (8/10/2021 10:09 AM)  External Factors: Supportive social network of family or friends;Other (\"I have things to look forward to.\") (8/10/2021 10:09 AM)   In the last month, how many times have you had suicidal thoughts?: 2-5 times in week (8/10/2021 10:09 AM)  In the last month, when you have had suicidal thoughts, how long do they last?: More than 8 hours/persistent or continuous (8/10/2021 10:09 AM)  In the last month, could/can you stop thinking about killing yourself or wanting to die if you want to?: can control thoughts with some difficulty (8/10/2021 10:09 AM)  In the last month, are there things - anyone or anything (i.e. family, Faith, pain of " death) - that stopped you from wanting to die or acting on thoughts of suicide? : Deterrents definitely did not stop you (8/10/2021 10:09 AM)  In the last month, what sorts of reasons did you have for thinking about wanting to die or killing yourself?: Completely to end or stop the pain (you couldn’t go on living with the pain or how you were feeling) (8/10/2021 10:09 AM)  Safe T Assessment of Risk: : Moderate Suicide Risk (8/10/2021 10:09 AM)  Interventions: Develop Safety Plan;Symptom reduction.;Provide Emergency/Crisis numbers (8/10/2021 10:09 AM)      Labs  No new labs.           Brief Psychiatric Formulation: 21 year old woman with past psych hx of MDD versus BPAD type 2, anorexia nervosa, ETOH use disorder in early remission, cocaine use disorder in early remission, stimulant (Adderall) in sustained remission, 3 past inpatient hospitalizations and 3 suicide attempts all since May 2021, hx of residential stay fall 2020, no hx of SIB, family history significant for sister with B PAD/suicidality, parents with alcohol use disorder and maternal grandmother with depression anxiety and eating disorder.  Patient presents with ongoing gradual improvement.  Describes her mood has been stable.  Open to considering option of eating disorder program given recent challenges with restricting and active laxative use.  Denies dangerousness to self or others.  Tolerating venlafaxine and states she finds it helpful overall.    Pt is at chronic elevated risk of intentional harm to self given her hx of depression, hx of suicide attempts, family hx of suicidality, hx of substance use, hx of eating disorder, hx of impulsivity. Her acute risk is elevated by most recent suicide attempt leading up to most recent hospitalization, recent hospitalization discharge, family/work/academic stressors, though this acute risk is mitigated by lack of current suicidal ideation, lack of plan/intent, lack of prior SIB, lack of recent substance use,  commitment to family, treatment-seeking behavior, and future orientation. In sum, her acute risk of intentional harm to self is not significantly elevated from usual baseline as to warrant hospitalization, but given her degree of symptoms and impact on functioning, she is appropriate for PHP. She denies any thoughts of harming others and has no history of violence, so risk to others is low.    Plan:  Repeat UDS to confirm patient's report of abstinence from substances given past history of substance use disorder  Ongoing support and structure around oral intake, patient more forthcoming and willing to consider eating disorder program  Continue venlafaxine 150 mg p.o. daily  Ongoing efforts to confirm follow-up plan, patient verbalizes understanding of recommendation to follow-up with Dr. Gifford for ongoing monitoring following discharge from [facility], appointment not yet scheduled  Next PHP follow-up visit / discharge visit 9 AM 8/24/2021 with          Based on [place] suicide screen, Safe-T Assessment, patient is determined  moderate    Suicide Risk/Suicidal Ideation will / has been  added to patient's treatment plan.     Plan:   Follow-up with PHP as scheduled  Recertification: I certify that PHP level of care continues to be required, improvement is expected and without this medically necessary treatment, inpatient psychiatric care would be required.  Gemma Darnell's response to therapeutic intervention has shown gradual improvement. Gemma Darnell remains at risk for psychiatric hospitalization due to moderate to severe mood symptoms and dangerousness to self . Treatment goals and coordination of care with multidisciplinary team as outlined in updated treatment plan  Patient to F/U with treatment goals as outlined in treatment plan.  Patient to F/U with local ED or call 911 should SI/HI arise.    Visit Diagnosis:    ICD-10-CM ICD-9-CM   1. Stimulant use disorder  F15.90 292.9   2. Mood disorder (CMS/[facility])   F39 296.90   3. Anorexia nervosa, restricting type, in partial remission, moderate  F50.01 307.1   4. Alcohol use disorder, moderate, in early remission (CMS/HCC)  F10.21 305.03   5. Cocaine use disorder, moderate, in sustained remission (CMS/HCC)  F14.21 305.63     2:20 - 2:40  Duration:  20 minutes  Latoya Silva MD @ 5:13 PM

## 2021-08-17 NOTE — GROUP NOTE
Date:  8/17/2021  Start Time:  12:10 PM  End Time:   1:10 PM  Gemma Darnell, YOB: 2000  Psychoeducational/Skills Based Group  9 members attended group today    Group Focus: Goal of group was to introduce skills and psychoeducation related to topic of PHP day.   Group members were provided instruction and opportunities to practice presented skills.  Clinician answered questions as they arose and shared how presented skills can be utilized on a daily basis to increase emotional wellness.      About the Group: DBT-Distress Tolerance Session 7: DBT- Distress Tolerance Psycho Ed/Skills Based Group Summary  Topic of Curriculum was “Distress Tolerance”. Clinician educated group members about Distraction Techniques (creative but healthy ways of distracting from pain) and Radical Acceptance (You stop fighting reality) from The Dialectical Behavior Therapy Skills Workbook by Antonio Schmitt, PHd, Ines Parada, Warner Gregory MD. Clinician discussed goal of providing immediate ways of coping with stress. Clinician introduced the PLEASE stress distraction technique as a temporary coping skill utilized mindfully to reduce emotional distress in the moment. Clinician also introduced other techniques for stress tolerance in the moment, such as the STOP technique and ACCEPTS worksheet all taken from The Dialectical Behavior Therapy Skills Workbook by Antonio Schmitt, PHd, Ines Parada, Warner Gregory MD.  Clinician showed Distress Tolerance Video on ACCEPTS  from [employer]. Clinician closed group session with 5 Sense Technique to help group members practice Distress Tolerance techniques in the moment.     Latoya Silva MD was onsite when services were rendered.    Request for Consent  Patient provided verbal consent to treat via telemedicine. Clinician introduced the secure telemedicine platform that we are utilizing to provide care during the COVID-19 pandemic.  Patient understands the session will be billed to their insurance or patient directly.  Patient was informed only the group patients  and the clinician are permitted on?the video conference, sessions are not recorded by the clinician, and the patient is not permitted to record the session.? Patient was provided clinician's unique meeting ID prior to session, patient was asked to arrive to virtual session on time just as patient would if we were in the office. Clinician confirmed identification of patient by name and birthdate, provider name, location of patient and clinician, and callback number in case disconnected. Patient consents to behavioral health treatment    Patient Response to Request for Consent: Yes  Visit Type performed: Audio and Video          Patient  was withdrawn in today's session.  Assessment/observation of group participation/presentation: Pt did not engage in discussion during group session. Pt made eye contact at times throughout session but did not verbalize thoughts or feelings.   Treatment plan areas addressed: Depression and Anxiety  Visit Diagnosis:      ICD-10-CM ICD-9-CM   1. Alcohol use disorder, moderate, in early remission (CMS/HCC)  F10.21 305.03   2. Cocaine use disorder, moderate, in sustained remission (CMS/HCC)  F14.21 305.63   3. Anorexia nervosa, restricting type, in partial remission, moderate  F50.01 307.1   4. Mood disorder (CMS/HCC)  F39 296.90       Plan: Continue with PHP   Pt to F/U with treatment goals as outlined in treatment plan.  Pt to F/U with local ED/call 911 should SI/HI arises.    Deanna Crook, [facility]

## 2021-08-17 NOTE — GROUP NOTE
Date:  8/17/2021  Start Time:  10:40 AM  End Time:  11:40 AM  Gemma Darnell, YOB: 2000   8 members attended group today.    Group Focus:  Goal of group was to establish and build social support, review coping skills, normalize the struggles of being human, and increase self awareness and self compassion.    About the Group:  Clinician started group with a prompting quote/song to facilitate group discussion.  Group engaged in active and supportive discussion. Topics discussed included Distress Tolerance.  Group members were able to offer insightful and supportive feedback and suggestions about how to manage difficult situations.  Group ended with a meditation/song.  Latoya Silva MD was onsite when services were rendered.        Patient  was withdrawn in today's session.  Assessment/observation of group participation/presentation: Pt did not participate verbally, and declined the invitation when invited by LCSW to share.  Treatment plan areas addressed: Depression  Visit Diagnosis:      ICD-10-CM ICD-9-CM   1. Alcohol use disorder, moderate, in early remission (CMS/HCC)  F10.21 305.03   2. Cocaine use disorder, moderate, in sustained remission (CMS/HCC)  F14.21 305.63   3. Anorexia nervosa, restricting type, in partial remission, moderate  F50.01 307.1   4. Mood disorder (CMS/HCC)  F39 296.90       Plan: Continue with PHP   Pt to F/U with treatment goals as outlined in treatment plan.  Pt to F/U with local ED/call 911 should SI/HI arises.    Ling Cifuentes LCSW

## 2021-08-17 NOTE — GROUP NOTE
Date: 8/17/2021  Start Time:  1:20 PM  End Time:   2:20 PM  Gemma Darnell, YOB: 2000,  was an active group participant.    Wrap Up Group   9 attended group today.   Group Focus: Goal of group was to wrap up and process the treatment day.  Assist  members in planning for the evening ahead and to assess and plan surrounding any  safety needs.      About the Group:  Clinician reviewed group members Afternoon  Reflection Sheets and did a verbal check in with each group member regarding safety  (SI/HI/self harm) and any necessary safety planning .  Session ended with a brief  meditation/calming activity.  Latoya Silva MD was onsite when  services rendered.    Request for Consent  Patient provided verbal consent to treat via telemedicine. Clinician introduced the secure telemedicine platform that we are utilizing to provide care during the COVID-19 pandemic. Patient understands the session will be billed to their insurance or patient directly.  Patient was informed only the group patients  and the clinician are permitted on?the video conference, sessions are not recorded by the clinician, and the patient is not permitted to record the session.? Patient was provided clinician's unique meeting ID prior to session, patient was asked to arrive to virtual session on time just as patient would if we were in the office. Clinician confirmed identification of patient by name and birthdate, provider name, location of patient and clinician, and callback number in case disconnected. Patient consents to behavioral health treatment    Patient Response to Request for Consent: Yes  Visit Type performed: Audio and Video      Afternoon Self Reflection  Depression: 3/5  Anxiety: 3/5  Anger: 0/5  Suicidal Ideation:   0/5 - None  Self Injury: 0/5 - None  Homicidal Ideation:   0/5 - None  Are you able to follow your Safety Plan? Yes.  I can/will:  Call someone  Pt reported significant moment/insight of the day: family session with  mom this morning   Pt reported gratitude list: mom, sister, support   Pt identified goal progress for the day: I was present   Pt reported evening self care plan: take a shower   Pt treatment plan areas addressed: Depression and Anxiety    Visit Diagnosis:      ICD-10-CM ICD-9-CM   1. Alcohol use disorder, moderate, in early remission (CMS/HCC)  F10.21 305.03   2. Cocaine use disorder, moderate, in sustained remission (CMS/HCC)  F14.21 305.63   3. Anorexia nervosa, restricting type, in partial remission, moderate  F50.01 307.1   4. Mood disorder (CMS/HCC)  F39 296.90       Assessment/Observations:  Pt shared that her family session was significant. LPC invited pt to share about the session. Pt declined but shared that she felt supported during the session.   Plan: Continue with PHP   Pt to F/U with treatment goals as outlined in treatment plan.  Pt to F/U with local ED/call 911 should SI/HI arises.    Deanna Crook, LPC

## 2021-08-17 NOTE — GROUP NOTE
"Date: 8/17/2021  Start Time:   9:30 AM  End Time: 10:30 AM  Gemma Darnell, YOB: 2000,  was an active group participant.    Community Check In Group  8 attended group today.     Group Focus: Goal of group was to welcome new and returning PHP members to the PHP, check in regarding group member needs, and assess safety.    About the Group: Clinician reviewed [facility] Group Code of Conduct and answered any questions that arose.  Clinician welcomed new members to the group and facilitated brief introductions of group members to one another.  Introduced topic/theme for the treatment day:Distress Tolerance.  Encouraged group members to request support throughout the day as needed.  Clinician reviewed group members’ Morning Reflection Sheets and did a verbal check in with each group member regarding safety (SI/HI/self harm), safety planning, medication compliance, if they needed to consult with anyone today and individual treatment needs/goals for the day.  Session ended with a brief meditation/calming activity.   Latoya Silva MD was on site when services rendered.    Morning Self reflection:   Depression: 3/5  Anxiety: 3/5  Anger: 0/5  Suicidal Ideation:   0/5 - None  Self Injury: 0/5 - None  Engaged in Self Injury since yesterday: No  Homicidal Ideation:   0/5 - None  Are you able to follow your Safety Plan? Yes.  I can/will:  Call someone  Substance Use:  No  Self Care:  No.  I am satisfied with my daily hygiene:  yes  Nourish:Number of meals eaten yesterday? 2    Sleep:  Describe:  Number of hours:  4  Social Interaction:Minimal:  Family  Physical Activity: Went for a walk.  Mindful Activity: Went for a walk.  Medication: Prescribed  Thought Content: \"clear.\"  Pt reported use of coping skills including successful follow through and barriers to follow through: \"Made dinner.\"  Pt reported significant or positive event/step: \"Moved more of my room.\"  Pt identified goal for the treatment day: \"Be present.\"  Pt " treatment plan areas addressed:  Depression  Pt requested consult with: None  Visit Diagnosis:      ICD-10-CM ICD-9-CM   1. Alcohol use disorder, moderate, in early remission (CMS/HCC)  F10.21 305.03   2. Cocaine use disorder, moderate, in sustained remission (CMS/HCC)  F14.21 305.63   3. Anorexia nervosa, restricting type, in partial remission, moderate  F50.01 307.1   4. Mood disorder (CMS/HCC)  F39 296.90         Assessment/Observations:  Pt shared her self reflections and identified that she had a family session this morning.  Plan:  Continue with PHP   Pt to F/U with treatment goals as outlined in treatment plan.  Pt to F/U with local ED/call 911 should SI/HI arises.    Ling Cifuentes LCSW

## 2021-08-18 ENCOUNTER — DOCUMENTATION (OUTPATIENT)
Dept: PSYCHIATRY | Facility: HOSPITAL | Age: 21
End: 2021-08-18

## 2021-08-18 ENCOUNTER — TELEPHONE (OUTPATIENT)
Dept: PSYCHIATRY | Facility: HOSPITAL | Age: 21
End: 2021-08-18

## 2021-08-18 ENCOUNTER — PHP (OUTPATIENT)
Dept: PSYCHIATRY | Facility: HOSPITAL | Age: 21
End: 2021-08-18
Attending: PSYCHIATRY & NEUROLOGY
Payer: COMMERCIAL

## 2021-08-18 DIAGNOSIS — F10.21 ALCOHOL USE DISORDER, MODERATE, IN EARLY REMISSION (CMS/HCC): Primary | ICD-10-CM

## 2021-08-18 DIAGNOSIS — F14.21 COCAINE USE DISORDER, MODERATE, IN SUSTAINED REMISSION (CMS/HCC): ICD-10-CM

## 2021-08-18 DIAGNOSIS — F15.90 STIMULANT USE DISORDER: ICD-10-CM

## 2021-08-18 DIAGNOSIS — F50.014 ANOREXIA NERVOSA, RESTRICTING TYPE, IN PARTIAL REMISSION, MODERATE: ICD-10-CM

## 2021-08-18 DIAGNOSIS — F39 MOOD DISORDER (CMS/HCC): ICD-10-CM

## 2021-08-18 PROCEDURE — H0035 MH PARTIAL HOSP TX UNDER 24H: HCPCS | Performed by: SOCIAL WORKER

## 2021-08-18 NOTE — GROUP NOTE
Date: 8/18/2021  Start Time:  1:20 PM  End Time:   2:20 PM  Gemma Darnell, YOB: 2000,  was an active group participant.    Wrap Up Group    9 attended group today.   Group Focus: Goal of group was to wrap up and process the treatment day.  Assist  members in planning for the evening ahead and to assess and plan surrounding any  safety needs.      About the Group:  Clinician reviewed group members Afternoon  Reflection Sheets and did a verbal check in with each group member regarding safety  (SI/HI/self harm) and any necessary safety planning .  Session ended with a brief  meditation/calming activity.  Latoya Silva MD was onsite when  services rendered.        Afternoon Self Reflection  Depression: 2/5  Anxiety: 4/5  Anger: 0/5  Suicidal Ideation:   0/5 - None  Self Injury: 0/5 - None  Homicidal Ideation:   0/5 - None  Are you able to follow your Safety Plan? Yes.  I can/will:  Journal and Call 911 if I'm unsafe.  Pt reported significant moment/insight of the day: Body scan meditation.  Pt reported gratitude list: My dog, my mom, PHP.  Pt identified goal progress for the day: Meditation threw me off of goal to be present.  Pt reported evening self care plan: Work through my room more.  Pt treatment plan areas addressed: Depression    Visit Diagnosis:      ICD-10-CM ICD-9-CM   1. Alcohol use disorder, moderate, in early remission (CMS/HCC)  F10.21 305.03   2. Cocaine use disorder, moderate, in sustained remission (CMS/HCC)  F14.21 305.63   3. Anorexia nervosa, restricting type, in partial remission, moderate  F50.01 307.1   4. Mood disorder (CMS/HCC)  F39 296.90   5. Stimulant use disorder  F15.90 292.9       Assessment/Observations:  Pt shared her self reflections and self care plan for the evening, including getting outside.  Plan: Continue with PHP   Pt to F/U with treatment goals as outlined in treatment plan.  Pt to F/U with local ED/call 911 should SI/HI arises.    NEREYDA NievesW

## 2021-08-18 NOTE — TELEPHONE ENCOUNTER
LPC left VM for [facility] Admissions Dept to refer pt for intake eval next week following completion of PHP tx. Pt signed TORITO for LPC to contact facility. LPC left contact info for [facility] staff to return the call.

## 2021-08-18 NOTE — PROGRESS NOTES
LPC met with pt briefly to have pt sign ROIs for aftercare referral programs, [facility]. Pt was willing to sign ROIs and inquired about what aftercare would look like. LPC explained that pt will hopefully be evaluated at ED outpt center for PHP tx or IOP tx. LPC spoke with pt about OP therapist that her mom mentioned in family session yesterday. Pt is open to exploring this for OP therapy. Pt shared that she only saw the psychiatrist she listed one time in may prior to being in res tx. LPC encouraged pt to reconnect with this psychiatrist so that she as long term psych support. Pt expressed feelings of anxiety about completing PHP tx next week and starting with anew program. LPC validated this experience and reminded pt that she has adjusted to new environments with Inpt tx, res tx, PHP and other op settings.  LPC spoke with pt about resilience. LPC encouraged pt to gain insight through on going therapy into triggers for ED sx and the underlying emotional drivers. LPC inquired about pt's life at the time when her ED sx started. Pt could not ID when specifically or what triggered the sx. Pt endorsed struggling with peers and fitting in when younger. Pt endorsed feelings of wanting to disappear. When LPC probed about what feelings or experiences make the pt want to disappear she could not answer. LPC expressed hope that pt will be willing to open up more and share more in the near future. Pt shared that she talks a little bit in groups here. LPC spoke with pt about justifying minimal engagement.  Pt agreed with this feedback. LPC affirmed pt for what she is doing in tx and also educated pt about the sx of her MH illnesses. Pt signed ROIs and received copies.

## 2021-08-18 NOTE — GROUP NOTE
Date:  8/18/2021  Start Time:  12:10 PM  End Time:   1:10 PM  Gemma Darnell, YOB: 2000  Psychoeducational/Skills Based Group  9 members attended group today    Group Focus: Goal of group was to introduce skills and psychoeducation related to topic of PHP day.   Group members were provided instruction and opportunities to practice presented skills.  Clinician answered questions as they arose and shared how presented skills can be utilized on a daily basis to increase emotional wellness.      About the Group: Trauma Session 8: Trauma Psycho Ed/Skills Based Group Summary  Topic of Curriculum was “Trauma”. Clinician educated group members about the body’s response to trauma and shared the  “Brain on Trauma” worksheet from [employer]. Group members were educated about neuroplasticity and how the brain is changeable through practice of coping skills, grounding techniques and time. Clinician showed a video on Neuroplasticity from [employer]  to highlight changeability of neurons with time and effort.  Clinician introduced and facilitated practice with group members of two grounding techniques, Progressive Muscle Relaxation from [employer] and Bilateral Stimulation “Butterfly hug” from [facility]. Group members then discussed their experiences with one another and Clinician closed group session with a grounding meditation of body scan from   Minds: Alejandrina Fournier.    Latoya Silva MD was onsite when services were rendered.          Patient  was withdrawn in today's session.  Assessment/observation of group participation/presentation: Pt seemed to participate in the butterfly hug exercise but stated that the body scan exercise made her too uncomfortable with her body. Pt was not able to explain further, but seemed to receive LCSW's suggestion that she explore this more in individual therapy.  Treatment plan areas addressed: Depression  Visit Diagnosis:      ICD-10-CM ICD-9-CM   1. Alcohol use disorder,  moderate, in early remission (CMS/HCC)  F10.21 305.03   2. Cocaine use disorder, moderate, in sustained remission (CMS/[facility])  F14.21 305.63   3. Anorexia nervosa, restricting type, in partial remission, moderate  F50.01 307.1   4. Mood disorder (CMS/[facility])  F39 296.90   5. Stimulant use disorder  F15.90 292.9       Plan: Continue with PHP   Pt to F/U with treatment goals as outlined in treatment plan.  Pt to F/U with local ED/call 911 should SI/HI arises.    Ling Cifuentes LCSW

## 2021-08-18 NOTE — GROUP NOTE
Date: 8/18/2021  Start Time:   9:30 AM  End Time: 10:30 AM  Gemma Darnell, YOB: 2000,  was an active group participant.    Community Check In Group  9 attended group today.     Group Focus: Goal of group was to welcome new and returning PHP members to the PHP, check in regarding group member needs, and assess safety.    About the Group: Clinician reviewed [facility] Group Code of Conduct and answered any questions that arose.  Clinician welcomed new members to the group and facilitated brief introductions of group members to one another.  Introduced topic/theme for the treatment day:Distress Tolerance.  Encouraged group members to request support throughout the day as needed.  Clinician reviewed group members’ Morning Reflection Sheets and did a verbal check in with each group member regarding safety (SI/HI/self harm), safety planning, medication compliance, if they needed to consult with anyone today and individual treatment needs/goals for the day.  Session ended with a brief meditation/calming activity.   Latoya Silva MD was on site when services rendered.    Request for Consent  Patient provided verbal consent to treat via telemedicine. Clinician introduced the secure telemedicine platform that we are utilizing to provide care during the COVID-19 pandemic. Patient understands the session will be billed to their insurance or patient directly.  Patient was informed only the group patients  and the clinician are permitted on?the video conference, sessions are not recorded by the clinician, and the patient is not permitted to record the session.? Patient was provided clinician's unique meeting ID prior to session, patient was asked to arrive to virtual session on time just as patient would if we were in the office. Clinician confirmed identification of patient by name and birthdate, provider name, location of patient and clinician, and callback number in case disconnected. Patient consents to behavioral  health treatment    Patient Response to Request for Consent: Yes  Visit Type performed: Audio and Video    Morning Self reflection:   Depression: 3/5  Anxiety: 2/5  Anger: 0/5  Suicidal Ideation:   0/5 - None  Self Injury: 0/5 - None  Engaged in Self Injury since yesterday: No  Homicidal Ideation:   0/5 - None  Are you able to follow your Safety Plan? Yes.  I can/will:  Call someone and Call 911 if I'm unsafe.  Substance Use:  No  Self Care:  I completed my self care activity last night:  took shower  I am satisfied with my daily hygiene:  yes  Nourish:Number of meals eaten yesterday? 2    Sleep:  Uses sleep aid? no     Social Interaction:Moderate: Family  Physical Activity: walked dog   Mindful Activity: walked dog  Medication: Prescribed  Thought Content: cloudy   Pt reported use of coping skills including successful follow through and barriers to follow through: Made dinner and walked   Pt reported significant or positive event/step: called my sister   Pt identified goal for the treatment day: be present   Pt treatment plan areas addressed:  Depression, Anxiety and Suicidal Ideation & Safety Planning  Pt requested consult with: None  Visit Diagnosis:      ICD-10-CM ICD-9-CM   1. Alcohol use disorder, moderate, in early remission (CMS/[facility])  F10.21 305.03   2. Cocaine use disorder, moderate, in sustained remission (CMS/[facility])  F14.21 305.63   3. Anorexia nervosa, restricting type, in partial remission, moderate  F50.01 307.1   4. Mood disorder (CMS/[facility])  F39 296.90   5. Stimulant use disorder  F15.90 292.9         Assessment/Observations:  Pt shared that she slept ok and ate two meals yesterday. Pt shared that she reached out to her sister but was not able to talk with her. Pt shared that she walked her dog. Pt was unclear about mindful activity but LPC spoke with pt about mindful walking being an activity with mindfulness. Pt continues to present guarded and quiet.   Plan:  Continue with PHP   Pt to F/U with  treatment goals as outlined in treatment plan.  Pt to F/U with local ED/call 911 should SI/HI arises.    Deanna Crook, LPC

## 2021-08-18 NOTE — GROUP NOTE
Date:  8/18/2021  Start Time:  10:40 AM  End Time:  11:40 AM  Gemma Darnell, YOB: 2000   9 members attended group today.    Group Focus:  Goal of group was to establish and build social support, review coping skills, normalize the struggles of being human, and increase self awareness and self compassion.    About the Group:  Clinician started group with a prompting quote/song to facilitate group discussion.  Group engaged in active and supportive discussion. Topics discussed included managing worry related to work and returning to work following PHP tx, family dynamics when coming home from residential tx or hospitalization, and confronting shame about mental health dx.  Group members were able to offer insightful and supportive feedback and suggestions about how to manage difficult situations.  Group ended with a meditation/song.  Latoya Silva MD was onsite when services were rendered.    Request for Consent  Patient provided verbal consent to treat via telemedicine. Clinician introduced the secure telemedicine platform that we are utilizing to provide care during the COVID-19 pandemic. Patient understands the session will be billed to their insurance or patient directly.  Patient was informed only the group patients  and the clinician are permitted on?the video conference, sessions are not recorded by the clinician, and the patient is not permitted to record the session.? Patient was provided clinician's unique meeting ID prior to session, patient was asked to arrive to virtual session on time just as patient would if we were in the office. Clinician confirmed identification of patient by name and birthdate, provider name, location of patient and clinician, and callback number in case disconnected. Patient consents to behavioral health treatment    Patient Response to Request for Consent: Yes  Visit Type performed: Audio and Video      Patient  was a passive group participant.  Assessment/observation  of group participation/presentation: Pt did not verbalize thoughts or feelings. Pt was making eye contact minimally with peers in group. Pt is continuously guarded and not willing to engage in open process group discussions.   Treatment plan areas addressed: Depression, Anxiety and Suicidal Ideation & Safety Planning  Visit Diagnosis:      ICD-10-CM ICD-9-CM   1. Alcohol use disorder, moderate, in early remission (CMS/HCC)  F10.21 305.03   2. Cocaine use disorder, moderate, in sustained remission (CMS/HCC)  F14.21 305.63   3. Anorexia nervosa, restricting type, in partial remission, moderate  F50.01 307.1   4. Mood disorder (CMS/HCC)  F39 296.90   5. Stimulant use disorder  F15.90 292.9       Plan: Continue with PHP   Pt to F/U with treatment goals as outlined in treatment plan.  Pt to F/U with local ED/call 911 should SI/HI arises.    Deanna Crook, LPC

## 2021-08-18 NOTE — TELEPHONE ENCOUNTER
[facility] that [facility] call to review demographics and schedule intake for assessment. LPC will relay this info and contact name/number to pt and her mother so they can call to schedule intake.

## 2021-08-19 ENCOUNTER — PHP (OUTPATIENT)
Dept: PSYCHIATRY | Facility: HOSPITAL | Age: 21
End: 2021-08-19
Attending: PSYCHIATRY & NEUROLOGY
Payer: COMMERCIAL

## 2021-08-19 DIAGNOSIS — F50.014 ANOREXIA NERVOSA, RESTRICTING TYPE, IN PARTIAL REMISSION, MODERATE: ICD-10-CM

## 2021-08-19 DIAGNOSIS — F10.21 ALCOHOL USE DISORDER, MODERATE, IN EARLY REMISSION (CMS/HCC): Primary | ICD-10-CM

## 2021-08-19 DIAGNOSIS — F14.21 COCAINE USE DISORDER, MODERATE, IN SUSTAINED REMISSION (CMS/HCC): ICD-10-CM

## 2021-08-19 DIAGNOSIS — F39 MOOD DISORDER (CMS/HCC): ICD-10-CM

## 2021-08-19 PROCEDURE — H0035 MH PARTIAL HOSP TX UNDER 24H: HCPCS | Performed by: COUNSELOR

## 2021-08-19 NOTE — GROUP NOTE
Date:  8/19/2021  Start Time:  12:10 PM  End Time:   1:10 PM  Gemma Darnell, YOB: 2000  Psychoeducational/Skills Based Group  11 members attended group today    Group Focus: Goal of group was to introduce skills and psychoeducation related to topic of PHP day.   Group members were provided instruction and opportunities to practice presented skills.  Clinician answered questions as they arose and shared how presented skills can be utilized on a daily basis to increase emotional wellness.      About the Group: DBT- Interpersonal Effectiveness Session 9: DBT- Interpersonal Effectiveness Psycho Ed/Skills Based Group Summary   Topic of curriculum was “DBT Interpersonal Effectiveness”.  Clinician educated group members on Communication Styles of Passive, Assertive, Aggressive, and Passive-Aggressive and had group  members identify examples of each. Clinician facilitated discussion on how some of these communication styles may have worked well previously, but are not serving group member now.  Clinician used worksheet from [employer] for communication styles. Clinician then centered discussion on the importance of using Assertive communication. Clinician used worksheet on “I Statements” from [employer] and encouraged group members to reflect on their own needs and how they can use “I statements” to communicate these needs. Clinician Closed group session with meditation from Luois Comer.     Latoya Silva MD was onsite when services were rendered.          Patient  was withdrawn in today's session.  Assessment/observation of group participation/presentation: Pt did not participate or share in today's discussion regarding communication styles and improving effectiveness.  Treatment plan areas addressed: Depression  Visit Diagnosis:      ICD-10-CM ICD-9-CM   1. Alcohol use disorder, moderate, in early remission (CMS/HCC)  F10.21 305.03   2. Cocaine use disorder, moderate, in sustained remission (CMS/HCC)   F14.21 305.63   3. Anorexia nervosa, restricting type, in partial remission, moderate  F50.01 307.1   4. Mood disorder (CMS/HCC)  F39 296.90       Plan: Continue with PHP   Pt to F/U with treatment goals as outlined in treatment plan.  Pt to F/U with local ED/call 911 should SI/HI arises.    Ling Cifuentes, DIVINA

## 2021-08-19 NOTE — GROUP NOTE
Date: 8/19/2021  Start Time:   9:30 AM  End Time: 10:30 AM  Gemma Darnell, YOB: 2000,  was an active group participant.    Community Check In Group  11 attended group today.     Group Focus: Goal of group was to welcome new and returning PHP members to the PHP, check in regarding group member needs, and assess safety.    About the Group: Clinician reviewed [facility] Group Code of Conduct and answered any questions that arose.  Clinician welcomed new members to the group and facilitated brief introductions of group members to one another.  Introduced topic/theme for the treatment day:Interpersonal Effectiveness.  Encouraged group members to request support throughout the day as needed.  Clinician reviewed group members’ Morning Reflection Sheets and did a verbal check in with each group member regarding safety (SI/HI/self harm), safety planning, medication compliance, if they needed to consult with anyone today and individual treatment needs/goals for the day.  Session ended with a brief meditation/calming activity.   Latoya Silva MD was on site when services rendered.    Request for Consent  Patient provided verbal consent to treat via telemedicine. Clinician introduced the secure telemedicine platform that we are utilizing to provide care during the COVID-19 pandemic. Patient understands the session will be billed to their insurance or patient directly.  Patient was informed only the group patients  and the clinician are permitted on?the video conference, sessions are not recorded by the clinician, and the patient is not permitted to record the session.? Patient was provided clinician's unique meeting ID prior to session, patient was asked to arrive to virtual session on time just as patient would if we were in the office. Clinician confirmed identification of patient by name and birthdate, provider name, location of patient and clinician, and callback number in case disconnected. Patient consents to  behavioral health treatment    Patient Response to Request for Consent: Yes  Visit Type performed: Audio and Video    Morning Self reflection:   Depression: 3/5  Anxiety: 3/5  Anger: 0/5  Suicidal Ideation:   0/5 - None  Self Injury: 0/5 - None  Engaged in Self Injury since yesterday: No  Homicidal Ideation:   0/5 - None  Are you able to follow your Safety Plan? Yes.  I can/will:  Call 911 if I'm unsafe.  Substance Use:  No  Self Care:  No.  I am satisfied with my daily hygiene:  yes  Nourish:Number of meals eaten yesterday? 2 and Describe:  Minimal    Sleep:  Describe:  Number of hours:  10  Social Interaction:Minimal:  Family  Physical Activity: walking, pacing   Mindful Activity: None   Medication: Prescribed  Thought Content: cloudy   Pt reported use of coping skills including successful follow through and barriers to follow through: baking   Pt reported significant or positive event/step: baking, reaching out to aftercare places   Pt identified goal for the treatment day: Be present   Pt treatment plan areas addressed:  Depression and Anxiety  Pt requested consult with: None  Visit Diagnosis:      ICD-10-CM ICD-9-CM   1. Alcohol use disorder, moderate, in early remission (CMS/HCC)  F10.21 305.03   2. Cocaine use disorder, moderate, in sustained remission (CMS/HCC)  F14.21 305.63   3. Anorexia nervosa, restricting type, in partial remission, moderate  F50.01 307.1   4. Mood disorder (CMS/HCC)  F39 296.90         Assessment/Observations:  Pt shared that she had a difficult night last night and paced a lot. Pt was invited to elaborate on how her evening was but she declined.  Pt shared that she baked which was helpful and she reached out to aftercare placements.   Plan:  Continue with PHP   Pt to F/U with treatment goals as outlined in treatment plan.  Pt to F/U with local ED/call 911 should SI/HI arises.    Deanna Crook, LPC

## 2021-08-19 NOTE — GROUP NOTE
Date: 8/19/2021  Start Time:  1:20 PM  End Time:   2:20 PM  Gemma Darnell, YOB: 2000,  was a passive group participant.    Wrap Up Group  11 attended group today.   Group Focus: Goal of group was to wrap up and process the treatment day.  Assist  members in planning for the evening ahead and to assess and plan surrounding any  safety needs.      About the Group:  Clinician reviewed group members Afternoon  Reflection Sheets and did a verbal check in with each group member regarding safety  (SI/HI/self harm) and any necessary safety planning .  Session ended with a brief  meditation/calming activity.  Latoya Silva MD was onsite when  services rendered.        Afternoon Self Reflection  Depression: 3/5  Anxiety: 3/5  Anger: 0/5  Suicidal Ideation:   0/5 - None  Self Injury: 0/5 - None  Homicidal Ideation:   0/5 - None  Are you able to follow your Safety Plan? Yes.  I can/will:  Call someone  Pt reported significant moment/insight of the day: Learning about rigid boundaries.  Pt reported gratitude list: My mom, my dog, PHP.  Pt identified goal progress for the day: Yes.  Pt reported evening self care plan:Take a shower, make dinner.  Pt treatment plan areas addressed: Depression    Visit Diagnosis:      ICD-10-CM ICD-9-CM   1. Alcohol use disorder, moderate, in early remission (CMS/HCC)  F10.21 305.03   2. Cocaine use disorder, moderate, in sustained remission (CMS/HCC)  F14.21 305.63   3. Anorexia nervosa, restricting type, in partial remission, moderate  F50.01 307.1   4. Mood disorder (CMS/HCC)  F39 296.90       Assessment/Observations:  Pt shared her self reflections and self care plan. [facility] invited Pt to shared in Open Process Group tomorrow, letting Pt know that she would like to hear from her more and support her more.  Plan: Continue with PHP   Pt to F/U with treatment goals as outlined in treatment plan.  Pt to F/U with local ED/call 911 should SI/HI arises.    Ling Cifuentes LCSW

## 2021-08-19 NOTE — GROUP NOTE
Date:  8/19/2021  Start Time:  10:40 AM  End Time:  11:40 AM  Gemma Darnell, YOB: 2000   11 members attended group today.    Group Focus:  Goal of group was to establish and build social support, review coping skills, normalize the struggles of being human, and increase self awareness and self compassion.    About the Group:  Clinician started group with a prompting quote/song to facilitate group discussion.  Group engaged in active and supportive discussion. Topics discussed included managing anxiety related to returning to work or school, affirming self for positive steps and qualities, managing trauma responses.  Group members were able to offer insightful and supportive feedback and suggestions about how to manage difficult situations.  Group ended with a meditation/song.  Latoya Silva MD was onsite when services were rendered.    Request for Consent  Patient provided verbal consent to treat via telemedicine. Clinician introduced the secure telemedicine platform that we are utilizing to provide care during the COVID-19 pandemic. Patient understands the session will be billed to their insurance or patient directly.  Patient was informed only the group patients  and the clinician are permitted on?the video conference, sessions are not recorded by the clinician, and the patient is not permitted to record the session.? Patient was provided clinician's unique meeting ID prior to session, patient was asked to arrive to virtual session on time just as patient would if we were in the office. Clinician confirmed identification of patient by name and birthdate, provider name, location of patient and clinician, and callback number in case disconnected. Patient consents to behavioral health treatment    Patient Response to Request for Consent: Yes  Visit Type performed: Audio and Video        Patient  was withdrawn in today's session.  Assessment/observation of group participation/presentation: Pt did not  verbalize thoughts or feelings in group session. Pt displayed non verbal cues that she was paying attention.   Treatment plan areas addressed: Depression and Anxiety  Visit Diagnosis:      ICD-10-CM ICD-9-CM   1. Alcohol use disorder, moderate, in early remission (CMS/HCC)  F10.21 305.03   2. Cocaine use disorder, moderate, in sustained remission (CMS/HCC)  F14.21 305.63   3. Anorexia nervosa, restricting type, in partial remission, moderate  F50.01 307.1   4. Mood disorder (CMS/HCC)  F39 296.90       Plan: Continue with PHP   Pt to F/U with treatment goals as outlined in treatment plan.  Pt to F/U with local ED/call 911 should SI/HI arises.    Deanna Crook, LPC

## 2021-08-20 ENCOUNTER — DOCUMENTATION (OUTPATIENT)
Dept: PSYCHIATRY | Facility: HOSPITAL | Age: 21
End: 2021-08-20

## 2021-08-20 ENCOUNTER — TELEPHONE (OUTPATIENT)
Dept: PSYCHIATRY | Facility: HOSPITAL | Age: 21
End: 2021-08-20

## 2021-08-20 ENCOUNTER — PHP (OUTPATIENT)
Dept: PSYCHIATRY | Facility: HOSPITAL | Age: 21
End: 2021-08-20
Attending: PSYCHIATRY & NEUROLOGY
Payer: COMMERCIAL

## 2021-08-20 VITALS
DIASTOLIC BLOOD PRESSURE: 89 MMHG | HEART RATE: 112 BPM | OXYGEN SATURATION: 97 % | SYSTOLIC BLOOD PRESSURE: 109 MMHG | RESPIRATION RATE: 16 BRPM

## 2021-08-20 DIAGNOSIS — F10.21 ALCOHOL USE DISORDER, MODERATE, IN EARLY REMISSION (CMS/HCC): Primary | ICD-10-CM

## 2021-08-20 DIAGNOSIS — F14.21 COCAINE USE DISORDER, MODERATE, IN SUSTAINED REMISSION (CMS/HCC): ICD-10-CM

## 2021-08-20 DIAGNOSIS — F50.014 ANOREXIA NERVOSA, RESTRICTING TYPE, IN PARTIAL REMISSION, MODERATE: ICD-10-CM

## 2021-08-20 DIAGNOSIS — F39 MOOD DISORDER (CMS/HCC): ICD-10-CM

## 2021-08-20 PROCEDURE — H0035 MH PARTIAL HOSP TX UNDER 24H: HCPCS | Performed by: COUNSELOR

## 2021-08-20 NOTE — GROUP NOTE
Date: 8/20/2021  Start Time:  1:20 PM  End Time:   2:20 PM  Gemma Darnell, YOB: 2000,  was an active group participant.    Wrap Up Group   8 attended group today.   Group Focus: Goal of group was to wrap up and process the treatment day.  Assist  members in planning for the evening ahead and to assess and plan surrounding any  safety needs.      About the Group:  Clinician reviewed group members Afternoon  Reflection Sheets and did a verbal check in with each group member regarding safety  (SI/HI/self harm) and any necessary safety planning .  Session ended with a brief  meditation/calming activity.  Latoya Silva MD was onsite when  services rendered.    Request for Consent  Patient provided verbal consent to treat via telemedicine. Clinician introduced the secure telemedicine platform that we are utilizing to provide care during the COVID-19 pandemic. Patient understands the session will be billed to their insurance or patient directly.  Patient was informed only the group patients  and the clinician are permitted on?the video conference, sessions are not recorded by the clinician, and the patient is not permitted to record the session.? Patient was provided clinician's unique meeting ID prior to session, patient was asked to arrive to virtual session on time just as patient would if we were in the office. Clinician confirmed identification of patient by name and birthdate, provider name, location of patient and clinician, and callback number in case disconnected. Patient consents to behavioral health treatment    Patient Response to Request for Consent: Yes  Visit Type performed: Audio and Video      Afternoon Self Reflection  Depression: 3/5  Anxiety: 3/5  Anger: 0/5  Suicidal Ideation:   0/5 - None  Self Injury: 0/5 - None  Homicidal Ideation:   0/5 - None  Are you able to follow your Safety Plan? Yes.  I can/will:  Call 911 if I'm unsafe.  Pt reported significant moment/insight of the day: writing  self compassion letter   Pt reported gratitude list: mom, sister, cats  Pt identified goal progress for the day: yes met goal   Pt reported evening self care plan: make dinner and take bath   Pt treatment plan areas addressed: Depression, Anxiety and Suicidal Ideation & Safety Planning    Visit Diagnosis:      ICD-10-CM ICD-9-CM   1. Alcohol use disorder, moderate, in early remission (CMS/[facility])  F10.21 305.03   2. Cocaine use disorder, moderate, in sustained remission (CMS/[facility])  F14.21 305.63   3. Anorexia nervosa, restricting type, in partial remission, moderate  F50.01 307.1   4. Mood disorder (CMS/[facility])  F39 296.90       Assessment/Observations:  Pt shared that she was inspired by another group member to take a bath using bath bomb. Pt shared that she has not taken a bath in a while. Pt shared that she likes writing the self compassion letter and that this was helpful. Pt shared that she plans to hike with her dog this weekend. Pt denied any further need with support for weekend planning or safety planning.   Plan: Continue with PHP   Pt to F/U with treatment goals as outlined in treatment plan.  Pt to F/U with local ED/call 911 should SI/HI arises.    Deanna Crook, LPC

## 2021-08-20 NOTE — GROUP NOTE
Date: 8/20/2021  Start Time:   9:30 AM  End Time: 10:30 AM  Gemma Darnell, YOB: 2000,  was an active group participant.    Community Check In Group  8 attended group today.     Group Focus: Goal of group was to welcome new and returning PHP members to the [facility], check in regarding group member needs, and assess safety.    About the Group: Clinician reviewed [facility] Group Code of Conduct and answered any questions that arose.  Clinician welcomed new members to the group and facilitated brief introductions of group members to one another.  Introduced topic/theme for the treatment day:Self Compassion.  Encouraged group members to request support throughout the day as needed.  Clinician reviewed group members’ Morning Reflection Sheets and did a verbal check in with each group member regarding safety (SI/HI/self harm), safety planning, medication compliance, if they needed to consult with anyone today and individual treatment needs/goals for the day.  Session ended with a brief meditation/calming activity.   Latoya Silva MD was on site when services rendered.    Morning Self reflection:   Depression: 3/5  Anxiety: 3/5  Anger: 0/5  Suicidal Ideation:   0/5 - None  Self Injury: 0/5 - None  Engaged in Self Injury since yesterday: No  Homicidal Ideation:   0/5 - None  Are you able to follow your Safety Plan? Yes.  I can/will:  Call 911 if I'm unsafe.  Substance Use:  No  Self Care:  I completed my self care activity last night:  showered this morning  I am satisfied with my daily hygiene:  yes  Nourish:Number of meals eaten yesterday? 2    Sleep:  Describe:  Number of hours:  8  Social Interaction:Excessive: Family  Physical Activity: No.  Mindful Activity: No.  Medication: Prescribed  Thought Content: Cloudy.  Pt reported use of coping skills including successful follow through and barriers to follow through: Listened to music.  Pt reported significant or positive event/step: Had dinner with mom and brother and  sister.  Pt identified goal for the treatment day: Be present.  Pt treatment plan areas addressed:  Depression  Pt requested consult with: None  Visit Diagnosis:      ICD-10-CM ICD-9-CM   1. Alcohol use disorder, moderate, in early remission (CMS/HCC)  F10.21 305.03   2. Cocaine use disorder, moderate, in sustained remission (CMS/HCC)  F14.21 305.63   3. Anorexia nervosa, restricting type, in partial remission, moderate  F50.01 307.1   4. Mood disorder (CMS/HCC)  F39 296.90         Assessment/Observations:  Pt shared her self reflections, and identified that she would have preferred more time alone last night.  Plan:  Continue with PHP   Pt to F/U with treatment goals as outlined in treatment plan.  Pt to F/U with local ED/call 911 should SI/HI arises.    Ling Cifuentes LCSW

## 2021-08-20 NOTE — GROUP NOTE
Date:  8/20/2021  Start Time:  12:10 PM  End Time:   1:10 PM  Gemma Darnell, YOB: 2000  Psychoeducational/Skills Based Group  8 members attended group today    Group Focus: Goal of group was to introduce skills and psychoeducation related to topic of PHP day.   Group members were provided instruction and opportunities to practice presented skills.  Clinician answered questions as they arose and shared how presented skills can be utilized on a daily basis to increase emotional wellness.      About the Group: Self Compassion/Self Love Session 10: Self Compassion/Self Love Psycho Ed/Skills Based Group Summary   Topic of Curriculum was “Self Compassion/Self Love”. Clinician showed Cherri Wan’s video describing Self-Compassion to help group members understand the use of self compassion as a coping skill. Clinician facilitated discussion as a group directed toward “negative thoughts about self that can be transformed into self-compassionate statements.”  For example, asking members to share negative thoughts that often go through their mind, “Would you say that to someone you love? What might you say instead?” Clinician introduced a few examples of Self-compassionate phrases: “I am only human.” “I am doing the best I can.” “This is really hard right now and others have felt this way too.”  Group members then discussed their experiences and potential utilization of self compassion to help them in the healing process. Clinician closed group session with a 2 minute breathing meditation to help group members practice grounding techniques    Latoya Silva MD was onsite when services were rendered.    Request for Consent  Patient provided verbal consent to treat via telemedicine. Clinician introduced the secure telemedicine platform that we are utilizing to provide care during the COVID-19 pandemic. Patient understands the session will be billed to their insurance or patient directly.  Patient was informed only  the group patients  and the clinician are permitted on?the video conference, sessions are not recorded by the clinician, and the patient is not permitted to record the session.? Patient was provided clinician's unique meeting ID prior to session, patient was asked to arrive to virtual session on time just as patient would if we were in the office. Clinician confirmed identification of patient by name and birthdate, provider name, location of patient and clinician, and callback number in case disconnected. Patient consents to behavioral health treatment    Patient Response to Request for Consent: Yes  Visit Type performed: Audio and Video          Patient  was quiet but attentive.  Assessment/observation of group participation/presentation: Pt did not share throughout group session. Pt was non verbally following group discussion. Pt did engage in writing exercise with self compassion letter.   Treatment plan areas addressed: Depression, Anxiety and Suicidal Ideation & Safety Planning  Visit Diagnosis:      ICD-10-CM ICD-9-CM   1. Alcohol use disorder, moderate, in early remission (CMS/HCC)  F10.21 305.03   2. Cocaine use disorder, moderate, in sustained remission (CMS/HCC)  F14.21 305.63   3. Anorexia nervosa, restricting type, in partial remission, moderate  F50.01 307.1   4. Mood disorder (CMS/HCC)  F39 296.90       Plan: Continue with PHP   Pt to F/U with treatment goals as outlined in treatment plan.  Pt to F/U with local ED/call 911 should SI/HI arises.    Deanna Crook, [facility]

## 2021-08-20 NOTE — GROUP NOTE
Date:  8/20/2021  Start Time:  10:40 AM  End Time:  11:40 AM  Gemma Darnell, YOB: 2000   8 members attended group today.    Group Focus:  Goal of group was to establish and build social support, review coping skills, normalize the struggles of being human, and increase self awareness and self compassion.    About the Group:  Clinician started group with a prompting quote/song to facilitate group discussion.  Group engaged in active and supportive discussion. Topics discussed included Self Compassion.  Group members were able to offer insightful and supportive feedback and suggestions about how to manage difficult situations.  Group ended with a meditation/song.  Latoya Silva MD was onsite when services were rendered.        Patient  was withdrawn in today's session.  Assessment/observation of group participation/presentation: Pt did not share during the group discussion, even when invited to participate by LCSW.  Treatment plan areas addressed: Depression  Visit Diagnosis:      ICD-10-CM ICD-9-CM   1. Alcohol use disorder, moderate, in early remission (CMS/HCC)  F10.21 305.03   2. Cocaine use disorder, moderate, in sustained remission (CMS/HCC)  F14.21 305.63   3. Anorexia nervosa, restricting type, in partial remission, moderate  F50.01 307.1   4. Mood disorder (CMS/HCC)  F39 296.90       Plan: Continue with PHP   Pt to F/U with treatment goals as outlined in treatment plan.  Pt to F/U with local ED/call 911 should SI/HI arises.    Ling Cifuentes LCSW

## 2021-08-20 NOTE — TELEPHONE ENCOUNTER
LPC spoke with Brittany, , Jose Bethea in regards to pt being assessed for ED tx upon completion of PHP tx. Brittany expressed concern that when she initially spoke with pt there was confusion as to whether her ED sx were primary over her MH sx. LPC advocated that pt needs ED tx at this time. Brittany was also able to talk with pt's mother and feels that pt getting assessed there for on going care is beneficial. Brittany informed LPC that pt's information was pushed through to Admissions and they will be in contact with pt about scheduled evaluation. There may be a wait for admission into their program, therefore pt would be able to remain in IOP tx at [facility] until ED tx is available. Pt may also have intake at [facility] to which she may admit as well.

## 2021-08-23 ENCOUNTER — PHP (OUTPATIENT)
Dept: PSYCHIATRY | Facility: HOSPITAL | Age: 21
End: 2021-08-23
Attending: PSYCHIATRY & NEUROLOGY
Payer: COMMERCIAL

## 2021-08-23 ENCOUNTER — TELEPHONE (OUTPATIENT)
Dept: PSYCHIATRY | Facility: HOSPITAL | Age: 21
End: 2021-08-23

## 2021-08-23 ENCOUNTER — DOCUMENTATION (OUTPATIENT)
Dept: PSYCHIATRY | Facility: HOSPITAL | Age: 21
End: 2021-08-23

## 2021-08-23 DIAGNOSIS — F14.21 COCAINE USE DISORDER, MODERATE, IN SUSTAINED REMISSION (CMS/HCC): ICD-10-CM

## 2021-08-23 DIAGNOSIS — F39 MOOD DISORDER (CMS/HCC): ICD-10-CM

## 2021-08-23 DIAGNOSIS — F10.21 ALCOHOL USE DISORDER, MODERATE, IN EARLY REMISSION (CMS/HCC): ICD-10-CM

## 2021-08-23 DIAGNOSIS — F50.014 ANOREXIA NERVOSA, RESTRICTING TYPE, IN PARTIAL REMISSION, MODERATE: ICD-10-CM

## 2021-08-23 DIAGNOSIS — F15.90 STIMULANT USE DISORDER: ICD-10-CM

## 2021-08-23 DIAGNOSIS — F10.21 ALCOHOL USE DISORDER, MODERATE, IN EARLY REMISSION (CMS/HCC): Primary | ICD-10-CM

## 2021-08-23 DIAGNOSIS — F39 MOOD DISORDER (CMS/HCC): Primary | ICD-10-CM

## 2021-08-23 PROCEDURE — H0035 MH PARTIAL HOSP TX UNDER 24H: HCPCS | Performed by: COUNSELOR

## 2021-08-23 PROCEDURE — 99215 OFFICE O/P EST HI 40 MIN: CPT | Performed by: PSYCHIATRY & NEUROLOGY

## 2021-08-23 NOTE — TELEPHONE ENCOUNTER
"LPC returned call from pt's mother asking to speak with LPC. Upon returning the call, Pt's mother, Cindi, stated that she found a journal entry that pt had written from 8/21/21 alluding to pt ending her life and not be here \"tomorrow\" which would have been 8/22/21. Cindi mentioned that she saw a change in pt's mood this weekend and pt appeared happier. Cindi is concerned that pt is not safe. Cindi also expressed concern that pt will feel upset toward her for reading her journal. LPC spoke with Cindi about safety as priority and also validated Cindi's concerns. LPC informed Cindi that the psychiatry team will likely want to meet with the pt today to assess safety. Pt has denied any SI upon coming into PHP tx today. Although pt stated on question 9 for PHQ-9 \"most days\" over the past 2 weeks. There is discrepancy in pt's reporting. LPC will meet with pt to discuss these concerns today. Cindi stated that the pt has her car today in tx, so Cindi will find a ride to the building so that the pt does not drive alone following tx. Cindi will be here to support the pt and potentially meet with Psychiatry team if need be. Cindi asked about potential recommendations. LPC informed Cindi that the psychiatry will make a recommendation, but pt may need evaluation at ED for inpt tx. Cindi mentioned [facility] since pt was supposed to admit there when she had a recent SA prior to entering PHP tx.   "

## 2021-08-23 NOTE — GROUP NOTE
Date:  8/23/2021  Start Time:  10:40 AM  End Time:  11:40 AM  Gemma Darnell, YOB: 2000   7 members attended group today.    Group Focus:  Goal of group was to establish and build social support, review coping skills, normalize the struggles of being human, and increase self awareness and self compassion.    About the Group:  Clinician started group with a prompting quote/song to facilitate group discussion.  Group engaged in active and supportive discussion. Topics discussed included boundaries, coping strategies for anxiety and depression, defense mechanisms and guilt versus shame.  Group members were able to offer insightful and supportive feedback and suggestions about how to manage difficult situations.  Group ended with a meditation/song.  Latoya Silva MD was onsite when services were rendered.    Request for Consent  Patient provided verbal consent to treat via telemedicine. Clinician introduced the secure telemedicine platform that we are utilizing to provide care during the COVID-19 pandemic. Patient understands the session will be billed to their insurance or patient directly.  Patient was informed only the group patients  and the clinician are permitted on?the video conference, sessions are not recorded by the clinician, and the patient is not permitted to record the session.? Patient was provided clinician's unique meeting ID prior to session, patient was asked to arrive to virtual session on time just as patient would if we were in the office. Clinician confirmed identification of patient by name and birthdate, provider name, location of patient and clinician, and callback number in case disconnected. Patient consents to behavioral health treatment    Patient Response to Request for Consent: Yes  Visit Type performed: Audio and Video        Patient  was an active group participant.  Assessment/observation of group participation/presentation: Pt was invited to share toward the end of the  "group session. Pt shared that she could relate to guilt versus shame. Pt shared that she tends to feel more shame. LPC asked pt to ID what her shame sounds like. Pt was able to verbalize that it sounds like \"I am bad\". LPC then asked pt to ID how she feels when she thinks this thought. Pt shared that she feels anxious. Pt shared that she then paces when feeling anxious. Pt shared that she would like to be able to practice mindfulness but that she has a hard time sitting with herself. LPC validated pt's experience and encouraged her to utilize some DBT skills when feeling anxious with the goal of working toward mindfulness.  Pt was agreeable to this. LPC reviewed TIPP and STOP with pt. LPC affirmed pt for sharing more in group this morning.  Pt also received support and feedback from peers in group session.   Treatment plan areas addressed: Depression, Anxiety and Suicidal Ideation & Safety Planning  Visit Diagnosis:      ICD-10-CM ICD-9-CM   1. Alcohol use disorder, moderate, in early remission (CMS/HCC)  F10.21 305.03   2. Cocaine use disorder, moderate, in sustained remission (CMS/HCC)  F14.21 305.63   3. Anorexia nervosa, restricting type, in partial remission, moderate  F50.01 307.1   4. Mood disorder (CMS/HCC)  F39 296.90   5. Stimulant use disorder  F15.90 292.9       Plan: Continue with PHP   Pt to F/U with treatment goals as outlined in treatment plan.  Pt to F/U with local ED/call 911 should SI/HI arises.    Deanna Crook LPC      "

## 2021-08-23 NOTE — PROGRESS NOTES
"[facility] PSYCHIATRY FOLLOW UP/MEDICATION CHECK    Gemma Darnell is a 21 y.o. female who presents for Follow-up (PHP day 9).      21 year old woman with past psych hx of MDD versus BPAD type 2, anorexia nervosa, ETOH use disorder in early remission, cocaine use disorder in early remission, stimulant (Adderall) in sustained remission, 3 past inpatient hospitalizations and 3 suicide attempts all since May 2021, hx of residential stay fall 2020, no hx of SIB, family history significant for sister with B PAD/suicidality, parents with alcohol use disorder and maternal grandmother with depression anxiety and eating disorder. [facility] day 9.    Patient with gradual improvement over the past 3 to 5 days with agreement to recommendation for specialty eating disorder programs.  Patient scheduled for intake evaluation at [place] eating disorder program, also exploring possibility of treatment at [facility] eating disorder program.  Patient engaged in the process of planning for screening appointments and transition from PHP with improved overall mood and ability to utilize skills to manage uncomfortable thoughts and urges to self.    [place] through treatment day, call from patient's mother revealing details of a journal entry with concerning statement written 8/21/2021 the patient would be \"dead by tomorrow.\"  Upon interview today patient states that she had that thoughts but did not have a plan, intent or furtherance of behaviors towards self-harm.  She states the protective factor at that time was connection to family.  Given concerning nature of statement, patient seen by individual therapist, psychiatry team and follow-up meeting with patient's mother for further evaluation.    Upon interview, patient continued to deny current thoughts of self-harm.  Denies intent or other actions furtherance.  Reviewed treatment options including potential need for return to inpatient level of care versus residential treatment.  Given patient's denial " of current suicidal ideation plan or intent and willingness to continue to engage in treatment, reviewed safety planning in detail.  Patient stated her willingness to leave with her mother, return to the family home this evening and remain home with family, returning to [facility] 8/24/2021.  Patient's mother in agreement with plan and in agreement to ensure all sharps, medications and other potential means of self-harm removed from patient's access.        Psychiatric ROS:       Medical Review of Systems  Review of Systems    PMSH: No changes were made to non-psychiatric medications/allergies/medical history.     Risk/Benefit/side Effects discussed regarding the following medications: See above  PDMP Queried: Yes    Allergies: No Known Allergies    Current Outpatient Medications:   •  cyanocobalamin (VITAMIN B12) 100 mcg tablet, Take 100 mcg by mouth daily., , Disp: , Rfl:   •  hydrOXYzine (ATARAX) 50 mg tablet, Take 1 tablet (50 mg total) by mouth every 6 (six) hours as needed for anxiety for up to 14 days., , Disp: 14 tablet, Rfl: 0  •  melatonin ODT, Take 1 tablet (3 mg total) by mouth nightly. (Patient taking differently: Take 3 mg by mouth nightly as needed.  ), , Disp: 30 tablet, Rfl: 0  •  venlafaxine XR (EFFEXOR-XR) 150 mg 24 hr capsule, Take 1 capsule (150 mg total) by mouth daily with breakfast., , Disp: 30 capsule, Rfl: 0       Objective     Physical Exam    Visit Vitals  LMP  (LMP Unknown)       Mental Status Exam  Appearance: Sweatshirt, fair hygiene  Gait and Motor: slow and Intermittent eye contact  Speech: decreased and soft  Mood: sad  Affect: Tearful  Associations: logical  Thought Process: goal-directed and slowed  Thought Content: no auditory or visual hallucinations.  Suicidality/Homicidality: thoughts of being dead/ no desire or plan to die  Judgement/Insight: Fair  Orientation: day, month and year  Memory: recalls recent events  Attention: withdrawn  Knowledge: below expected for  education  Language: word finding difficulties       PHQ-9: Total Score-OWL Transcribed: 11  Thoughts that You Would be Better Off Dead or of Hurting Yourself in Some Way: 1-->several days  JAY-7 Total Score-OWL Transcribed: 10    [place] Suicide Severity Rating Scale:  Not done today     Safe-T Assessment:  Not done today            Brief Psychiatric Formulation: 21 year old woman with past psych hx of MDD versus BPAD type 2, anorexia nervosa, ETOH use disorder in early remission, cocaine use disorder in early remission, stimulant (Adderall) in sustained remission, 3 past inpatient hospitalizations and 3 suicide attempts all since May 2021, hx of residential stay fall 2020, no hx of SIB, family history significant for sister with B PAD/suicidality, parents with alcohol use disorder and maternal grandmother with depression anxiety and eating disorder.  Patient with gradual improvement through PHP day 8, however with decreased structure of the weekend, recent knowledge of journal entry over the weekend, and upcoming transition to eating disorder treatment focus program patient now with an increased risk of self-harm.      Following meeting with individual therapist and second meeting with individual therapist and psychiatry, reviewed overall risks and treatment options with patient and her mother.  Given lack of current suicidal ideation, willingness to engage in safety planning and utilizing safety plan overall, patient does not require inpatient psychiatric treatment either voluntarily or involuntarily at this time.        Pt is at chronic elevated risk of intentional harm to self given her hx of depression, hx of suicide attempts, family hx of suicidality, hx of substance use, hx of eating disorder, hx of impulsivity. Her acute risk is elevated by most recent suicide attempt leading up to most recent hospitalization, anticipated completion of PHP, recent awareness of journal entry over the weekend, though this  acute risk is mitigated by lack of current suicidal ideation, lack of plan/intent, lack of recent substance use, commitment to family, treatment-seeking behavior, and future orientation. In sum, her acute risk of intentional harm to self is not significantly elevated from usual baseline as to warrant hospitalization, but given her degree of symptoms and impact on functioning, she is appropriate for an extension of PHP. She denies any thoughts of harming others and has no history of violence, so risk to others is low.     Plan:  Patient will return home with her mother this evening, mother will be in charge of medication, remove sharps, patient agreeable to same  Ongoing support and monitoring of eating patterns, ongoing anticipation of need for formal eating disorder treatment program  Ongoing evaluation of patient's intermittent thoughts and urges of self-harm and assessment for need for higher level of care such as inpatient psychiatric treatment versus specialized eating disorder focused treatment versus residential dual diagnosis  Patient scheduled for follow-up labs and screening urine drug screen  Continue venlafaxine 150 mg p.o. daily  Patient encouraged to schedule outpatient follow-up appointment with Dr. Cathy Gifford as backup option if above described alternate treatment options not in place following PHP discharge  Next PHP follow-up visit 8/24/2021 with          Based on [place] suicide screen, Safe-T Assessment, patient is determined  moderate    Suicide Risk/Suicidal Ideation will / has been added to patient's treatment plan.     Plan:   Recertification: I certify that PHP level of care continues to be required, improvement is expected and without this medically necessary treatment, inpatient psychiatric care would be required.  Gemma Darnell's response to therapeutic intervention has shown worsening of symptoms due to increased thoughts of suicide. Gemma Darnell remains at risk for  psychiatric hospitalization due to moderate to severe mood symptoms, severe anxiety and dangerousness to self . Treatment goals and coordination of care with multidisciplinary team as outlined in updated treatment plan  Patient to F/U with treatment goals as outlined in treatment plan.  Patient to F/U with local ED or call 911 should SI/HI arise.    Visit Diagnosis:    ICD-10-CM ICD-9-CM   1. Mood disorder (CMS/[facility])  F39 296.90   2. Anorexia nervosa, restricting type, in partial remission, moderate  F50.01 307.1   3. Alcohol use disorder, moderate, in early remission (CMS/HCC)  F10.21 305.03   4. Cocaine use disorder, moderate, in sustained remission (CMS/HCC)  F14.21 305.63     2:44 -3:22  Duration:  38 min  Latoya Silva MD @ 5:38 PM

## 2021-08-23 NOTE — PROGRESS NOTES
"LPC met with pt following PHP tx to discuss safety concerns that pt's mother had reported to LPC over the phone. LPC spoke with pt about discrepancy with PHQ-9 score answer to question 9 and pt denying any SI while in PHP tx. Pt reported that she does not remember filling out the PHQ-9 question as \"most days\". Pt reported that she completed the screening last evening. LPC asked pt if she has been honest about SI and safety. Pt reported that she has been honest and that she has not had any SI throughout PHP tx. LPC spoke with pt about the report that her mother gave over the phone earlier today. LPC confronted pt about having written what seemed to be a suicide note in her journal from 8/21/21. Pt broke eye contact at that time and became tearful. Pt reported that she has been having a hard time with memory and not remembering completing things. Pt reported that she is not sure if this is from drug or alcohol use, or ECT that she had in the hospital. Pt reported that she wrote that note on Saturday to end her life on Sunday but she did not have plan or intent. LPC spoke with pt about SI and pt reported that she thought about cutting herself but did not have specific details or intent to carry it out. Pt reported that she was with her parents on Sunday and felt guilt about SI and plan. LPC helped pt to reframe \"guilt\" meaning that she was recognizing the pain it would cause her family if she were to end her life. Pt reported that she thought about the note she had wrote on Sunday. Pt reported that she got limited sleep last night. Pt reported that the thoughts are lingering, coming and going today but pt denied plan or intent. LPC expressed concern due to pt not be forthcoming or honest about how she is feeling. LPC spoke with pt about meeting with psychiatry team this afternoon to assess her safety. Pt was agreeable to meet with [facility] psychaitrist. LPC informed that her mother was also here in the building as a " support and to transport her home safely. Pt inquired about what the recommendations would be following psychiatry appt. LPC informed pt that it would evaluation at ED, residential tx or continued with PHP tx. Pt reported that she does not want to go back to residential tx or the hospital. LPC spoke with pt about what she needs versus what she wants. LPC escorted pt to the psychiatrist's office to help support pt.

## 2021-08-23 NOTE — GROUP NOTE
"Date: 8/23/2021  Start Time:  1:20 PM  End Time:   2:20 PM  Gemma Darnell, YOB: 2000,  was an active group participant.    Wrap Up Group    6 attended group today.   Group Focus: Goal of group was to wrap up and process the treatment day.  Assist  members in planning for the evening ahead and to assess and plan surrounding any  safety needs.      About the Group:  Clinician reviewed group members Afternoon  Reflection Sheets and did a verbal check in with each group member regarding safety  (SI/HI/self harm) and any necessary safety planning .  Session ended with a brief  meditation/calming activity.  Latoya Silva MD was onsite when  services rendered.        Afternoon Self Reflection  Depression: 1/5  Anxiety: 2/5  Anger: 0/5  Suicidal Ideation:   0/5 - None  Self Injury: 0/5 - None  Homicidal Ideation:   0/5 - None  Are you able to follow your Safety Plan? Yes.  I can/will:  Call 911 if I'm unsafe.  Pt reported significant moment/insight of the day: \"Stream meditation.\"  Pt reported gratitude list: \"My mom, my dog, PHP.\"  Pt identified goal progress for the day: \"yes.\"  Pt reported evening self care plan:\"Journal, reflect on DBT skills.\"  Pt treatment plan areas addressed: Depression    Visit Diagnosis:      ICD-10-CM ICD-9-CM   1. Alcohol use disorder, moderate, in early remission (CMS/HCC)  F10.21 305.03   2. Cocaine use disorder, moderate, in sustained remission (CMS/HCC)  F14.21 305.63   3. Anorexia nervosa, restricting type, in partial remission, moderate  F50.01 307.1   4. Mood disorder (CMS/HCC)  F39 296.90   5. Stimulant use disorder  F15.90 292.9       Assessment/Observations:  Pt shared her self reflections and indicated that she planned on reflecting on DBT skills, especially distress tolerance skills.  Plan: Continue with PHP   Pt to F/U with treatment goals as outlined in treatment plan.  Pt to F/U with local ED/call 911 should SI/HI arises.    NEREYDA NievesW        "

## 2021-08-23 NOTE — GROUP NOTE
Date:  8/23/2021  Start Time:  12:10 PM  End Time:   1:10 PM  Gemma Darnell, YOB: 2000  Psychoeducational/Skills Based Group  7 members attended group today    Group Focus: Goal of group was to introduce skills and psychoeducation related to topic of PHP day.   Group members were provided instruction and opportunities to practice presented skills.  Clinician answered questions as they arose and shared how presented skills can be utilized on a daily basis to increase emotional wellness.      About the Group: Mindfulness Topic of curriculum was Mindfulness.” Clinician further introduced the concept of mindfulness by showing group members a Donis Dean video. Clinician educated group members on the many different ways to use mindfulness day to day, moment to moment by sharing a Skills Overview worksheets. Concepts described on the worksheets included: 1) observe, 2) describe, 3) participate, 4) non-judgmental stance, 5) one-mindfully, 6) effectively, 7) wise mind (including a video description). The goal of sharing the skills was to provide multiple techniques for practicing mindfulness in our lives. Clinician then encouraged group members to complete a Wise Mind worksheet to relate their own experience to the concept of Wise Mind. Clinician facilitated a group discussion to check for understanding of presented skills. Clinician ended group by leading group members through a “5 Senses” exercises to help group members link practice of Observe, Describe and One-Mindfully skills.    Latoya Silva MD was onsite when services were rendered.        Patient  was quiet but attentive.  Assessment/observation of group participation/presentation: Pt was quiet but attentive in group. She maintained eye contact and followed along with the discussion but did not make spontaneous contributions.   Treatment plan areas addressed: Depression and Anxiety  Visit Diagnosis:      ICD-10-CM ICD-9-CM   1. Alcohol use disorder,  moderate, in early remission (CMS/HCC)  F10.21 305.03   2. Cocaine use disorder, moderate, in sustained remission (CMS/[facility])  F14.21 305.63   3. Anorexia nervosa, restricting type, in partial remission, moderate  F50.01 307.1   4. Mood disorder (CMS/[facility])  F39 296.90   5. Stimulant use disorder  F15.90 292.9     Plan: Continue with PHP   Pt to F/U with treatment goals as outlined in treatment plan.  Pt to F/U with local ED/call 911 should SI/HI arises.    Eileen Stewart, LPC

## 2021-08-23 NOTE — GROUP NOTE
Date: 8/23/2021  Start Time:   9:30 AM  End Time: 10:30 AM  Gemma Darnell, YOB: 2000,  was an active group participant.    Community Check In Group  6 attended group today.     Group Focus: Goal of group was to welcome new and returning PHP members to the PHP, check in regarding group member needs, and assess safety.    About the Group: Clinician reviewed [facility] Group Code of Conduct and answered any questions that arose.  Clinician welcomed new members to the group and facilitated brief introductions of group members to one another.  Introduced topic/theme for the treatment day:Mindfulness.  Encouraged group members to request support throughout the day as needed.  Clinician reviewed group members’ Morning Reflection Sheets and did a verbal check in with each group member regarding safety (SI/HI/self harm), safety planning, medication compliance, if they needed to consult with anyone today and individual treatment needs/goals for the day.  Session ended with a brief meditation/calming activity.   Latoya Silva MD was on site when services rendered.    Request for Consent  Patient provided verbal consent to treat via telemedicine. Clinician introduced the secure telemedicine platform that we are utilizing to provide care during the COVID-19 pandemic. Patient understands the session will be billed to their insurance or patient directly.  Patient was informed only the group patients  and the clinician are permitted on?the video conference, sessions are not recorded by the clinician, and the patient is not permitted to record the session.? Patient was provided clinician's unique meeting ID prior to session, patient was asked to arrive to virtual session on time just as patient would if we were in the office. Clinician confirmed identification of patient by name and birthdate, provider name, location of patient and clinician, and callback number in case disconnected. Patient consents to behavioral health  treatment    Patient Response to Request for Consent: Yes  Visit Type performed: Audio and Video    Morning Self reflection:   Depression: 2/5  Anxiety: 3/5  Anger: 0/5  Suicidal Ideation:   0/5 - None  Self Injury: 0/5 - None  Engaged in Self Injury since yesterday: No  Homicidal Ideation:   0/5 - None  Are you able to follow your Safety Plan? Yes.  I can/will:  Journal and Call 911 if I'm unsafe.  Substance Use:  No  Self Care:  I completed my self care activity last night:  did not specify   I am satisfied with my daily hygiene:  yes  Nourish:Number of meals eaten yesterday? 3 and Describe:  Normal    Sleep:  Describe:  Restless and Number of hours:  6  Social Interaction:Moderate: Family  Physical Activity: pacing   Mindful Activity: None   Medication: Prescribed  Thought Content: racing   Pt reported use of coping skills including successful follow through and barriers to follow through: journal   Pt reported significant or positive event/step: went trough old journals   Pt identified goal for the treatment day: Be present and try to practice mindfulness   Pt treatment plan areas addressed:  Depression, Anxiety and Suicidal Ideation & Safety Planning  Pt requested consult with: None  Visit Diagnosis:      ICD-10-CM ICD-9-CM   1. Alcohol use disorder, moderate, in early remission (CMS/HCC)  F10.21 305.03   2. Cocaine use disorder, moderate, in sustained remission (CMS/HCC)  F14.21 305.63   3. Anorexia nervosa, restricting type, in partial remission, moderate  F50.01 307.1   4. Mood disorder (CMS/[facility])  F39 296.90   5. Stimulant use disorder  F15.90 292.9         Assessment/Observations:  Pt shared that she read old journals over the weekend which brought up some anxiety and discomfort. Pt shared that she paced a lot due to the increased anxiety. Pt's meal intake seemed to improve over the weekend. Pt stated eating 3 meals. LPC invited pt to share during open process group to help her get support with racing  thoughts.   Plan:  Continue with PHP   Pt to F/U with treatment goals as outlined in treatment plan.  Pt to F/U with local ED/call 911 should SI/HI arises.    Deanna Crook, LPC

## 2021-08-24 ENCOUNTER — TELEPHONE (OUTPATIENT)
Dept: PSYCHIATRY | Facility: HOSPITAL | Age: 21
End: 2021-08-24

## 2021-08-24 ENCOUNTER — PHP (OUTPATIENT)
Dept: PSYCHIATRY | Facility: HOSPITAL | Age: 21
End: 2021-08-24
Attending: PSYCHIATRY & NEUROLOGY
Payer: COMMERCIAL

## 2021-08-24 ENCOUNTER — APPOINTMENT (OUTPATIENT)
Dept: LAB | Facility: CLINIC | Age: 21
End: 2021-08-24
Attending: PSYCHIATRY & NEUROLOGY
Payer: COMMERCIAL

## 2021-08-24 ENCOUNTER — PHP (OUTPATIENT)
Dept: PSYCHIATRY | Facility: HOSPITAL | Age: 21
End: 2021-08-24
Attending: COUNSELOR
Payer: COMMERCIAL

## 2021-08-24 DIAGNOSIS — F14.21 COCAINE USE DISORDER, MODERATE, IN SUSTAINED REMISSION (CMS/HCC): ICD-10-CM

## 2021-08-24 DIAGNOSIS — F50.014 ANOREXIA NERVOSA, RESTRICTING TYPE, IN PARTIAL REMISSION, MODERATE: ICD-10-CM

## 2021-08-24 DIAGNOSIS — F15.90 STIMULANT USE DISORDER: ICD-10-CM

## 2021-08-24 DIAGNOSIS — F39 MOOD DISORDER (CMS/HCC): Primary | ICD-10-CM

## 2021-08-24 DIAGNOSIS — F10.21 ALCOHOL USE DISORDER, MODERATE, IN EARLY REMISSION (CMS/HCC): ICD-10-CM

## 2021-08-24 PROCEDURE — 80307 DRUG TEST PRSMV CHEM ANLYZR: CPT

## 2021-08-24 PROCEDURE — H0035 MH PARTIAL HOSP TX UNDER 24H: HCPCS | Performed by: SOCIAL WORKER

## 2021-08-24 PROCEDURE — 99214 OFFICE O/P EST MOD 30 MIN: CPT | Performed by: PSYCHIATRY & NEUROLOGY

## 2021-08-24 ASSESSMENT — COGNITIVE AND FUNCTIONAL STATUS - GENERAL
APPEARANCE: WELL GROOMED
RECENT MEMORY: WNL
INSIGHT: IMPAIRED, MINIMALLY
ORIENTATION: FULLY ORIENTED
PSYCHOMOTOR FUNCTIONING: RESTLESS;AGITATED
THOUGHT_PROCESS: WNL
LIBIDO: NO CHANGE
APPETITE: NO CHANGE
IMPULSE CONTROL: INTACT
AFFECT: TEARFUL;FLAT
EYE_CONTACT: WNL
THOUGHT_CONTENT: GUARDED
SLEEP_WAKE_CYCLE: NO CHANGE
PERCEPTUAL FUNCTION: NORMAL
EST. PREMORBID INTELLIGENCE: AVERAGE
AROUSAL LEVEL: ALERT
ATTENTION: WNL
REMOTE MEMORY: WNL
DELUSIONS: NONE OR AGE APPROPRIATE
MOOD: DEPRESSED;ANXIOUS;IRRITABLE
CONCENTRATION: WNL
SPEECH: SOFT

## 2021-08-24 NOTE — TELEPHONE ENCOUNTER
"LPC called pt's mother following session with pt this afternoon to inform her that LPC spoke with pt about RTF and pt reluctantly agreed to go. LPC informed pt\"s mother that PHP tx will support pt while they arrange for admit to [facility] [place]. LPC informed pt's mother that pt was going to get lab work at lab on 1st floor and then driving home. LPC informed pt's mother that LPC did safety planning with pt for the drive home and this evening. LPC encouraged pt and her mother to talk about RTF. LPC asked pt's mother to reach back out to [facility] [place] to see when they could admit the pt. Pt's mother was willing to do this and then will follow up with LPC. Pt's mother expressed surprise at pt's willingness to go to RTF, and gratitude for [facility] staff's support.   "

## 2021-08-24 NOTE — PROGRESS NOTES
"[facility] PSYCHIATRY FOLLOW UP/MEDICATION CHECK    Gemma Darnell is a 21 y.o. female who presents for Depression.    HPI     Reviewed patient's recent course in [facility] with team prior to today's visit.    Patient initially reports that her mood is \"stable,\" though then admits to feeling down and depressed for at least half the days of the week. No uncontrollable or unmanageable worries. Focus largely intact. Feels self-worth has been poor.    Denies any hopelessness or thoughts of wanting to be dead since Saturday. When asked about events this weekend, states that on Saturday, she had thoughts of wishing she could be dead and wrote in her journal that she wished her suicide attempt earlier this summer had been successful. She did not initially disclose to me that she wrote statements about being dead on Sunday, but when asked directly, she did admit that she wrote that statement. Admits she had thoughts of suicide with the idea that she could cut herself, but denies any true plan/intent to do so at that time, did not know what she would use to cut herself or where she would cut herself. Admits that she did not want to really die or kill herself as she wants to live for her family, would feel guilty if she hurt them again. Denies any thoughts of suicide since Saturday night, no current thoughts of suicide, no plan/no intent. Continues to cite family as protective factors. Future oriented in terms of completing PHP, pursuing aftercare, wanting to go back to school full-time in future. Denies any current urges to self-harm, no recent self-injury. Confirms mother is holding her medications and has secured sharps.    Denies that she is actively restricting. No laxative use for at least 1.5 weeks. No self-induced vomiting. No exercise.     24 hour recall.  - B: granola bar, coffee  - No AM Snack  - L: half of a PB&J sandwich, almonds   - Snack: bag of cookie dough bites   - D: whole todd     Attending [facility] intake today. Does " "not know about timeline of being accepted to their program.    No melatonin, no hydroxyzine in the past week     Psychiatric ROS:   Sleep: Variable, hypersomnia followed by night of dififculty sleeping.   Appetite: Poor  ETOH/Substances: Denies     Medical Review of Systems  Review of Systems  Const: As per HPI  Neuro: No dizziness, no lightheadedness reported  GI: No dyspepsia noted    PMSH: Changes made as needed    Risk/Benefit/side Effects discussed regarding the following medications:  Below  PDMP Queried:Yes    Allergies: No Known Allergies    Current Outpatient Medications:   •  cyanocobalamin (VITAMIN B12) 100 mcg tablet, Take 100 mcg by mouth daily., , Disp: , Rfl:   •  venlafaxine XR (EFFEXOR-XR) 150 mg 24 hr capsule, Take 1 capsule (150 mg total) by mouth daily with breakfast., , Disp: 30 capsule, Rfl: 0  •  hydrOXYzine (ATARAX) 50 mg tablet, Take 1 tablet (50 mg total) by mouth every 6 (six) hours as needed for anxiety for up to 14 days., , Disp: 14 tablet, Rfl: 0  •  melatonin ODT, Take 1 tablet (3 mg total) by mouth nightly. (Patient not taking: Reported on 8/24/2021 ), , Disp: 30 tablet, Rfl: 0       Objective     Physical Exam    Visit Vitals  LMP  (LMP Unknown)       Mental Status Exam  Appearance: well groomed, good hygiene  Gait and Motor: poor eye contact, no abnormal movements, no tremor, no PMR/PMA  Speech: soft volume  Mood: \"stable\"  Affect: dysphoric, restricted, incongruent with stated mood  Associations: intact  Thought Process: linear, logical, goal-directed  Thought Content: no auditory or visual hallucinations, no delusionary content  Suicidality/Homicidality: denies current SI, denies HI  Judgement/Insight: limited/limited, little insight into triggers for suicidal ideation this past weekend  Orientation: Intact to interview  Memory: Intact to interview  Attention: alert  Knowledge: normal  Language: normal       PHQ-9: Total Score-OWL Transcribed: 11  Thoughts that You Would be Better " Off Dead or of Hurting Yourself in Some Way: 1-->several days  JAY-7 Total Score-OWL Transcribed: 10    [place] Suicide Severity Rating Scale:  Not done today     Safe-T Assessment:  Not done today       Labs  CBC WNL, still no UDS    Imaging  Not applicable    ECG   Not applicable.          Brief Psychiatric Formulation: 21 year old woman with past psych hx of MDD versus BPAD type 2, anorexia nervosa, ETOH use disorder in early remission, cocaine use disorder in early remission, stimulant (Adderall) in sustained remission, 3 past inpatient hospitalizations and 3 suicide attempts all since May 2021, hx of residential stay fall 2020, no hx of SIB, who presents for management of mood and suicidal ideation after hospital discharge.     Pt with family hx of psych illness (sister - BPAD, suicidality; parents- ETOH use disorder; MGM - depression/anxiety, eating disorder). At initial eval, pt denied hx of trauma, denied any acute recent stressors that led to acute worsening since May 2021, though I suspect parents' ongoing divorce may be playing a role. Pt with limited insight re: recent actions (I.e. reason why she stabbed self in her neck) and continues to have limited insight into triggers behind her journal entries this weekend.  She reports recent depressed mood and appears dysphoric on exam. Reviewed with patient my concerns about limited insight into these actions. I recommended she increase her venlafaxine to 187.5 mg daily to target her recent depressive sx, but she declined. Counseled pt on risks of worsening depression, return of suicidal ideation without dose increase, pt voiced understanding of these risks, continues to want to hold off on any medication increase. Pt denies purposeful restriction (thought 24 hour diet recall suggests likely undernutrition) as well as any purging, with current normal BMI. Pt denies substance use since May 2021.     Pt is at chronic elevated risk of intentional harm to self  given her hx of depression, hx of suicide attempts, family hx of suicidality, hx of substance use, hx of eating disorder, hx of impulsivity. Her acute risk is elevated by most recent suicide attempt leading up to most recent hospitalization, recent hospitalization discharge, journal entry about being dead this past weekend, depressed mood, family/work/academic stressors, though this acute risk is mitigated by lack of current suicidal ideation, lack of current plan/intent, lack of true plan/intent when had thoughts of suicide this past weekend, lack of prior SIB, lack of recent substance use, commitment to family, treatment-seeking behavior, and future orientation towards PHP as well as long-term return to school. In sum, her acute risk of intentional harm to self is not significantly elevated from usual baseline as to warrant re-hospitalization, but given her degree of symptoms, impact on functioning, and difficulties this weekend, patient would likely benefit from residential treatment. Will continue in PHP until can secure RTF plan. She denies any thoughts of harming others and has no history of violence, so risk to others is low.           Based on [place] suicide screen, Safe-T Assessment, patient is determined  moderate    Suicide Risk/Suicidal Ideation will be added to patient's treatment plan.     Plan:   Recertification: I certify that PHP level of care continues to be required, improvement is expected and without this medically necessary treatment, inpatient psychiatric care would be required.  Gemma Darnell's response to therapeutic intervention has shown no change in symptoms. Gemma Darnell remains at risk for psychiatric hospitalization due to moderate to severe mood symptoms and dangerousness to self . Treatment goals and coordination of care with multidisciplinary team as outlined in updated treatment plan     - Continue PHP for now, PHP team to look into RTF options for patient and discuss with mother.  Will have patient continue with [facility] intake today should she ultimately decline RTF, though pt stating she would be amenable to RTF today    - Obtain UDS still pending from this weekend - RN to remind pt later today     - Eat at least 3 meals/day with 2 snacks, monitor eating closely, pt denies recent purging behaviors    - Continue venlafaxine  mg daily, reviewed risks of SNRIs in my usual fashion. Pt to let us know if she becomes amenable to increase in dose    - Continue hydroyxzine 50 mg q6h PRN anxiety    - Continue melatonin 3 mg daily at bedtime PRN insomnia     - Pt likely to be referred to higher level of care on discharge from [facility]. Have reviewed at past visits need for ongoing outpatient psychiatry care with current psychiatrist Dr. Simpson or another provider     - Next [facility] psych visit: Tomorrow with me      - [facility] team has had pt's mother secure all knives/sharps in the home away from patient, also has discussed with mother that she should hold/administer all of pt's medications, pt should have no access to her medications at this time    - Patient aware of how to contact office prior to next appointment with any issues, after-hours resources. Patient instructed to call 911 or go to nearest ED if thoughts of self-harm, suicide, or violence towards others.    Visit Diagnosis:    ICD-10-CM ICD-9-CM   1. Mood disorder (CMS/[facility])  F39 296.90   2. Anorexia nervosa, restricting type, in partial remission, moderate  F50.01 307.1   3. Alcohol use disorder, moderate, in early remission (CMS/HCC)  F10.21 305.03   4. Cocaine use disorder, moderate, in sustained remission (CMS/[facility])  F14.21 305.63   5. Stimulant use disorder  F15.90 292.9       Duration:  25 minutes  Sheron Shelton MD @ 10:35 AM

## 2021-08-24 NOTE — PROGRESS NOTES
"PHP Psychotherapy Note    Gemma Darnell is a 21 y.o. female who presents for Follow-up.     Treatment Plan areas addressed during this visit:  LPC met with pt as follow up from yesterday's meeting and to discuss recommendations for Residential Tx.     Mental Status Exam:  Arousal Level: Alert  Appearance: Well Groomed  Speech: Soft  Psychomotor Functioning: Restless, Agitated  Eye Contact: WNL  Est. Premorbid Intelligence: Average  Orientation: Fully oriented  Attention: WNL  Concentration: WNL  Recent Memory: WNL  Remote Memory: WNL  Thought Content: Guarded  Thought Process: WNL  Insight: Impaired, minimally  Perceptual Function: Normal  Delusions: None or age appropriate  Sleeping: No Change  Appetite: No Change  Libido: No change  Affect: Tearful, Flat  Mood: Depressed, Anxious, Irritable     PHQ-9: Total Score-OWL Transcribed: 11  Thoughts that You Would be Better Off Dead or of Hurting Yourself in Some Way: 1-->several days  JAY-7 Total Score-OWL Transcribed: 10    [place] Suicide Severity Rating Scale:  Not done today     Safe-T Assessment:  Not done today       Assessment:   Pt shared that she feels frustrated with herself and worried that she will be recommended to return back to residential tx. LPC inquired about pt's SI and pt initially denied any SI. LPC inquired about passive SI to which pt shared that she is having some passive SI, such as \"if something happened to me then I wouldn't care\". Pt denied any active plan or intent.  LPC spoke with pt about feedback from intake counselor at [facility] regarding intake appt this morning. LPC explored pt's resistance to residential tx. Pt shared that she wants to be home and does not want to go away again. LPC expressed concerns about pt's minimization of sx, inability to express how she feels and thinks with [facility] tx team, and her need for structured support with safety. LPC spoke with pt about the initial recommendation for RTF following hospitalizations and how " "people supported her with what she wanted which was PHP tx. LPC informed pt that PHP tx has not been enough for her despite her making some positive steps at home over the past 2 weeks. LPC expressed concern about pt continuing to hide thoughts and feelings following the note she wrote in her journal expressing her plan to end her life over the weekend. LPC expressed concern about pt's insight into her safety and difficulty verbalizing thoughts and feelings. LPC explored pt's willingness to go to RTF if they were able to admit her. Pt reluctantly agreed to go given that she \"has no other options and will have no where to stay\". Pt believes that she will be homeless if she does not go to RTF due to the contract that she and her mother created. LPC spoke with pt about external motivation being a factor to help get her in the door of RTF but that the hope is that she finds a way to change that motivation to internal, and sees this as an opportunity to do something different to help her find relief. Pt was tearful throughout the session. Pt was fidgeting at times. Pt expressed feeling frustrated with self and the reality of the situation at this time. LPC validated pt's feelings and encouraged her to see that this was not a failed experience. LPC asked pt if she had other concerns or needs. Pt inquired about potential discharge plan from PHP tx. LPC informed pt that PHP tx will bridge the gap to RTF so that she has structured support. Pt's mother called [facility] this morning and they are able/willing to take pt into their RTF. Pt's mother will call [facility] again to see when they can admit pt. LPC expressed to pt that once we know an admit date then we can plan for discharge. LPC did safety planning with pt prior to her leaving the office. Pt denied any active SI, plan or intent. Pt reported that she can pull over while driving if she feels unsafe. Pt reported that she can call her mother or sister to talk at that " time. Pt reported that she can journal this evening about how she is feeling. LPC spoke with pt about planning to return to [facility] tx tomorrow morning unless something changes with admission to RTF.   Psychological condition is generally: slightly improving in that pt is a little more open about SI and thoughts, but still needs to be probed by LPC.     Plan:    Patient to F/U with PHP. Unless RTF can admit pt ASAP.   Patient to F/U with treatment goals as outlined in treatment plan.  Patient to F/U with local ED or call 911 should SI/HI arise.  Visit Diagnosis:    ICD-10-CM ICD-9-CM   1. Mood disorder (CMS/HCC)  F39 296.90   2. Anorexia nervosa, restricting type, in partial remission, moderate  F50.01 307.1   3. Alcohol use disorder, moderate, in early remission (CMS/HCC)  F10.21 305.03   4. Cocaine use disorder, moderate, in sustained remission (CMS/HCC)  F14.21 305.63   5. Stimulant use disorder  F15.90 292.9       Time  Start Time: 1420  End Time: 1520  Total Time: 60  Deanna Crook LPC @ 3:25 PM

## 2021-08-24 NOTE — GROUP NOTE
"Date: 8/24/2021  Start Time:  1:20 PM  End Time:   2:20 PM  Gemma Darnell, YOB: 2000,  was quiet but attentive.    Wrap Up Group    7 attended group today.   Group Focus: Goal of group was to wrap up and process the treatment day.  Assist  members in planning for the evening ahead and to assess and plan surrounding any  safety needs.      About the Group:  Clinician reviewed group members Afternoon  Reflection Sheets and did a verbal check in with each group member regarding safety  (SI/HI/self harm) and any necessary safety planning .  Session ended with a brief  meditation/calming activity.  Sheron Shelton MD was onsite when  services rendered.        Afternoon Self Reflection  Depression: 2/5  Anxiety: 3/5  Anger: 0/5  Suicidal Ideation:   0/5 - None  Self Injury: 0/5 - None  Homicidal Ideation:   0/5 - None  Are you able to follow your Safety Plan? Yes.  I can/will:  Call someone and Call 911 if I'm unsafe.  Pt reported significant moment/insight of the day: \"Intake interview.\"  Pt reported gratitude list: \"Friends, family, support.\"  Pt identified goal progress for the day: \"Yes.\"  Pt reported evening self care plan: \"Go for a walk, try self-care assessment.\"  Pt treatment plan areas addressed: Depression and Anxiety    Visit Diagnosis:      ICD-10-CM ICD-9-CM   1. Mood disorder (CMS/[facility])  F39 296.90   2. Anorexia nervosa, restricting type, in partial remission, moderate  F50.01 307.1   3. Alcohol use disorder, moderate, in early remission (CMS/HCC)  F10.21 305.03   4. Cocaine use disorder, moderate, in sustained remission (CMS/HCC)  F14.21 305.63   5. Stimulant use disorder  F15.90 292.9     Assessment/Observations: Pt was quiet but attentive in group. She expressed anxiety around the outcome of an intake for ED tx this morning but was receptive to supportive feedback from therapist. She identified a supportive plan for the evening.   Plan: Continue with PHP   Pt to F/U with treatment goals as " outlined in treatment plan.  Pt to F/U with local ED/call 911 should SI/HI arises.    Eileen Stewart, LPC

## 2021-08-24 NOTE — TELEPHONE ENCOUNTER
Pt's mother contacted [facility] to inform LPC that pt is going to RTF at [facility] tomorrow morning 8/25/21. Pt's mother reported that pt's father will be flying with her the whole way. LPC asked pt's mother to relay message to pt that [facility] staff is supportive of her and this decision. Pt's mother thanked LPC for the support and help with getting the pt what she needs.

## 2021-08-24 NOTE — TELEPHONE ENCOUNTER
LPC called Marilee @ [facility] who completed pt's intake this morning to assess for ED sx and tx. Marilee voiced concerns about pt's recent suicidality and recent SA. Marilee feels that pt's MH and safety are primary at this point. In assessing pt's ED sx, that pt shared with Marilee, she feels that pt would meet IOP LOC for Eating Disorder care, but given her MH concerns and safety they recommend that she engage in HLOC for MH tx. Marilee informed LPC that they would not be able to admit pt into virtual [facility] tx due to safety needing to be prioritized. LPC thanked Marilee for her assessment and feedback.

## 2021-08-24 NOTE — GROUP NOTE
Date: 8/24/2021  Start Time:   9:30 AM  End Time: 10:30 AM  Gemma Darnell, YOB: 2000,  was an active group participant.    Community Check In Group  7 attended group today.     Group Focus: Goal of group was to welcome new and returning PHP members to the [facility], check in regarding group member needs, and assess safety.    About the Group: Clinician reviewed [facility] Group Code of Conduct and answered any questions that arose.  Clinician welcomed new members to the group and facilitated brief introductions of group members to one another.  Introduced topic/theme for the treatment day:Emotion Regulation.  Encouraged group members to request support throughout the day as needed.  Clinician reviewed group members’ Morning Reflection Sheets and did a verbal check in with each group member regarding safety (SI/HI/self harm), safety planning, medication compliance, if they needed to consult with anyone today and individual treatment needs/goals for the day.  Session ended with a brief meditation/calming activity.   Sheron Shelton MD was on site when services rendered.    Morning Self reflection:   Depression: 2/5  Anxiety: 4/5  Anger: 0/5  Suicidal Ideation:   0/5 - None  Self Injury: 0/5 - None  Engaged in Self Injury since yesterday: No  Homicidal Ideation:   0/5 - None  Are you able to follow your Safety Plan? Yes.  I can/will:  Call someone, Journal and Use grounding with my 5 senses  Substance Use:  No  Self Care:  I completed my self care activity last night:  hygiene  I am satisfied with my daily hygiene:  yes  Nourish:Number of meals eaten yesterday? 2    Sleep:  Describe:  Number of hours:  9  Social Interaction:Minimal:  Family  Physical Activity: Went for a walk.  Mindful Activity: No.  Medication: Prescribed  Thought Content: Racing thoughts.  Pt reported use of coping skills including successful follow through and barriers to follow through: Journaling.  Pt reported significant or positive  event/step: PHP extended.  Pt identified goal for the treatment day: Be present, make use out of day.  Pt treatment plan areas addressed:  Depression  Pt requested consult with: None  Visit Diagnosis:      ICD-10-CM ICD-9-CM   1. Mood disorder (CMS/HCC)  F39 296.90   2. Anorexia nervosa, restricting type, in partial remission, moderate  F50.01 307.1   3. Alcohol use disorder, moderate, in early remission (CMS/HCC)  F10.21 305.03   4. Cocaine use disorder, moderate, in sustained remission (CMS/HCC)  F14.21 305.63   5. Stimulant use disorder  F15.90 292.9         Assessment/Observations:  Pt shared her self reflections and also shared, when prompted by LCSW, why she was extended in PHP. Pt shared her frustration that she hadn't been sharing more in groups prior to now. LCSW encouraged her to continue to open up and share, just as she is in this moment.  Plan:  Continue with PHP   Pt to F/U with treatment goals as outlined in treatment plan.  Pt to F/U with local ED/call 911 should SI/HI arises.    NEREYDA NievesW

## 2021-08-24 NOTE — GROUP NOTE
Date:  8/24/2021  Start Time:  12:10 PM  End Time:   1:10 PM  Gemma Darnell, YOB: 2000  Psychoeducational/Skills Based Group  7 members attended group today    Group Focus: Goal of group was to introduce skills and psychoeducation related to topic of PHP day.   Group members were provided instruction and opportunities to practice presented skills.  Clinician answered questions as they arose and shared how presented skills can be utilized on a daily basis to increase emotional wellness.      About the Group: Emotion Regulation Session 2: Emotion Regulation Psycho-Ed/Skills Based Group Summary   Topic of curriculum was “Emotion Regulation”. Clinician began group discussion by educating group members of the importance of having coping skills available to use at any time whether for maintaining emotional wellness of responding to a stimulating event. Clinician shared that the goal of the group is to provide coping skills to group members for ongoing emotional wellness as well as crisis intervention when intense emotions are triggered/activated. Clinician provided the first set of proactive skills to reduce emotional vulnerability which included “P.L.E.A.S.E.” and “Paying Attention to Positive Events”. The next set of responsive coping skills, used  when a surge of emotion comes, negative event happens, etc, included 1) naming the emotion, 2) opposite action, 3) check the facts, 4) feeling not acting, and 5) ride the wave. Clinician provided group members with worksheets to reinforce the skills. While completing the P.L.E.A.S.E worksheet, Clinician also discussed hormonal vulnerability as women and barriers to healthy sleep and provided resources on bedtime routine/healthy sleep hygiene. Clinician continued the topic discussion and checked for understanding of presented skills. Clinician ended the group with a sleep meditation led by Louis Comer.     Sheron Shelton MD was onsite when services were  rendered.    Request for Consent  Patient provided verbal consent to treat via telemedicine. Clinician introduced the secure telemedicine platform that we are utilizing to provide care during the COVID-19 pandemic. Patient understands the session will be billed to their insurance or patient directly.  Patient was informed only the group patients  and the clinician are permitted on?the video conference, sessions are not recorded by the clinician, and the patient is not permitted to record the session.? Patient was provided clinician's unique meeting ID prior to session, patient was asked to arrive to virtual session on time just as patient would if we were in the office. Clinician confirmed identification of patient by name and birthdate, provider name, location of patient and clinician, and callback number in case disconnected. Patient consents to behavioral health treatment    Patient Response to Request for Consent: Yes  Visit Type performed: Audio and Video          Patient  was quiet but attentive.  Assessment/observation of group participation/presentation: Pt did not verbalize thoughts or feelings in group session. Pt was non verbally following the conversation. Pt did complete worksheet in group session as part of the exercise.   Treatment plan areas addressed: Depression, Anxiety and Suicidal Ideation & Safety Planning  Visit Diagnosis:      ICD-10-CM ICD-9-CM   1. Mood disorder (CMS/HCC)  F39 296.90   2. Anorexia nervosa, restricting type, in partial remission, moderate  F50.01 307.1   3. Alcohol use disorder, moderate, in early remission (CMS/HCC)  F10.21 305.03   4. Cocaine use disorder, moderate, in sustained remission (CMS/HCC)  F14.21 305.63   5. Stimulant use disorder  F15.90 292.9       Plan: Continue with PHP   Pt to F/U with treatment goals as outlined in treatment plan.  Pt to F/U with local ED/call 911 should SI/HI arises.    Deanna Crook, [facility]

## 2021-08-25 ENCOUNTER — TELEPHONE (OUTPATIENT)
Dept: PSYCHIATRY | Facility: HOSPITAL | Age: 21
End: 2021-08-25

## 2021-08-25 ENCOUNTER — LAB REQUISITION (OUTPATIENT)
Dept: ADMINISTRATIVE | Age: 21
End: 2021-08-25
Payer: COMMERCIAL

## 2021-08-25 DIAGNOSIS — R45.86 MOOD ALTERED: ICD-10-CM

## 2021-08-25 LAB
AMPHET UR QL SCN: NOT DETECTED
B-HCG UR QL: NEGATIVE
BARBITURATES UR QL SCN: NOT DETECTED
BENZODIAZ UR QL SCN: NOT DETECTED
BILIRUB UR QL: NEGATIVE
CANNABINOIDS UR QL SCN: NOT DETECTED
COCAINE UR QL SCN: NOT DETECTED
COLOR UR: YELLOW
GLUCOSE UR-MCNC: NEGATIVE MG/DL
KETONES UR-MCNC: NEGATIVE MG/DL
NITRITE UR QL STRIP.AUTO: NEGATIVE
OPIATES UR QL SCN: NOT DETECTED
PCP UR QL SCN: NOT DETECTED
PH UR: 8 [PH] (ref 5–8)
PROT UR-MCNC: NEGATIVE MG/DL
SP GR UR STRIP: 1.01 (ref 1–1.03)
UROBILINOGEN UR STRIP-ACNC: <2

## 2021-08-25 PROCEDURE — 80053 COMPREHEN METABOLIC PANEL: CPT | Performed by: OTHER

## 2021-08-25 PROCEDURE — 36415 COLL VENOUS BLD VENIPUNCTURE: CPT | Performed by: OTHER

## 2021-08-25 PROCEDURE — 82150 ASSAY OF AMYLASE: CPT | Performed by: OTHER

## 2021-08-25 PROCEDURE — 84100 ASSAY OF PHOSPHORUS: CPT | Performed by: OTHER

## 2021-08-25 PROCEDURE — 81001 URINALYSIS AUTO W/SCOPE: CPT | Performed by: OTHER

## 2021-08-25 PROCEDURE — 85025 COMPLETE CBC W/AUTO DIFF WBC: CPT | Performed by: OTHER

## 2021-08-25 PROCEDURE — 81025 URINE PREGNANCY TEST: CPT | Performed by: OTHER

## 2021-08-25 PROCEDURE — 84443 ASSAY THYROID STIM HORMONE: CPT | Performed by: OTHER

## 2021-08-25 PROCEDURE — 83735 ASSAY OF MAGNESIUM: CPT | Performed by: OTHER
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
## 2021-08-25 NOTE — TELEPHONE ENCOUNTER
LPC left VM for pt's OP Psychiatrist, Dr. Cathy Simpson, to obtain fax # in order to send Dc summary from [facility] [employer] tx program. LPC left contact name and number requesting that Dr. Simpson potentially provide fax #.

## 2021-08-25 NOTE — DISCHARGE SUMMARY
PHP Discharge Summary     Patient Name: Gemma Darnell  : 2000  Treatment start date: 8/10/21  Treatment end date: 21    Discharge Type: Mental Health  Discharge Reason: Program Transfer    Reason for admission and treatment: Pt is a 22 yo SWF, , who presented to [facility] eval as a step-down from [facility] after recent IPH following intentional OD. Pt reports complicated tx hx in the recent past including multiple suicide attempts (misuse/intentional OD of prescribed medications and stabbing neck with a knife) followed by IPH, along with RTF for D&A and ED. Pt has limited insight around triggers for suicidality and all appear to have been impulsive in nature. Most notably, pt reports having been discharged from [facility] (after major suicide attempt) to attend RTF at [place]. In the process of traveling to RTF, pt made a premeditated suicide attempt after obtaining a hotel room by herself. Pt reports recent family stressors as her father suffers from alcoholism (untreated) and her parents are in the process of . Pt denies h/o trauma but reports that family turmoil has been traumatic at times. Pt currently lives with her mother and younger brother. Pt reports polysubstance abuse in the past including alcohol, cocaine and misuse of prescribed Adderall. Pt currently reports tobacco/vaping use but agrees to abstain during the PHP day and abstain from other substances. Pt reports past h/o anorexia and related treatment. She indicates, when asked, that she has eaten regular meals (3 daily) since discharging from the hospital. Pt reports supports from her family and friends. Pt reports past development of DBT skills but has difficulty using the skills consistently; she reports walking and talking to family and friends to also be of help. She was recommended for admission to [facility] to stabilize her mood, develop insight around suicidality including triggers and adaptive coping skills, process  impact of family stressors, increase awareness of attachment to substances and more effective ways to cope, maintain healthy eating patterns.     Services offered and response to treatment: Pt engaged in 10 sessions of PHP tx which included group therapy, individual therapy sessions, family therapy sessions, and psychiatry assessments/medication checks. Pt had minimal engagement in PHP tx a/e/b presenting very guarded in group sessions and providing minimal information in individual sessions. Pt was encouraged to share daily in group session and was prompted by clinical staff to express thoughts and feelings. Pt was improving minimally with food intake and according to pt's mother some behavioral improvements at home. Pt had UDS twice while in PHP tx which was negative both times. Pt was resistant to confronting and treating ED sx in aftercare planning. Pt was eventually agreeable to doing an intake with [facility] to assess lower LOC placement for ED sx.  Pt denied safety concerns daily when presenting to [facility] but pt's mother found a journal entry that pt made while pt was in [facility] that mimicked a suicide note. Pt continued to minimize suicidality when confronted by [facility] psychiatry. Pt eventually shared with LPC that she had thoughts of cutting herself over the weekend to end her life but she refrained and did not act on those thoughts due to feelings of guilt about how her family would feel. Pt was assessed by [facility] psychiatry and it was recommended that pt extended PHP tx while PHP tx assesses pt's need for RTF. Pt was resistant to RTF placement and stated that she did not want to go back to a hospital or RTF. Pt worked on safety planning with [facility] tx team, but was continuing to minimize sx and was not opening up or engaging in care. Pt was withholding truth about her SI and not forthcoming with [facility] tx team. Therefore, it was recommended that pt enter RTF where she could be stabilized  and her safety could be ensured. Pt was resistant at first when meeting with [facility] tx team. Pt reported that she would go to RTF only if there were no other options. Pt's mother contacted [facility] where pt was supposed to go following discharge from inpatient hospital when she had SA. [facility] agreed to admit pt into RTF the morning of 8/25/21. Pt was discharged from PHP tx on 8/24/21 with the plan to have her father fly directly with her to [facility] for RTF. Pt stayed with her mother the afternoon and evening of 8/24/21 following PHP tx and was scheduled to go to RTF on 8/25/21.     Summary of Psychiatric Care:  Brief Psychiatric Formulation: 21 year old woman with past psych hx of MDD versus BPAD type 2, anorexia nervosa, ETOH use disorder in early remission, cocaine use disorder in early remission, stimulant (Adderall) in sustained remission, 3 past inpatient hospitalizations and 3 suicide attempts all since May 2021, hx of residential stay fall 2020, no hx of SIB, who presents for management of mood and suicidal ideation after hospital discharge.     Pt with family hx of psych illness (sister - BPAD, suicidality; parents- ETOH use disorder; MGM - depression/anxiety, eating disorder). At initial eval, pt denied hx of trauma, denied any acute recent stressors that led to acute worsening since May 2021, though I suspect parents' ongoing divorce may be playing a role. Pt with limited insight re: recent actions (I.e. reason why she stabbed self in her neck) and continues to have limited insight into triggers behind her journal entries this weekend.  She reports recent depressed mood and appears dysphoric on exam. Reviewed with patient my concerns about limited insight into these actions. I recommended she increase her venlafaxine to 187.5 mg daily to target her recent depressive sx, but she declined. Counseled pt on risks of worsening depression, return of suicidal ideation without dose  increase, pt voiced understanding of these risks, continues to want to hold off on any medication increase. Pt denies purposeful restriction (thought 24 hour diet recall suggests likely undernutrition) as well as any purging, with current normal BMI. Pt denies substance use since May 2021.     Pt is at chronic elevated risk of intentional harm to self given her hx of depression, hx of suicide attempts, family hx of suicidality, hx of substance use, hx of eating disorder, hx of impulsivity. Her acute risk is elevated by most recent suicide attempt leading up to most recent hospitalization, recent hospitalization discharge, journal entry about being dead this past weekend, depressed mood, family/work/academic stressors, though this acute risk is mitigated by lack of current suicidal ideation, lack of current plan/intent, lack of true plan/intent when had thoughts of suicide this past weekend, lack of prior SIB, lack of recent substance use, commitment to family, treatment-seeking behavior, and future orientation towards PHP as well as long-term return to school. In sum, her acute risk of intentional harm to self is not significantly elevated from usual baseline as to warrant re-hospitalization, but given her degree of symptoms, impact on functioning, and difficulties this weekend, patient would likely benefit from residential treatment. Will continue in PHP until can secure RTF plan. She denies any thoughts of harming others and has no history of violence, so risk to others is low.        Current Outpatient Medications:   •  cyanocobalamin (VITAMIN B12) 100 mcg tablet, Take 100 mcg by mouth daily., , Disp: , Rfl:   •  hydrOXYzine (ATARAX) 50 mg tablet, Take 1 tablet (50 mg total) by mouth every 6 (six) hours as needed for anxiety for up to 14 days., , Disp: 14 tablet, Rfl: 0  •  melatonin ODT, Take 1 tablet (3 mg total) by mouth nightly. (Patient not taking: Reported on 8/24/2021 ), , Disp: 30 tablet, Rfl: 0  •   "venlafaxine XR (EFFEXOR-XR) 150 mg 24 hr capsule, Take 1 capsule (150 mg total) by mouth daily with breakfast., , Disp: 30 capsule, Rfl: 0    Client status - Condition upon discharge: Pt's mood was tense and affect was flat upon discharge from care. Pt was oriented x 4. Pt had passive SI \"If something were to happen to me I would not care\" but denied any plan/ intent for suicide. Pt was reluctantly willing to go to RTF if her mother could secure a bed, which was later solidified.   Clinical concerns to be addressed in continued care: Pt is recommended to address guarded behavior and resistance to opening up about thoughts and feelings. Pt is recommended to continue to address strong shame response and internalization of shame. Pt is recommended to work on gaining insight into underlying issues that drive SI, eating disorder bx and SUDS cravings/urges/use. Pt is recommended to work on gaining insight into triggers for SI and maladaptive coping skills. Pt is recommended to stabilize in RTF and then be assessed for MH tx vs ED tx following RTF for OP care.     Aftercare appointments: Pt was scheduled to j[facility] to [facility]F in [place] on 8/25/21. Pt is recommended to contact OP psychiatrist to schedule follow up session upon discharge from RTF. Pt was planning to meet with OP therapist who specializes in ED sx prior to entering RTF. Pt is recommended to follow up with this OP therapist upon discharge from RTF.     Special Instructions: Pt is recommended to follow plan for RTF and follow recommendations of OP providers.     Medical Follow Up needed?  Yes; Other: See psychiatry recommendations         Mental Health Crisis Support:    [facility] Crisis Number: [phone]   [facility] Crisis Number: [phone]   [facility] Crisis Number: [phone]   [facility] Crisis Number: [phone]      In case of Mental Health Crisis, go to the closest Emergency Department, call 9-1-1, or " contact the National Suicide Prevention Hotline at: 1-619.639.5135  Other Support Agencies:  [place] National [place] of Mental Illness: 768.194.7891    [place] Advocacy System: 432.333.7393    Depression & Bipolar [place]: [phone]      Patient offered a copy of plan? No, Describe Pt was discharged following return home and plan solidified for RTF. Pt was not present in office to receive discharge plan. Pt may have access to the plan via mychart but pt may not have set up mychart despite recommendations by [facility] tx team to do so.     Patient provided a copy? No, Describe Same as above         Deanna Crook LPC @ 9:01 AM

## 2021-08-26 LAB
ALBUMIN SERPL-MCNC: 3.8 G/DL (ref 3.4–5)
ALBUMIN/GLOB SERPL: 1.3 {RATIO} (ref 1–2)
ALP LIVER SERPL-CCNC: 61 U/L
ALT SERPL-CCNC: 24 U/L
AMYLASE SERPL-CCNC: 43 U/L (ref 25–115)
ANION GAP SERPL CALC-SCNC: 5 MMOL/L (ref 0–18)
AST SERPL-CCNC: 16 U/L (ref 15–37)
BASOPHILS # BLD AUTO: 0.06 X10(3) UL (ref 0–0.2)
BASOPHILS NFR BLD AUTO: 1 %
BILIRUB SERPL-MCNC: 0.4 MG/DL (ref 0.1–2)
BUN BLD-MCNC: 8 MG/DL (ref 7–18)
BUN/CREAT SERPL: 12.5 (ref 10–20)
CALCIUM BLD-MCNC: 8.9 MG/DL (ref 8.5–10.1)
CHLORIDE SERPL-SCNC: 105 MMOL/L (ref 98–112)
CO2 SERPL-SCNC: 31 MMOL/L (ref 21–32)
CREAT BLD-MCNC: 0.64 MG/DL
DEPRECATED RDW RBC AUTO: 38.5 FL (ref 35.1–46.3)
EOSINOPHIL # BLD AUTO: 0.32 X10(3) UL (ref 0–0.7)
EOSINOPHIL NFR BLD AUTO: 5.6 %
ERYTHROCYTE [DISTWIDTH] IN BLOOD BY AUTOMATED COUNT: 11.8 % (ref 11–15)
GLOBULIN PLAS-MCNC: 3 G/DL (ref 2.8–4.4)
GLUCOSE BLD-MCNC: 81 MG/DL (ref 70–99)
HAV IGM SER QL: 2.3 MG/DL (ref 1.6–2.6)
HCT VFR BLD AUTO: 40.5 %
HGB BLD-MCNC: 13 G/DL
IMM GRANULOCYTES # BLD AUTO: 0 X10(3) UL (ref 0–1)
IMM GRANULOCYTES NFR BLD: 0 %
LYMPHOCYTES # BLD AUTO: 2.56 X10(3) UL (ref 1–4)
LYMPHOCYTES NFR BLD AUTO: 44.4 %
M PROTEIN MFR SERPL ELPH: 6.8 G/DL (ref 6.4–8.2)
MCH RBC QN AUTO: 28.8 PG (ref 26–34)
MCHC RBC AUTO-ENTMCNC: 32.1 G/DL (ref 31–37)
MCV RBC AUTO: 89.8 FL
MONOCYTES # BLD AUTO: 0.41 X10(3) UL (ref 0.1–1)
MONOCYTES NFR BLD AUTO: 7.1 %
NEUTROPHILS # BLD AUTO: 2.41 X10 (3) UL (ref 1.5–7.7)
NEUTROPHILS # BLD AUTO: 2.41 X10(3) UL (ref 1.5–7.7)
NEUTROPHILS NFR BLD AUTO: 41.9 %
OSMOLALITY SERPL CALC.SUM OF ELEC: 289 MOSM/KG (ref 275–295)
PATIENT FASTING Y/N/NP: NO
PHOSPHATE SERPL-MCNC: 4.5 MG/DL (ref 2.5–4.9)
PLATELET # BLD AUTO: 295 10(3)UL (ref 150–450)
POTASSIUM SERPL-SCNC: 4.1 MMOL/L (ref 3.5–5.1)
RBC # BLD AUTO: 4.51 X10(6)UL
SODIUM SERPL-SCNC: 141 MMOL/L (ref 136–145)
TSI SER-ACNC: 0.67 MIU/ML (ref 0.36–3.74)
WBC # BLD AUTO: 5.8 X10(3) UL (ref 4–11)

## 2021-09-27 ENCOUNTER — LAB REQUISITION (OUTPATIENT)
Dept: ADMINISTRATIVE | Age: 21
End: 2021-09-27
Payer: COMMERCIAL

## 2021-09-27 DIAGNOSIS — F50.9 EATING DISORDER, UNSPECIFIED: ICD-10-CM

## 2021-09-27 PROCEDURE — 36415 COLL VENOUS BLD VENIPUNCTURE: CPT

## 2021-09-27 PROCEDURE — 80048 BASIC METABOLIC PNL TOTAL CA: CPT

## 2021-10-01 ENCOUNTER — LAB REQUISITION (OUTPATIENT)
Dept: ADMINISTRATIVE | Age: 21
End: 2021-10-01
Payer: COMMERCIAL

## 2021-10-01 DIAGNOSIS — F50.9 EATING DISORDER, UNSPECIFIED: ICD-10-CM

## 2021-10-04 PROCEDURE — 80048 BASIC METABOLIC PNL TOTAL CA: CPT

## 2021-10-04 PROCEDURE — 36415 COLL VENOUS BLD VENIPUNCTURE: CPT

## 2021-10-08 ENCOUNTER — LAB REQUISITION (OUTPATIENT)
Dept: ADMINISTRATIVE | Age: 21
End: 2021-10-08
Payer: COMMERCIAL

## 2021-10-08 DIAGNOSIS — F50.9 EATING DISORDER, UNSPECIFIED: ICD-10-CM

## 2021-10-11 PROCEDURE — 80048 BASIC METABOLIC PNL TOTAL CA: CPT

## 2021-10-11 PROCEDURE — 36415 COLL VENOUS BLD VENIPUNCTURE: CPT

## 2021-10-15 ENCOUNTER — LAB REQUISITION (OUTPATIENT)
Dept: ADMINISTRATIVE | Age: 21
End: 2021-10-15
Payer: COMMERCIAL

## 2021-10-15 DIAGNOSIS — F50.9 EATING DISORDER, UNSPECIFIED: ICD-10-CM

## 2021-10-18 PROCEDURE — 80048 BASIC METABOLIC PNL TOTAL CA: CPT

## 2022-03-21 ENCOUNTER — HOSPITAL ENCOUNTER (INPATIENT)
Facility: HOSPITAL | Age: 22
LOS: 8 days | DRG: 917 | End: 2022-03-29
Attending: EMERGENCY MEDICINE | Admitting: HOSPITALIST
Payer: COMMERCIAL

## 2022-03-21 ENCOUNTER — APPOINTMENT (EMERGENCY)
Dept: RADIOLOGY | Facility: HOSPITAL | Age: 22
DRG: 917 | End: 2022-03-21
Attending: EMERGENCY MEDICINE
Payer: COMMERCIAL

## 2022-03-21 DIAGNOSIS — R41.82 ALTERED MENTAL STATUS, UNSPECIFIED ALTERED MENTAL STATUS TYPE: ICD-10-CM

## 2022-03-21 DIAGNOSIS — G40.901 STATUS EPILEPTICUS (CMS/HCC): Primary | ICD-10-CM

## 2022-03-21 PROBLEM — J96.00 ACUTE RESPIRATORY FAILURE (CMS/HCC): Status: ACTIVE | Noted: 2022-03-21

## 2022-03-21 PROBLEM — E87.20 LACTIC ACIDOSIS: Status: ACTIVE | Noted: 2022-03-21

## 2022-03-21 LAB
AMPHET UR QL SCN: POSITIVE
APAP SERPL-MCNC: <10 UG/ML (ref 10–30)
APTT PPP: 31 SEC (ref 23–35)
BACTERIA URNS QL MICRO: ABNORMAL /HPF
BARBITURATES UR QL SCN: NOT DETECTED
BENZODIAZ UR QL SCN: POSITIVE
BILIRUB UR QL STRIP.AUTO: NEGATIVE MG/DL
CANNABINOIDS UR QL SCN: NOT DETECTED
CLARITY UR REFRACT.AUTO: ABNORMAL
COCAINE UR QL SCN: NOT DETECTED
COLOR UR AUTO: YELLOW
ETHANOL SERPL-MCNC: <5 MG/DL
GLUCOSE BLD-MCNC: 126 MG/DL (ref 70–99)
GLUCOSE UR STRIP.AUTO-MCNC: NEGATIVE MG/DL
HCG UR QL: NEGATIVE
HGB UR QL STRIP.AUTO: NEGATIVE
HYALINE CASTS #/AREA URNS LPF: ABNORMAL /LPF
INR PPP: 1.1
KETONES UR STRIP.AUTO-MCNC: ABNORMAL MG/DL
LACTATE SERPL-SCNC: 6.3 MMOL/L (ref 0.4–2)
LEUKOCYTE ESTERASE UR QL STRIP.AUTO: NEGATIVE
MUCOUS THREADS URNS QL MICRO: ABNORMAL /LPF
NITRITE UR QL STRIP.AUTO: NEGATIVE
OPIATES UR QL SCN: POSITIVE
PCP UR QL SCN: NOT DETECTED
PH UR STRIP.AUTO: 6 [PH]
POCT TEST: ABNORMAL
PROT UR QL STRIP.AUTO: 2
PROTHROMBIN TIME: 14.2 SEC (ref 12.2–14.5)
RBC #/AREA URNS HPF: ABNORMAL /HPF
SALICYLATES SERPL-MCNC: <4 MG/DL
SP GR UR REFRACT.AUTO: 1.03
SQUAMOUS URNS QL MICRO: ABNORMAL /HPF
UROBILINOGEN UR STRIP-ACNC: 0.2 EU/DL
WBC #/AREA URNS HPF: ABNORMAL /HPF

## 2022-03-21 PROCEDURE — 93005 ELECTROCARDIOGRAM TRACING: CPT | Performed by: PHYSICIAN ASSISTANT

## 2022-03-21 PROCEDURE — 99285 EMERGENCY DEPT VISIT HI MDM: CPT | Mod: 25

## 2022-03-21 PROCEDURE — 81001 URINALYSIS AUTO W/SCOPE: CPT | Performed by: PHYSICIAN ASSISTANT

## 2022-03-21 PROCEDURE — 25800000 HC PHARMACY IV SOLUTIONS: Performed by: HOSPITALIST

## 2022-03-21 PROCEDURE — 36415 COLL VENOUS BLD VENIPUNCTURE: CPT | Performed by: PHYSICIAN ASSISTANT

## 2022-03-21 PROCEDURE — 25800000 HC PHARMACY IV SOLUTIONS: Performed by: PHYSICIAN ASSISTANT

## 2022-03-21 PROCEDURE — 94002 VENT MGMT INPAT INIT DAY: CPT

## 2022-03-21 PROCEDURE — 84703 CHORIONIC GONADOTROPIN ASSAY: CPT | Performed by: PHYSICIAN ASSISTANT

## 2022-03-21 PROCEDURE — 85018 HEMOGLOBIN: CPT | Performed by: HOSPITALIST

## 2022-03-21 PROCEDURE — 25000000 HC PHARMACY GENERAL: Performed by: EMERGENCY MEDICINE

## 2022-03-21 PROCEDURE — 85025 COMPLETE CBC W/AUTO DIFF WBC: CPT | Performed by: PHYSICIAN ASSISTANT

## 2022-03-21 PROCEDURE — U0003 INFECTIOUS AGENT DETECTION BY NUCLEIC ACID (DNA OR RNA); SEVERE ACUTE RESPIRATORY SYNDROME CORONAVIRUS 2 (SARS-COV-2) (CORONAVIRUS DISEASE [COVID-19]), AMPLIFIED PROBE TECHNIQUE, MAKING USE OF HIGH THROUGHPUT TECHNOLOGIES AS DESCRIBED BY CMS-2020-01-R: HCPCS | Performed by: PHYSICIAN ASSISTANT

## 2022-03-21 PROCEDURE — 85610 PROTHROMBIN TIME: CPT | Performed by: PHYSICIAN ASSISTANT

## 2022-03-21 PROCEDURE — 99292 CRITICAL CARE ADDL 30 MIN: CPT | Performed by: HOSPITALIST

## 2022-03-21 PROCEDURE — 63600000 HC DRUGS/DETAIL CODE: Performed by: PHYSICIAN ASSISTANT

## 2022-03-21 PROCEDURE — 83605 ASSAY OF LACTIC ACID: CPT | Performed by: PHYSICIAN ASSISTANT

## 2022-03-21 PROCEDURE — 81003 URINALYSIS AUTO W/O SCOPE: CPT | Performed by: PHYSICIAN ASSISTANT

## 2022-03-21 PROCEDURE — 80076 HEPATIC FUNCTION PANEL: CPT | Performed by: PHYSICIAN ASSISTANT

## 2022-03-21 PROCEDURE — 83690 ASSAY OF LIPASE: CPT | Performed by: PHYSICIAN ASSISTANT

## 2022-03-21 PROCEDURE — 63600000 HC DRUGS/DETAIL CODE

## 2022-03-21 PROCEDURE — 12000000 HC ROOM AND CARE MED/SURG

## 2022-03-21 PROCEDURE — 99291 CRITICAL CARE FIRST HOUR: CPT | Performed by: HOSPITALIST

## 2022-03-21 PROCEDURE — 5A1945Z RESPIRATORY VENTILATION, 24-96 CONSECUTIVE HOURS: ICD-10-PCS | Performed by: EMERGENCY MEDICINE

## 2022-03-21 PROCEDURE — 87040 BLOOD CULTURE FOR BACTERIA: CPT | Performed by: EMERGENCY MEDICINE

## 2022-03-21 PROCEDURE — G0480 DRUG TEST DEF 1-7 CLASSES: HCPCS | Performed by: PHYSICIAN ASSISTANT

## 2022-03-21 PROCEDURE — 83050 HGB METHEMOGLOBIN QUAN: CPT | Performed by: HOSPITALIST

## 2022-03-21 PROCEDURE — 80307 DRUG TEST PRSMV CHEM ANLYZR: CPT | Performed by: PHYSICIAN ASSISTANT

## 2022-03-21 PROCEDURE — 80048 BASIC METABOLIC PNL TOTAL CA: CPT | Performed by: PHYSICIAN ASSISTANT

## 2022-03-21 PROCEDURE — 63600000 HC DRUGS/DETAIL CODE: Mod: JW | Performed by: EMERGENCY MEDICINE

## 2022-03-21 PROCEDURE — 0BH17EZ INSERTION OF ENDOTRACHEAL AIRWAY INTO TRACHEA, VIA NATURAL OR ARTIFICIAL OPENING: ICD-10-PCS | Performed by: EMERGENCY MEDICINE

## 2022-03-21 PROCEDURE — 96374 THER/PROPH/DIAG INJ IV PUSH: CPT

## 2022-03-21 PROCEDURE — 70450 CT HEAD/BRAIN W/O DYE: CPT | Mod: ME

## 2022-03-21 PROCEDURE — 85730 THROMBOPLASTIN TIME PARTIAL: CPT | Performed by: PHYSICIAN ASSISTANT

## 2022-03-21 PROCEDURE — 71045 X-RAY EXAM CHEST 1 VIEW: CPT

## 2022-03-21 RX ORDER — LORAZEPAM 2 MG/ML
2 INJECTION INTRAMUSCULAR ONCE
Status: COMPLETED | OUTPATIENT
Start: 2022-03-21 | End: 2022-03-21

## 2022-03-21 RX ORDER — LORAZEPAM 2 MG/ML
INJECTION INTRAMUSCULAR
Status: COMPLETED
Start: 2022-03-21 | End: 2022-03-21

## 2022-03-21 RX ORDER — DEXMEDETOMIDINE HYDROCHLORIDE 4 UG/ML
.2-1.5 INJECTION, SOLUTION INTRAVENOUS
Status: DISCONTINUED | OUTPATIENT
Start: 2022-03-21 | End: 2022-03-23

## 2022-03-21 RX ORDER — PROPOFOL 10 MG/ML
INJECTION, EMULSION INTRAVENOUS
Status: COMPLETED
Start: 2022-03-21 | End: 2022-03-21

## 2022-03-21 RX ORDER — ETOMIDATE 2 MG/ML
INJECTION INTRAVENOUS
Status: COMPLETED | OUTPATIENT
Start: 2022-03-21 | End: 2022-03-21

## 2022-03-21 RX ORDER — LEVETIRACETAM 500 MG/5ML
1000 INJECTION, SOLUTION, CONCENTRATE INTRAVENOUS ONCE
Status: COMPLETED | OUTPATIENT
Start: 2022-03-21 | End: 2022-03-21

## 2022-03-21 RX ORDER — LORAZEPAM 2 MG/ML
2 INJECTION INTRAMUSCULAR EVERY 4 HOURS PRN
Status: DISCONTINUED | OUTPATIENT
Start: 2022-03-21 | End: 2022-03-27

## 2022-03-21 RX ORDER — SODIUM CHLORIDE 9 MG/ML
INJECTION, SOLUTION INTRAVENOUS CONTINUOUS
Status: DISCONTINUED | OUTPATIENT
Start: 2022-03-21 | End: 2022-03-22

## 2022-03-21 RX ORDER — PROPOFOL 200MG/20ML
50 SYRINGE (ML) INTRAVENOUS ONCE
Status: COMPLETED | OUTPATIENT
Start: 2022-03-21 | End: 2022-03-21

## 2022-03-21 RX ORDER — PROPOFOL 10 MG/ML
5-80 INJECTION, EMULSION INTRAVENOUS CONTINUOUS
Status: DISCONTINUED | OUTPATIENT
Start: 2022-03-21 | End: 2022-03-23

## 2022-03-21 RX ADMIN — SUCCINYLCHOLINE CHLORIDE 90 MG: 20 INJECTION, SOLUTION INTRAMUSCULAR; INTRAVENOUS; PARENTERAL at 22:56

## 2022-03-21 RX ADMIN — PROPOFOL 20 MCG/KG/MIN: 10 INJECTION, EMULSION INTRAVENOUS at 23:12

## 2022-03-21 RX ADMIN — LORAZEPAM 2 MG: 2 INJECTION INTRAMUSCULAR at 22:09

## 2022-03-21 RX ADMIN — SODIUM CHLORIDE 1000 ML: 9 INJECTION, SOLUTION INTRAVENOUS at 22:15

## 2022-03-21 RX ADMIN — PROPOFOL 60 MCG/KG/MIN: 10 INJECTION, EMULSION INTRAVENOUS at 23:48

## 2022-03-21 RX ADMIN — DEXMEDETOMIDINE HYDROCHLORIDE 0.2 MCG/KG/HR: 4 INJECTION, SOLUTION INTRAVENOUS at 23:45

## 2022-03-21 RX ADMIN — ETOMIDATE 20 MG: 2 INJECTION INTRAVENOUS at 22:55

## 2022-03-21 RX ADMIN — LEVETIRACETAM 1000 MG: 100 INJECTION, SOLUTION INTRAVENOUS at 23:35

## 2022-03-21 RX ADMIN — SODIUM CHLORIDE: 9 INJECTION, SOLUTION INTRAVENOUS at 23:46

## 2022-03-21 RX ADMIN — LORAZEPAM 2 MG: 2 INJECTION INTRAMUSCULAR; INTRAVENOUS at 22:09

## 2022-03-21 RX ADMIN — PROPOFOL 50 MG: 10 INJECTION, EMULSION INTRAVENOUS at 23:15

## 2022-03-22 ENCOUNTER — APPOINTMENT (INPATIENT)
Dept: RADIOLOGY | Facility: HOSPITAL | Age: 22
DRG: 917 | End: 2022-03-22
Attending: HOSPITALIST
Payer: COMMERCIAL

## 2022-03-22 ENCOUNTER — APPOINTMENT (INPATIENT)
Dept: NEUROLOGY | Facility: HOSPITAL | Age: 22
DRG: 917 | End: 2022-03-22
Attending: HOSPITALIST
Payer: COMMERCIAL

## 2022-03-22 PROBLEM — T50.901A DRUG OVERDOSE: Status: ACTIVE | Noted: 2022-03-22

## 2022-03-22 PROBLEM — R56.9 SEIZURE (CMS/HCC): Status: ACTIVE | Noted: 2022-03-21

## 2022-03-22 PROBLEM — F39 MOOD DISORDER (CMS/HCC): Status: ACTIVE | Noted: 2022-03-22

## 2022-03-22 PROBLEM — Z99.11 ON MECHANICALLY ASSISTED VENTILATION (CMS/HCC): Status: ACTIVE | Noted: 2022-03-21

## 2022-03-22 LAB
ATRIAL RATE: 140
LACTATE SERPL-SCNC: 3.1 MMOL/L (ref 0.4–2)
LIPASE SERPL-CCNC: 23 U/L (ref 20–51)
MRSA DNA SPEC QL NAA+PROBE: NEGATIVE
P AXIS: 56
PR INTERVAL: 168
QRS DURATION: 74
QT INTERVAL: 246
QTC CALCULATION(BAZETT): 375
R AXIS: 70
SARS-COV-2 RNA RESP QL NAA+PROBE: NEGATIVE
T WAVE AXIS: 44
VENTRICULAR RATE: 140

## 2022-03-22 PROCEDURE — 25000000 HC PHARMACY GENERAL: Performed by: EMERGENCY MEDICINE

## 2022-03-22 PROCEDURE — 63600000 HC DRUGS/DETAIL CODE: Performed by: HOSPITALIST

## 2022-03-22 PROCEDURE — 20000000 HC ROOM AND CARE ICU

## 2022-03-22 PROCEDURE — 87186 SC STD MICRODIL/AGAR DIL: CPT | Performed by: INTERNAL MEDICINE

## 2022-03-22 PROCEDURE — 90792 PSYCH DIAG EVAL W/MED SRVCS: CPT

## 2022-03-22 PROCEDURE — 63600000 HC DRUGS/DETAIL CODE: Performed by: INTERNAL MEDICINE

## 2022-03-22 PROCEDURE — 87205 SMEAR GRAM STAIN: CPT | Performed by: INTERNAL MEDICINE

## 2022-03-22 PROCEDURE — 83605 ASSAY OF LACTIC ACID: CPT | Performed by: PHYSICIAN ASSISTANT

## 2022-03-22 PROCEDURE — G1004 CDSM NDSC: HCPCS

## 2022-03-22 PROCEDURE — 36415 COLL VENOUS BLD VENIPUNCTURE: CPT | Performed by: PHYSICIAN ASSISTANT

## 2022-03-22 PROCEDURE — 31500 INSERT EMERGENCY AIRWAY: CPT | Performed by: EMERGENCY MEDICINE

## 2022-03-22 PROCEDURE — 25000000 HC PHARMACY GENERAL

## 2022-03-22 PROCEDURE — 63600000 HC DRUGS/DETAIL CODE: Performed by: PHYSICIAN ASSISTANT

## 2022-03-22 PROCEDURE — 82310 ASSAY OF CALCIUM: CPT | Performed by: HOSPITALIST

## 2022-03-22 PROCEDURE — A9579 GAD-BASE MR CONTRAST NOS,1ML: HCPCS | Performed by: HOSPITALIST

## 2022-03-22 PROCEDURE — 94003 VENT MGMT INPAT SUBQ DAY: CPT

## 2022-03-22 PROCEDURE — 85027 COMPLETE CBC AUTOMATED: CPT | Performed by: HOSPITALIST

## 2022-03-22 PROCEDURE — 95711 VEEG 2-12 HR UNMONITORED: CPT

## 2022-03-22 PROCEDURE — A9585 GADOBUTROL INJECTION: HCPCS | Performed by: HOSPITALIST

## 2022-03-22 PROCEDURE — 200200 PR NO CHARGE: Performed by: PSYCHIATRY & NEUROLOGY

## 2022-03-22 PROCEDURE — 82550 ASSAY OF CK (CPK): CPT | Performed by: INTERNAL MEDICINE

## 2022-03-22 PROCEDURE — 82550 ASSAY OF CK (CPK): CPT

## 2022-03-22 PROCEDURE — 87077 CULTURE AEROBIC IDENTIFY: CPT | Performed by: INTERNAL MEDICINE

## 2022-03-22 PROCEDURE — 87641 MR-STAPH DNA AMP PROBE: CPT | Performed by: HOSPITALIST

## 2022-03-22 RX ORDER — FAMOTIDINE 10 MG/ML
20 INJECTION INTRAVENOUS EVERY 12 HOURS
Status: DISCONTINUED | OUTPATIENT
Start: 2022-03-22 | End: 2022-03-23

## 2022-03-22 RX ORDER — GADOBUTROL 604.72 MG/ML
5.9 INJECTION INTRAVENOUS ONCE
Status: COMPLETED | OUTPATIENT
Start: 2022-03-22 | End: 2022-03-22

## 2022-03-22 RX ORDER — POTASSIUM CHLORIDE 750 MG/1
40 TABLET, FILM COATED, EXTENDED RELEASE ORAL AS NEEDED
Status: DISCONTINUED | OUTPATIENT
Start: 2022-03-22 | End: 2022-03-29 | Stop reason: HOSPADM

## 2022-03-22 RX ORDER — DEXTROSE 40 %
15-30 GEL (GRAM) ORAL AS NEEDED
Status: DISCONTINUED | OUTPATIENT
Start: 2022-03-22 | End: 2022-03-29 | Stop reason: HOSPADM

## 2022-03-22 RX ORDER — IBUPROFEN 200 MG
16-32 TABLET ORAL AS NEEDED
Status: DISCONTINUED | OUTPATIENT
Start: 2022-03-22 | End: 2022-03-29 | Stop reason: HOSPADM

## 2022-03-22 RX ORDER — POTASSIUM CHLORIDE 750 MG/1
20 TABLET, FILM COATED, EXTENDED RELEASE ORAL AS NEEDED
Status: DISCONTINUED | OUTPATIENT
Start: 2022-03-22 | End: 2022-03-29 | Stop reason: HOSPADM

## 2022-03-22 RX ORDER — SODIUM CHLORIDE, SODIUM LACTATE, POTASSIUM CHLORIDE, CALCIUM CHLORIDE 600; 310; 30; 20 MG/100ML; MG/100ML; MG/100ML; MG/100ML
INJECTION, SOLUTION INTRAVENOUS CONTINUOUS
Status: DISCONTINUED | OUTPATIENT
Start: 2022-03-22 | End: 2022-03-24

## 2022-03-22 RX ORDER — PANTOPRAZOLE SODIUM 40 MG/10ML
40 INJECTION, POWDER, LYOPHILIZED, FOR SOLUTION INTRAVENOUS EVERY 24 HOURS
Status: DISCONTINUED | OUTPATIENT
Start: 2022-03-22 | End: 2022-03-22

## 2022-03-22 RX ORDER — DEXTROSE 50 % IN WATER (D50W) INTRAVENOUS SYRINGE
25 AS NEEDED
Status: DISCONTINUED | OUTPATIENT
Start: 2022-03-22 | End: 2022-03-29 | Stop reason: HOSPADM

## 2022-03-22 RX ORDER — ACETAMINOPHEN 650 MG/1
650 SUPPOSITORY RECTAL EVERY 4 HOURS PRN
Status: DISCONTINUED | OUTPATIENT
Start: 2022-03-22 | End: 2022-03-24

## 2022-03-22 RX ORDER — LEVETIRACETAM 500 MG/5ML
500 INJECTION, SOLUTION, CONCENTRATE INTRAVENOUS
Status: DISCONTINUED | OUTPATIENT
Start: 2022-03-22 | End: 2022-03-24

## 2022-03-22 RX ADMIN — LEVETIRACETAM 500 MG: 100 INJECTION, SOLUTION INTRAVENOUS at 23:13

## 2022-03-22 RX ADMIN — SODIUM CHLORIDE, POTASSIUM CHLORIDE, SODIUM LACTATE AND CALCIUM CHLORIDE: 600; 310; 30; 20 INJECTION, SOLUTION INTRAVENOUS at 11:40

## 2022-03-22 RX ADMIN — SODIUM CHLORIDE, POTASSIUM CHLORIDE, SODIUM LACTATE AND CALCIUM CHLORIDE 100 ML/HR: 600; 310; 30; 20 INJECTION, SOLUTION INTRAVENOUS at 22:22

## 2022-03-22 RX ADMIN — PROPOFOL 60 MCG/KG/MIN: 10 INJECTION, EMULSION INTRAVENOUS at 02:22

## 2022-03-22 RX ADMIN — DEXMEDETOMIDINE HYDROCHLORIDE 0.2 MCG/KG/HR: 4 INJECTION, SOLUTION INTRAVENOUS at 11:32

## 2022-03-22 RX ADMIN — FAMOTIDINE 20 MG: 10 INJECTION INTRAVENOUS at 11:34

## 2022-03-22 RX ADMIN — FAMOTIDINE 20 MG: 10 INJECTION INTRAVENOUS at 22:19

## 2022-03-22 RX ADMIN — GADOBUTROL 5.9 ML: 604.72 INJECTION INTRAVENOUS at 17:25

## 2022-03-22 RX ADMIN — LEVETIRACETAM 500 MG: 100 INJECTION, SOLUTION INTRAVENOUS at 11:33

## 2022-03-22 NOTE — ASSESSMENT & PLAN NOTE
Pt with reported seizure activity, no responsive to ativan  susbequently intubated for airway protection  Concern for drug overdose but also consider primary CNS event  No evidence of acute abnormality of ct imaging  Instructed ED to give keppra 1 gm iv now  Neurology notified - agreed with management as outlined  Continue keppra iv bid  eeg ordered  Mri brain ordered  Ativan prn for breakthrough seizure  On sedation for profofol, titrate for lowest effective dose.

## 2022-03-22 NOTE — ASSESSMENT & PLAN NOTE
2nd to ongoing seizures, intubated for airway protection  Extubated 3/23    -supplemental O2 with SpO2 goal >92%

## 2022-03-22 NOTE — PATIENT CARE CONFERENCE
Care Progression Rounds Note  Date: 3/22/2022  Time: 10:45 AM     Patient Name: Gemma Darnell     Medical Record Number: [id number]   YOB: 2000  Sex: Adult      Room/Bed: 3408    Admitting Diagnosis: Status epilepticus (CMS/HCC) [G40.901]  Altered mental status, unspecified altered mental status type [R41.82]   Admit Date/Time: 3/21/2022  9:45 PM    Primary Diagnosis: Seizure (CMS/HCC)  Principal Problem: Seizure (CMS/HCC)    GMLOS: pending  Anticipated Discharge Date: 3/26/2022    AM-PAC:  Mobility Score:      Discharge Planning:  Anticipated Discharge Disposition: psychiatric facility, substance use treatment facility    Barriers to Discharge:  Medical issues not resolved    Comments:  Pt admitted with [facility] UDS + for opiods\benzos\metheamphetamines. PMH Drug OD with S.A. Pt vented\sedated\IVfs\Keppra started For MRI\EEG Psych and neuro C\S'S    Participants:  advanced practice provider, pharmacy, , physical therapy, dietitian/nutrition services, physician, nursing, respiratory therapy, occupational therapy

## 2022-03-22 NOTE — ASSESSMENT & PLAN NOTE
Concern for drug overdose, especially with prior reported history of drug overdose/suicide attempts  EMS found bottle containing hydroxyzine, effexor, and benadryl  Call made to poison control, updated regarding events transpired, current status/condition  Poison control stated that presentation may be consistent with ingestion of listed pills  Recommended supportive measures  Follow ekg for changes to include:  If qrs > 100,give bicarb, if qtc > 500, give mag IV  Okay for ativan prn seizure/agitation

## 2022-03-22 NOTE — PLAN OF CARE
Problem: Adult Inpatient Plan of Care  Goal: Plan of Care Review  Outcome: Progressing  Goal: Patient-Specific Goal (Individualized)  Outcome: Progressing  Goal: Absence of Hospital-Acquired Illness or Injury  Outcome: Progressing  Goal: Optimal Comfort and Wellbeing  Outcome: Progressing  Goal: Readiness for Transition of Care  Outcome: Progressing     Problem: Skin Injury Risk Increased  Goal: Skin Health and Integrity  Outcome: Progressing     Problem: Restraint, Nonbehavioral (Nonviolent)  Goal: Discontinuation Criteria Achieved  Outcome: Progressing        Pt is off propofol and continues on vent with precedex at 0.4mcg. EEG and MRI are done. Pt is more arousable  now, continues to have periodic jerky movement in her extremities. Pérez drains rg urine. Plan for weaning from ventilators tomorrow.

## 2022-03-22 NOTE — CONSULTS
Neurology Consult Note    I was asked to see Gemma Darnell who is a 22 y.o. adult history of previous suicidality was found to be unconscious with seizure activity observed by EMS and father.  Patient was found to be agitated and tachycardic Ativan was given.  Patient was intubated.  Keppra was initiated.  With a pill bottle containing Benadryl Effexor and hydroxyzine.  Drug screen was positive for opioids methamphetamine and benzodiazepines.  Currently intubated and sedated.  Pulse was as high as 142.  BP was 142/86.  No further seizures reported.  CT of the brain completed and reviewed no territorial infarct mass or hemorrhage.  Blood pressure 87/57    Review of Systems  Not available due to cognitive status.  Allergies: Patient has no known allergies.    Current Facility-Administered Medications   Medication Dose Route Frequency Provider Last Rate Last Admin   • acetaminophen (TYLENOL) suppository 650 mg  650 mg rectal q4h PRN Dinora Galeas MD       • dexmedeTOMIDine in 0.9 % NaCL (PRECEDEX) 400 mcg/100 mL (4 mcg/mL) infusion  0.2-1.5 mcg/kg/hr intravenous Titrated Lupe Alonso MD   Stopped at 03/22/22 0607   • glucose chewable tablet 16-32 g of dextrose  16-32 g of dextrose oral PRN Dinora Galeas MD        Or   • dextrose 40 % oral gel 15-30 g of dextrose  15-30 g of dextrose oral PRN Dinora Galeas MD        Or   • glucagon (GLUCAGEN) injection 1 mg  1 mg intramuscular PRN Dinora Galeas MD        Or   • dextrose in water injection 12.5 g  25 mL intravenous PRN Dinora Galeas MD       • levETIRAcetam (KEPPRA) injection 500 mg  500 mg intravenous q12h INT Dinora Galeas MD       • LORazepam (ATIVAN) injection 2 mg  2 mg intravenous q4h PRN Dinora Galeas MD       • magnesium sulfate IVPB 2g in 50 mL NSS/D5W/SWFI  2 g intravenous PRN Dinora Galeas MD       • pantoprazole (PROTONIX) injection 40 mg  40 mg intravenous q24h Dinora Galeas MD       • potassium bicarbonate (EFFER-K) disintegrating tablet 20 mEq  20 mEq oral PRN  Dinora Galeas MD       • potassium bicarbonate (EFFER-K) disintegrating tablet 40 mEq  40 mEq oral PRN Dinora Galeas MD       • potassium chloride (KLOR-CON) tablet extended release 20 mEq  20 mEq oral PRN Dinora Galeas MD       • potassium chloride (KLOR-CON) tablet extended release 40 mEq  40 mEq oral PRN Dinora Galeas MD       • propofoL (DIPRIVAN) 10 mg/mL continuous infusion  5-80 mcg/kg/min intravenous Continuous Linette Wade PA C 11.5 mL/hr at 22 0841 30 mcg/kg/min at 22 0841   • sodium chloride 0.9 % infusion   intravenous Continuous Dinora Galeas  mL/hr at 22 0400 Rate Verify at 22 0400       Medical History: No past medical history on file.    Surgical History: No past surgical history on file.    Social History:   Social History     Socioeconomic History   • Marital status: Single       Family History: No family history on file.    Objective     Temperature: Temp (24hrs), Av.2 °C (99 °F), Min:36.1 °C (97 °F), Max:37.8 °C (100.1 °F)     BP Max:  Systolic (24hrs), Av , Min:85 , Max:142      BP Vance:  Diastolic (24hrs), Av, Min:54, Max:92    Recent:    Patient Vitals for the past 4 hrs:   BP Temp Temp src Pulse Resp SpO2   22 0844 -- -- -- 64 -- 100 %   22 0800 (!) 87/57 36.1 °C (97 °F) Axillary 64 14 100 %   22 0700 (!) 87/59 -- -- 65 14 100 %   22 0658 -- -- -- 66 14 100 %   22 0600 (!) 85/54 -- -- 66 14 99 %         Physical/Neurologic Exam  Normal cephalic atraumatic.  Intubated and sedated pupils anisocoric 2 mm right 4 mm left with sluggish consensual response no spontaneous movements or vocalization tone flaccid sedated exam  Labs  Lab Results   Component Value Date    WBC 20.02 2022    HGB 12.4 2022    HCT 36.1 2022     2022    ALT 23 2022    AST 46 (H) 2022     2022    K 4.0 2022     2022    CREATININE 0.7 2022    BUN 8 2022    CO2 20 (L)  03/22/2022    INR 1.1 03/21/2022     No results found for: ESR, CRP  Radiology Imaging    XR CHEST 1 VW (ED Wet Read)      CT HEAD WITHOUT IV CONTRAST    Result Date: 3/21/2022  Narrative: CLINICAL HISTORY:  Seizure, new-onset, no history of trauma Mental status change, unknown cause     Impression: IMPRESSION: Study is significantly limited secondary to patient motion.  No evidence of large intracranial hemorrhage, midline shift or other mass effect COMMENT: Comparison:  None. TECHNIQUE: CT of the brain was performed and reconstructed/reformatted in the axial, coronal and sagittal planes. CT DOSE:  One or more dose reduction techniques (e.g. automated exposure control, adjustment of the mA and/or kV according to patient size, use of iterative reconstruction technique) utilized for this examination. INTRAVENOUS CONTRAST: None Brain parenchyma:  No evidence of large intracranial hemorrhage, midline shift or other mass effect.  No evidence of an extra-axial fluid collection. Ventricles and cisterns:  Normal in size and configuration. Cranial carotid arteries: Limited assessment of the vasculature on this nonenhanced study. Calvarium and extra cranial soft tissues:  No evidence of a displaced calvarial fracture.   Scalp soft tissue within normal limits. Visualized paranasal sinuses and mastoid air cells:  Clear bilaterally.     Recent Results (from the past 24 hour(s))   ECG 12 lead    Collection Time: 03/21/22  9:54 PM   Result Value Ref Range    Ventricular rate 140     Atrial rate 140     WI Interval 168     QRS duration 74     QT Interval 246     QTC Calculation(Bazett) 375     P Axis 56     R Axis 70     T Wave Axis 44    CBC and Differential    Collection Time: 03/21/22 10:04 PM   Result Value Ref Range    WBC 14.44   K/uL    RBC 4.40 M/uL    Hemoglobin 13.0   g/dL    Hematocrit 38.3   %    MCV 87.0 fL    MCH 29.5 pg    MCHC 33.9 g/dL    RDW 12.0 %    Platelets 260   K/uL    MPV 10.8 fL    Differential Type Auto      nRBC 0.0 <=0.0 %    Immature Granulocytes 0.3 %    Neutrophils 94.9 %    Lymphocytes 3.2 %    Monocytes 1.5 %    Eosinophils 0.0 %    Basophils 0.1 %    Immature Granulocytes, Absolute 0.05 K/uL    Neutrophils, Absolute 13.69 K/uL    Lymphocytes, Absolute 0.46 K/uL    Monocytes, Absolute 0.22 K/uL    Eosinophils, Absolute 0.00 K/uL    Basophils, Absolute 0.02 K/uL   Basic metabolic panel    Collection Time: 03/21/22 10:04 PM   Result Value Ref Range    Sodium 136 136 - 144 mEQ/L    Potassium 4.5 3.6 - 5.1 mEQ/L    Chloride 108 98 - 109 mEQ/L    CO2 15 (L) 22 - 32 mEQ/L    BUN 8 8 - 20 mg/dL    Creatinine 0.7   mg/dL    Glucose 125 (H) 70 - 99 mg/dL    Calcium 8.2 (L) 8.9 - 10.3 mg/dL    eGFR      Anion Gap 13 3 - 15 mEQ/L   Lactic acid, Venous    Collection Time: 03/21/22 10:04 PM   Result Value Ref Range    Lactate 6.3 (HH) 0.4 - 2.0 mmol/L   ER toxicology screen, serum    Collection Time: 03/21/22 10:04 PM   Result Value Ref Range    Salicylate <4.0 <=30.0 mg/dL    Acetaminophen <10.0 (L) 10.0 - 30.0 ug/mL    Ethanol <5 <=10 mg/dL   BhCG, Serum, Qual    Collection Time: 03/21/22 10:04 PM   Result Value Ref Range    Preg Test, Serum Negative Negative   Hepatic function panel    Collection Time: 03/21/22 10:04 PM   Result Value Ref Range    Albumin 3.8 3.4 - 5.0 g/dL    Bilirubin, Total 1.1 0.3 - 1.2 mg/dL    Bilirubin, Direct 0.5 (H) <=0.4 mg/dL    Alkaline Phosphatase 53 35 - 126 IU/L    AST (SGOT) 46 (H) 15 - 41 IU/L    ALT (SGPT) 23 IU/L    Total Protein 6.4 6.0 - 8.2 g/dL   Lipase    Collection Time: 03/21/22 10:04 PM   Result Value Ref Range    Lipase 23 20 - 51 U/L   Protime-INR    Collection Time: 03/21/22 10:05 PM   Result Value Ref Range    PT 14.2 12.2 - 14.5 sec    INR 1.1     APTT    Collection Time: 03/21/22 10:05 PM   Result Value Ref Range    PTT 31 23 - 35 sec   Urine drug screen (UDS)    Collection Time: 03/21/22 10:15 PM   Result Value Ref Range    PCP Scrn, Ur Not Detected Not Detected     Benzodiazepine Ur Qual Positive (A) Not Detected    Cocaine Screen, Urine Not Detected Not Detected    Amphetamine+Methamphetamine Screen, Ur Positive (A) Not Detected    Cannabinoid Screen, Urine Not Detected Not Detected    Opiate Scrn, Ur Positive (A) Not Detected    Barbiturate Screen, Ur Not Detected Not Detected   UA Reflex to Culture (Macroscopic)    Collection Time: 03/21/22 10:15 PM    Specimen: Urine, Clean Catch   Result Value Ref Range    Color, Urine Yellow Yellow, Colorless    Clarity, Urine Cloudy (A) Clear    Specific Gravity, Urine 1.035 (H) 1.005 - 1.030    pH, Urine 6.0 4.5 - 8.0    Leukocyte Esterase Negative Negative    Nitrite, Urine Negative Negative    Protein, Urine +2 (A) Negative    Glucose, Urine Negative Negative mg/dL    Ketones, Urine Trace (A) Negative mg/dL    Urobilinogen, Urine 0.2 <2.0 EU/dL EU/dL    Bilirubin, Urine Negative Negative mg/dL    Blood, Urine Negative Negative   UA Microscopic    Collection Time: 03/21/22 10:15 PM   Result Value Ref Range    RBC, Urine 0 TO 4 0 TO 4 /HPF    WBC, Urine 0 TO 3 0 TO 3 /HPF    Squamous Epithelial None Seen None Seen /hpf    Hyaline Cast None Seen None Seen /lpf    Bacteria, Urine None Seen None Seen /HPF    MUCUSUA Rare (A) None Seen /LPF   POCT Glucose    Collection Time: 03/21/22 10:17 PM   Result Value Ref Range    POCT Bedside Glucose 126 (H) 70 - 99 mg/dL    POC Test POC    SARS-CoV-2 (COVID-19), PCR Nasopharynx    Collection Time: 03/21/22 11:29 PM    Specimen: Nasopharynx; Nasopharyngeal Swab   Result Value Ref Range    SARS-CoV-2 (COVID-19) Negative Negative   Blood Gas, arterial Comprehensive    Collection Time: 03/21/22 11:43 PM   Result Value Ref Range    pH, Arterial 7.35 7.35 - 7.45    pCO2, Arterial 35 35 - 48 mm Hg    pO2, Arterial 204 (H) 83 - 100 mm Hg    HCO3, Arterial 20.6 (L) 21.0 - 28.0 mEQ/L    Base Excess, Arterial -5.6 mEQ/L    O2 Sat, Arterial 98 93 - 98 %    TCO2, Arterial 20.4 (L) 22.0 - 32.0 mEQ/L    Lactate,  Art 1.8 (H) 0.4 - 1.6 mmol/L    Glucose, Arterial 104 (H) 70 - 99 mg/dL    Sodium, Arterial 135 (L) 136 - 145 mEQ/L    Potassium, Arterial 3.6 3.4 - 4.5 mEQ/L    Chloride, Arterial 108 98 - 109 mEQ/L    Ionized Calcium, Arterial 1.18 1.15 - 1.27 mmol/L    Hemoglobin, Arterial 12.0 g/dL    Carboxyhemoglobin 0.9 0.0 - 3.0; (Smokers: 0.0 - 9.0) %    Methemoglobin 0.8 0.4 - 1.5 %    Source Of Oxygen vent     FIO2 40    MRSA Screen, Nares Only Nose    Collection Time: 03/22/22 12:22 AM    Specimen: Nose; Nasal Swab   Result Value Ref Range    MRSA DNA, Nares Negative Negative   Lactic Acid (Repeat)    Collection Time: 03/22/22  1:07 AM   Result Value Ref Range    Lactate 3.1 (H) 0.4 - 2.0 mmol/L   CBC    Collection Time: 03/22/22  1:07 AM   Result Value Ref Range    WBC 20.02   K/uL    RBC 4.15 M/uL    Hemoglobin 12.4   g/dL    Hematocrit 36.1   %    MCV 87.0 fL    MCH 29.9 pg    MCHC 34.3 g/dL    RDW 12.1 %    Platelets 265   K/uL    MPV 10.0 fL   Basic metabolic panel    Collection Time: 03/22/22  1:07 AM   Result Value Ref Range    Sodium 138 136 - 144 mEQ/L    Potassium 4.0 3.6 - 5.1 mEQ/L    Chloride 108 98 - 109 mEQ/L    CO2 20 (L) 22 - 32 mEQ/L    BUN 8 8 - 20 mg/dL    Creatinine 0.7   mg/dL    Glucose 97 70 - 99 mg/dL    Calcium 8.9 8.9 - 10.3 mg/dL    eGFR      Anion Gap 10 3 - 15 mEQ/L     Microbiology Results     Procedure Component Value Units Date/Time    MRSA Screen, Nares Only Nose [[id number]]  (Normal) Collected: 03/22/22 0022    Specimen: Nasal Swab from Nose Updated: 03/22/22 0513     MRSA DNA, Nares Negative    SARS-CoV-2 (COVID-19), PCR Nasopharynx [[id number]]  (Normal) Collected: 03/21/22 2329    Specimen: Nasopharyngeal Swab from Nasopharynx Updated: 03/22/22 0015    Narrative:      The following orders were created for panel order SARS-CoV-2 (COVID-19), PCR Nasopharynx.  Procedure                               Abnormality         Status                     ---------                                -----------         ------                     SARS-CoV-2 (COVID-19), P...[[id number]]  Normal              Final result                 Please view results for these tests on the individual orders.    SARS-CoV-2 (COVID-19), PCR Nasopharynx [[id number]]  (Normal) Collected: 03/21/22 5479    Specimen: Nasopharyngeal Swab from Nasopharynx Updated: 03/22/22 0015     SARS-CoV-2 (COVID-19) Negative        Assessment      Seizure likely due to toxic ingestion of multiple psychoaffective medications which were positive on drug screen.  To rule out acute cerebral lesion recommend MRI brain without contrast and EEG.  Continue Keppra 500 every 12.    Liliane Moctezuma MD  3/22/2022  9:26 AM

## 2022-03-22 NOTE — CONSULTS
Psychiatry Consult    Chief Complaint   Patient presents with   • Altered Mental Status       Subjective     Gmema Darnell is a 22 y.o. adult who was admitted for altered mental status and possible seizure activity.   In the Emergency Department, the patient was agitated, tachycardic, had abnormal motor movements, nystagmus, required intubation.  Psychiatry was consulted due to history of depression, suspected suicide attempt. Patient known to our service from previous admissions.  She has a past psychiatric history of anxiety, depression, eating disorder, cluster B traits, ETOH use disorder, cocaine use disorder, stimulant (Adderall) use diorder,  and multiple previous suicide attempts.  Patient received lying in bed, she is intubated and sedated, unable to participate in psychiatric ROS.  Per EMR, patient was found unresponsive, with seizure activity at home. Pill bottles containing Benadryl, Effexor and Atarax were found by patient.     Patient previously hospitalized on [facility] acute inpatient psychiatric unit in May 2021 and July/August 2021 after intentional medication overdoses. During July/August encounter, patient underwent 5 courses of ECT, with good effect.  She then attended PHP at [facility] in August 2021. Per EMR, patient hx of residential eating disorder treatment in the fall of 2020.      PDMP: queried, no red flags    Psychiatric History:  Suicide Attempts: yes - multiple suicide attempts by overdose  Risk Factors: hx of mental illness  Protective Factors: Connectedness to individuals, family, community, and social institutions, Problem-solving and conflict resolution skills, Contacts with caregivers        Current Psychiatrist: unknown  Past psychiatric Hospitalization: yes - May and July 2021 at [facility]  Medication Trials:  yes - Effexor, Prozac, Atarax, Remeron, Trazodone   Access to Firearms:  unknown        Substance Use History:  Substance use: UDS + for benzodiazepines,  opiates, methamphetamine  Past D&A Treatment: Yes: Per EMR, patient with history of substance misuse    Family History: No family history on file.    Social History:   Social History     Socioeconomic History   • Marital status: Single       Medical History: No past medical history on file.    Allergies: No Known Allergies    Current Medications:  •  acetaminophen, 650 mg, rectal, q4h PRN  •  dexmedetomidine in 0.9 % NaCl, 0.2-1.5 mcg/kg/hr, intravenous, Titrated  •  glucose, 16-32 g of dextrose, oral, PRN **OR** dextrose, 15-30 g of dextrose, oral, PRN **OR** glucagon, 1 mg, intramuscular, PRN **OR** dextrose in water, 25 mL, intravenous, PRN  •  famotidine, 20 mg, intravenous, q12h ELMER  •  lactated ringer's, , intravenous, Continuous  •  levETIRAcetam, 500 mg, intravenous, q12h INT  •  LORazepam, 2 mg, intravenous, q4h PRN  •  magnesium sulfate, 2 g, intravenous, PRN  •  potassium bicarbonate, 20 mEq, oral, PRN  •  potassium bicarbonate, 40 mEq, oral, PRN  •  potassium chloride, 20 mEq, oral, PRN  •  potassium chloride, 40 mEq, oral, PRN  •  propofoL, 5-80 mcg/kg/min, intravenous, Continuous    Home Medications:      Review of Systems  patient intubated and sedated, unable to participate in psychiatric ROS    Objective     Vital Signs for the last 24 hours:  Temp:  [36.1 °C (97 °F)-37.8 °C (100.1 °F)] 36.1 °C (97 °F)  Heart Rate:  [] 65  Resp:  [10-34] 14  BP: ()/(54-92) 91/63  FiO2 (%) (Set):  [21 %-40 %] 21 %    Labs  Results from last 7 days   Lab Units 03/22/22  0107 03/21/22  2204   HEMOGLOBIN g/dL 12.4 13.0   WBC K/uL 20.02 14.44   PLATELETS K/uL 265 260   POTASSIUM mEQ/L 4.0 4.5   SODIUM mEQ/L 138 136   CREATININE mg/dL 0.7 0.7     Results from last 7 days   Lab Units 03/21/22  2204   ALT IU/L 23   AST IU/L 46*   ALK PHOS IU/L 53     Ethanol   Date Value Ref Range Status   03/21/2022 <5 <=10 mg/dL Final     No results found for: LITHIUM, TSH, FREET4, K7VOJGL, [id number], FOLATE  Results from  last 7 days   Lab Units 03/21/22  7673   BENZODIAZEPINE SCRN UR  Positive*   AMPHET+ METHAMPHET SCR UR  Positive*   OPIATE SCRN UR  Positive*   CANNABINOID PRESENCE  Not Detected   COCAINE SCREEN, URINE  Not Detected   BARBITURATE SCRN UR  Not Detected     Lab Results   Component Value Date    VENTRICRATE 140 03/21/2022    QTCCALCULAT 375 03/21/2022     No results found for: HDL, LDLCALC, TRIG, CHOL, HGBA1C        Mental Status Evaluation:  Patient appears current age, dressed in hospital attire.  Patient currently intubated and sedation.  She does not respond to verbal commands.        Mood disorder ([facility])  Assessment & Plan  Patient is a 22 y.o. adult who was admitted for altered mental status and possible seizure activity.   Psychiatry was consulted due to history of depression, suspected suicide attempt. Patient known to our service from previous admissions.  She has a past psychiatric history of anxiety, depression, eating disorder, cluster B traits, ETOH use disorder, cocaine use disorder, stimulant (Adderall) use diorder,  and multiple previous suicide attempts.  Patient received lying in bed, she is intubated and sedated, unable to participate in psychiatric ROS.  UDS + for benzodiazepines, opiates, and methamphetamines.  Suspected intentional overdose    1) Disposition - Given suspected intentional overdose, patient will likely require acute inpatient psychiatric hospitalization once medically stable.  Will continue to work with the team and family to determine disposition    2) Recommend 1:1 observation once patient is awake    3) Patient cannot leave AMA    4) Will continue to follow while medically hospitalized

## 2022-03-22 NOTE — ASSESSMENT & PLAN NOTE
Concern for drug overdose, especially with prior reported history of drug overdose/suicide attempts  EMS found bottle containing hydroxyzine, effexor, and benadryl    Poison control contacted  Recommended supportive measures  Follow ekg for changes to include:  If qrs > 100,give bicarb, if qtc > 500, give mag IV  Okay for ativan prn seizure/agitation  Behavior josey crisis consult following  psych consult following  Continue 1:1 for safety  anticipate inpatient psychiatric admission

## 2022-03-22 NOTE — PROGRESS NOTES
Neurology Progress Note    Interval History: Some brief jerking movements and nystagmus were noted previously by critical care staff.  EEG ongoing shows no evidence of nonconvulsive status or ongoing seizure activity at this time.  Father at the bedside.    Objective   Current Facility-Administered Medications   Medication Dose Route Frequency Provider Last Rate Last Admin   • acetaminophen (TYLENOL) suppository 650 mg  650 mg rectal q4h PRN Dinora Galeas MD       • dexmedeTOMIDine in 0.9 % NaCL (PRECEDEX) 400 mcg/100 mL (4 mcg/mL) infusion  0.2-1.5 mcg/kg/hr intravenous Titrated Lupe Alonso MD 6.4 mL/hr at 03/22/22 1227 0.4 mcg/kg/hr at 03/22/22 1227   • glucose chewable tablet 16-32 g of dextrose  16-32 g of dextrose oral PRN Dinora Galeas MD        Or   • dextrose 40 % oral gel 15-30 g of dextrose  15-30 g of dextrose oral PRN Dinora Galeas MD        Or   • glucagon (GLUCAGEN) injection 1 mg  1 mg intramuscular PRN Dinora Galeas MD        Or   • dextrose in water injection 12.5 g  25 mL intravenous PRN Dinora Galeas MD       • famotidine (PEPCID) injection 20 mg  20 mg intravenous q12h Ish Elmore PA C   20 mg at 03/22/22 1134   • lactated ringer's infusion   intravenous Continuous Angel Lemos  mL/hr at 03/22/22 1140 New Bag at 03/22/22 1140   • levETIRAcetam (KEPPRA) injection 500 mg  500 mg intravenous q12h INT Dinora Galeas MD   500 mg at 03/22/22 1133   • LORazepam (ATIVAN) injection 2 mg  2 mg intravenous q4h PRN Dinora Galaes MD       • magnesium sulfate IVPB 2g in 50 mL NSS/D5W/SWFI  2 g intravenous PRN Dinora Galeas MD       • potassium bicarbonate (EFFER-K) disintegrating tablet 20 mEq  20 mEq oral PRN Dinora Galeas MD       • potassium bicarbonate (EFFER-K) disintegrating tablet 40 mEq  40 mEq oral PRN Dinora Galeas MD       • potassium chloride (KLOR-CON) tablet extended release 20 mEq  20 mEq oral PRN Dinora Galeas MD       • potassium chloride (KLOR-CON) tablet extended release 40  mEq  40 mEq oral PRN Dinora Galeas MD       • propofoL (DIPRIVAN) 10 mg/mL continuous infusion  5-80 mcg/kg/min intravenous Continuous Linette Wdae PA C 1.9 mL/hr at 03/22/22 1132 5 mcg/kg/min at 03/22/22 1132     Labs    Lab Results   Component Value Date    WBC 20.02 03/22/2022    HGB 12.4 03/22/2022    HCT 36.1 03/22/2022     03/22/2022    ALT 23 03/21/2022    AST 46 (H) 03/21/2022     03/22/2022    K 4.0 03/22/2022     03/22/2022    CREATININE 0.7 03/22/2022    BUN 8 03/22/2022    CO2 20 (L) 03/22/2022    INR 1.1 03/21/2022       Vital signs in last 24 hours:  Temp:  [36.1 °C (97 °F)-37.8 °C (100.1 °F)] 36.1 °C (97 °F)  Heart Rate:  [] 78  Resp:  [10-34] 28  BP: ()/(54-92) 91/63  FiO2 (%) (Set):  [21 %-40 %] 21 %     Intubated partially sedated no spontaneous movements or vocalizations seem, no myoclonus or rhythmical movements.    CT HEAD WITHOUT IV CONTRAST    Result Date: 3/21/2022  Narrative: CLINICAL HISTORY:  Seizure, new-onset, no history of trauma Mental status change, unknown cause     Impression: IMPRESSION: Study is significantly limited secondary to patient motion.  No evidence of large intracranial hemorrhage, midline shift or other mass effect COMMENT: Comparison:  None. TECHNIQUE: CT of the brain was performed and reconstructed/reformatted in the axial, coronal and sagittal planes. CT DOSE:  One or more dose reduction techniques (e.g. automated exposure control, adjustment of the mA and/or kV according to patient size, use of iterative reconstruction technique) utilized for this examination. INTRAVENOUS CONTRAST: None Brain parenchyma:  No evidence of large intracranial hemorrhage, midline shift or other mass effect.  No evidence of an extra-axial fluid collection. Ventricles and cisterns:  Normal in size and configuration. Cranial carotid arteries: Limited assessment of the vasculature on this nonenhanced study. Calvarium and extra cranial soft tissues:  No  evidence of a displaced calvarial fracture.   Scalp soft tissue within normal limits. Visualized paranasal sinuses and mastoid air cells:  Clear bilaterally.     X-RAY CHEST 1 VIEW    Result Date: 3/22/2022  Narrative: CLINICAL HISTORY: Change in mental status PRIOR STUDY:  None TECHNIQUE:  Frontal image of the chest COMMENT:  There is an endotracheal tube noted with the tip in the mid trachea. The lungs are clear. The heart size is normal. There is an enteric tube noted with the tip overlying the distal esophagus. The osseous and soft tissues are unremarkable.     Impression: IMPRESSION: See above. Findings above including an enteric tube malposition were discussed with the physician assistant Malissa at 9:55 AM on March 22, 2022     Recent Results (from the past 24 hour(s))   ECG 12 lead    Collection Time: 03/21/22  9:54 PM   Result Value Ref Range    Ventricular rate 140     Atrial rate 140     PA Interval 168     QRS duration 74     QT Interval 246     QTC Calculation(Bazett) 375     P Axis 56     R Axis 70     T Wave Axis 44    CBC and Differential    Collection Time: 03/21/22 10:04 PM   Result Value Ref Range    WBC 14.44   K/uL    RBC 4.40 M/uL    Hemoglobin 13.0   g/dL    Hematocrit 38.3   %    MCV 87.0 fL    MCH 29.5 pg    MCHC 33.9 g/dL    RDW 12.0 %    Platelets 260   K/uL    MPV 10.8 fL    Differential Type Auto     nRBC 0.0 <=0.0 %    Immature Granulocytes 0.3 %    Neutrophils 94.9 %    Lymphocytes 3.2 %    Monocytes 1.5 %    Eosinophils 0.0 %    Basophils 0.1 %    Immature Granulocytes, Absolute 0.05 K/uL    Neutrophils, Absolute 13.69 K/uL    Lymphocytes, Absolute 0.46 K/uL    Monocytes, Absolute 0.22 K/uL    Eosinophils, Absolute 0.00 K/uL    Basophils, Absolute 0.02 K/uL   Basic metabolic panel    Collection Time: 03/21/22 10:04 PM   Result Value Ref Range    Sodium 136 136 - 144 mEQ/L    Potassium 4.5 3.6 - 5.1 mEQ/L    Chloride 108 98 - 109 mEQ/L    CO2 15 (L) 22 - 32 mEQ/L    BUN 8 8 - 20 mg/dL     Creatinine 0.7   mg/dL    Glucose 125 (H) 70 - 99 mg/dL    Calcium 8.2 (L) 8.9 - 10.3 mg/dL    eGFR      Anion Gap 13 3 - 15 mEQ/L   Lactic acid, Venous    Collection Time: 03/21/22 10:04 PM   Result Value Ref Range    Lactate 6.3 (HH) 0.4 - 2.0 mmol/L   ER toxicology screen, serum    Collection Time: 03/21/22 10:04 PM   Result Value Ref Range    Salicylate <4.0 <=30.0 mg/dL    Acetaminophen <10.0 (L) 10.0 - 30.0 ug/mL    Ethanol <5 <=10 mg/dL   BhCG, Serum, Qual    Collection Time: 03/21/22 10:04 PM   Result Value Ref Range    Preg Test, Serum Negative Negative   Hepatic function panel    Collection Time: 03/21/22 10:04 PM   Result Value Ref Range    Albumin 3.8 3.4 - 5.0 g/dL    Bilirubin, Total 1.1 0.3 - 1.2 mg/dL    Bilirubin, Direct 0.5 (H) <=0.4 mg/dL    Alkaline Phosphatase 53 35 - 126 IU/L    AST (SGOT) 46 (H) 15 - 41 IU/L    ALT (SGPT) 23 IU/L    Total Protein 6.4 6.0 - 8.2 g/dL   Lipase    Collection Time: 03/21/22 10:04 PM   Result Value Ref Range    Lipase 23 20 - 51 U/L   Protime-INR    Collection Time: 03/21/22 10:05 PM   Result Value Ref Range    PT 14.2 12.2 - 14.5 sec    INR 1.1     APTT    Collection Time: 03/21/22 10:05 PM   Result Value Ref Range    PTT 31 23 - 35 sec   Urine drug screen (UDS)    Collection Time: 03/21/22 10:15 PM   Result Value Ref Range    PCP Scrn, Ur Not Detected Not Detected    Benzodiazepine Ur Qual Positive (A) Not Detected    Cocaine Screen, Urine Not Detected Not Detected    Amphetamine+Methamphetamine Screen, Ur Positive (A) Not Detected    Cannabinoid Screen, Urine Not Detected Not Detected    Opiate Scrn, Ur Positive (A) Not Detected    Barbiturate Screen, Ur Not Detected Not Detected   UA Reflex to Culture (Macroscopic)    Collection Time: 03/21/22 10:15 PM    Specimen: Urine, Clean Catch   Result Value Ref Range    Color, Urine Yellow Yellow, Colorless    Clarity, Urine Cloudy (A) Clear    Specific Gravity, Urine 1.035 (H) 1.005 - 1.030    pH, Urine 6.0 4.5 - 8.0     Leukocyte Esterase Negative Negative    Nitrite, Urine Negative Negative    Protein, Urine +2 (A) Negative    Glucose, Urine Negative Negative mg/dL    Ketones, Urine Trace (A) Negative mg/dL    Urobilinogen, Urine 0.2 <2.0 EU/dL EU/dL    Bilirubin, Urine Negative Negative mg/dL    Blood, Urine Negative Negative   UA Microscopic    Collection Time: 03/21/22 10:15 PM   Result Value Ref Range    RBC, Urine 0 TO 4 0 TO 4 /HPF    WBC, Urine 0 TO 3 0 TO 3 /HPF    Squamous Epithelial None Seen None Seen /hpf    Hyaline Cast None Seen None Seen /lpf    Bacteria, Urine None Seen None Seen /HPF    MUCUSUA Rare (A) None Seen /LPF   POCT Glucose    Collection Time: 03/21/22 10:17 PM   Result Value Ref Range    POCT Bedside Glucose 126 (H) 70 - 99 mg/dL    POC Test POC    SARS-CoV-2 (COVID-19), PCR Nasopharynx    Collection Time: 03/21/22 11:29 PM    Specimen: Nasopharynx; Nasopharyngeal Swab   Result Value Ref Range    SARS-CoV-2 (COVID-19) Negative Negative   Blood Gas, arterial Comprehensive    Collection Time: 03/21/22 11:43 PM   Result Value Ref Range    pH, Arterial 7.35 7.35 - 7.45    pCO2, Arterial 35 35 - 48 mm Hg    pO2, Arterial 204 (H) 83 - 100 mm Hg    HCO3, Arterial 20.6 (L) 21.0 - 28.0 mEQ/L    Base Excess, Arterial -5.6 mEQ/L    O2 Sat, Arterial 98 93 - 98 %    TCO2, Arterial 20.4 (L) 22.0 - 32.0 mEQ/L    Lactate, Art 1.8 (H) 0.4 - 1.6 mmol/L    Glucose, Arterial 104 (H) 70 - 99 mg/dL    Sodium, Arterial 135 (L) 136 - 145 mEQ/L    Potassium, Arterial 3.6 3.4 - 4.5 mEQ/L    Chloride, Arterial 108 98 - 109 mEQ/L    Ionized Calcium, Arterial 1.18 1.15 - 1.27 mmol/L    Hemoglobin, Arterial 12.0 g/dL    Carboxyhemoglobin 0.9 0.0 - 3.0; (Smokers: 0.0 - 9.0) %    Methemoglobin 0.8 0.4 - 1.5 %    Source Of Oxygen vent     FIO2 40    MRSA Screen, Nares Only Nose    Collection Time: 03/22/22 12:22 AM    Specimen: Nose; Nasal Swab   Result Value Ref Range    MRSA DNA, Nares Negative Negative   Lactic Acid (Repeat)     Collection Time: 03/22/22  1:07 AM   Result Value Ref Range    Lactate 3.1 (H) 0.4 - 2.0 mmol/L   CBC    Collection Time: 03/22/22  1:07 AM   Result Value Ref Range    WBC 20.02   K/uL    RBC 4.15 M/uL    Hemoglobin 12.4   g/dL    Hematocrit 36.1   %    MCV 87.0 fL    MCH 29.9 pg    MCHC 34.3 g/dL    RDW 12.1 %    Platelets 265   K/uL    MPV 10.0 fL   Basic metabolic panel    Collection Time: 03/22/22  1:07 AM   Result Value Ref Range    Sodium 138 136 - 144 mEQ/L    Potassium 4.0 3.6 - 5.1 mEQ/L    Chloride 108 98 - 109 mEQ/L    CO2 20 (L) 22 - 32 mEQ/L    BUN 8 8 - 20 mg/dL    Creatinine 0.7   mg/dL    Glucose 97 70 - 99 mg/dL    Calcium 8.9 8.9 - 10.3 mg/dL    eGFR      Anion Gap 10 3 - 15 mEQ/L     Microbiology Results     Procedure Component Value Units Date/Time    MRSA Screen, Nares Only Nose [[id number]]  (Normal) Collected: 03/22/22 0022    Specimen: Nasal Swab from Nose Updated: 03/22/22 0513     MRSA DNA, Nares Negative    SARS-CoV-2 (COVID-19), PCR Nasopharynx [[id number]]  (Normal) Collected: 03/21/22 2329    Specimen: Nasopharyngeal Swab from Nasopharynx Updated: 03/22/22 0015    Narrative:      The following orders were created for panel order SARS-CoV-2 (COVID-19), PCR Nasopharynx.  Procedure                               Abnormality         Status                     ---------                               -----------         ------                     SARS-CoV-2 (COVID-19), P...[[id number]]  Normal              Final result                 Please view results for these tests on the individual orders.    SARS-CoV-2 (COVID-19), PCR Nasopharynx [[id number]]  (Normal) Collected: 03/21/22 2329    Specimen: Nasopharyngeal Swab from Nasopharynx Updated: 03/22/22 0015     SARS-CoV-2 (COVID-19) Negative        Lab Results   Component Value Date    WBC 20.02 03/22/2022    HGB 12.4 03/22/2022    HCT 36.1 03/22/2022     03/22/2022    ALT 23 03/21/2022    AST 46 (H) 03/21/2022     03/22/2022    K 4.0  2022     2022    CREATININE 0.7 2022    BUN 8 2022    CO2 20 (L) 2022    INR 1.1 2022     Temp (72hrs), Av.2 °C (99 °F), Min:36.1 °C (97 °F), Max:37.8 °C (100.1 °F)        No results found for: ESR, CRP  Radiology Imaging    XR CHEST 1 VW    Narrative  CLINICAL HISTORY: Change in mental status    PRIOR STUDY:  None    TECHNIQUE:  Frontal image of the chest    COMMENT:  There is an endotracheal tube noted with the tip in the mid trachea.  The lungs are clear. The heart size is normal. There is an enteric tube noted  with the tip overlying the distal esophagus. The osseous and soft tissues are  unremarkable.    --    Impression  See above. Findings above including an enteric tube malposition were  discussed with the physician assistant Malissa at 9:55 AM on 2022        Assessment & Plan  Will review MRI when complete  Continuous EEG ongoing  [facility] to [facility] clinically discussed with f family at the bedside  Liliane Moctezuma MD  3/22/2022

## 2022-03-22 NOTE — PROGRESS NOTES
Critical Care Progress Note    SUBJECTIVE  Admitted overnight for drug overdose/ seizures   Patient is currently intubated and sedated  Continuous EEG on now.  No w/d to pain, PERRL  Plan to lower sedation    Later in day, sedation was lowered. Patient started to have jerking movement of her arms and nystagmus. Jerking movements non consistent with seziure activity. Neurology was notified and conformed patient did not have any seizure activity on EEG.    OBJECTIVE   VITAL SIGNS  Temp:  [36.1 °C (97 °F)-37.8 °C (100.1 °F)] 36.1 °C (97 °F)  Heart Rate:  [] 64  Resp:  [10-34] 14  BP: ()/(54-92) 92/58  FiO2 (%) (Set):  [21 %-40 %] 21 %  Vent Mode: Pressure regulated volume control  FiO2 (%) (Set):  [21 %-40 %] 21 %  S RR:  [14] 14  S VT:  [400 mL] 400 mL  PEEP/CPAP (Set, cmH2O):  [6 cm H20] 6 cm H20  MAP (Observed, cmH2O):  [7-9] 8  PIP (Set, cmH2O):  [0 cm H2O] 0 cm H2O    Intake/Output Summary (Last 24 hours) at 3/22/2022 1727  Last data filed at 3/22/2022 1559  Gross per 24 hour   Intake 702.77 ml   Output 500 ml   Net 202.77 ml       O2 Requirement:  Oxygen Therapy: None (Room air)  O2 Delivery Method: Endotracheal tube  FiO2 (%) (Set): 21 %        Telemetry: sinus rhythm      MEDICATIONS  famotidine, 20 mg, q12h ELMER  levETIRAcetam, 500 mg, q12h INT           LAB RESULTS  Recent Results (from the past 24 hour(s))   ECG 12 lead    Collection Time: 03/21/22  9:54 PM   Result Value Ref Range    Ventricular rate 140     Atrial rate 140     DC Interval 168     QRS duration 74     QT Interval 246     QTC Calculation(Bazett) 375     P Axis 56     R Axis 70     T Wave Axis 44    CBC and Differential    Collection Time: 03/21/22 10:04 PM   Result Value Ref Range    WBC 14.44   K/uL    RBC 4.40 M/uL    Hemoglobin 13.0   g/dL    Hematocrit 38.3   %    MCV 87.0 fL    MCH 29.5 pg    MCHC 33.9 g/dL    RDW 12.0 %    Platelets 260   K/uL    MPV 10.8 fL    Differential Type Auto     nRBC 0.0 <=0.0 %    Immature  Granulocytes 0.3 %    Neutrophils 94.9 %    Lymphocytes 3.2 %    Monocytes 1.5 %    Eosinophils 0.0 %    Basophils 0.1 %    Immature Granulocytes, Absolute 0.05 K/uL    Neutrophils, Absolute 13.69 K/uL    Lymphocytes, Absolute 0.46 K/uL    Monocytes, Absolute 0.22 K/uL    Eosinophils, Absolute 0.00 K/uL    Basophils, Absolute 0.02 K/uL   Basic metabolic panel    Collection Time: 03/21/22 10:04 PM   Result Value Ref Range    Sodium 136 136 - 144 mEQ/L    Potassium 4.5 3.6 - 5.1 mEQ/L    Chloride 108 98 - 109 mEQ/L    CO2 15 (L) 22 - 32 mEQ/L    BUN 8 8 - 20 mg/dL    Creatinine 0.7   mg/dL    Glucose 125 (H) 70 - 99 mg/dL    Calcium 8.2 (L) 8.9 - 10.3 mg/dL    eGFR      Anion Gap 13 3 - 15 mEQ/L   Lactic acid, Venous    Collection Time: 03/21/22 10:04 PM   Result Value Ref Range    Lactate 6.3 (HH) 0.4 - 2.0 mmol/L   ER toxicology screen, serum    Collection Time: 03/21/22 10:04 PM   Result Value Ref Range    Salicylate <4.0 <=30.0 mg/dL    Acetaminophen <10.0 (L) 10.0 - 30.0 ug/mL    Ethanol <5 <=10 mg/dL   BhCG, Serum, Qual    Collection Time: 03/21/22 10:04 PM   Result Value Ref Range    Preg Test, Serum Negative Negative   Hepatic function panel    Collection Time: 03/21/22 10:04 PM   Result Value Ref Range    Albumin 3.8 3.4 - 5.0 g/dL    Bilirubin, Total 1.1 0.3 - 1.2 mg/dL    Bilirubin, Direct 0.5 (H) <=0.4 mg/dL    Alkaline Phosphatase 53 35 - 126 IU/L    AST (SGOT) 46 (H) 15 - 41 IU/L    ALT (SGPT) 23 IU/L    Total Protein 6.4 6.0 - 8.2 g/dL   Lipase    Collection Time: 03/21/22 10:04 PM   Result Value Ref Range    Lipase 23 20 - 51 U/L   Protime-INR    Collection Time: 03/21/22 10:05 PM   Result Value Ref Range    PT 14.2 12.2 - 14.5 sec    INR 1.1     APTT    Collection Time: 03/21/22 10:05 PM   Result Value Ref Range    PTT 31 23 - 35 sec   Urine drug screen (UDS)    Collection Time: 03/21/22 10:15 PM   Result Value Ref Range    PCP Scrn, Ur Not Detected Not Detected    Benzodiazepine Ur Qual Positive (A)  Not Detected    Cocaine Screen, Urine Not Detected Not Detected    Amphetamine+Methamphetamine Screen, Ur Positive (A) Not Detected    Cannabinoid Screen, Urine Not Detected Not Detected    Opiate Scrn, Ur Positive (A) Not Detected    Barbiturate Screen, Ur Not Detected Not Detected   UA Reflex to Culture (Macroscopic)    Collection Time: 03/21/22 10:15 PM    Specimen: Urine, Clean Catch   Result Value Ref Range    Color, Urine Yellow Yellow, Colorless    Clarity, Urine Cloudy (A) Clear    Specific Gravity, Urine 1.035 (H) 1.005 - 1.030    pH, Urine 6.0 4.5 - 8.0    Leukocyte Esterase Negative Negative    Nitrite, Urine Negative Negative    Protein, Urine +2 (A) Negative    Glucose, Urine Negative Negative mg/dL    Ketones, Urine Trace (A) Negative mg/dL    Urobilinogen, Urine 0.2 <2.0 EU/dL EU/dL    Bilirubin, Urine Negative Negative mg/dL    Blood, Urine Negative Negative   UA Microscopic    Collection Time: 03/21/22 10:15 PM   Result Value Ref Range    RBC, Urine 0 TO 4 0 TO 4 /HPF    WBC, Urine 0 TO 3 0 TO 3 /HPF    Squamous Epithelial None Seen None Seen /hpf    Hyaline Cast None Seen None Seen /lpf    Bacteria, Urine None Seen None Seen /HPF    MUCUSUA Rare (A) None Seen /LPF   POCT Glucose    Collection Time: 03/21/22 10:17 PM   Result Value Ref Range    POCT Bedside Glucose 126 (H) 70 - 99 mg/dL    POC Test POC    SARS-CoV-2 (COVID-19), PCR Nasopharynx    Collection Time: 03/21/22 11:29 PM    Specimen: Nasopharynx; Nasopharyngeal Swab   Result Value Ref Range    SARS-CoV-2 (COVID-19) Negative Negative   Blood Gas, arterial Comprehensive    Collection Time: 03/21/22 11:43 PM   Result Value Ref Range    pH, Arterial 7.35 7.35 - 7.45    pCO2, Arterial 35 35 - 48 mm Hg    pO2, Arterial 204 (H) 83 - 100 mm Hg    HCO3, Arterial 20.6 (L) 21.0 - 28.0 mEQ/L    Base Excess, Arterial -5.6 mEQ/L    O2 Sat, Arterial 98 93 - 98 %    TCO2, Arterial 20.4 (L) 22.0 - 32.0 mEQ/L    Lactate, Art 1.8 (H) 0.4 - 1.6 mmol/L     Glucose, Arterial 104 (H) 70 - 99 mg/dL    Sodium, Arterial 135 (L) 136 - 145 mEQ/L    Potassium, Arterial 3.6 3.4 - 4.5 mEQ/L    Chloride, Arterial 108 98 - 109 mEQ/L    Ionized Calcium, Arterial 1.18 1.15 - 1.27 mmol/L    Hemoglobin, Arterial 12.0 g/dL    Carboxyhemoglobin 0.9 0.0 - 3.0; (Smokers: 0.0 - 9.0) %    Methemoglobin 0.8 0.4 - 1.5 %    Source Of Oxygen vent     FIO2 40    MRSA Screen, Nares Only Nose    Collection Time: 03/22/22 12:22 AM    Specimen: Nose; Nasal Swab   Result Value Ref Range    MRSA DNA, Nares Negative Negative   Lactic Acid (Repeat)    Collection Time: 03/22/22  1:07 AM   Result Value Ref Range    Lactate 3.1 (H) 0.4 - 2.0 mmol/L   CBC    Collection Time: 03/22/22  1:07 AM   Result Value Ref Range    WBC 20.02   K/uL    RBC 4.15 M/uL    Hemoglobin 12.4   g/dL    Hematocrit 36.1   %    MCV 87.0 fL    MCH 29.9 pg    MCHC 34.3 g/dL    RDW 12.1 %    Platelets 265   K/uL    MPV 10.0 fL   Basic metabolic panel    Collection Time: 03/22/22  1:07 AM   Result Value Ref Range    Sodium 138 136 - 144 mEQ/L    Potassium 4.0 3.6 - 5.1 mEQ/L    Chloride 108 98 - 109 mEQ/L    CO2 20 (L) 22 - 32 mEQ/L    BUN 8 8 - 20 mg/dL    Creatinine 0.7   mg/dL    Glucose 97 70 - 99 mg/dL    Calcium 8.9 8.9 - 10.3 mg/dL    eGFR      Anion Gap 10 3 - 15 mEQ/L   CK, Total    Collection Time: 03/22/22  1:07 AM   Result Value Ref Range    Total CK 3,767 U/L   CK, Total    Collection Time: 03/22/22 12:58 PM   Result Value Ref Range    Total CK 4,395 U/L     Laboratory results were independently reviewed    ABG  Results from last 7 days   Lab Units 03/21/22  2343   PH ART  7.35   PCO2 ART mm Hg 35   PO2 ART mm Hg 204*   HCO3 ART mEQ/L 20.6*   O2 SAT ART % 98   BASE EXC ART mEQ/L -5.6   SOURCE OF OXYGEN  vent         CULTURES  Microbiology Results       Procedure Component Value Units Date/Time    MRSA Screen, Nares Only Nose [[id number]]  (Normal) Collected: 03/22/22 0022    Specimen: Nasal Swab from Nose Updated:  03/22/22 0513     MRSA DNA, Nares Negative    SARS-CoV-2 (COVID-19), PCR Nasopharynx [[id number]]  (Normal) Collected: 03/21/22 2329    Specimen: Nasopharyngeal Swab from Nasopharynx Updated: 03/22/22 0015    Narrative:      The following orders were created for panel order SARS-CoV-2 (COVID-19), PCR Nasopharynx.  Procedure                               Abnormality         Status                     ---------                               -----------         ------                     SARS-CoV-2 (COVID-19), P...[[id number]]  Normal              Final result                 Please view results for these tests on the individual orders.    SARS-CoV-2 (COVID-19), PCR Nasopharynx [[id number]]  (Normal) Collected: 03/21/22 2329    Specimen: Nasopharyngeal Swab from Nasopharynx Updated: 03/22/22 0015     SARS-CoV-2 (COVID-19) Negative            Laboratory results were independently reviewed    RADIOLOGY  CT HEAD WITHOUT IV CONTRAST    Result Date: 3/21/2022  CLINICAL HISTORY:  Seizure, new-onset, no history of trauma Mental status change, unknown cause     IMPRESSION: Study is significantly limited secondary to patient motion.  No evidence of large intracranial hemorrhage, midline shift or other mass effect COMMENT: Comparison:  None. TECHNIQUE: CT of the brain was performed and reconstructed/reformatted in the axial, coronal and sagittal planes. CT DOSE:  One or more dose reduction techniques (e.g. automated exposure control, adjustment of the mA and/or kV according to patient size, use of iterative reconstruction technique) utilized for this examination. INTRAVENOUS CONTRAST: None Brain parenchyma:  No evidence of large intracranial hemorrhage, midline shift or other mass effect.  No evidence of an extra-axial fluid collection. Ventricles and cisterns:  Normal in size and configuration. Cranial carotid arteries: Limited assessment of the vasculature on this nonenhanced study. Calvarium and extra cranial soft tissues:  No  evidence of a displaced calvarial fracture.   Scalp soft tissue within normal limits. Visualized paranasal sinuses and mastoid air cells:  Clear bilaterally.     X-RAY CHEST 1 VIEW    Result Date: 3/22/2022  CLINICAL HISTORY: Change in mental status PRIOR STUDY:  None TECHNIQUE:  Frontal image of the chest COMMENT:  There is an endotracheal tube noted with the tip in the mid trachea. The lungs are clear. The heart size is normal. There is an enteric tube noted with the tip overlying the distal esophagus. The osseous and soft tissues are unremarkable.     IMPRESSION: See above. Findings above including an enteric tube malposition were discussed with the physician assistant Malissa at 9:55 AM on March 22, 2022     Radiography was independently reviewed.      VTE Risk Assessment and Plan  Not indicated    GI prophylaxis  Pepcid    PHYSICAL EXAM    GEN: critically ill woman  HENT: NC/AT, no deformity, PERRL  NECK: supple, no lymphadenopathy  CARD: RRR, no rubs, murmur, gallops  RESP: Assisted, no wheeze, rales, crackles  Gi: soft, NTND, +BS  EXT: no edema, pulses present  SKIN: warm, dry, no rash  NEURO: intubated and sedated, jerking arm movement, not following commands, +cough and gag        ASSESSMENT & PLAN    Mood disorder (CMS/HCC)  Assessment & Plan  Patient hx of mood disorder with multiple suicide attempts in the past  Psych and behavior josey crisis consulted     Drug overdose  Assessment & Plan  Concern for drug overdose, especially with prior reported history of drug overdose/suicide attempts  EMS found bottle containing hydroxyzine, effexor, and benadryl    Poison control contacted  Recommended supportive measures  Follow ekg for changes to include:  If qrs > 100,give bicarb, if qtc > 500, give mag IV  Okay for ativan prn seizure/agitation  Behavior josey crisis consult and psych consult       Lactic acidosis  Assessment & Plan  Suspect 2nd to seizure activity  No evidence of infectious conditon at this  time  IVFs ordered  ABG is not acidotic    On mechanically assisted ventilation (CMS/[facility])  Assessment & Plan  2nd to ongoing seizures, intubated for airway protection    -ICU level of care  -Av vent with SpO2 goal > 92%  -Lowest effective sedation  -Monitor chest xray and abg  -GI and DVT ppx  -Resp care       * Seizure (CMS/[facility])  Assessment & Plan  Pt with reported seizure activity, no responsive to ativan  Susbequently intubated for airway protection  Concern for drug overdose but also consider primary CNS event    -Neurology consulted   -Continuous EEG ongoing now: no seizure activity thus far  -Continue Keppra 500mg BID  - MRI brain ordered  -No evidence of acute abnormality of CTH imaging   -Ativan prn for seizure activity              Acting as a scribe in the presence of TELMA Gallagher  Documentation prepared by the Physician Assistant/Resident in my presence, I have reviewed and agree with this note. I have spent a total of  35 minutes of time.   Dr Lemos.

## 2022-03-22 NOTE — SUBJECTIVE & OBJECTIVE
Admitting diagnosis: Status epilepticus (CMS/[facility]) [G40.901]  Patient is a 22 y.o. adult who presents with seizure  History obtained from father, ED staff.  Pt presented with altered mental status, seizure  This occurred prior to arrival to ED.  Pt was last known baseline at 10 am, when mother talked to her on phone.  Patient did not have any concerns or complaints, and mother reported conversation was unremarkable.  Subsequently throughout the day, the mother was unable to reach or make contact with pt.  The father came by later and found the patient unresponsive, with seizure activity.  He called EMS.  EMS found pill bottle in room, which contained some pills.    In ED, on arrival,  ED staff reported that the patient is tachycardic.  She is extremely altered.  Has psychomotor agitation.  She does have shaking activity but also nystagmus.  Patient was given 2 mg of IV Ativan x2 without really any significant change to her vital signs nor her mental status.  The shaking episodes continued.  EKG check showing sinus tachycardia with a heart rate of 140 bpm.       In ED, . Received ativan 2 mg x 2.  Subsequently was intubated 2nd to ongoing seizure  Currently sedated with profofol gtt  Other treatment in ED: ns 1000 cc     Currently intubated, sedated.    I talked to both father and mother on speaker phone ( mother Is out of town on vacation ),who reported that patient has history of psychiatric conditions, prior suicide attempts include drug overdoses and self harm with knife, also history of eating disorders.     The patient is receiving outpatient therapy currently.   She         Medical History: No past medical history on file.    Surgical History: No past surgical history on file.    Social History:   Social History     Socioeconomic History    Marital status: Single       Family History: No family history on file.    Allergies: Patient has no known allergies.    Current Inpatient Medications   Medication Dose  Route Frequency Provider Last Rate Last Admin    dexmedeTOMIDine in 0.9 % NaCL (PRECEDEX) 400 mcg/100 mL (4 mcg/mL) infusion  0.2-1.5 mcg/kg/hr intravenous Titrated Lupe Alonso MD        levETIRAcetam (KEPPRA) injection 1,000 mg  1,000 mg intravenous Once BodenhLinette barrios PA C        propofoL (DIPRIVAN) 10 mg/mL continuous infusion  5-80 mcg/kg/min intravenous Continuous BodenheiLinette loaiza PA C 7.7 mL/hr at 03/21/22 2312 20 mcg/kg/min at 03/21/22 2312       Review of Systems  Review of systems not obtained due to patient is intubated and sedated    Vital Signs for the last 24 hours:  Temp:  [37.5 °C (99.5 °F)-37.8 °C (100.1 °F)] 37.5 °C (99.5 °F)  Heart Rate:  [107-142] 107  Resp:  [10-34] 18  BP: (102-142)/(73-86) 130/77  FiO2 (%) (Set):  [40 %] 40 %    Gen: intubated, sedated  Heent: pupils round/reactive  Neck ett in place  Cv: regular s1s2 tachycardic  Lung: coarse bs present, pt on acv vent mode rate 14 tv 400 peep 6  Abd: soft  Ext: no edema   Neuro: sedated.  No current seizures,  no shaking activity noted.  Skin: warm and dry.    Labs  I have reviewed the patient's pertinent labs.  Significant abnormals are  .  Elevated lactate  UDS + benzo, + amphetamine, + opiate           Imaging    Chest xray independently reviewed by me: no acute infiltrates.  Ett in position.    Significant findings include:        CT HEAD WITHOUT IV CONTRAST [[id number]] Collected: 03/21/22 2305   Order Status: Completed Updated: 03/21/22 2309   Narrative:     CLINICAL HISTORY:  Seizure, new-onset, no history of trauma   Mental status change, unknown cause    Impression:     IMPRESSION:   Study is significantly limited secondary to patient motion.  No evidence of   large intracranial hemorrhage, midline shift or other mass effect     COMMENT:     Comparison:  None.     TECHNIQUE: CT of the brain was performed and reconstructed/reformatted in the   axial, coronal and sagittal planes.   CT DOSE:  One or more dose reduction  techniques (e.g. automated exposure   control, adjustment of the mA and/or kV according to patient size, use of   iterative reconstruction technique) utilized for this examination.     INTRAVENOUS CONTRAST: None     Brain parenchyma:  No evidence of large intracranial hemorrhage, midline shift   or other mass effect.  No evidence of an extra-axial fluid collection.   Ventricles and cisterns:  Normal in size and configuration.   Cranial carotid arteries: Limited assessment of the vasculature on this   nonenhanced study.   Calvarium and extra cranial soft tissues:  No evidence of a displaced   calvarial fracture.   Scalp soft tissue within normal limits.   Visualized paranasal sinuses and mastoid air cells:  Clear bilaterally         ECG/Telemetry  I have independently reviewed the telemetry. Significant findings include sinus tachycardia.        VTE Assessment: Padua

## 2022-03-22 NOTE — ASSESSMENT & PLAN NOTE
2nd to ongoing seizures, intubated for airway protection  Maintain acv vent mode  Follow abg, chest ray  Follow clinically for evidence of aspiration  Check sputum  Ventilator settings adjustment prn   Gi and dvt prophylaxis

## 2022-03-22 NOTE — PLAN OF CARE
Problem: Adult Inpatient Plan of Care  Goal: Plan of Care Review  Flowsheets (Taken 3/22/2022 1053)  Progress: no change  Plan of Care Reviewed With: caregiver  Outcome Summary: Pt remains sedated and intubated. Pt is getting 203 lipid kcals from propofol. Weight is wnl based on BMI. If pt to remain intubated, pt would benefit from enteral nutrition support.   Goal: initiate enteral nutrition support within 48hrs of intubation  Recommendations:  If pt remains intubated, consider DHT with enteral feeds: Fibersource HN at 30ml/hr increase as tolerated to a goal of 60ml/hr x 24hrs to provide 1728 kcals (total of 1931 kcals with propofol), 77gms protein and 1166ml free water.  Monitor  NPO status and labs, especially Trig.

## 2022-03-22 NOTE — CONSULTS
Behavioral Health Crisis Consult    Reason for Consult Drug Overdose    Presenting Problem  Status epilepticus (CMS/[facility]) [G40.901]  Altered mental status, unspecified altered mental status type [R41.82]     Interval History  Gemma Darnell is a 22 y.o. adult who was admitted for AMS, drug overdose.  She was found by her father with AMS and seizure activity.  Pt was intubated in ED.  Pt has a history of intentional overdose, and a pill bottle was found beside her.    Pt is known to our service.  She has had at least 2 prior admissions at [facility].  During her last hospitalization in August 2021, pt had ECT.  She participated in PHP at [facility] post-discharge.      Pt currently intubated/sedated, unable to participate in assessment.      17:15 ADD: Spoke with pt's parents at bedside to obtain history and offer support. Pt has a long history of mental health, substance use, and eating disorder symptoms, beginning at age 18 when she went away to school and was introduced to drugs.  Her treatment history has involved 3 inpatient psych admissions for suicide attempts (2 at [facility], 1 in [place]), 2 residential treatment programs ([facility] and [facility]) and PHP/IOP at [facility] and [facility].  She has had several suicide attempts by overdose, but this is the first overdose that has required intubation.  Pt is not currently following with a Psychiatrist.  She was attending IOP at [facility] prior to admission and had been attending the program.  They are not aware of any specific triggers that led to this overdose, though pt is not always truthful with them.  At [facility] she had a trial of ECT which pt's felt was helpful but pt complained of residential side effects.   When asked if any of the treatment programs had been beneficial, they noted that she did seem somewhat improved after her residential and PHP stay at [facility] but that it was not sustained.  She does not attend school or work, and  per her parents struggles with her ADLs.      With regard to the events that led to her admission, her parents did not notice any change in her mood or behavior.  That morning she took her grandmother to a doctor's appointment.  Her mother spoke with her around 3 and then attempted to each her at 5pm and 8pm.  Her brother was home with pt but did not know that she was home.  Her sister tracked pt's phone to the house. Pt's brother stuck his head in the room and confirmed pt was in bed but did not look any further.  Her father found her with apparent seizure activity.  There was a bottle of pills at bedside, but per her parents the number of pills in the bottle was as expected if they were taken as prescribed. They are unsure if she was taking any medications. Discussed at length the course of treatment with the focus on stabilizing pt medically.  Explained 302 petition on file; parents would prefer pt to go to treatment voluntarily, rationale for 302 explained.  All questions answered, emotional support provided.    Updated PA on above conversation.  The plan is for pt to remain intubated overnight and re-evaluate in the morning.  Parents updated.  Psychiatry updated.      Social History  Per chart, pt lives with her family.    Psychiatric History  Pt has at least 3 prior IP Psychiatric hospitalizations,  2 at [facility], one prior residential treatment stay, PHP at [facility]. Current provider unknown.    Mental Status Exam  Unable to complete ROS    Substance Use History  Per chart, history of substance use, treated at [facility] in the past.    Review of Systems  Current Medications:  •  acetaminophen, 650 mg, rectal, q4h PRN  •  dexmedetomidine in 0.9 % NaCl, 0.2-1.5 mcg/kg/hr, intravenous, Titrated  •  glucose, 16-32 g of dextrose, oral, PRN **OR** dextrose, 15-30 g of dextrose, oral, PRN **OR** glucagon, 1 mg, intramuscular, PRN **OR** dextrose in water, 25 mL, intravenous, PRN  •  famotidine, 20 mg, intravenous,  q12h ELMER  •  lactated ringer's, , intravenous, Continuous  •  levETIRAcetam, 500 mg, intravenous, q12h INT  •  LORazepam, 2 mg, intravenous, q4h PRN  •  magnesium sulfate, 2 g, intravenous, PRN  •  potassium bicarbonate, 20 mEq, oral, PRN  •  potassium bicarbonate, 40 mEq, oral, PRN  •  potassium chloride, 20 mEq, oral, PRN  •  potassium chloride, 40 mEq, oral, PRN  •  propofoL, 5-80 mcg/kg/min, intravenous, Continuous    Home Medications    Objective   Vital Signs for the last 24 hours:  Temp:  [36.1 °C (97 °F)-37.8 °C (100.1 °F)] 36.1 °C (97 °F)  Heart Rate:  [] 78  Resp:  [10-34] 28  BP: ()/(54-92) 91/63  FiO2 (%) (Set):  [21 %-40 %] 21 %    Ethanol Level On Admission: <5    Urine Drug Screen:  +Benzos, Amphetamines, Opiates    CIWA/COWS:  N/A    Impression  Drug Overdose  Intervention/Plan  Pt admitted with overdose, possibly intentional.  302 Petition completed by Psych AMPARO, sent to [facility], warrant received.  Will continue to follow in conjunction with Psychiatry to assist with discharge planning as appropriate.  Follow Up Needed:  yes

## 2022-03-22 NOTE — ASSESSMENT & PLAN NOTE
Pt with reported seizure activity, no responsive to ativan  Susbequently intubated for airway protection  Concern for drug overdose but also consider primary CNS event      -MRI brain: Normal  -No evidence of acute abnormality of CTH imaging    -Neurology consult following  -S/p continuous EEG: without seizures   -Keppra 500mg BID for now until cleared to stop by neuro  -Ativan prn for seizure activity

## 2022-03-22 NOTE — PLAN OF CARE
Problem: Adult Inpatient Plan of Care  Goal: Readiness for Transition of Care  Outcome: Progressing  Intervention: Mutually Develop Transition Plan  Flowsheets  Taken 3/22/2022 1210  Equipment Needed After Discharge: (a\w PT eval and Tx) other (see comments)  Discharge Coordination/Progress: Dc Plan TBD once Psych and [facility] Crisis SW evaluate pt when extubated  Assistive Device/Animal Currently Used at Home: none  Anticipated Changes Related to Illness: inability to care for self  Concerns Comments: Pt admitted with Alt MS Szs found near open empty pill bottle. ?drug OD. UDS + opiods. benzos methamphetamines. PMH Drug OD in . Currently in ICU Intubated\sedated  Outpatient/Agency/Support Group Needs: psychiatric facility  Transportation Concerns: car, none  Current Discharge Risk:   substance use/abuse   psychiatric illness  Readmission Within the Last 30 Days: no previous admission in last 30 days  Patient/Family Anticipated Services at Transition: mental health services  Patient/Family Anticipates Transition to: home with family  Transportation Anticipated: family or friend will provide  Concerns to be Addressed:   discharge planning   substance/tobacco abuse/use   mental health   coping/stress  Current Outpatient/Agency/Support Group: outpatient psychiatric care  Taken 3/22/2022 1042  Anticipated Discharge Disposition:   psychiatric facility   substance use treatment facility  Per Chart review, the pt l\w her mother and is IND in [facility] and amb PTA. She has a PMH of eating disorder and Drug OD in . A\w extubation and Evaluation by psych and [facility] to a\w Dc plan.

## 2022-03-22 NOTE — ASSESSMENT & PLAN NOTE
Check abg to assess current acid/base status  Suspect 2nd to seizure activity  No evidence of infectious conditon at this time  ivfs ordered

## 2022-03-22 NOTE — H&P
History & Physical    Subjective/Objective:  Admitting diagnosis: Status epilepticus (CMS/[facility]) [G40.901]  Patient is a 22 y.o. adult who presents with seizure  History obtained from father, ED staff.  Pt presented with altered mental status, seizure  This occurred prior to arrival to ED.  Pt was last known baseline at 10 am, when mother talked to her on phone.  Patient did not have any concerns or complaints, and mother reported conversation was unremarkable.  Subsequently throughout the day, the mother was unable to reach or make contact with pt.  The father came by later and found the patient unresponsive, with seizure activity.  He called EMS.  EMS found pill bottle in room, which contained some pills.    In ED, on arrival,  ED staff reported that the patient is tachycardic.  She is extremely altered.  Has psychomotor agitation.  She does have shaking activity but also nystagmus.  Patient was given 2 mg of IV Ativan x2 without really any significant change to her vital signs nor her mental status.  The shaking episodes continued.  EKG check showing sinus tachycardia with a heart rate of 140 bpm.       In ED, . Received ativan 2 mg x 2.  Subsequently was intubated 2nd to ongoing seizure  Currently sedated with profofol gtt  Other treatment in ED: ns 1000 cc     Currently intubated, sedated.    I talked to both father and mother on speaker phone ( mother Is out of town on vacation ),who reported that patient has history of psychiatric conditions, prior suicide attempts include drug overdoses and self harm with knife, also history of eating disorders.     The patient is receiving outpatient therapy currently.   She         Medical History: No past medical history on file.    Surgical History: No past surgical history on file.    Social History:   Social History     Socioeconomic History   • Marital status: Single       Family History: No family history on file.    Allergies: Patient has no known  allergies.    Current Inpatient Medications   Medication Dose Route Frequency Provider Last Rate Last Admin   • dexmedeTOMIDine in 0.9 % NaCL (PRECEDEX) 400 mcg/100 mL (4 mcg/mL) infusion  0.2-1.5 mcg/kg/hr intravenous Titrated Lupe Alonso MD       • levETIRAcetam (KEPPRA) injection 1,000 mg  1,000 mg intravenous Once JaidaenheiLinette loaiza PA C       • propofoL (DIPRIVAN) 10 mg/mL continuous infusion  5-80 mcg/kg/min intravenous Continuous BodenheimerLinette PA C 7.7 mL/hr at 03/21/22 2312 20 mcg/kg/min at 03/21/22 2312       Review of Systems  Review of systems not obtained due to patient is intubated and sedated    Vital Signs for the last 24 hours:  Temp:  [37.5 °C (99.5 °F)-37.8 °C (100.1 °F)] 37.5 °C (99.5 °F)  Heart Rate:  [107-142] 107  Resp:  [10-34] 18  BP: (102-142)/(73-86) 130/77  FiO2 (%) (Set):  [40 %] 40 %    Gen: intubated, sedated  Heent: pupils round/reactive  Neck ett in place  Cv: regular s1s2 tachycardic  Lung: coarse bs present, pt on acv vent mode rate 14 tv 400 peep 6  Abd: soft  Ext: no edema   Neuro: sedated.  No current seizures,  no shaking activity noted.  Skin: warm and dry.    Labs  I have reviewed the patient's pertinent labs.  Significant abnormals are  .  1. Elevated lactate  2. UDS + benzo, + amphetamine, + opiate           Imaging    Chest xray independently reviewed by me: no acute infiltrates.  Ett in position.    Significant findings include:        CT HEAD WITHOUT IV CONTRAST [[id number]] Collected: 03/21/22 2305   Order Status: Completed Updated: 03/21/22 2309   Narrative:     CLINICAL HISTORY:  Seizure, new-onset, no history of trauma   Mental status change, unknown cause    Impression:     IMPRESSION:   Study is significantly limited secondary to patient motion.  No evidence of   large intracranial hemorrhage, midline shift or other mass effect     COMMENT:     Comparison:  None.     TECHNIQUE: CT of the brain was performed and reconstructed/reformatted in the   axial,  coronal and sagittal planes.   CT DOSE:  One or more dose reduction techniques (e.g. automated exposure   control, adjustment of the mA and/or kV according to patient size, use of   iterative reconstruction technique) utilized for this examination.     INTRAVENOUS CONTRAST: None     Brain parenchyma:  No evidence of large intracranial hemorrhage, midline shift   or other mass effect.  No evidence of an extra-axial fluid collection.   Ventricles and cisterns:  Normal in size and configuration.   Cranial carotid arteries: Limited assessment of the vasculature on this   nonenhanced study.   Calvarium and extra cranial soft tissues:  No evidence of a displaced   calvarial fracture.   Scalp soft tissue within normal limits.   Visualized paranasal sinuses and mastoid air cells:  Clear bilaterally         ECG/Telemetry  I have independently reviewed the telemetry. Significant findings include sinus tachycardia.        VTE Assessment: [place]             Assessment/Plan:  Drug overdose  Assessment & Plan  Concern for drug overdose, especially with prior reported history of drug overdose/suicide attempts  EMS found bottle containing hydroxyzine, effexor, and benadryl  Call made to poison control, updated regarding events transpired, current status/condition  Poison control stated that presentation may be consistent with ingestion of listed pills  Recommended supportive measures  Follow ekg for changes to include:  If qrs > 100,give bicarb, if qtc > 500, give mag IV  Okay for ativan prn seizure/agitation      Lactic acidosis  Assessment & Plan  Check abg to assess current acid/base status  Suspect 2nd to seizure activity  No evidence of infectious conditon at this time  ivfs ordered    On mechanically assisted ventilation (CMS/HCC)  Assessment & Plan  2nd to ongoing seizures, intubated for airway protection  Maintain acv vent mode  Follow abg, chest ray  Follow clinically for evidence of aspiration  Check sputum  Ventilator  settings adjustment prn   Gi and dvt prophylaxis    * Seizure (CMS/HCC)  Assessment & Plan  Pt with reported seizure activity, no responsive to ativan  susbequently intubated for airway protection  Concern for drug overdose but also consider primary CNS event  No evidence of acute abnormality of ct imaging  Instructed ED to give keppra 1 gm iv now  Neurology notified - agreed with management as outlined  Continue keppra iv bid  eeg ordered  Mri brain ordered  Ativan prn for breakthrough seizure  On sedation for profofol, titrate for lowest effective dose.        Father at bedside, updated all above.  Mother was updated over phone.   Questions and conerns were addressed.  Contact info for unit given.  Total critical care time: 80 minutes.    Code Status: Full Code  Estimated discharge date: 3/26/2022

## 2022-03-22 NOTE — ED ATTESTATION NOTE
I have personally seen and examined the patient.  I reviewed and agree with physician assistant’s assessment and plan of care, except as where stated below.  My examination, assessment, and plan of care of Gemma Darnell is as follows:     Patient is a 22-year-old female with a past medical history of anxiety, depression, prior suicide attempts who presents to the emergency department for evaluation of altered mental status.  The patient herself is not able to provide any history.  History is obtained from parents, EMS, and police.  Apparently, the patient lives at home with her mother.  Mother is out of town.  Her father found her today altered and seizing and called EMS.    On exam, the patient is awake but minimally responsive.  She has rotary nystagmus, dilated pupils.  Bilateral corneal reflexes are present.  She will turn her head side to side but not in response to any specific stimuli.  She has 2-3 beats of clonus in the bilateral lower extremities.  Heart rate is tachycardic, regular.  Respirations are nonlabored, lungs are clear.  Abdomen is soft, nondistended.  There are no signs of trauma on her.    On arrival, the patient is tachycardic.  She is extremely altered.  She does have shaking activity but also nystagmus.  Concern for seizure versus toxidrome.  Patient was given 2 mg of IV Ativan x2 without really any significant change to her vital signs nor her mental status.  The shaking episodes continued.  EKG check showing sinus tachycardia with a heart rate of 140 bpm.  Normal intervals.    Lab work started to resolve.  She has a leukocytosis of 14,000, lactate is elevated to 6.5.  Patient continued to have tachycardia and abnormal movements that were not able to be controlled.  Therefore, decision was made to intubate the patient to allow for increased sedation.  Patient was intubated by myself without any complications.  Will be admitted to the ICU for further management.        Critical Care  Performed  by: Lupe Alonso MD  Authorized by: Lupe Alonso MD     Critical care provider statement:     Critical care time (minutes):  90    Critical care was necessary to treat or prevent imminent or life-threatening deterioration of the following conditions:  CNS failure or compromise, toxidrome, metabolic crisis, respiratory failure, cardiac failure and circulatory failure    Critical care was time spent personally by me on the following activities:  Ordering and performing treatments and interventions, ordering and review of laboratory studies, ordering and review of radiographic studies, development of treatment plan with patient or surrogate, discussions with consultants, evaluation of patient's response to treatment, examination of patient, interpretation of cardiac output measurements, obtaining history from patient or surrogate, re-evaluation of patient's condition, ventilator management and review of old charts  Comments:            Intubation    Date/Time: 3/22/2022 12:17 AM  Performed by: Lupe Alonso MD  Authorized by: Lupe Alonso MD     Consent:     Consent obtained:  Verbal    Consent given by:  Parent    Risks discussed:  Hypoxia and death    Alternatives discussed:  No treatment and delayed treatment  Universal protocol:     Procedure explained and questions answered to patient or proxy's satisfaction: yes      Relevant documents present and verified: yes      Test results available: yes      Required blood products, implants, devices, and special equipment available: no    Pre-procedure details:     Indication: failure to protect airway and predicted clinical deterioration      Patient status:  Altered mental status    Obstruction: none      Pharmacologic strategy: RSI      Induction agents:  Etomidate    Paralytics:  Succinylcholine  Procedure details:     Preoxygenation:  Nasal cannula    Intubation method:  Oral    Intubation technique: video assisted      Laryngoscope size: Glidescope  4.    Tube size (mm):  7.5    Tube type:  Cuffed    Number of attempts:  1    Tube visualized through cords: yes    Placement assessment:     ETT at teeth/gumline (cm):  24    Tube secured with:  ETT murrell    Breath sounds:  Equal    Placement verification: chest rise, colorimetric ETCO2, CXR verification and equal breath sounds      CXR findings:  Appropriate position  Post-procedure details:     Procedure completion:  Tolerated well, no immediate complications               Lupe Alonso MD  03/22/22 0020

## 2022-03-22 NOTE — ED PROVIDER NOTES
Emergency Medicine Note  HPI   HISTORY OF PRESENT ILLNESS     The patient is a 22-year-old female with a past medical history of depression, prior suicide attempts who presents to the ED with altered mental status.  The patient was found on her bed having a possible seizure.  Dad called 911.  EMS and the police report that when they got to the scene she had nystagmus and her upper extremities were shaking.  They state that this has been going on for at least 10 minutes.  Dad states that the patient lives with her mother who is out of town.  The patient is in an IOP program and had an appointment at 5 PM.  He received a phone call that the patient did not go to the appointment and therefore he went to the house to check on her.  That is when he found her having a possible seizure.   they are unsure when she was last seen at her baseline, does not talk to her today.  Mom is out of town.  The therapist is here and states they last talked to her on Friday.  No one seemed to have concerns that she was more depressed than typically or planning to hurt herself.  The patient is on Effexor daily.  They are not aware of any other medications.  EMS states they saw a bottle of hydroxyzine.  EMS and police report that the scene looked safe and there were no pill bottles or alcohol bottles lying around.    HPI      Patient History   PAST HISTORY     Reviewed from Nursing Triage:  Problems         No past medical history on file.    No past surgical history on file.    No family history on file.           Review of Systems   REVIEW OF SYSTEMS     Review of Systems   Unable to perform ROS: Acuity of condition         VITALS     ED Vitals    Date/Time Temp Pulse Resp BP SpO2 [facility]   03/22/22 0000 37.5 °C (99.5 °F) 98 14 116/67 100 % DD   03/21/22 2337 -- 107 18 125/71 100 % SM   03/21/22 2327 -- 107 18 130/77 100 % SM   03/21/22 2314 -- 124 25 142/86 100 % RST   03/21/22 2300 -- 122 21 130/82 98 % KMW   03/21/22 2255 -- 128 34 -- 100  % KMW   03/21/22 2253 37.5 °C (99.5 °F) 130 14 102/73 100 % KMW   03/21/22 2157 37.8 °C (100.1 °F) 142 10 134/80 98 % KMW        Pulse Ox %: 98 % (03/21/22 2233)  Pulse Ox Interpretation: Normal (03/21/22 2233)  Heart Rate: 142 (03/21/22 2233)  Rhythm Strip Interpretation: Sinus Tachycardia (03/21/22 2233)     Physical Exam   PHYSICAL EXAM     Physical Exam  Vitals and nursing note reviewed.   Constitutional:       Appearance: She is well-developed.   HENT:      Head: Atraumatic.      Comments: No drooling  Has a gag reflex  Eyes:      Extraocular Movements:      Right eye: Nystagmus (horizontal and rotary) present.      Left eye: Nystagmus (horizontal and rotary) present.      Conjunctiva/sclera: Conjunctivae normal.      Pupils: Pupils are equal, round, and reactive to light.   Cardiovascular:      Rate and Rhythm: Tachycardia present.   Abdominal:      General: There is no distension.      Tenderness: There is no abdominal tenderness.   Musculoskeletal:         General: No deformity.      Cervical back: Neck supple.      Comments: Patient is moving all extremities   Skin:     General: Skin is warm and dry.      Capillary Refill: Capillary refill takes less than 2 seconds.      Comments: No signs of trauma to the head or body   Neurological:      General: No focal deficit present.      Comments: The patient is unresponsive, she does not respond to her name or painful stimuli.  Her extremities are rigid, tremulous and shaking.  Does not appear to be in a pattern of a seizure.   Psychiatric:         Behavior: Behavior normal.           PROCEDURES     Procedures     DATA     Results     Procedure Component Value Units Date/Time    [place] Draw Panel [[id number]] Collected: 03/21/22 2205    Specimen: Blood, Venous Updated: 03/22/22 0701    Narrative:      The following orders were created for panel order [place] Draw Panel.  Procedure                               Abnormality         Status                      ---------                               -----------         ------                     REBECCA LT BLUE[[id number]]                                  Final result                 Please view results for these tests on the individual orders.    RAINBOW LT BLUE [[id number]] Collected: 03/21/22 2205    Specimen: Blood, Venous Updated: 03/22/22 0701    Hepatic function panel [[id number]]  (Abnormal) Collected: 03/21/22 2204    Specimen: Blood, Venous Updated: 03/22/22 0029     Albumin 3.8 g/dL      Bilirubin, Total 1.1 mg/dL      Comment: MODERATE HEMOLYSIS, RESULT MAY BE INCREASED.        Bilirubin, Direct 0.5 mg/dL      Comment: MODERATE HEMOLYSIS, RESULT MAY BE AFFECTED.        Alkaline Phosphatase 53 IU/L      AST (SGOT) 46 IU/L      Comment: MODERATE HEMOLYSIS, RESULT MAY BE INCREASED.        ALT (SGPT) 23 IU/L      Comment: Checked by dilution    MODERATE HEMOLYSIS, RESULT MAY BE INCREASED.        Total Protein 6.4 g/dL      Comment: Test performed on plasma which typically contains approximately 0.4 g/dL more protein than serum.       SARS-CoV-2 (COVID-19), PCR Nasopharynx [[id number]]  (Normal) Collected: 03/21/22 2329    Specimen: Nasopharyngeal Swab from Nasopharynx Updated: 03/22/22 0015    Narrative:      The following orders were created for panel order SARS-CoV-2 (COVID-19), PCR Nasopharynx.  Procedure                               Abnormality         Status                     ---------                               -----------         ------                     SARS-CoV-2 (COVID-19), P...[[id number]]  Normal              Final result                 Please view results for these tests on the individual orders.    SARS-CoV-2 (COVID-19), PCR Nasopharynx [[id number]]  (Normal) Collected: 03/21/22 2329    Specimen: Nasopharyngeal Swab from Nasopharynx Updated: 03/22/22 0015     SARS-CoV-2 (COVID-19) Negative    Lipase [[id number]]  (Normal) Collected: 03/21/22 2204    Specimen: Blood, Venous Updated: 03/22/22 0008      Lipase 23 U/L     Protime-INR [[id number]]  (Normal) Collected: 03/21/22 2205    Specimen: Blood, Venous Updated: 03/21/22 2355     PT 14.2 sec      INR 1.1     Comment: INR has no defined significance when PT is within Reference Range.       APTT [[id number]]  (Normal) Collected: 03/21/22 2205    Specimen: Blood, Venous Updated: 03/21/22 2355     PTT 31 sec     Blood Culture Blood, Venous [[id number]] Collected: 03/21/22 2329    Specimen: Blood, Venous Updated: 03/21/22 2334    Blood Culture Blood, Venous [[id number]] Collected: 03/21/22 2329    Specimen: Blood, Venous Updated: 03/21/22 2334    UA with reflex culture [[id number]]  (Abnormal) Collected: 03/21/22 2215    Specimen: Urine, Clean Catch Updated: 03/21/22 2248    Narrative:      The following orders were created for panel order UA with reflex culture.  Procedure                               Abnormality         Status                     ---------                               -----------         ------                     UA Reflex to Culture (Ma...[[id number]]  Abnormal            Final result               UA Microscopic[[id number]]               Abnormal            Final result                 Please view results for these tests on the individual orders.    UA Microscopic [[id number]]  (Abnormal) Collected: 03/21/22 2215    Specimen: Urine, Clean Catch Updated: 03/21/22 2248     RBC, Urine 0 TO 4 /HPF      WBC, Urine 0 TO 3 /HPF      Squamous Epithelial None Seen /hpf      Hyaline Cast None Seen /lpf      Bacteria, Urine None Seen /HPF      MUCUSUA Rare /LPF     ER toxicology screen, serum [[id number]]  (Abnormal) Collected: 03/21/22 2204    Specimen: Blood, Venous Updated: 03/21/22 2247     Salicylate <4.0 mg/dL      Acetaminophen <10.0 ug/mL      Ethanol <5 mg/dL     Basic metabolic panel [[id number]]  (Abnormal) Collected: 03/21/22 2204    Specimen: Blood, Venous Updated: 03/21/22 2247     Sodium 136 mEQ/L      Potassium 4.5 mEQ/L      Comment: Results  obtained on plasma. Plasma Potassium values may be up to 0.4 mEQ/L less than serum values. The differences may be greater for patients with high platelet or white cell counts.  MODERATE HEMOLYSIS, RESULT MAY BE INCREASED.        Chloride 108 mEQ/L      CO2 15 mEQ/L      BUN 8 mg/dL      Creatinine 0.7 mg/dL      Glucose 125 mg/dL      Calcium 8.2 mg/dL      eGFR --     Comment: Not calculated          Anion Gap 13 mEQ/L     Urine drug screen (UDS) [[id number]]  (Abnormal) Collected: 03/21/22 2215    Specimen: Urine, Clean Catch Updated: 03/21/22 2243     PCP Scrn, Ur Not Detected     Comment: Assay Detects: phencyclidine in urine. Lowest detectable concentration is 25 ng/mL of phencyclidine.        Benzodiazepine Ur Qual Positive     Comment: Confirmation will be ordered upon request. If clinically warranted, please call [phone] to request drug confirmatory test. This specimen may be used for confirmation and will be saved for 10 days. Unconfirmed results are to be used for medical purposes (not legal).  Assay Detects: benzodiazepines and metabolites at varying concentrations. Lowest detectable concentration is 200 ng/mL of oxazepam.        Cocaine Screen, Urine Not Detected     Comment: Assay Detects: benzoylecgonine and cocaine in urine. Lowest detectable concentration is 300 ng/mL of benzoylecgonine.        Amphetamine+Methamphetamine Screen, Ur Positive     Comment: Confirmation will be ordered upon request. If clinically warranted, please call [phone] to request drug confirmatory test. This specimen may be used for confirmation and will be saved for 10 days. Unconfirmed results are to be used for medical purposes (not legal).  Assay Detects: d-methamphetamine, d-amphetamine, methlyenedioxyamphetamine (MDA), and methlyenendioxymethamphetamine (MDMA) in urine. Lowest detectable concentration is 1000 ng/mL of d-methamphetamine.  Assay is less sensitive to MDA and MDMA (lowest detectable concentration,  2500 ng/mL) and could produce a false negative result. If MDMA overdose is suspected and the result is negative, a more specific test should be requested.        Cannabinoid Screen, Urine Not Detected     Comment: Assay Detects: cannabinoid metabolites in urine. Lowest detectable concentration is 50 ng/mL        Opiate Scrn, Ur Positive     Comment: Confirmation will be ordered upon request. If clinically warranted, please call [phone] to request drug confirmatory test. This specimen may be used for confirmation and will be saved for 10 days. Unconfirmed results are to be used for medical purposes (not legal).  Assay Detects: codeine, dihydrocodeine, hydrocodone, hydromorphone, levorphanol, morphine, morphine-3-glucuronide, norcodeine, oxycodone in urine. Lowest detectable concentration is 300 ng/mL of morphine.        Barbiturate Screen, Ur Not Detected     Comment: Assay Detects: alphenal, amobarbital, aprobarbital, barbital, butabarbital, butalbital, butethal, diallybarbital, pentobarbital, secobarbital,talbutal, and thiopental. Lowest detectable concentration is 200 ng/mL of secobarbital.       BhCG, Serum, Qual [[id number]]  (Normal) Collected: 03/21/22 2204    Specimen: Blood, Venous Updated: 03/21/22 2243     Preg Test, Serum Negative    UA Reflex to Culture (Macroscopic) [[id number]]  (Abnormal) Collected: 03/21/22 2215    Specimen: Urine, Clean Catch Updated: 03/21/22 2243     Color, Urine Yellow     Clarity, Urine Cloudy     Specific Gravity, Urine 1.035     pH, Urine 6.0     Leukocyte Esterase Negative     Comment: Results can be falsely negative due to high specific gravity, some antibiotics, glucose >3 g/dl, or WBC other than neutrophils.        Nitrite, Urine Negative     Protein, Urine +2     Glucose, Urine Negative mg/dL      Ketones, Urine Trace mg/dL      Comment: Free sulfhydryl drugs such as Mesna, Capoten, and Acetylcysteine (Mucomyst) may cause false positive ketonuria.        Urobilinogen,  Urine 0.2 EU/dL      Bilirubin, Urine Negative mg/dL      Blood, Urine Negative     Comment: The sensitivity of the occult blood test is equivalent to approximately 4 intact RBC/HPF.       Lactic acid, Venous [[id number]]  (Abnormal) Collected: 03/21/22 2204    Specimen: Blood, Venous Updated: 03/21/22 2218     Lactate 6.3 mmol/L     CBC and Differential [[id number]] Collected: 03/21/22 2204    Specimen: Blood, Venous Updated: 03/21/22 2214     WBC 14.44 K/uL      RBC 4.40 M/uL      Hemoglobin 13.0 g/dL      Hematocrit 38.3 %      MCV 87.0 fL      MCH 29.5 pg      MCHC 33.9 g/dL      RDW 12.0 %      Platelets 260 K/uL      MPV 10.8 fL      Differential Type Auto     nRBC 0.0 %      Immature Granulocytes 0.3 %      Neutrophils 94.9 %      Lymphocytes 3.2 %      Monocytes 1.5 %      Eosinophils 0.0 %      Basophils 0.1 %      Immature Granulocytes, Absolute 0.05 K/uL      Neutrophils, Absolute 13.69 K/uL      Lymphocytes, Absolute 0.46 K/uL      Monocytes, Absolute 0.22 K/uL      Eosinophils, Absolute 0.00 K/uL      Basophils, Absolute 0.02 K/uL           Imaging Results          X-RAY CHEST 1 VIEW (Final result)  Result time 03/22/22 09:59:12    Final result                 Impression:    IMPRESSION: See above. Findings above including an enteric tube malposition were  discussed with the physician assistant Malissa at 9:55 AM on March 22, 2022                 Narrative:    CLINICAL HISTORY: Change in mental status    PRIOR STUDY:  None    TECHNIQUE:  Frontal image of the chest    COMMENT:  There is an endotracheal tube noted with the tip in the mid trachea.  The lungs are clear. The heart size is normal. There is an enteric tube noted  with the tip overlying the distal esophagus. The osseous and soft tissues are  unremarkable.                    ED Interpretation    Good Tube placement   NAD  Reviewed with Dr. Alonso                             CT HEAD WITHOUT IV CONTRAST (Final result)  Result time 03/21/22 23:07:40     Final result                 Impression:    IMPRESSION:  Study is significantly limited secondary to patient motion.  No evidence of  large intracranial hemorrhage, midline shift or other mass effect    COMMENT:    Comparison:  None.    TECHNIQUE: CT of the brain was performed and reconstructed/reformatted in the  axial, coronal and sagittal planes.  CT DOSE:  One or more dose reduction techniques (e.g. automated exposure  control, adjustment of the mA and/or kV according to patient size, use of  iterative reconstruction technique) utilized for this examination.    INTRAVENOUS CONTRAST: None    Brain parenchyma:  No evidence of large intracranial hemorrhage, midline shift  or other mass effect.  No evidence of an extra-axial fluid collection.  Ventricles and cisterns:  Normal in size and configuration.  Cranial carotid arteries: Limited assessment of the vasculature on this  nonenhanced study.  Calvarium and extra cranial soft tissues:  No evidence of a displaced  calvarial fracture.   Scalp soft tissue within normal limits.  Visualized paranasal sinuses and mastoid air cells:  Clear bilaterally.               Narrative:    CLINICAL HISTORY:  Seizure, new-onset, no history of trauma  Mental status change, unknown cause                                ECG 12 lead   Final Result          Scoring tools                                 ED Course & MDM   MDM / ED COURSE / CLINICAL IMPRESSIONS / DISPO     [facility]    ED Course as of 03/23/22 0017   Mon Mar 21, 2022   0566 Patient was given 2 mg of Ativan x2.  She remains tachycardic with abnormal movements, unresponsive to most stimuli.  Given lack of improvement with benzodiazepines, decision was made to intubate the patient to allow for more sedation.  Patient was intubated at the bedside without difficulty.  Discussed with both parents beforehand prior to the procedure for consent.   [AJ]   2320 The patient was sedated and intubated.  The shaking and nystagmus resolved.  We  feel that this was likely a medication reaction.  UDS was positive for opiates and amphetamines.    Paged ICU [LB]   2328 Signed out to ICU [LB]   2337 Spoke with Dr. Moctezuma, Neurology  Recommends Keppra 500mg q12  They see the patient in the morning [LB]      ED Course User Index  [AJ] Lupe Alonso MD  [LB] Linette Wade PA C         Clinical Impressions as of 03/23/22 0017   Status epilepticus (CMS/HCC)   Altered mental status, unspecified altered mental status type     Admit / Observation         Linette Wade PA C  03/23/22 0017

## 2022-03-22 NOTE — ASSESSMENT & PLAN NOTE
Pt more irritable today, acknowledges intentional overdose. Patient understands plan for involuntary admission.    1) Disposition - acute inpatient psychiatric hospitalization once patient is medically stable. Our team petitioned a 302 for danger to self with act of furtherance.    2) Continue 1:1 until time of transfer  3) Patient cannot leave AMA  4) Lorazepam 0.5mg q8hr PO PRN for anxiety  5) Trazodone 25mg HS PRN for insomnia

## 2022-03-22 NOTE — ASSESSMENT & PLAN NOTE
Patient hx of mood disorder with multiple suicide attempts in the past      Psych and behavior [facility] crisis consulted   1:1 for safety  Can not leave AMA

## 2022-03-23 PROBLEM — R50.9 FEVER: Status: ACTIVE | Noted: 2022-03-23

## 2022-03-23 LAB
GLUCOSE BLD-MCNC: 123 MG/DL (ref 70–99)
MAGNESIUM SERPL-MCNC: 1.7 MG/DL (ref 1.8–2.5)
PHOSPHATE SERPL-MCNC: 4 MG/DL (ref 2.4–4.7)
POCT TEST: ABNORMAL

## 2022-03-23 PROCEDURE — 200200 PR NO CHARGE: Performed by: PSYCHIATRY & NEUROLOGY

## 2022-03-23 PROCEDURE — 80048 BASIC METABOLIC PNL TOTAL CA: CPT | Performed by: HOSPITALIST

## 2022-03-23 PROCEDURE — 25000000 HC PHARMACY GENERAL

## 2022-03-23 PROCEDURE — 20000000 HC ROOM AND CARE ICU

## 2022-03-23 PROCEDURE — 99231 SBSQ HOSP IP/OBS SF/LOW 25: CPT

## 2022-03-23 PROCEDURE — 63600000 HC DRUGS/DETAIL CODE

## 2022-03-23 PROCEDURE — 36415 COLL VENOUS BLD VENIPUNCTURE: CPT | Performed by: HOSPITALIST

## 2022-03-23 PROCEDURE — 85027 COMPLETE CBC AUTOMATED: CPT | Performed by: HOSPITALIST

## 2022-03-23 PROCEDURE — 36600 WITHDRAWAL OF ARTERIAL BLOOD: CPT

## 2022-03-23 PROCEDURE — 94003 VENT MGMT INPAT SUBQ DAY: CPT

## 2022-03-23 PROCEDURE — 63600000 HC DRUGS/DETAIL CODE: Performed by: HOSPITALIST

## 2022-03-23 PROCEDURE — 83735 ASSAY OF MAGNESIUM: CPT

## 2022-03-23 PROCEDURE — 84100 ASSAY OF PHOSPHORUS: CPT

## 2022-03-23 PROCEDURE — 25000000 HC PHARMACY GENERAL: Performed by: HOSPITALIST

## 2022-03-23 RX ORDER — ENOXAPARIN SODIUM 100 MG/ML
40 INJECTION SUBCUTANEOUS
Status: DISCONTINUED | OUTPATIENT
Start: 2022-03-23 | End: 2022-03-29 | Stop reason: HOSPADM

## 2022-03-23 RX ADMIN — MAGNESIUM SULFATE 2 G: 2 INJECTION INTRAVENOUS at 04:40

## 2022-03-23 RX ADMIN — ENOXAPARIN SODIUM 40 MG: 40 INJECTION SUBCUTANEOUS at 17:43

## 2022-03-23 RX ADMIN — LEVETIRACETAM 500 MG: 100 INJECTION, SOLUTION INTRAVENOUS at 11:24

## 2022-03-23 RX ADMIN — FAMOTIDINE 20 MG: 10 INJECTION INTRAVENOUS at 09:50

## 2022-03-23 RX ADMIN — DEXTROSE MONOHYDRATE 12.5 G: 70 INJECTION, SOLUTION INTRAVENOUS at 04:39

## 2022-03-23 RX ADMIN — LEVETIRACETAM 500 MG: 100 INJECTION, SOLUTION INTRAVENOUS at 23:06

## 2022-03-23 NOTE — PLAN OF CARE
Pt remains intubated overnight, tolerating current settings well. Tan, red streaked secretions in hi-lo.

## 2022-03-23 NOTE — PROGRESS NOTES
Critical Care Progress Note    SUBJECTIVE  Tmax overnight 100.6, sputum culture taken, remains intubated on 21% FiO2 and 6 PEEP  Sedated with precedex  MRI yesterday was normal  Will plan to turn off sedation and attempt to wean and extubate    Extubation note:  Patient wean PSV 8/6 for 45 minutes. Patient was following some commands had a cough and a gag. Average . Max TV on command over 500.   Post wean AB.38  Will extubate and continue to monitor closely     OBJECTIVE   VITAL SIGNS  Temp:  [36.8 °C (98.2 °F)-38.1 °C (100.6 °F)] 37.3 °C (99.2 °F)  Heart Rate:  [] 98  Resp:  [11-41] 41  BP: (100-129)/(53-91) 109/59  FiO2 (%) (Set):  [21 %] 21 %  Vent Mode: Pressure support  FiO2 (%) (Set):  [21 %] 21 %  S RR:  [0-14] 0  S VT:  [0 mL-400 mL] 0 mL  PEEP/CPAP (Set, cmH2O):  [6 cm H20] 6 cm H20  MAP (Observed, cmH2O):  [7-12] 7  PIP (Set, cmH2O):  [0 cm H2O] 0 cm H2O    Intake/Output Summary (Last 24 hours) at 3/23/2022 1616  Last data filed at 3/23/2022 1500  Gross per 24 hour   Intake 2428.31 ml   Output 1025 ml   Net 1403.31 ml       O2 Requirement:  Oxygen Therapy: Supplemental oxygen  O2 Delivery Method: Nasal cannula  FiO2 (%) (Set): 21 %  O2 Flow Rate (L/min): 4 L/min     Telemetry: sinus rhythm      MEDICATIONS  enoxaparin, 40 mg, Daily (6p)  levETIRAcetam, 500 mg, q12h INT           LAB RESULTS  Recent Results (from the past 24 hour(s))   Sputum Gram Stain (Lab Only) Expectorated Sputum    Collection Time: 22 10:24 PM    Specimen: Expectorated Sputum   Result Value Ref Range    Gram Stain Result 4+ WBC     Gram Stain Result No Epithelial Cells Seen     Gram Stain Result 3+ Gram positive cocci in clusters     Gram Stain Result 1+ Gram positive cocci in chains    CBC    Collection Time: 22  3:48 AM   Result Value Ref Range    WBC 17.76   K/uL    RBC 3.61 M/uL    Hemoglobin 10.6   g/dL    Hematocrit 31.9   %    MCV 88.4 fL    MCH 29.4 pg    MCHC 33.2 g/dL    RDW 12.4 %     Platelets 211   K/uL    MPV 10.0 fL   Basic metabolic panel    Collection Time: 03/23/22  3:48 AM   Result Value Ref Range    Sodium 136 136 - 144 mEQ/L    Potassium 4.3 3.6 - 5.1 mEQ/L    Chloride 111 (H) 98 - 109 mEQ/L    CO2 18 (L) 22 - 32 mEQ/L    BUN 15 8 - 20 mg/dL    Creatinine 0.8   mg/dL    Glucose 60 (L) 70 - 99 mg/dL    Calcium 7.9 (L) 8.9 - 10.3 mg/dL    eGFR      Anion Gap 7 3 - 15 mEQ/L   Magnesium    Collection Time: 03/23/22  3:48 AM   Result Value Ref Range    Magnesium 1.7 (L) 1.8 - 2.5 mg/dL   Phosphorus    Collection Time: 03/23/22  3:48 AM   Result Value Ref Range    Phosphorus 4.0 2.4 - 4.7 mg/dL   POCT Glucose    Collection Time: 03/23/22  5:05 AM   Result Value Ref Range    POCT Bedside Glucose 123 (H) 70 - 99 mg/dL    POC Test POC    POCT Arterial Blood Gas    Collection Time: 03/23/22 12:55 PM   Result Value Ref Range    pH, Arterial 7.38 7.35 - 7.45 pH    pCO2, Arterial 29 (L) 35 - 48 mm Hg    pO2, Arterial 74 (L) 83 - 100 mm Hg    HCO3, Arterial 20 (L) 21 - 28 mEQ/L    Base Excess, Arterial -6.6 mEQ/L    O2 Sat, Arterial 96 93 - 98 %    TCO2, Arterial 18 (L) 22 - 32 mEQ/L    Lactate, Arterial 0.6 0.4 - 1.6 mmol/L    Glucose, Arterial 67 (L) 70 - 99 mg/dL    Sodium, Arterial 136 136 - 145 mEQ/L    Potassium, Arterial 3.9 3.4 - 4.5 mEQ/L    Chloride, Arterial 108 98 - 109 mEQ/L    Ionized Calcium, Arterial 1.22 1.15 - 1.27 mmol/L    Hemoglobin, Arterial 10.5 g/dL    Patient Temperature 98.6 97.0 - 99.0 °F    POC Test POC      Laboratory results were independently reviewed    ABG  Results from last 7 days   Lab Units 03/23/22  1255 03/21/22  2343   PH ART pH 7.38 7.35   PCO2 ART mm Hg 29* 35   PO2 ART mm Hg 74* 204*   HCO3 ART mEQ/L 20* 20.6*   O2 SAT ART % 96 98   BASE EXC ART mEQ/L -6.6 -5.6   SOURCE OF OXYGEN   --  vent         CULTURES  Microbiology Results       Procedure Component Value Units Date/Time    MRSA Screen, Nares Only Nose [[id number]]  (Normal) Collected: 03/22/22 0022     Specimen: Nasal Swab from Nose Updated: 03/22/22 0513     MRSA DNA, Nares Negative    SARS-CoV-2 (COVID-19), PCR Nasopharynx [[id number]]  (Normal) Collected: 03/21/22 2329    Specimen: Nasopharyngeal Swab from Nasopharynx Updated: 03/22/22 0015    Narrative:      The following orders were created for panel order SARS-CoV-2 (COVID-19), PCR Nasopharynx.  Procedure                               Abnormality         Status                     ---------                               -----------         ------                     SARS-CoV-2 (COVID-19), P...[[id number]]  Normal              Final result                 Please view results for these tests on the individual orders.    SARS-CoV-2 (COVID-19), PCR Nasopharynx [[id number]]  (Normal) Collected: 03/21/22 2329    Specimen: Nasopharyngeal Swab from Nasopharynx Updated: 03/22/22 0015     SARS-CoV-2 (COVID-19) Negative            Laboratory results were independently reviewed    RADIOLOGY  MRI BRAIN WITH AND WITHOUT CONTRAST    Result Date: 3/23/2022  CLINICAL HISTORY: Seizure STUDY: MRI examination of the brain utilizing a seizure protocol was performed without and with 5.9 cc Gadavist intravenous contrast. COMPARISON: Head CT dated 3/21/2022.     IMPRESSION: Normal study. COMMENT: Postsurgical change: None. Restricted diffusion: None Brain parenchyma: There is no intraparenchymal hemorrhage, mass effect, midline shift or extra-axial fluid collection.  No abnormal enhancement. White matter changes: Normal for age Ventricles, cisterns, and sulci: Normal in size and configuration. Sella and cerebellar tonsils: Normal in appearance. Orbits: Normal Nasopharynx: Normal Specific attention to the mesial temporal lobes shows symmetric volume and signal of the hippocampal apparatus.  No obvious congenital abnormality. Calvarium and extra cranial soft tissues: Normal. Paranasal sinuses: Clear bilaterally. Mastoid air cell: Normal Vascular flow voids: Normal     Radiography was  independently reviewed.      VTE Risk Assessment and Plan  Not indicated    GI prophylaxis  Pepcid    PHYSICAL EXAM    GEN: critically ill woman  HENT: NC/AT, no deformity, PERRL  NECK: supple, no lymphadenopathy  CARD: RRR, no rubs, murmur, gallops  RESP: Assisted, no wheeze, rales, crackles  Gi: soft, NTND, +BS  EXT: no edema, pulses present  SKIN: warm, dry, no rash  NEURO: intubated, following some commands +cough and gag        ASSESSMENT & PLAN    Fever  Assessment & Plan  Temp of 100.6 overnight  Follow up sputum culture  Blood culture: NGTD  Monitor for sign and symptoms of infection    Mood disorder (CMS/[facility])  Overview  Patient is a 22 y.o. adult who was admitted for altered mental status and possible seizure activity.   Psychiatry was consulted due to history of depression, suspected suicide attempt. Patient known to our service from previous admissions.  She has a past psychiatric history of anxiety, depression, eating disorder, cluster B traits, ETOH use disorder, cocaine use disorder, stimulant (Adderall) use diorder,  and multiple previous suicide attempts.  Patient received lying in bed, she is intubated and sedated, unable to participate in psychiatric ROS.  UDS + for benzodiazepines, opiates, and methamphetamines.  Suspected intentional overdose      Assessment & Plan  Patient hx of mood disorder with multiple suicide attempts in the past  Psych and behavior josey crisis consulted   1:1  Can not leave AMA    Drug overdose  Assessment & Plan  Concern for drug overdose, especially with prior reported history of drug overdose/suicide attempts  EMS found bottle containing hydroxyzine, effexor, and benadryl    Poison control contacted  Recommended supportive measures  Follow ekg for changes to include:  If qrs > 100,give bicarb, if qtc > 500, give mag IV  Okay for ativan prn seizure/agitation  Behavior josey crisis consult and psych consult       Lactic acidosis  Assessment & Plan  Suspect 2nd to seizure  activity  IVFs ordered  ABG is not acidotic  Improved       On mechanically assisted ventilation (CMS/HCC)  Assessment & Plan  2nd to ongoing seizures, intubated for airway protection  Extubated 3/23    -supplemental O2 with SpO2 goal >92%      * Seizure (CMS/HCC)  Assessment & Plan  Pt with reported seizure activity, no responsive to ativan  Susbequently intubated for airway protection  Concern for drug overdose but also consider primary CNS event    -Neurology consulted   -S/p continuous EEG: without seizures   -Continue Keppra 500mg BID  -MRI brain: Normal  -No evidence of acute abnormality of CTH imaging   -Ativan prn for seizure activity              Acting as a scribe in the presence of TELMA Gallagher  Documentation prepared by the Physician Assistant/Resident in my presence, I have reviewed and agree with this note. I have spent a total of  35 minutes of time.   Dr Lemos.

## 2022-03-23 NOTE — PATIENT CARE CONFERENCE
Care Progression Rounds Note  Date: 3/23/2022  Time: 9:58 AM     Patient Name: Gemma Darnell     Medical Record Number: [id number]   YOB: 2000  Sex: Adult      Room/Bed: 3408    Admitting Diagnosis: Status epilepticus (CMS/HCC) [G40.901]  Altered mental status, unspecified altered mental status type [R41.82]   Admit Date/Time: 3/21/2022  9:45 PM    Primary Diagnosis: Seizure (CMS/HCC)  Principal Problem: Seizure (CMS/HCC)    GMLOS: pending  Anticipated Discharge Date: 3/26/2022    AM-PAC:  Mobility Score:      Discharge Planning:  Concerns to be Addressed: discharge planning, substance/tobacco abuse/use, mental health, coping/stress  Anticipated Discharge Disposition: psychiatric facility, substance use treatment facility    Barriers to Discharge:  Medical issues not resolved    Comments:  Pt remains vented\sedated. No further szs on EEG.Plan to wean to extubate today. Psych and BHSW following    Participants:  advanced practice provider, pharmacy, , physical therapy, dietitian/nutrition services, physician, nursing, respiratory therapy, occupational therapy

## 2022-03-23 NOTE — PROGRESS NOTES
Psychiatry Progress Note    Chief Complaint/Reason for follow-up: SI    Interval History: Patient seen for follow up. 1:1 sitter at bedside. She was extubated this afternoon. Somnolent, eyes closed.  Minimal verbal response.  She is oriented to person and being in the hospital.  Able to follow simple commands. Unable to participate in full evaluation due to somnolence.     Review of Systems:   patient remains lethargic, minimally cooperative with psychiatric ROS    Vital Signs for the last 24 hours:  Temp:  [36.5 °C (97.7 °F)-38.1 °C (100.6 °F)] 37.3 °C (99.2 °F)  Heart Rate:  [] 98  Resp:  [11-41] 41  BP: ()/(53-91) 109/59  FiO2 (%) (Set):  [21 %] 21 %    Scheduled Meds:  • enoxaparin  40 mg subcutaneous Daily (6p)   • levETIRAcetam  500 mg intravenous q12h INT       Labs:  Selected Electrolytes  Results from last 7 days   Lab Units 03/23/22  0348 03/22/22  0107   HEMOGLOBIN g/dL 10.6 12.4   WBC K/uL 17.76 20.02   PLATELETS K/uL 211 265   POTASSIUM mEQ/L 4.3 4.0   SODIUM mEQ/L 136 138   CREATININE mg/dL 0.8 0.7       Recent EKG HR/QTC  Lab Results   Component Value Date    VENTRICRATE 140 03/21/2022    QTCCALCULAT 375 03/21/2022       No results found for: VPA, LITHIUM    Mental Status Evaluation:  Patient appears current age, dressed in hospital attire.  Recently Extubated, lethargic.  Minimal verbal response. Eyes closed.  Able to follow simple commands.    PLAN:    Mood disorder (CMS/[facility])  Overview  Patient is a 22 y.o. adult who was admitted for altered mental status and possible seizure activity.   Psychiatry was consulted due to history of depression, suspected suicide attempt. Patient known to our service from previous admissions.  She has a past psychiatric history of anxiety, depression, eating disorder, cluster B traits, ETOH use disorder, cocaine use disorder, stimulant (Adderall) use diorder,  and multiple previous suicide attempts.  Patient received lying in bed, she is intubated and  sedated, unable to participate in psychiatric ROS.  UDS + for benzodiazepines, opiates, and methamphetamines.  Suspected intentional overdose      Assessment & Plan  Patient no longer intubated.  Somnolent, keeping eyes closed, with minimal verbal response.  She was able to follow simple commands.  Oriented to person and being in the hospital.  Unable to participate further due to somnolence.      1) Disposition - Given suspected intentional overdose, patient will likely require acute inpatient psychiatric hospitalization once medically stable.  Will continue to work with the team and family to determine disposition    2) Recommend 1:1 observation     3) Patient cannot leave AMA    4) Will continue to follow while medically hospitalized    15 minutes were spent in direct face-to-face counseling/coordination of care. >50% of total minutes were spent reviewing medical record, in counseling and/or coordination of care

## 2022-03-23 NOTE — ASSESSMENT & PLAN NOTE
Blood culture: NGTD    sputum culture h  ad MSSA but afebrile, no respiratory systems. possible colonized but will  check CXR and monitor fever curve.  Low threshold for abx for signs of infection

## 2022-03-23 NOTE — BEHAVIORAL HEALTH CRISIS PROGRESS NOTE
Behavioral Health Crisis Progress Note    Reason for Follow Up Overdose    Presenting Problem  Status epilepticus (CMS/HCC) [G40.901]  Altered mental status, unspecified altered mental status type [R41.82]     Interval History  Pt seen at bedside earlier today.  Pt was still intubated but able to respond to questions as encouraged by her mother and aunt.  Emotional support provided to family.     Pt later extubated but somnolent per Psych CRNP note.    Review of Systems  Current Medications:  •  acetaminophen, 650 mg, rectal, q4h PRN  •  glucose, 16-32 g of dextrose, oral, PRN **OR** dextrose, 15-30 g of dextrose, oral, PRN **OR** glucagon, 1 mg, intramuscular, PRN **OR** dextrose in water, 25 mL, intravenous, PRN  •  enoxaparin, 40 mg, subcutaneous, Daily (6p)  •  lactated ringer's, , intravenous, Continuous  •  levETIRAcetam, 500 mg, intravenous, q12h INT  •  LORazepam, 2 mg, intravenous, q4h PRN  •  magnesium sulfate, 2 g, intravenous, PRN  •  potassium bicarbonate, 20 mEq, oral, PRN  •  potassium bicarbonate, 40 mEq, oral, PRN  •  potassium chloride, 20 mEq, oral, PRN  •  potassium chloride, 40 mEq, oral, PRN    Home Medications      Objective   Vital Signs for the last 24 hours:  Temp:  [36.5 °C (97.7 °F)-38.1 °C (100.6 °F)] 36.5 °C (97.7 °F)  Heart Rate:  [] 106  Resp:  [11-53] 53  BP: (100-129)/(53-91) 120/67  FiO2 (%) (Set):  [21 %] 21 %    Impression  Overdose  Intervention/Plan  Pt remains in ICU after overdose. Extubated today. Will continue to follow to facilitate aftercare recommendations per Psychiatry.   Follow Up Needed:  yes

## 2022-03-23 NOTE — PLAN OF CARE
Problem: Adult Inpatient Plan of Care  Goal: Readiness for Transition of Care  Outcome: Progressing     Problem: Skin Injury Risk Increased  Goal: Skin Health and Integrity  Outcome: Progressing     Problem: Fall Injury Risk  Goal: Absence of Fall and Fall-Related Injury  Outcome: Progressing     Problem: Restraint, Nonbehavioral (Nonviolent)  Goal: Discontinuation Criteria Achieved  Outcome: Met

## 2022-03-24 ENCOUNTER — APPOINTMENT (INPATIENT)
Dept: RADIOLOGY | Facility: HOSPITAL | Age: 22
DRG: 917 | End: 2022-03-24
Attending: PHYSICIAN ASSISTANT
Payer: COMMERCIAL

## 2022-03-24 PROBLEM — Z99.11 ON MECHANICALLY ASSISTED VENTILATION (CMS/HCC): Status: RESOLVED | Noted: 2022-03-21 | Resolved: 2022-03-24

## 2022-03-24 PROBLEM — E87.20 LACTIC ACIDOSIS: Status: RESOLVED | Noted: 2022-03-21 | Resolved: 2022-03-24

## 2022-03-24 LAB
MAGNESIUM SERPL-MCNC: 1.7 MG/DL (ref 1.8–2.5)
MICROORGANISM SPEC CULT: ABNORMAL
PHOSPHATE SERPL-MCNC: 2.9 MG/DL (ref 2.4–4.7)

## 2022-03-24 PROCEDURE — 63600000 HC DRUGS/DETAIL CODE

## 2022-03-24 PROCEDURE — 36415 COLL VENOUS BLD VENIPUNCTURE: CPT | Performed by: HOSPITALIST

## 2022-03-24 PROCEDURE — 80048 BASIC METABOLIC PNL TOTAL CA: CPT | Performed by: HOSPITALIST

## 2022-03-24 PROCEDURE — 25800000 HC PHARMACY IV SOLUTIONS: Performed by: STUDENT IN AN ORGANIZED HEALTH CARE EDUCATION/TRAINING PROGRAM

## 2022-03-24 PROCEDURE — 84100 ASSAY OF PHOSPHORUS: CPT

## 2022-03-24 PROCEDURE — 63700000 HC SELF-ADMINISTRABLE DRUG: Performed by: STUDENT IN AN ORGANIZED HEALTH CARE EDUCATION/TRAINING PROGRAM

## 2022-03-24 PROCEDURE — 71045 X-RAY EXAM CHEST 1 VIEW: CPT

## 2022-03-24 PROCEDURE — 99231 SBSQ HOSP IP/OBS SF/LOW 25: CPT | Performed by: NURSE PRACTITIONER

## 2022-03-24 PROCEDURE — 63700000 HC SELF-ADMINISTRABLE DRUG: Performed by: PHYSICIAN ASSISTANT

## 2022-03-24 PROCEDURE — 63600000 HC DRUGS/DETAIL CODE: Performed by: STUDENT IN AN ORGANIZED HEALTH CARE EDUCATION/TRAINING PROGRAM

## 2022-03-24 PROCEDURE — 63600000 HC DRUGS/DETAIL CODE: Performed by: PHYSICIAN ASSISTANT

## 2022-03-24 PROCEDURE — 63600000 HC DRUGS/DETAIL CODE: Performed by: INTERNAL MEDICINE

## 2022-03-24 PROCEDURE — 83735 ASSAY OF MAGNESIUM: CPT

## 2022-03-24 PROCEDURE — 85027 COMPLETE CBC AUTOMATED: CPT | Performed by: HOSPITALIST

## 2022-03-24 PROCEDURE — 20600000 HC ROOM AND CARE INTERMEDIATE/TELEMETRY

## 2022-03-24 RX ORDER — LEVETIRACETAM 500 MG/1
500 TABLET ORAL 2 TIMES DAILY
Status: DISCONTINUED | OUTPATIENT
Start: 2022-03-24 | End: 2022-03-29 | Stop reason: HOSPADM

## 2022-03-24 RX ORDER — SODIUM CHLORIDE, SODIUM LACTATE, POTASSIUM CHLORIDE, CALCIUM CHLORIDE 600; 310; 30; 20 MG/100ML; MG/100ML; MG/100ML; MG/100ML
INJECTION, SOLUTION INTRAVENOUS CONTINUOUS
Status: ACTIVE | OUTPATIENT
Start: 2022-03-24 | End: 2022-03-25

## 2022-03-24 RX ORDER — ACETAMINOPHEN 325 MG/1
650 TABLET ORAL EVERY 4 HOURS PRN
Status: DISCONTINUED | OUTPATIENT
Start: 2022-03-24 | End: 2022-03-29 | Stop reason: HOSPADM

## 2022-03-24 RX ADMIN — SODIUM CHLORIDE 3 G: 900 INJECTION INTRAVENOUS at 22:44

## 2022-03-24 RX ADMIN — LEVETIRACETAM 500 MG: 500 TABLET, FILM COATED ORAL at 20:02

## 2022-03-24 RX ADMIN — ACETAMINOPHEN 650 MG: 325 TABLET ORAL at 20:02

## 2022-03-24 RX ADMIN — ACETAMINOPHEN 650 MG: 325 TABLET ORAL at 14:36

## 2022-03-24 RX ADMIN — SODIUM CHLORIDE, POTASSIUM CHLORIDE, SODIUM LACTATE AND CALCIUM CHLORIDE: 600; 310; 30; 20 INJECTION, SOLUTION INTRAVENOUS at 00:40

## 2022-03-24 RX ADMIN — ENOXAPARIN SODIUM 40 MG: 40 INJECTION SUBCUTANEOUS at 17:06

## 2022-03-24 RX ADMIN — SODIUM CHLORIDE 3 G: 900 INJECTION INTRAVENOUS at 17:06

## 2022-03-24 RX ADMIN — SODIUM CHLORIDE, POTASSIUM CHLORIDE, SODIUM LACTATE AND CALCIUM CHLORIDE: 600; 310; 30; 20 INJECTION, SOLUTION INTRAVENOUS at 14:18

## 2022-03-24 RX ADMIN — LEVETIRACETAM 500 MG: 500 TABLET, FILM COATED ORAL at 10:09

## 2022-03-24 RX ADMIN — MAGNESIUM SULFATE 2 G: 2 INJECTION INTRAVENOUS at 14:51

## 2022-03-24 NOTE — NURSING NOTE
Patient haer rate 120 since she arrived to floor went up to 130 and 140 recently while she was rseting in bed no complaint of chest pain Dr manju garcia

## 2022-03-24 NOTE — PLAN OF CARE
Plan of Care Review  Plan of Care Reviewed With: patient, mother, sibling  Progress: no change  Outcome Summary: pt tired not much appetite transferred to tele from iCU disoriented to time and situation. tylenol for fever 100 mom and sister at bedside

## 2022-03-24 NOTE — PROGRESS NOTES
"Psychiatry Progress Note    Chief Complaint/Reason for follow-up: Intentional overdose    Interval History: Patient seen for follow up. Patient received in bed, sister at bedside.  Pt transferred from ICU to [facility].  Patient sleeping on my exam.  She awakens easily to voice.  She is oriented to place, person and situation.  She reports she would like to keep sleeping and declined to speak at length.  She reports she understands plan is for acute inpatient psychiatric hospitalization once medically stable.     Review of Systems:   no clear evidence of abnormal motor movement    Vital Signs for the last 24 hours:  Temp:  [36.5 °C (97.7 °F)-38 °C (100.4 °F)] 37.8 °C (100 °F)  Heart Rate:  [] 121  Resp:  [16-66] 16  BP: (100-153)/(50-86) 108/64    Labs:  Results from last 7 days   Lab Units 03/24/22  0549 03/23/22  0348   HEMOGLOBIN g/dL 10.4 10.6   WBC K/uL 13.24 17.76   PLATELETS K/uL 190 211   POTASSIUM mEQ/L 3.9 4.3   SODIUM mEQ/L 138 136   CREATININE mg/dL 0.6 0.8       Mental Status Evaluation:  Pt appears current age, dressed in hospital attire, sleeping.  Pt awakens easily to voice. Oriented to person, place.  Speech intact, soft.  Behavior cooperative.  Mood is \"tired.\"  Affect calm.  TP linear.  Denies SI today.  No HI/AVH.  Cognition intact.  I/J accepting medical treatment    Mood disorder (CMS/[facility])  Overview  Patient is a 22 y.o. adult who was admitted for altered mental status and possible seizure activity.   Psychiatry was consulted due to history of depression, suspected suicide attempt. Patient known to our service from previous admissions.  She has a past psychiatric history of anxiety, depression, eating disorder, cluster B traits, ETOH use disorder, cocaine use disorder, stimulant (Adderall) use diorder,  and multiple previous suicide attempts.  Patient received lying in bed, she is intubated and sedated, unable to participate in psychiatric ROS.  UDS + for benzodiazepines, opiates, and " methamphetamines.  Suspected intentional overdose      Assessment & Plan  Pt extubated yesterday, transfer to [facility].  Pt sleeping on my exam, awakens to voice. She declined to speak at length and asked to continue sleeping.  She denies any pain or discomfort.  She is aware of plan for acute inpatient psychiatric hospitalization.     1) Disposition - acute inpatient psychiatric hospitalization once patient is medically stable. Our team petitioned a 302 for danger to self with act of furtherance.    2) Continue 1:1 until time of transfer  3) Patient cannot leave AMA  4) Lorazepam 0.5mg q8hr PO PRN for anxiety  5) Will continue to follow while medically admitted      15 minutes were spent in direct face-to-face counseling/coordination of care. >50% of total minutes were spent reviewing medical record, in counseling and/or coordination of care

## 2022-03-24 NOTE — NURSING NOTE
Pt was transfer to 3c floor for continues of care. Transport via w/c , sleepy but answered questions appropriate. Mom at bed side.

## 2022-03-24 NOTE — PROGRESS NOTES
CC SW following, though it appears pt will be requiring IP psych upon d/c.  IP CC SW/RN will remain available as needed to support pt/family and to assist BH team as needed.

## 2022-03-24 NOTE — PLAN OF CARE
Problem: Adult Inpatient Plan of Care  Goal: Plan of Care Review  Outcome: Progressing  Goal: Patient-Specific Goal (Individualized)  Outcome: Progressing  Goal: Absence of Hospital-Acquired Illness or Injury  Outcome: Progressing  Goal: Optimal Comfort and Wellbeing  Outcome: Progressing  Goal: Readiness for Transition of Care  Outcome: Progressing     Problem: Skin Injury Risk Increased  Goal: Skin Health and Integrity  Outcome: Progressing     Problem: Restraint, Nonbehavioral (Nonviolent)  Goal: Discontinuation Criteria Achieved  Outcome: Progressing     Problem: Fall Injury Risk  Goal: Absence of Fall and Fall-Related Injury  Outcome: Progressing

## 2022-03-24 NOTE — NURSING NOTE
Patient transferred from ICU to room 365  Sleepy does arouse to voice ablt to state name and date of birth she knows shes at hospital but off on date stated 3/31/2020.  Mom and sister at bedside.  Safety search completed clothes being sent home with mom only belongings brought in

## 2022-03-24 NOTE — PROGRESS NOTES
Critical Care Progress Note    SUBJECTIVE  Neuro exam stable   No seizures reported  1:1 at bedside for safety  Sputum has MSSA. Suspect colonized. Afebrile, non-toxic appearing off abx.    Mother updated at bedside.           OBJECTIVE    Vitals:    03/24/22 1500   BP:    Pulse: (!) 130   Resp:    Temp:    SpO2:          Intake/Output Summary (Last 24 hours) at 3/24/2022 1532  Last data filed at 3/24/2022 1200  Gross per 24 hour   Intake 1680 ml   Output 1675 ml   Net 5 ml            Lab Results   Component Value Date    GLUCOSE 84 03/24/2022    CALCIUM 8.6 (L) 03/24/2022     03/24/2022    K 3.9 03/24/2022    CO2 19 (L) 03/24/2022     03/24/2022    BUN 7 (L) 03/24/2022    CREATININE 0.6 03/24/2022       Lab Results   Component Value Date    WBC 13.24 03/24/2022    HGB 10.4 03/24/2022    HCT 31.1 03/24/2022    MCV 87.6 03/24/2022     03/24/2022       Lab Results   Component Value Date    MG 1.7 (L) 03/24/2022    PHOS 2.9 03/24/2022         Physical Exam:  GEN: NAD in bed,   HEENT: AT/NC, EOM, PERRL, Neck supple  Cv: RRR, tachy  Lungs: no wheeze/rhonchi, aerating bilaterally  ABD: Soft, Active  EXT: symmetric  Pv: pulses intact  Neuro: Ao3, nonfocal  Skin: warm, dry    Labs  I have reviewed the patient's pertinent labs.     Imaging  I have independently reviewed the patient's pertinent imaging.     Drug overdose  Assessment & Plan  Concern for drug overdose, especially with prior reported history of drug overdose/suicide attempts  EMS found bottle containing hydroxyzine, effexor, and benadryl    Poison control contacted  Recommended supportive measures  Follow ekg for changes to include:  If qrs > 100,give bicarb, if qtc > 500, give mag IV  Okay for ativan prn seizure/agitation  Behavior josey crisis consult following  psych consult following  Continue 1:1 for safety  anticipate inpatient psychiatric admission      Mood disorder (CMS/[facility])  Overview  Patient is a 22 y.o. adult who was admitted for  altered mental status and possible seizure activity.   Psychiatry was consulted due to history of depression, suspected suicide attempt. Patient known to our service from previous admissions.  She has a past psychiatric history of anxiety, depression, eating disorder, cluster B traits, ETOH use disorder, cocaine use disorder, stimulant (Adderall) use diorder,  and multiple previous suicide attempts.  Patient received lying in bed, she is intubated and sedated, unable to participate in psychiatric ROS.  UDS + for benzodiazepines, opiates, and methamphetamines.  Suspected intentional overdose      Assessment & Plan  Patient hx of mood disorder with multiple suicide attempts in the past      Psych and behavior josey crisis consulted   1:1 for safety  Can not leave AMA    * Seizure (CMS/[facility])  Assessment & Plan  Pt with reported seizure activity, no responsive to ativan  Susbequently intubated for airway protection  Concern for drug overdose but also consider primary CNS event      -MRI brain: Normal  -No evidence of acute abnormality of CTH imaging    -Neurology consult following  -S/p continuous EEG: without seizures   -Keppra 500mg BID for now until cleared to stop by neuro  -Ativan prn for seizure activity        Fever  Assessment & Plan  Blood culture: NGTD    sputum culture h  ad MSSA but afebrile, no respiratory systems. possible colonized but will  check CXR and monitor fever curve.  Low threshold for abx for signs of infection           TELMA Guerra Scribed for and in presence of DR Dao Osuna  I have reviewed and agree with the note. Dao Osuna MD

## 2022-03-24 NOTE — PATIENT CARE CONFERENCE
Care Progression Rounds Note  Date: 3/24/2022  Time: 11:30 AM     Patient Name: Gemma Darnell     Medical Record Number: [id number]   YOB: 2000  Sex: Adult      Room/Bed: 3408    Admitting Diagnosis: Status epilepticus (CMS/HCC) [G40.901]  Altered mental status, unspecified altered mental status type [R41.82]   Admit Date/Time: 3/21/2022  9:45 PM    Primary Diagnosis: Seizure (CMS/HCC)  Principal Problem: Seizure (CMS/HCC)    GMLOS: pending  Anticipated Discharge Date: 3/26/2022    AM-PAC:  Mobility Score:      Discharge Planning:  Concerns to be Addressed: discharge planning, substance/tobacco abuse/use, mental health, coping/stress  Anticipated Discharge Disposition: substance use treatment facility, psychiatric facility    Barriers to Discharge:  Medical issues not resolved    Comments:  pt extubated yesterday, BH/psych following for OD will be assisting with d/c planning needs    Participants:  advanced practice provider, pharmacy, , physical therapy, dietitian/nutrition services, physician, nursing, respiratory therapy, occupational therapy

## 2022-03-24 NOTE — BEHAVIORAL HEALTH CRISIS PROGRESS NOTE
Behavioral Health Crisis Progress Note    Reason for Follow Up Overdose    Presenting Problem  Status epilepticus (CMS/[facility]) [G40.901]  Altered mental status, unspecified altered mental status type [R41.82]     Interval History  Pt extubated yesterday. Met with pt and mother at bedside.  Pt was somnolent but able to respond to questions.  She confirmed that she had take an overdose to end her life.  She reported taking 10 Effexor and 10 Atarax.  She denies any illicit drug use.  She does not want to go to inpatient psych at discharge, wants to go home. Encouraged pt to rest and that the next steps will be discussed when she is more stable.    Review of Systems  Current Medications:  •  acetaminophen, 650 mg, rectal, q4h PRN  •  glucose, 16-32 g of dextrose, oral, PRN **OR** dextrose, 15-30 g of dextrose, oral, PRN **OR** glucagon, 1 mg, intramuscular, PRN **OR** dextrose in water, 25 mL, intravenous, PRN  •  enoxaparin, 40 mg, subcutaneous, Daily (6p)  •  lactated ringer's, , intravenous, Continuous  •  levETIRAcetam, 500 mg, oral, BID  •  LORazepam, 2 mg, intravenous, q4h PRN  •  magnesium sulfate, 2 g, intravenous, PRN  •  potassium bicarbonate, 20 mEq, oral, PRN  •  potassium bicarbonate, 40 mEq, oral, PRN  •  potassium chloride, 20 mEq, oral, PRN  •  potassium chloride, 40 mEq, oral, PRN    Home Medications      Objective   Vital Signs for the last 24 hours:  Temp:  [36.5 °C (97.7 °F)-38 °C (100.4 °F)] 37.8 °C (100 °F)  Heart Rate:  [] 116  Resp:  [16-66] 16  BP: (108-153)/(50-86) 108/64      Impression  Overdose  Intervention/Plan  IP Psych when medically cleared.  302 on Chart. Pt cannot leave AMA.  Follow Up Needed:  yes

## 2022-03-25 PROBLEM — J15.211 MSSA (METHICILLIN SUSCEPTIBLE STAPHYLOCOCCUS AUREUS) PNEUMONIA (CMS/HCC): Status: ACTIVE | Noted: 2022-03-25

## 2022-03-25 LAB
MAGNESIUM SERPL-MCNC: 1.8 MG/DL (ref 1.8–2.5)
PHOSPHATE SERPL-MCNC: 2.8 MG/DL (ref 2.4–4.7)

## 2022-03-25 PROCEDURE — 63700000 HC SELF-ADMINISTRABLE DRUG: Performed by: INTERNAL MEDICINE

## 2022-03-25 PROCEDURE — 63600000 HC DRUGS/DETAIL CODE

## 2022-03-25 PROCEDURE — 99232 SBSQ HOSP IP/OBS MODERATE 35: CPT | Performed by: NURSE PRACTITIONER

## 2022-03-25 PROCEDURE — 63700000 HC SELF-ADMINISTRABLE DRUG: Performed by: HOSPITALIST

## 2022-03-25 PROCEDURE — 63700000 HC SELF-ADMINISTRABLE DRUG: Performed by: STUDENT IN AN ORGANIZED HEALTH CARE EDUCATION/TRAINING PROGRAM

## 2022-03-25 PROCEDURE — 85027 COMPLETE CBC AUTOMATED: CPT | Performed by: HOSPITALIST

## 2022-03-25 PROCEDURE — 63600000 HC DRUGS/DETAIL CODE: Performed by: STUDENT IN AN ORGANIZED HEALTH CARE EDUCATION/TRAINING PROGRAM

## 2022-03-25 PROCEDURE — 83735 ASSAY OF MAGNESIUM: CPT

## 2022-03-25 PROCEDURE — 20600000 HC ROOM AND CARE INTERMEDIATE/TELEMETRY

## 2022-03-25 PROCEDURE — 25800000 HC PHARMACY IV SOLUTIONS: Performed by: STUDENT IN AN ORGANIZED HEALTH CARE EDUCATION/TRAINING PROGRAM

## 2022-03-25 PROCEDURE — 84100 ASSAY OF PHOSPHORUS: CPT

## 2022-03-25 PROCEDURE — 63700000 HC SELF-ADMINISTRABLE DRUG: Performed by: PHYSICIAN ASSISTANT

## 2022-03-25 PROCEDURE — 80048 BASIC METABOLIC PNL TOTAL CA: CPT | Performed by: HOSPITALIST

## 2022-03-25 PROCEDURE — 99233 SBSQ HOSP IP/OBS HIGH 50: CPT | Performed by: STUDENT IN AN ORGANIZED HEALTH CARE EDUCATION/TRAINING PROGRAM

## 2022-03-25 PROCEDURE — 36415 COLL VENOUS BLD VENIPUNCTURE: CPT | Performed by: HOSPITALIST

## 2022-03-25 RX ORDER — CEPHALEXIN 500 MG/1
500 CAPSULE ORAL 4 TIMES DAILY
Status: DISCONTINUED | OUTPATIENT
Start: 2022-03-25 | End: 2022-03-29 | Stop reason: HOSPADM

## 2022-03-25 RX ORDER — MELATONIN 5 MG
5 CAPSULE ORAL NIGHTLY PRN
Status: DISCONTINUED | OUTPATIENT
Start: 2022-03-25 | End: 2022-03-29 | Stop reason: HOSPADM

## 2022-03-25 RX ADMIN — LEVETIRACETAM 500 MG: 500 TABLET, FILM COATED ORAL at 20:13

## 2022-03-25 RX ADMIN — ENOXAPARIN SODIUM 40 MG: 40 INJECTION SUBCUTANEOUS at 19:18

## 2022-03-25 RX ADMIN — LEVETIRACETAM 500 MG: 500 TABLET, FILM COATED ORAL at 08:23

## 2022-03-25 RX ADMIN — POTASSIUM BICARBONATE 40 MEQ: 391 TABLET, EFFERVESCENT ORAL at 11:16

## 2022-03-25 RX ADMIN — CEFEPIME 2 G: 2 INJECTION, POWDER, FOR SOLUTION INTRAVENOUS at 11:18

## 2022-03-25 RX ADMIN — SODIUM CHLORIDE 3 G: 900 INJECTION INTRAVENOUS at 03:42

## 2022-03-25 RX ADMIN — ACETAMINOPHEN 650 MG: 325 TABLET ORAL at 15:50

## 2022-03-25 RX ADMIN — ACETAMINOPHEN 650 MG: 325 TABLET ORAL at 08:23

## 2022-03-25 RX ADMIN — CEPHALEXIN 500 MG: 500 CAPSULE ORAL at 19:17

## 2022-03-25 RX ADMIN — ACETAMINOPHEN 650 MG: 325 TABLET ORAL at 00:28

## 2022-03-25 RX ADMIN — CEPHALEXIN 500 MG: 500 CAPSULE ORAL at 21:12

## 2022-03-25 RX ADMIN — ACETAMINOPHEN 650 MG: 325 TABLET ORAL at 20:13

## 2022-03-25 NOTE — PROGRESS NOTES
Certified  Progress Note      Consulted by:  [facility]    Reason for consult: Cocaine use      Data:   CRS met with the patient to offer services and support.        Assessment:  Patient was drowzy and didn't was not very talkative. CRS provided her with an explaination of the CRS program and contact information should she want support.        Plan: CRS will remain available for support.      Resources Provided:       Additional Comments:

## 2022-03-25 NOTE — PLAN OF CARE
Plan of Care Review  Plan of Care Reviewed With: patient  Progress: no change  Outcome Summary: Pt AOx4 but appears lethargic. OOB to bathroom twice, very high fall risk. Tylenol given in AM for fever, most recent temp 98.2. Cefapime given. K+ replaced.

## 2022-03-25 NOTE — BEHAVIORAL HEALTH CRISIS PROGRESS NOTE
"Behavioral Health Crisis Progress Note    Reason for Follow Up Suicide Attempt    Presenting Problem  Status epilepticus (CMS/[facility]) [G40.901]  Altered mental status, unspecified altered mental status type [R41.82]     Interval History  Pt transferred out of ICU.  Spoke with pt at bedside, pt reported that she feels \"like shit\".  She does not remember most of what happened prior to admission.  Reviewed what was known about the events that led to her admission.  She was surprised that she was found in bed.  She is forthright that it was an intentional overdose to end her life.  Pt is disappointed that the attempt was not successful.  She understands the next step is inpatient psych when medically cleared.  She displayed flat affect, ambivalence about the possibility she could get better.  She requested a phone to call a friend.      Discussed with nursing, pt weak, had a fever overnight.,  Not medically cleared yet.       Review of Systems  Current Medications:  •  acetaminophen, 650 mg, oral, q4h PRN  •  cefepime, 2 g, intravenous, q8h INT **AND** Inpatient consult to Infectious Diseases, , , Once  •  glucose, 16-32 g of dextrose, oral, PRN **OR** dextrose, 15-30 g of dextrose, oral, PRN **OR** glucagon, 1 mg, intramuscular, PRN **OR** dextrose in water, 25 mL, intravenous, PRN  •  enoxaparin, 40 mg, subcutaneous, Daily (6p)  •  lactated ringer's, , intravenous, Continuous  •  levETIRAcetam, 500 mg, oral, BID  •  LORazepam, 2 mg, intravenous, q4h PRN  •  magnesium sulfate, 2 g, intravenous, PRN  •  potassium bicarbonate, 20 mEq, oral, PRN  •  potassium bicarbonate, 40 mEq, oral, PRN  •  potassium chloride, 20 mEq, oral, PRN  •  potassium chloride, 40 mEq, oral, PRN    Home Medications      Objective   Vital Signs for the last 24 hours:  Temp:  [37.6 °C (99.7 °F)-39 °C (102.2 °F)] 37.8 °C (100 °F)  Heart Rate:  [110-130] 112  Resp:  [16-52] 19  BP: (108-138)/(64-83) 132/83    Impression  Suicide " Attempt  Intervention/Plan  Inpatient Psych when medically cleared. 302 on chart.   Follow Up Needed:  yes

## 2022-03-25 NOTE — ASSESSMENT & PLAN NOTE
H/o mood disorder with multiple suicide attempts in the past.   -Psych and behavioral health crisis consulted  -1:1 for safety  -Cannot leave AMA

## 2022-03-25 NOTE — PLAN OF CARE
Problem: Adult Inpatient Plan of Care  Goal: Plan of Care Review  Flowsheets (Taken 3/25/2022 1359)  Progress: no change  Outcome Summary: Per interdisciplinary rounds, ICU tx, on 1:1 for SI/OD and awaiting medical stability for IP Psych placement on [place] 302. MERLE TOVAR following.

## 2022-03-25 NOTE — ASSESSMENT & PLAN NOTE
Saturating well on room air  Trend fever curve  Leukocytosis resolved  BCx NGTD, sputum culture positive for MSSA  CXR with patchy bilateral infiltrates suspicious for multifocal pneumonia.  Off Cefepime; C/w Keflex (day 5/7)  Patient has remained afebrile for over the last 48 hours, stable for discharge to inpatient psych

## 2022-03-25 NOTE — PATIENT CARE CONFERENCE
Care Progression Rounds Note  Date: 3/25/2022  Time: 10:42 AM     Patient Name: Gemma Darnell     Medical Record Number: [id number]   YOB: 2000  Sex: Adult      Room/Bed: 0365    Admitting Diagnosis: Status epilepticus (CMS/HCC) [G40.901]  Altered mental status, unspecified altered mental status type [R41.82]   Admit Date/Time: 3/21/2022  9:45 PM    Primary Diagnosis: Seizure (CMS/HCC)  Principal Problem: Seizure (CMS/HCC)    GMLOS: 4.1  Anticipated Discharge Date: 3/28/2022    AM-PAC:  Mobility Score:      Discharge Planning:  Concerns to be Addressed: discharge planning, substance/tobacco abuse/use, mental health, coping/stress  Anticipated Discharge Disposition: psychiatric facility    Barriers to Discharge:  Medical issues not resolved    Comments:  ICU tx, SI/OD, HIGH RISK for SI due to concern that her previous attempt was not successful, BH-SW following, 302, ID c/s, asp pna?    Participants:  nursing, social work/services

## 2022-03-25 NOTE — PLAN OF CARE
Problem: Adult Inpatient Plan of Care  Goal: Plan of Care Review  Flowsheets (Taken 3/25/2022 1520)  Progress: no change  Plan of Care Reviewed With:   patient   other (RN)  Outcome Summary: Pt sleepy but more alert. Pt refused to eat breakfast and lunch today. Pt basically drinking only OJ per RN.  Pt with 1:1 supervision. No BM today.  Goal: Pt will meet >/= 50% estimated energy and protein intake via meals.  Recommendations:  Regular diet with safety tray and assistance prn.  Will send [place] TID.  Continue K+ repletion. Suggest MVI's.  Monitor labs (lytes closely), weights and PO intake.

## 2022-03-25 NOTE — PROGRESS NOTES
"Psychiatry Progress Note    Chief Complaint/Reason for follow-up: Intentional overdose    Interval History: Patient seen for follow up.  Pt received in bed, more awake today.  She is oriented to place, time and situation.  She reports took intentional overdose in an attempt to end her life.  She reports she feels ambivalent about living.  She reports her family is her reason for wanting to continue to live and to get help.  She reports she last felt hopeful when she was a student at [employer].  She continues to endorse passive suicidal ideation.  Per patient \"I took a whole bunch of medication\" in an attempt end her life.  Advised patient of 302 petition and plan for acute inpatient psychiatric hospitalization once medically stable.  Pt verbalized understanding.     Review of Systems:   psychomotor slowing    Vital Signs for the last 24 hours:  Temp:  [36.8 °C (98.2 °F)-39 °C (102.2 °F)] 36.8 °C (98.2 °F)  Heart Rate:  [110-124] 117  Resp:  [16-19] 17  BP: (118-138)/(64-83) 137/74    Labs:  Results from last 7 days   Lab Units 03/25/22  0840 03/24/22  0549   HEMOGLOBIN g/dL 11.3 10.4   WBC K/uL 16.31 13.24   PLATELETS K/uL 254 190   POTASSIUM mEQ/L 3.3* 3.9   SODIUM mEQ/L 134* 138   CREATININE mg/dL 0.7 0.6       Mental Status Evaluation:  Appears current age, dressed in hospital attire, variable eye contact.  Speech is clear, soft.  Behavior appears withdrawn.  Mood is \"I don't know.\"  Affect blunted.  TP coherent.  AOx3.  Pt endorses a passive wish to be dead.  No HI, no AVH.  Cognition grossly intact.  Insight is poor, judgment is poor    Mood disorder (CMS/HCC)  Overview  Patient is a 22 y.o. adult who was admitted for altered mental status and possible seizure activity.   Psychiatry was consulted due to history of depression, suspected suicide attempt. Patient known to our service from previous admissions.  She has a past psychiatric history of anxiety, depression, eating disorder, cluster B traits, ETOH " use disorder, cocaine use disorder, stimulant (Adderall) use diorder,  and multiple previous suicide attempts.  Patient received lying in bed, she is intubated and sedated, unable to participate in psychiatric ROS.  UDS + for benzodiazepines, opiates, and methamphetamines.  Suspected intentional overdose      Assessment & Plan  Pt awake today, acknowledges intentional overdose.  She continues to endorse passive wish to be dead.  Aware of plan for acute inpatient psychiatric hospitalization once medically stable.     1) Disposition - acute inpatient psychiatric hospitalization once patient is medically stable. Our team petitioned a 302 for danger to self with act of furtherance.    2) Continue 1:1 until time of transfer  3) Patient cannot leave AMA  4) Lorazepam 0.5mg q8hr PO PRN for anxiety  5) Will continue to follow while medically admitted      25 minutes were spent in direct face-to-face counseling/coordination of care. >50% of total minutes were spent reviewing medical record, in counseling and/or coordination of care.  Most of time spent speaking directly with patient.

## 2022-03-25 NOTE — CONSULTS
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  This patient intentionally overdosed with her medications resulting in seizures, she was intubated and currently extubated.  Sputum cultures were sent showing MSSA she was febrile last night to 102.2.  She did have leukocytosis of 17,000 today 16,000.  She has some cough but is currently breathing on room air.  She is tachycardic.  Independent review of MRI of the brain performed on the  with and without contrast shows absence of stroke or hemorrhage independent review of CT of the head done on admission was also normal    Allergies: No Known Allergies    Medical History: No past medical history on file.    Surgical History: No past surgical history on file.         Family History: No family history on file.    Review of Systems    All other systems reviewed and negative except as noted in the HPI.    Medications:    Current IP Meds (From admission, onward)        Frequency     ceFEPIme (MAXIPIME) 2 g in 100 mL NSS vial in bag  (cefepime (MAXIPIME) IV and consult to infectious disease)        \"And\" Linked Group Details    Every 8 hours interval     ampicillin-sulbactam (UNASYN) IVPB 3 g/100 mL NSS  Status:  Discontinued         Every 6 hours interval     acetaminophen (TYLENOL) tablet 650 mg         Every 4 hours PRN     lactated ringer's infusion         Continuous     levETIRAcetam (KEPPRA) tablet 500 mg         2 times daily     enoxaparin (LOVENOX) syringe 40 mg         Daily (6p)     glucose chewable tablet 16-32 g of dextrose  (Hypoglycemia Treatment Protocol and Hyperglycemia Validation Protocol)        \"Or\" Linked Group Details    As needed     dextrose 40 " "% oral gel 15-30 g of dextrose  (Hypoglycemia Treatment Protocol and Hyperglycemia Validation Protocol)        \"Or\" Linked Group Details    As needed     glucagon (GLUCAGEN) injection 1 mg  (Hypoglycemia Treatment Protocol and Hyperglycemia Validation Protocol)        \"Or\" Linked Group Details    As needed     dextrose in water injection 12.5 g  (Hypoglycemia Treatment Protocol and Hyperglycemia Validation Protocol)        \"Or\" Linked Group Details    As needed     acetaminophen (TYLENOL) suppository 650 mg  (Analgesics - Acetaminophen pain or fever)  Status:  Discontinued         Every 4 hours PRN     potassium chloride (KLOR-CON) tablet extended release 20 mEq  (Potassium Replacement)         As needed     potassium chloride (KLOR-CON) tablet extended release 40 mEq  (Potassium Replacement)         As needed     potassium bicarbonate (EFFER-K) disintegrating tablet 20 mEq  (Potassium Replacement)         As needed     potassium bicarbonate (EFFER-K) disintegrating tablet 40 mEq  (Potassium Replacement)         As needed     magnesium sulfate IVPB 2g in 50 mL NSS/D5W/SWFI         As needed     LORazepam (ATIVAN) injection 2 mg         Every 4 hours PRN          Anti-infectives (From admission, onward)    Start     Dose/Rate Route Frequency Ordered Stop    22 1200  ceFEPIme (MAXIPIME) 2 g in 100 mL NSS vial in bag        \"And\" Linked Group Details    2 g  200 mL/hr over 30 Minutes intravenous Every 8 hours interval 22 0759              Vital Signs:    Temp:  [36.8 °C (98.2 °F)-39 °C (102.2 °F)] 36.8 °C (98.2 °F)  Heart Rate:  [110-130] 117  Resp:  [16-19] 17  BP: (118-138)/(64-83) 137/74    Temp (72hrs), Av.6 °C (99.7 °F), Min:36.5 °C (97.7 °F), Max:39 °C (102.2 °F)      Physical Exam     Gen: Aox3  HEENT: OP clear  Neck: Supple  LAD: No cervical LAD  Lungs: Coarse breath sounds  CV: RRR no murmurs  Abd: Soft NTND +BS  Ext: No c/c/e  Skin: no rash  Neuro: II-XII intact        Lines, Drains, Airways, " Wounds:       Labs:    Lab Results   Component Value Date    WBC 16.31 03/25/2022    HGB 11.3 03/25/2022    HCT 33.1 03/25/2022    MCV 84.9 03/25/2022     03/25/2022     Lab Results   Component Value Date    GLUCOSE 100 (H) 03/25/2022    CALCIUM 8.5 (L) 03/25/2022     (L) 03/25/2022    K 3.3 (L) 03/25/2022    CO2 18 (L) 03/25/2022     03/25/2022    BUN 6 (L) 03/25/2022    CREATININE 0.7 03/25/2022     Lab Results   Component Value Date    ALT 23 03/21/2022    AST 46 (H) 03/21/2022    ALKPHOS 53 03/21/2022    BILITOT 1.1 03/21/2022     UA Results       03/21/22 2215    Color Yellow    Clarity Cloudy    Glucose Negative    Bilirubin Negative    Ketones Trace    Sp Grav 1.035    Blood Negative    Ph 6.0    Protein +2    Urobilinogen 0.2    Nitrite Negative    Leuk Est Negative    WBC 0 TO 3    RBC 0 TO 4    Bacteria None Seen         Comment for Ketones at 2215 on 03/21/22: Free sulfhydryl drugs such as Mesna, Capoten, and Acetylcysteine (Mucomyst) may cause false positive ketonuria.    Comment for Blood at 2215 on 03/21/22: The sensitivity of the occult blood test is equivalent to approximately 4 intact RBC/HPF.    Comment for Leuk Est at 2215 on 03/21/22: Results can be falsely negative due to high specific gravity, some antibiotics, glucose >3 g/dl, or WBC other than neutrophils.        Microbiology Results     Procedure Component Value Units Date/Time    Sputum culture / smear Expectorated Sputum [[id number]]  (Abnormal) Collected: 03/22/22 2224    Specimen: Expectorated Sputum Updated: 03/24/22 1450    Narrative:      The following orders were created for panel order Sputum culture / smear Expectorated Sputum.  Procedure                               Abnormality         Status                     ---------                               -----------         ------                     Sputum Gram Stain (Lab O...[[id number]]                      Final result               Sputum Culture (Lab  Only...[[id number]]  Abnormal            Final result                 Please view results for these tests on the individual orders.    Sputum Gram Stain (Lab Only) Expectorated Sputum [[id number]] Collected: 03/22/22 2224    Specimen: Expectorated Sputum Updated: 03/23/22 0039     Gram Stain Result 4+ WBC      No Epithelial Cells Seen      3+ Gram positive cocci in clusters      1+ Gram positive cocci in chains    Sputum Culture (Lab Only) Expectorated Sputum [[id number]]  (Abnormal)  (Susceptibility) Collected: 03/22/22 2224    Specimen: Expectorated Sputum Updated: 03/24/22 1450     Culture **Positive Culture**      4+ Staphylococcus aureus      Normal Ruby Also Recovered    MRSA Screen, Nares Only Nose [[id number]]  (Normal) Collected: 03/22/22 0022    Specimen: Nasal Swab from Nose Updated: 03/22/22 0513     MRSA DNA, Nares Negative    Blood Culture Blood, Venous [[id number]]  (Normal) Collected: 03/21/22 2329    Specimen: Blood, Venous Updated: 03/25/22 0502     Culture No growth at 72 hours    Blood Culture Blood, Venous [[id number]]  (Normal) Collected: 03/21/22 2329    Specimen: Blood, Venous Updated: 03/25/22 0502     Culture No growth at 72 hours    SARS-CoV-2 (COVID-19), PCR Nasopharynx [[id number]]  (Normal) Collected: 03/21/22 2329    Specimen: Nasopharyngeal Swab from Nasopharynx Updated: 03/22/22 0015    Narrative:      The following orders were created for panel order SARS-CoV-2 (COVID-19), PCR Nasopharynx.  Procedure                               Abnormality         Status                     ---------                               -----------         ------                     SARS-CoV-2 (COVID-19), P...[[id number]]  Normal              Final result                 Please view results for these tests on the individual orders.    SARS-CoV-2 (COVID-19), PCR Nasopharynx [[id number]]  (Normal) Collected: 03/21/22 2329    Specimen: Nasopharyngeal Swab from Nasopharynx Updated: 03/22/22 0015     SARS-CoV-2  (COVID-19) Negative           Pathology Results     ** No results found for the last 720 hours. **          Echo:         Imaging:    Radiology Imaging    XR CHEST 1 VW    Narrative  CLINICAL HISTORY: MSSA in sputum. Recent intubation.    COMMENT:A single AP erect view the chest was obtained portably March 24, 2022 at  1:25 PM. Comparison is made to previous study performed March 21, 2022.    The cardiomediastinal silhouette is normal in caliber. There is patchy  consolidation within the left infrahilar region and the left lower lobe as well  as the medial aspect of the right lower lobe and the right upper lobe raising  concern for pneumonia. There are probable small bilateral pleural effusions. The  pulmonary vasculature is normal in caliber and there is no pneumothorax. The  visualized bony thorax is unremarkable.    --    Impression  Patchy bilateral lung infiltrates as described above suspicious for  multifocal pneumonia. Probable small bilateral pleural effusions.      Patient Active Problem List   Diagnosis Code   • Seizure (CMS/[facility]) R56.9   • Drug overdose T50.901A   • Mood disorder (CMS/[facility]) F39   • Fever R50.9   • MSSA (methicillin susceptible Staphylococcus aureus) pneumonia (CMS/[facility]) J15.211     MSSA (methicillin susceptible Staphylococcus aureus) pneumonia (CMS/[facility])  Assessment & Plan  Discontinue cefepime and start Keflex 500 mg orally 3 times daily for 7 days          Pedro Peng MD  3/25/2022 1:52 PM

## 2022-03-25 NOTE — ASSESSMENT & PLAN NOTE
Reported seizure activity and was unresponsive to Ativan. She was subsequently intubated for airway protection  There was concern for drug OD   -CT head neg for any acute abnormalities  -MRI brain neg  -s/p continuous EEG - w/o seizures  -C/w Keppra 500mg BID per Neuro  -Ativan PRN for seizure activity  -No evidence of seizures during hospitalization, stable for discharge to inpatient psych

## 2022-03-25 NOTE — PROGRESS NOTES
Hospital Medicine Service -  Daily Progress Note       SUBJECTIVE   Interval History: patient seen and examined at bedside. States she still has a cough, chest tightness. The chest tightness has been going on prior to arrival and she describes it as achy. Denies n/v, sob, f/c.      OBJECTIVE      Vital signs in last 24 hours:  Temp:  [36.8 °C (98.2 °F)-39 °C (102.2 °F)] 38.6 °C (101.5 °F)  Heart Rate:  [] 99  Resp:  [16-19] 18  BP: (118-138)/(64-83) 128/78    Intake/Output Summary (Last 24 hours) at 3/25/2022 1745  Last data filed at 3/25/2022 1400  Gross per 24 hour   Intake 620 ml   Output --   Net 620 ml       PHYSICAL EXAMINATION      Physical Exam    General: well appearing, appears comfortable, NAD, aaox3, speaks in full sentences  HEENT: EOMI, NCAT, sclerae anicteric, no conjunctival pallor, no stridor  CV: regular and tachy, no mrg, no JVD  Pulm: coarse breath sounds bilaterally  GI: +BS, abd soft, nontender to palpation, nondistended  MSK: normal bulk and tone, normal ROM  Extremities: no b/l LE edema, NT, warm, perfused  Neuro: CN 2-12 grossly intact by nonfocal exam  Psych: normal mood and affect. No psychomotor agitation     LINES, CATHETERS, DRAINS, AIRWAYS, AND WOUNDS   Lines, Drains, and Airways:  Wounds (agree with documentation and present on admission):  Peripheral IV (Adult) 03/25/22 Distal;Left;Posterior Forearm (Active)   Number of days: 0         Comments:      LABS / IMAGING / TELE      Labs  I have reviewed the patient's pertinent labs.  Significant abnormals are Na 134, K 3.3.    SARS-CoV-2 (COVID-19) (no units)   Date/Time Value   03/21/2022 2329 Negative       Imaging  I have independently reviewed the pertinent imaging from the last 24 hrs.    ECG/Telemetry  I have independently reviewed the telemetry. No events for the last 24 hours.    ASSESSMENT AND PLAN      * MSSA (methicillin susceptible Staphylococcus aureus) pneumonia (CMS/[facility])  Assessment & Plan  Saturating well on room  air  Trend fever curve  WBC 16.31  BCx NGTD 72hrs, sputum culture positive for MSSA  CXR with patchy bilateral infiltrates suspicious for multifocal pneumonia.  Off Cefepime. C/w Keflex for 7 days  Appreicate ID recs    Mood disorder (CMS/[facility])  Overview  Patient is a 22 y.o. adult who was admitted for altered mental status and possible seizure activity.   Psychiatry was consulted due to history of depression, suspected suicide attempt. Patient known to our service from previous admissions.  She has a past psychiatric history of anxiety, depression, eating disorder, cluster B traits, ETOH use disorder, cocaine use disorder, stimulant (Adderall) use diorder,  and multiple previous suicide attempts.  Patient received lying in bed, she is intubated and sedated, unable to participate in psychiatric ROS.  UDS + for benzodiazepines, opiates, and methamphetamines.  Suspected intentional overdose      Assessment & Plan  H/o mood disorder with multiple suicide attempts in the past.   -Psych and behavioral health crisis consulted  -1:1 for safety  -Cannot leave AMA    Drug overdose  Assessment & Plan  Concern for drug OD given previous history  EMS found bottle containing hydroxyzine, effexor, benadryl  -Poison control was contacted and recommended supportive measures  -Follow EKG for changes to include: if QRS >100 give bicarb, if QTc >500 give IV mag  -Okay for Ativan PRN for seizures/agitation  -Behavioral health crisis and Psych consult, appreciate recs  -C/w 1:1  -Inpatient psych on discharge    Seizure (CMS/[facility])  Assessment & Plan  Reported seizure activity and was unresponsive to Ativan. She was subsequently intubated for airway protection  There was concern for drug OD   -CT head neg for any acute abnormalities  -MRI brain neg  -s/p continuous EEG - w/o seizures  -C/w Keppra 500mg BID per Neuro  -Ativan PRN for seizure activity  -Appreciate Neuro recs       VTE Assessment: Padua    VTE Prophylaxis:  Current  anticoagulants:  enoxaparin (LOVENOX) syringe 40 mg, subcutaneous, Daily (6p)      Code Status: Full Code      Estimated Discharge Date: 3/28/2022     Disposition Planning: continues to have fevers on ABx for her MSSA PNA, will need to trend fever curve; inpatient psych on d/c     Liset Hardin DO  3/25/2022

## 2022-03-25 NOTE — ASSESSMENT & PLAN NOTE
Concern for drug OD given previous history  EMS found bottle containing hydroxyzine, effexor, benadryl  -Poison control was contacted and recommended supportive measures  -Follow EKG for changes to include: if QRS >100 give bicarb, if QTc >500 give IV mag  -Okay for Ativan PRN for seizures/agitation  -Behavioral health crisis and Psych consult, appreciate recs  -C/w 1:1  -Inpatient psych on discharge - awaits bed availability

## 2022-03-26 LAB
MAGNESIUM SERPL-MCNC: 2 MG/DL (ref 1.8–2.5)
PHOSPHATE SERPL-MCNC: 2.7 MG/DL (ref 2.4–4.7)
TROPONIN I SERPL HS-MCNC: 3.8 PG/ML

## 2022-03-26 PROCEDURE — 63700000 HC SELF-ADMINISTRABLE DRUG: Performed by: INTERNAL MEDICINE

## 2022-03-26 PROCEDURE — 84484 ASSAY OF TROPONIN QUANT: CPT | Performed by: STUDENT IN AN ORGANIZED HEALTH CARE EDUCATION/TRAINING PROGRAM

## 2022-03-26 PROCEDURE — 80048 BASIC METABOLIC PNL TOTAL CA: CPT | Performed by: HOSPITALIST

## 2022-03-26 PROCEDURE — 36415 COLL VENOUS BLD VENIPUNCTURE: CPT | Performed by: HOSPITALIST

## 2022-03-26 PROCEDURE — 63700000 HC SELF-ADMINISTRABLE DRUG: Performed by: PHYSICIAN ASSISTANT

## 2022-03-26 PROCEDURE — 63600000 HC DRUGS/DETAIL CODE

## 2022-03-26 PROCEDURE — 20600000 HC ROOM AND CARE INTERMEDIATE/TELEMETRY

## 2022-03-26 PROCEDURE — 12000000 HC ROOM AND CARE MED/SURG

## 2022-03-26 PROCEDURE — 83735 ASSAY OF MAGNESIUM: CPT

## 2022-03-26 PROCEDURE — 84100 ASSAY OF PHOSPHORUS: CPT

## 2022-03-26 PROCEDURE — 99232 SBSQ HOSP IP/OBS MODERATE 35: CPT | Performed by: STUDENT IN AN ORGANIZED HEALTH CARE EDUCATION/TRAINING PROGRAM

## 2022-03-26 PROCEDURE — 85027 COMPLETE CBC AUTOMATED: CPT | Performed by: HOSPITALIST

## 2022-03-26 RX ADMIN — LEVETIRACETAM 500 MG: 500 TABLET, FILM COATED ORAL at 09:33

## 2022-03-26 RX ADMIN — Medication 5 MG: at 21:51

## 2022-03-26 RX ADMIN — ENOXAPARIN SODIUM 40 MG: 40 INJECTION SUBCUTANEOUS at 18:47

## 2022-03-26 RX ADMIN — LEVETIRACETAM 500 MG: 500 TABLET, FILM COATED ORAL at 21:47

## 2022-03-26 RX ADMIN — CEPHALEXIN 500 MG: 500 CAPSULE ORAL at 18:47

## 2022-03-26 RX ADMIN — Medication 5 MG: at 00:10

## 2022-03-26 RX ADMIN — CEPHALEXIN 500 MG: 500 CAPSULE ORAL at 14:51

## 2022-03-26 RX ADMIN — CEPHALEXIN 500 MG: 500 CAPSULE ORAL at 09:33

## 2022-03-26 RX ADMIN — CEPHALEXIN 500 MG: 500 CAPSULE ORAL at 21:47

## 2022-03-26 NOTE — PROGRESS NOTES
Hospital Medicine Service -  Daily Progress Note       SUBJECTIVE   Interval History: Patient seen and examined at bedside.  She states that she is feeling restless, since she has not been able to leave her room.  She would like permission to be able to walk in the halls.  She is eager to be discharged to inpatient psych.  Denies any current complaints     OBJECTIVE      Vital signs in last 24 hours:  Temp:  [36.8 °C (98.2 °F)-39.2 °C (102.5 °F)] 37.2 °C (99 °F)  Heart Rate:  [] 89  Resp:  [14-18] 14  BP: (109-135)/(67-83) 109/67    Intake/Output Summary (Last 24 hours) at 3/26/2022 1829  Last data filed at 3/26/2022 0656  Gross per 24 hour   Intake 260 ml   Output --   Net 260 ml       PHYSICAL EXAMINATION      Physical Exam  Constitutional:       General: She is not in acute distress.     Appearance: Normal appearance. She is not ill-appearing or toxic-appearing.   HENT:      Head: Normocephalic and atraumatic.      Mouth/Throat:      Mouth: Mucous membranes are moist.      Pharynx: No posterior oropharyngeal erythema.   Eyes:      Extraocular Movements: Extraocular movements intact.      Conjunctiva/sclera: Conjunctivae normal.      Pupils: Pupils are equal, round, and reactive to light.   Cardiovascular:      Rate and Rhythm: Normal rate and regular rhythm.      Pulses: Normal pulses.      Heart sounds: Normal heart sounds. No murmur heard.  Pulmonary:      Effort: Pulmonary effort is normal. No respiratory distress.      Breath sounds: Normal breath sounds. No wheezing, rhonchi or rales.   Abdominal:      General: Bowel sounds are normal. There is no distension.      Palpations: Abdomen is soft.      Tenderness: There is no abdominal tenderness.   Musculoskeletal:         General: No swelling or tenderness.      Cervical back: Normal range of motion.      Right lower leg: No edema.      Left lower leg: No edema.   Skin:     General: Skin is warm and dry.   Neurological:      General: No focal deficit  present.      Mental Status: She is alert and oriented to person, place, and time.            LINES, CATHETERS, DRAINS, AIRWAYS, AND WOUNDS   Lines, Drains, and Airways:  Wounds (agree with documentation and present on admission):  Peripheral IV (Adult) 03/25/22 Distal;Left;Posterior Forearm (Active)   Number of days: 1         Comments:      LABS / IMAGING / TELE      Labs  I have reviewed the patient's pertinent labs.  Significant abnormals are K: 3.2.    SARS-CoV-2 (COVID-19) (no units)   Date/Time Value   03/21/2022 2329 Negative       Imaging  I have independently reviewed the patient's pertinent imaging for this hospital visit. Pertinent Imaging findings are within normal limits.    ECG/Telemetry  I have independently reviewed the telemetry. No events for the last 24 hours.    ASSESSMENT AND PLAN      Mood disorder (CMS/[facility])  Overview  Patient is a 22 y.o. adult who was admitted for altered mental status and possible seizure activity.   Psychiatry was consulted due to history of depression, suspected suicide attempt. Patient known to our service from previous admissions.  She has a past psychiatric history of anxiety, depression, eating disorder, cluster B traits, ETOH use disorder, cocaine use disorder, stimulant (Adderall) use diorder,  and multiple previous suicide attempts.  Patient received lying in bed, she is intubated and sedated, unable to participate in psychiatric ROS.  UDS + for benzodiazepines, opiates, and methamphetamines.  Suspected intentional overdose      Assessment & Plan  H/o mood disorder with multiple suicide attempts in the past.   -Psych and behavioral health crisis consulted  -1:1 for safety  -Cannot leave AMA    Drug overdose  Assessment & Plan  Concern for drug OD given previous history  EMS found bottle containing hydroxyzine, effexor, benadryl  -Poison control was contacted and recommended supportive measures  -Follow EKG for changes to include: if QRS >100 give bicarb, if QTc  >500 give IV mag  -Okay for Ativan PRN for seizures/agitation  -Behavioral health crisis and Psych consult, appreciate recs  -C/w 1:1  -Inpatient psych on discharge    Seizure (CMS/[facility])  Assessment & Plan  Reported seizure activity and was unresponsive to Ativan. She was subsequently intubated for airway protection  There was concern for drug OD   -CT head neg for any acute abnormalities  -MRI brain neg  -s/p continuous EEG - w/o seizures  -C/w Keppra 500mg BID per Neuro  -Ativan PRN for seizure activity  -Appreciate Neuro recs    * MSSA (methicillin susceptible Staphylococcus aureus) pneumonia (CMS/[facility])  Assessment & Plan  Saturating well on room air  Trend fever curve  WBC 16.31  BCx NGTD 72hrs, sputum culture positive for MSSA  CXR with patchy bilateral infiltrates suspicious for multifocal pneumonia.  Off Cefepime. C/w Keflex for 7 days  Appreicate ID recs         VTE Assessment: Padua    VTE Prophylaxis:  Current anticoagulants:  enoxaparin (LOVENOX) syringe 40 mg, subcutaneous, Daily (6p)      Code Status: Full Code      Estimated Discharge Date: 3/28/2022     Disposition Planning: Plan for discharge to inpatient psychiatry when bed available     Donnell Agrawal,   3/26/2022

## 2022-03-26 NOTE — PROGRESS NOTES
Infectious Disease Progress Note    Patient Name: Gemma Darnell  MR#: [id number]  : 2000  Admission Date: 3/21/2022  Date: 22   Time: 11:35 AM   Author: Pedro Peng MD    OBJECTIVE:  Was febrile last night to 102.5 but breathing better and satting well on room air    Antibiotics:    Anti-infectives (From admission, onward)    Start     Dose/Rate Route Frequency Ordered Stop    22 1800  cephalexin (KEFLEX) capsule 500 mg         500 mg oral 4 times daily 22 1355            ROS  As above  Vital Signs:    Temp:  [36.8 °C (98.2 °F)-39.2 °C (102.5 °F)] 37.8 °C (100.1 °F)  Heart Rate:  [] 96  Resp:  [16-18] 16  BP: (114-137)/(74-83) 128/74    Temp (72hrs), Av.8 °C (100.1 °F), Min:36.5 °C (97.7 °F), Max:39.2 °C (102.5 °F)      Physical Exam    Gen: Aox3  HEENT: OP clear  Neck: Supple  LAD: No cervical LAD  Lungs: CTAB  CV: RRR no murmurs  Abd: Soft NTND +BS  Ext: No c/c/e  Skin: no rash  Neuro: II-XII intact        Lines, Drains, Airways, Wounds:  Peripheral IV (Adult) 22 Distal;Left;Posterior Forearm (Active)   Number of days: 1       Labs:    Lab Results   Component Value Date    WBC 12.60 2022    HGB 10.9 2022    HCT 31.4 2022    MCV 83.7 2022     2022     Lab Results   Component Value Date    GLUCOSE 100 (H) 2022    CALCIUM 8.5 (L) 2022     (L) 2022    K 3.3 (L) 2022    CO2 18 (L) 2022     2022    BUN 6 (L) 2022    CREATININE 0.7 2022     Lab Results   Component Value Date    ALT 23 2022    AST 46 (H) 2022    ALKPHOS 53 2022    BILITOT 1.1 2022         Patient Active Problem List   Diagnosis Code   • Seizure (CMS/[facility]) R56.9   • Drug overdose T50.901A   • Mood disorder (CMS/[facility]) F39   • Fever R50.9   • MSSA (methicillin susceptible Staphylococcus aureus) pneumonia (CMS/[facility]) J15.211       * MSSA (methicillin susceptible Staphylococcus aureus)  pneumonia (CMS/[facility])  Assessment & Plan  the plan is to continue with Keflex to complete a 7-day course, today's day #2          Pedro Peng MD  3/26/347699:35 AM

## 2022-03-26 NOTE — PLAN OF CARE
Plan of Care Review  Plan of Care Reviewed With: patient, mother  Progress: improving  Outcome Summary: Pt AAO, walking to bathroom with supervision, showering with supervision., 1:1 continued for suicide risk. Low grade temperatures monitored. Pt denies complaints. She is anxious to transfer to [facility] when a bed is available. Will continue to monitor.

## 2022-03-26 NOTE — PLAN OF CARE
Problem: Adult Inpatient Plan of Care  Goal: Plan of Care Review  Outcome: Progressing  Flowsheets (Taken 3/26/2022 0330)  Progress: improving  Plan of Care Reviewed With: patient  Outcome Summary: Pt alert and oriented, able to have appropriate conversation. No complaints of pain or discomfort. 1:1 remains at bedside. Monitoring temperature due to possible aspiration pneumonia. Nursing will continue to monitor.   Plan of Care Review  Plan of Care Reviewed With: patient  Progress: improving  Outcome Summary: Pt alert and oriented, able to have appropriate conversation. No complaints of pain or discomfort. 1:1 remains at bedside. Monitoring temperature due to possible aspiration pneumonia. Nursing will continue to monitor.

## 2022-03-27 LAB — MAGNESIUM SERPL-MCNC: 2 MG/DL (ref 1.8–2.5)

## 2022-03-27 PROCEDURE — 63700000 HC SELF-ADMINISTRABLE DRUG: Performed by: HOSPITALIST

## 2022-03-27 PROCEDURE — 12000000 HC ROOM AND CARE MED/SURG

## 2022-03-27 PROCEDURE — 84100 ASSAY OF PHOSPHORUS: CPT

## 2022-03-27 PROCEDURE — 63600000 HC DRUGS/DETAIL CODE: Performed by: STUDENT IN AN ORGANIZED HEALTH CARE EDUCATION/TRAINING PROGRAM

## 2022-03-27 PROCEDURE — 36415 COLL VENOUS BLD VENIPUNCTURE: CPT

## 2022-03-27 PROCEDURE — 63700000 HC SELF-ADMINISTRABLE DRUG: Performed by: PHYSICIAN ASSISTANT

## 2022-03-27 PROCEDURE — 63700000 HC SELF-ADMINISTRABLE DRUG: Performed by: INTERNAL MEDICINE

## 2022-03-27 PROCEDURE — 20600000 HC ROOM AND CARE INTERMEDIATE/TELEMETRY

## 2022-03-27 PROCEDURE — 99232 SBSQ HOSP IP/OBS MODERATE 35: CPT | Performed by: STUDENT IN AN ORGANIZED HEALTH CARE EDUCATION/TRAINING PROGRAM

## 2022-03-27 PROCEDURE — 63700000 HC SELF-ADMINISTRABLE DRUG: Performed by: STUDENT IN AN ORGANIZED HEALTH CARE EDUCATION/TRAINING PROGRAM

## 2022-03-27 PROCEDURE — 83735 ASSAY OF MAGNESIUM: CPT

## 2022-03-27 PROCEDURE — 99232 SBSQ HOSP IP/OBS MODERATE 35: CPT | Performed by: NURSE PRACTITIONER

## 2022-03-27 RX ORDER — LORAZEPAM 2 MG/ML
0.5 INJECTION INTRAMUSCULAR EVERY 4 HOURS PRN
Status: DISCONTINUED | OUTPATIENT
Start: 2022-03-27 | End: 2022-03-29 | Stop reason: HOSPADM

## 2022-03-27 RX ORDER — IBUPROFEN 200 MG
1 TABLET ORAL DAILY
Status: DISCONTINUED | OUTPATIENT
Start: 2022-03-27 | End: 2022-03-29 | Stop reason: HOSPADM

## 2022-03-27 RX ORDER — IBUPROFEN 200 MG
1 TABLET ORAL DAILY
Status: DISCONTINUED | OUTPATIENT
Start: 2022-03-28 | End: 2022-03-27

## 2022-03-27 RX ADMIN — CEPHALEXIN 500 MG: 500 CAPSULE ORAL at 14:03

## 2022-03-27 RX ADMIN — CEPHALEXIN 500 MG: 500 CAPSULE ORAL at 09:37

## 2022-03-27 RX ADMIN — CEPHALEXIN 500 MG: 500 CAPSULE ORAL at 18:33

## 2022-03-27 RX ADMIN — LORAZEPAM 0.5 MG: 2 INJECTION INTRAMUSCULAR; INTRAVENOUS at 18:33

## 2022-03-27 RX ADMIN — POTASSIUM CHLORIDE 40 MEQ: 750 TABLET, FILM COATED, EXTENDED RELEASE ORAL at 18:33

## 2022-03-27 RX ADMIN — Medication 5 MG: at 20:37

## 2022-03-27 RX ADMIN — Medication 1 PATCH: at 20:58

## 2022-03-27 RX ADMIN — LEVETIRACETAM 500 MG: 500 TABLET, FILM COATED ORAL at 20:37

## 2022-03-27 RX ADMIN — CEPHALEXIN 500 MG: 500 CAPSULE ORAL at 20:37

## 2022-03-27 RX ADMIN — LEVETIRACETAM 500 MG: 500 TABLET, FILM COATED ORAL at 09:37

## 2022-03-27 NOTE — PROGRESS NOTES
Hospital Medicine Service -  Daily Progress Note       SUBJECTIVE   Interval History: Patient seen and examined at bedside. She has been afebrile for 24 hours.   Reports that she would like to be transferred to inpatient psychiatry soon as possible.    Discussed with behavioral health today, no beds are available.  Explained that patient is medically cleared for inpatient psych.   OBJECTIVE      Vital signs in last 24 hours:  Temp:  [36.7 °C (98 °F)-37.2 °C (98.9 °F)] 37.1 °C (98.8 °F)  Heart Rate:  [65-84] 73  Resp:  [14-16] 16  BP: (103-120)/(70-81) 103/70    Intake/Output Summary (Last 24 hours) at 3/27/2022 1742  Last data filed at 3/27/2022 0455  Gross per 24 hour   Intake --   Output 400 ml   Net -400 ml       PHYSICAL EXAMINATION      Physical Exam  Constitutional:       General: She is not in acute distress.     Appearance: Normal appearance. She is not ill-appearing or toxic-appearing.   HENT:      Head: Normocephalic and atraumatic.      Mouth/Throat:      Mouth: Mucous membranes are moist.      Pharynx: No posterior oropharyngeal erythema.   Eyes:      Extraocular Movements: Extraocular movements intact.      Conjunctiva/sclera: Conjunctivae normal.      Pupils: Pupils are equal, round, and reactive to light.   Cardiovascular:      Rate and Rhythm: Normal rate and regular rhythm.      Pulses: Normal pulses.      Heart sounds: Normal heart sounds. No murmur heard.  Pulmonary:      Effort: Pulmonary effort is normal. No respiratory distress.      Breath sounds: Normal breath sounds. No wheezing, rhonchi or rales.   Abdominal:      General: Bowel sounds are normal. There is no distension.      Palpations: Abdomen is soft.      Tenderness: There is no abdominal tenderness.   Musculoskeletal:         General: No swelling or tenderness.      Cervical back: Normal range of motion.      Right lower leg: No edema.      Left lower leg: No edema.   Skin:     General: Skin is warm and dry.   Neurological:       General: No focal deficit present.      Mental Status: She is alert and oriented to person, place, and time.            LINES, CATHETERS, DRAINS, AIRWAYS, AND WOUNDS   Lines, Drains, and Airways:  Wounds (agree with documentation and present on admission):  Peripheral IV (Adult) 03/25/22 Distal;Left;Posterior Forearm (Active)   Number of days: 1         Comments:      LABS / IMAGING / TELE      Labs  Electrolytes normal.    SARS-CoV-2 (COVID-19) (no units)   Date/Time Value   03/21/2022 2329 Negative       Imaging  I have independently reviewed the patient's pertinent imaging for this hospital visit. Pertinent Imaging findings are within normal limits.    ECG/Telemetry  I have independently reviewed the telemetry. No events for the last 24 hours.    ASSESSMENT AND PLAN      Mood disorder (CMS/[facility])  Overview  Patient is a 22 y.o. adult who was admitted for altered mental status and possible seizure activity.   Psychiatry was consulted due to history of depression, suspected suicide attempt. Patient known to our service from previous admissions.  She has a past psychiatric history of anxiety, depression, eating disorder, cluster B traits, ETOH use disorder, cocaine use disorder, stimulant (Adderall) use diorder,  and multiple previous suicide attempts.  Patient received lying in bed, she is intubated and sedated, unable to participate in psychiatric ROS.  UDS + for benzodiazepines, opiates, and methamphetamines.  Suspected intentional overdose      Assessment & Plan  H/o mood disorder with multiple suicide attempts in the past.   -Psych and behavioral health crisis consulted  -1:1 for safety  -Cannot leave AMA    Drug overdose  Assessment & Plan  Concern for drug OD given previous history  EMS found bottle containing hydroxyzine, effexor, benadryl  -Poison control was contacted and recommended supportive measures  -Follow EKG for changes to include: if QRS >100 give bicarb, if QTc >500 give IV mag  -Okay for Ativan  PRN for seizures/agitation  -Behavioral health crisis and Psych consult, appreciate recs  -C/w 1:1  -Inpatient psych on discharge    Seizure (CMS/[facility])  Assessment & Plan  Reported seizure activity and was unresponsive to Ativan. She was subsequently intubated for airway protection  There was concern for drug OD   -CT head neg for any acute abnormalities  -MRI brain neg  -s/p continuous EEG - w/o seizures  -C/w Keppra 500mg BID per Neuro  -Ativan PRN for seizure activity  -No evidence of seizures during hospitalization, stable for discharge to inpatient psych    * MSSA (methicillin susceptible Staphylococcus aureus) pneumonia (CMS/[facility])  Assessment & Plan  Saturating well on room air  Trend fever curve  Leukocytosis resolved  BCx NGTD, sputum culture positive for MSSA  CXR with patchy bilateral infiltrates suspicious for multifocal pneumonia.  Off Cefepime. C/w Keflex for 7 days  Patient has remained afebrile for over the last 24 hours, stable for discharge to inpatient psych       VTE Assessment: Padua    VTE Prophylaxis:  Current anticoagulants:  enoxaparin (LOVENOX) syringe 40 mg, subcutaneous, Daily (6p)      Code Status: Full Code      Estimated Discharge Date: 3/28/2022     Disposition Planning: Plan for discharge to inpatient psychiatry when bed available     Donnell Agrawal DO  3/27/2022

## 2022-03-27 NOTE — PLAN OF CARE
Plan of Care Review  Plan of Care Reviewed With: patient  Progress: improving  Outcome Summary: Pt AAO, medically cleared today for transport to [facility], hopefully tomorrow. Tele monitoring D/C'd, pt walking in [place], feeling frustrated and anxious. 1:1 continued for suicide precautions.  Will continue to monitor.

## 2022-03-27 NOTE — PROGRESS NOTES
"Psychiatry Progress Note    Chief Complaint/Reason for follow-up: Intentional overdose    Interval History: Patient seen for follow up.  Pt received in bed, awake, alert, oriented to place, time and situation.  She reports she did not sleep overnight.  Pt requested to discuss discharge to home or to residential.  Reviewed with patient a 302 was petitioned and she would not be permitted to leave AMA, reviewed plan for acute inpatient psychiatric hospitalization once medically stable.  Patient verbalized understanding.  She reports poor sleep overnight, episodes of anxiety and requested medication for sleep.      Review of Systems:   sleep is poor, appetite is fair    Vital Signs for the last 24 hours:  Temp:  [36.7 °C (98 °F)-37.2 °C (98.9 °F)] 37.1 °C (98.8 °F)  Heart Rate:  [65-89] 73  Resp:  [14-16] 16  BP: (103-120)/(70-81) 103/70    Labs:  Results from last 7 days   Lab Units 03/26/22  1115 03/25/22  0840   HEMOGLOBIN g/dL 10.9 11.3   WBC K/uL 12.60 16.31   PLATELETS K/uL 267 254   POTASSIUM mEQ/L 3.2* 3.3*   SODIUM mEQ/L 137 134*   CREATININE mg/dL 0.6 0.7       Mental Status Evaluation:  Appearance:  appears current age, dressed in hospital attire, fair eye contact   Behavior:  cooperative   Speech:  clear, normal rate and tone   Mood:  \"not great\"   Affect:  mood-congruent   Thought Process:  linear   Thought Content:  Focused on discharge   Sensorium:  person, place, time/date and situation   Cognition:  grossly intact   Insight:  poor   Judgment:  poor     Mood disorder (CMS/[facility])  Overview  Patient is a 22 y.o. adult who was admitted for altered mental status and possible seizure activity.   Psychiatry was consulted due to history of depression, suspected suicide attempt. Patient known to our service from previous admissions.  She has a past psychiatric history of anxiety, depression, eating disorder, cluster B traits, ETOH use disorder, cocaine use disorder, stimulant (Adderall) use diorder,  and multiple " previous suicide attempts.  Patient received lying in bed, she is intubated and sedated, unable to participate in psychiatric ROS.  UDS + for benzodiazepines, opiates, and methamphetamines.  Suspected intentional overdose      Assessment & Plan  Pt awake today, acknowledges intentional overdose.  More irritable today, requesting to leave. Sleep is poor.  Pt endorses anxiety, frustration.       1) Disposition - acute inpatient psychiatric hospitalization once patient is medically stable. Our team petitioned a 302 for danger to self with act of furtherance.    2) Continue 1:1 until time of transfer  3) Patient cannot leave AMA  4) Lorazepam 0.5mg q8hr PO PRN for anxiety  5) Consider adding trazodone 25mg HS PRN for insomnia      25 minutes were spent in direct face-to-face counseling/coordination of care. >50% of total minutes were spent reviewing medical record, in counseling and/or coordination of care

## 2022-03-28 LAB
MAGNESIUM SERPL-MCNC: 2.2 MG/DL (ref 1.8–2.5)
PHOSPHATE SERPL-MCNC: 5.1 MG/DL (ref 2.4–4.7)

## 2022-03-28 PROCEDURE — 12000000 HC ROOM AND CARE MED/SURG

## 2022-03-28 PROCEDURE — 63600000 HC DRUGS/DETAIL CODE: Performed by: STUDENT IN AN ORGANIZED HEALTH CARE EDUCATION/TRAINING PROGRAM

## 2022-03-28 PROCEDURE — 84132 ASSAY OF SERUM POTASSIUM: CPT | Performed by: STUDENT IN AN ORGANIZED HEALTH CARE EDUCATION/TRAINING PROGRAM

## 2022-03-28 PROCEDURE — 99233 SBSQ HOSP IP/OBS HIGH 50: CPT | Performed by: STUDENT IN AN ORGANIZED HEALTH CARE EDUCATION/TRAINING PROGRAM

## 2022-03-28 PROCEDURE — 63700000 HC SELF-ADMINISTRABLE DRUG: Performed by: INTERNAL MEDICINE

## 2022-03-28 PROCEDURE — 83735 ASSAY OF MAGNESIUM: CPT

## 2022-03-28 PROCEDURE — 99232 SBSQ HOSP IP/OBS MODERATE 35: CPT | Performed by: PSYCHIATRY & NEUROLOGY

## 2022-03-28 PROCEDURE — 63700000 HC SELF-ADMINISTRABLE DRUG: Performed by: STUDENT IN AN ORGANIZED HEALTH CARE EDUCATION/TRAINING PROGRAM

## 2022-03-28 PROCEDURE — 36415 COLL VENOUS BLD VENIPUNCTURE: CPT

## 2022-03-28 PROCEDURE — 63600000 HC DRUGS/DETAIL CODE

## 2022-03-28 PROCEDURE — 63700000 HC SELF-ADMINISTRABLE DRUG: Performed by: PHYSICIAN ASSISTANT

## 2022-03-28 PROCEDURE — 84100 ASSAY OF PHOSPHORUS: CPT

## 2022-03-28 RX ADMIN — CEPHALEXIN 500 MG: 500 CAPSULE ORAL at 08:33

## 2022-03-28 RX ADMIN — CEPHALEXIN 500 MG: 500 CAPSULE ORAL at 13:56

## 2022-03-28 RX ADMIN — LEVETIRACETAM 500 MG: 500 TABLET, FILM COATED ORAL at 08:32

## 2022-03-28 RX ADMIN — LEVETIRACETAM 500 MG: 500 TABLET, FILM COATED ORAL at 20:38

## 2022-03-28 RX ADMIN — Medication 1 PATCH: at 20:38

## 2022-03-28 RX ADMIN — CEPHALEXIN 500 MG: 500 CAPSULE ORAL at 17:21

## 2022-03-28 RX ADMIN — CEPHALEXIN 500 MG: 500 CAPSULE ORAL at 21:25

## 2022-03-28 RX ADMIN — LORAZEPAM 0.5 MG: 2 INJECTION INTRAMUSCULAR; INTRAVENOUS at 01:17

## 2022-03-28 RX ADMIN — LORAZEPAM 0.5 MG: 2 INJECTION INTRAMUSCULAR; INTRAVENOUS at 20:38

## 2022-03-28 RX ADMIN — ENOXAPARIN SODIUM 40 MG: 40 INJECTION SUBCUTANEOUS at 17:21

## 2022-03-28 NOTE — PROGRESS NOTES
Infectious Disease Progress Note    Patient Name: Gemma Darnell  MR#: [id number]  : 2000  Admission Date: 3/21/2022  Date: 22   Time: 12:36 PM   Author: Pedro Peng MD    OBJECTIVE:  No more fever  Antibiotics:    Anti-infectives (From admission, onward)    Start     Dose/Rate Route Frequency Ordered Stop    22 1800  cephalexin (KEFLEX) capsule 500 mg         500 mg oral 4 times daily 22 1355            ROS  As above  Vital Signs:    Temp:  [36.8 °C (98.3 °F)-37.1 °C (98.8 °F)] 36.9 °C (98.4 °F)  Heart Rate:  [73-94] 94  Resp:  [16] 16  BP: (103-118)/(69-82) 111/69    Temp (72hrs), Av.4 °C (99.4 °F), Min:36.7 °C (98 °F), Max:39.2 °C (102.5 °F)      Physical Exam    Gen: Aox3  HEENT: OP clear  Neck: Supple  LAD: No cervical LAD  Lungs: CTAB  CV: RRR no murmurs  Abd: Soft NTND +BS  Ext: No c/c/e  Skin: no rash  Neuro: II-XII intact        Lines, Drains, Airways, Wounds:  Peripheral IV (Adult) 22 Distal;Left;Posterior Forearm (Active)   Number of days: 1       Labs:    Lab Results   Component Value Date    WBC 12.60 2022    HGB 10.9 2022    HCT 31.4 2022    MCV 83.7 2022     2022     Lab Results   Component Value Date    GLUCOSE 108 (H) 2022    CALCIUM 8.6 (L) 2022     2022    K 3.2 (L) 2022    CO2 23 2022     2022    BUN 9 2022    CREATININE 0.6 2022     Lab Results   Component Value Date    ALT 23 2022    AST 46 (H) 2022    ALKPHOS 53 2022    BILITOT 1.1 2022         Patient Active Problem List   Diagnosis Code   • Seizure (CMS/[facility]) R56.9   • Drug overdose T50.901A   • Mood disorder (CMS/[facility]) F39   • Fever R50.9   • MSSA (methicillin susceptible Staphylococcus aureus) pneumonia (CMS/[facility]) J15.211       * MSSA (methicillin susceptible Staphylococcus aureus) pneumonia (CMS/[facility])  Assessment & Plan  continue with Keflex to complete a 7-day course, today's  day #4  I will sign off          Pedro Peng MD  3/28/167549:36 PM

## 2022-03-28 NOTE — PLAN OF CARE
Plan of Care Review  Plan of Care Reviewed With: patient  Progress: no change  Outcome Summary: PT AAO x 3. No complaints. VSS. Resting comfortable. Anxious to be transferred to psych. No beds at this time. Will CTM

## 2022-03-28 NOTE — PROGRESS NOTES
"Psychiatry Progress Note    Chief Complaint/Reason for follow-up: Intentional overdose    Interval History: Patient seen for follow up.  Pt received in bed, awake, alert, oriented to place, time and situation.  She shared that she took an intentional overdose of Vistaril, Benadryl, and Oxycontin in and attempt to die. She planned the overdose for days and feels ambivalent about being alive now. She noted that she has been seeing a therapist at [facility] and had achieved two months of recovery. She stated she continues to feel empty and sad.       Review of Systems:   sleep is poor, appetite is fair    Vital Signs for the last 24 hours:  Temp:  [36.8 °C (98.3 °F)-37.1 °C (98.8 °F)] 36.9 °C (98.4 °F)  Heart Rate:  [73-94] 94  Resp:  [16] 16  BP: (103-118)/(69-82) 111/69    Labs:  Results from last 7 days   Lab Units 03/26/22  1115 03/25/22  0840   HEMOGLOBIN g/dL 10.9 11.3   WBC K/uL 12.60 16.31   PLATELETS K/uL 267 254   POTASSIUM mEQ/L 3.2* 3.3*   SODIUM mEQ/L 137 134*   CREATININE mg/dL 0.6 0.7       Mental Status Evaluation:  Appearance:  appears current age, dressed in hospital attire, fair eye contact   Behavior:  cooperative   Speech:  clear, normal rate and tone   Mood:  \"pretty bad\"   Affect:  mood-congruent   Thought Process:  linear   Thought Content:  +SI (no current plan), denied HI/AVH   Sensorium:  person, place, time/date and situation   Cognition:  grossly intact   Insight:  poor   Judgment:  poor     Mood disorder (CMS/HCC)  Overview  Patient is a 22 y.o. adult who was admitted for altered mental status and possible seizure activity.   Psychiatry was consulted due to history of depression, suspected suicide attempt. Patient known to our service from previous admissions.  She has a past psychiatric history of anxiety, depression, eating disorder, cluster B traits, ETOH use disorder, cocaine use disorder, stimulant (Adderall) use diorder,  and multiple previous suicide attempts.  Patient " received lying in bed, she is intubated and sedated, unable to participate in psychiatric ROS.  UDS + for benzodiazepines, opiates, and methamphetamines.  Suspected intentional overdose      Assessment & Plan  Pt awake today, acknowledges intentional overdose. Patient understands plan for involuntary admission.    1) Disposition - acute inpatient psychiatric hospitalization once patient is medically stable. Our team petitioned a 302 for danger to self with act of furtherance.    2) Continue 1:1 until time of transfer  3) Patient cannot leave AMA  4) Lorazepam 0.5mg q8hr PO PRN for anxiety  5) Consider adding trazodone 25mg HS PRN for insomnia      I spent 28 minutes on this date of service performing the following activities: obtaining history, performing examination, documenting, preparing for visit, obtaining / reviewing records, providing counseling and education, independently reviewing study/studies, communicating results and coordinating care.

## 2022-03-28 NOTE — PLAN OF CARE
Plan of Care Review  Plan of Care Reviewed With: patient  Progress: no change  Outcome Summary: Pt awake and alert. Pt medically cleared to transport to psych unit, waiting for a bed. Pt anxious about inpatient psych placement. Pt OOB to shower. Continued 1:1 for suicide precautions. Will CTM.

## 2022-03-28 NOTE — PROGRESS NOTES
Hospital Medicine Service -  Daily Progress Note       SUBJECTIVE   Interval History: ZHEN, continue to be afebrile. Denies any acute concerns other than feeling bored. Trying to keep herself busy with magazines. Denies SOB, fevers/chills. Hoping to be at inpatient psych soon to adjust her psych meds.      OBJECTIVE      Vital signs in last 24 hours:  Temp:  [36.8 °C (98.3 °F)-37 °C (98.6 °F)] 37 °C (98.6 °F)  Heart Rate:  [] 100  Resp:  [16] 16  BP: ()/(61-82) 98/61    Intake/Output Summary (Last 24 hours) at 3/28/2022 1552  Last data filed at 3/28/2022 0930  Gross per 24 hour   Intake 60 ml   Output --   Net 60 ml       PHYSICAL EXAMINATION      Physical Exam  General: NAD, calm, sitting in bed comfortably  HEENT: atraumatic, normocephalic, mmm  Cardiovascular: RRR, no murmurs; S1/S2+  Pulmonary: CTAB; no rales, rhonchi or wheezing  Gi: Soft, nontender, nondistended, BS present  Ext: no significant peripheral edema  Neuro: Alert and oriented x3; no sensory or motor deficits  Psych: Calm, flat affect, cooperative, appropriate answers   LINES, CATHETERS, DRAINS, AIRWAYS, AND WOUNDS   Lines, Drains, and Airways:  Wounds (agree with documentation and present on admission):  Peripheral IV (Adult) 03/25/22 Distal;Left;Posterior Forearm (Active)   Number of days: 1         Comments:      LABS / IMAGING / TELE      Labs  Results from last 7 days   Lab Units 03/26/22  1115 03/25/22  0840 03/24/22  0549   SODIUM mEQ/L 137 134* 138   POTASSIUM mEQ/L 3.2* 3.3* 3.9   CHLORIDE mEQ/L 104 104 109   CO2 mEQ/L 23 18* 19*   BUN mg/dL 9 6* 7*   CREATININE mg/dL 0.6 0.7 0.6   GLUCOSE mg/dL 108* 100* 84     Results from last 7 days   Lab Units 03/21/22  2204   ALBUMIN g/dL 3.8   BILIRUBIN TOTAL mg/dL 1.1   BILIRUBIN DIRECT mg/dL 0.5*   ALK PHOS IU/L 53   ALT IU/L 23   AST IU/L 46*     Results from last 7 days   Lab Units 03/26/22  1115 03/25/22  0840 03/24/22  0549   WBC K/uL 12.60 16.31 13.24   HEMOGLOBIN g/dL 10.9  11.3 10.4   PLATELETS K/uL 267 254 190     Results from last 7 days   Lab Units 03/21/22 2205   PTT sec 31   INR  1.1     Microbiology Results     Procedure Component Value Units Date/Time    Sputum culture / smear Expectorated Sputum [[id number]]  (Abnormal) Collected: 03/22/22 2224    Specimen: Expectorated Sputum Updated: 03/24/22 1450    Narrative:      The following orders were created for panel order Sputum culture / smear Expectorated Sputum.  Procedure                               Abnormality         Status                     ---------                               -----------         ------                     Sputum Gram Stain (Lab O...[[id number]]                      Final result               Sputum Culture (Lab Only...[[id number]]  Abnormal            Final result                 Please view results for these tests on the individual orders.    Sputum Gram Stain (Lab Only) Expectorated Sputum [[id number]] Collected: 03/22/22 2224    Specimen: Expectorated Sputum Updated: 03/23/22 0039     Gram Stain Result 4+ WBC      No Epithelial Cells Seen      3+ Gram positive cocci in clusters      1+ Gram positive cocci in chains    Sputum Culture (Lab Only) Expectorated Sputum [[id number]]  (Abnormal)  (Susceptibility) Collected: 03/22/22 2224    Specimen: Expectorated Sputum Updated: 03/24/22 1450     Culture **Positive Culture**      4+ Staphylococcus aureus      Normal Ruby Also Recovered    MRSA Screen, Nares Only Nose [[id number]]  (Normal) Collected: 03/22/22 0022    Specimen: Nasal Swab from Nose Updated: 03/22/22 0513     MRSA DNA, Nares Negative    Blood Culture Blood, Venous [[id number]]  (Normal) Collected: 03/21/22 2329    Specimen: Blood, Venous Updated: 03/27/22 0502     Culture No growth at 120 hours    Blood Culture Blood, Venous [[id number]]  (Normal) Collected: 03/21/22 2329    Specimen: Blood, Venous Updated: 03/27/22 0502     Culture No growth at 120 hours    SARS-CoV-2 (COVID-19), PCR Nasopharynx  [[id number]]  (Normal) Collected: 03/21/22 2329    Specimen: Nasopharyngeal Swab from Nasopharynx Updated: 03/22/22 0015    Narrative:      The following orders were created for panel order SARS-CoV-2 (COVID-19), PCR Nasopharynx.  Procedure                               Abnormality         Status                     ---------                               -----------         ------                     SARS-CoV-2 (COVID-19), P...[[id number]]  Normal              Final result                 Please view results for these tests on the individual orders.    SARS-CoV-2 (COVID-19), PCR Nasopharynx [[id number]]  (Normal) Collected: 03/21/22 2329    Specimen: Nasopharyngeal Swab from Nasopharynx Updated: 03/22/22 0015     SARS-CoV-2 (COVID-19) Negative         Imaging  I have independently reviewed the patient's pertinent imaging for this hospital visit. Pertinent Imaging findings are within normal limits.    ECG/Telemetry  Patient is not on telemetry.    ASSESSMENT AND PLAN      Mood disorder (CMS/[facility])  Overview  Patient is a 22 y.o. adult who was admitted for altered mental status and possible seizure activity.   Psychiatry was consulted due to history of depression, suspected suicide attempt. Patient known to our service from previous admissions.  She has a past psychiatric history of anxiety, depression, eating disorder, cluster B traits, ETOH use disorder, cocaine use disorder, stimulant (Adderall) use diorder,  and multiple previous suicide attempts.  Patient received lying in bed, she is intubated and sedated, unable to participate in psychiatric ROS.  UDS + for benzodiazepines, opiates, and methamphetamines.  Suspected intentional overdose      Assessment & Plan  H/o mood disorder with multiple suicide attempts in the past.   -Psych and behavioral health crisis consulted  -1:1 for safety  -Cannot leave AMA    Drug overdose  Assessment & Plan  Concern for drug OD given previous history  EMS found bottle containing  hydroxyzine, effexor, benadryl  -Poison control was contacted and recommended supportive measures  -Follow EKG for changes to include: if QRS >100 give bicarb, if QTc >500 give IV mag  -Okay for Ativan PRN for seizures/agitation  -Behavioral health crisis and Psych consult, appreciate recs  -C/w 1:1  -Inpatient psych on discharge - awaits bed availability     Seizure (CMS/[facility])  Assessment & Plan  Reported seizure activity and was unresponsive to Ativan. She was subsequently intubated for airway protection  There was concern for drug OD   -CT head neg for any acute abnormalities  -MRI brain neg  -s/p continuous EEG - w/o seizures  -C/w Keppra 500mg BID per Neuro  -Ativan PRN for seizure activity  -No evidence of seizures during hospitalization, stable for discharge to inpatient psych    * MSSA (methicillin susceptible Staphylococcus aureus) pneumonia (CMS/[facility])  Assessment & Plan  Saturating well on room air  Trend fever curve  Leukocytosis resolved  BCx NGTD, sputum culture positive for MSSA  CXR with patchy bilateral infiltrates suspicious for multifocal pneumonia.  Off Cefepime; C/w Keflex (day 4/7)  Patient has remained afebrile for over the last 48 hours, stable for discharge to inpatient psych       VTE Assessment: Padua    VTE Prophylaxis:  Current anticoagulants:  enoxaparin (LOVENOX) syringe 40 mg, subcutaneous, Daily (6p)      Code Status: Full Code      Estimated Discharge Date: 3/28/22   Disposition Planning: Plan for discharge to inpatient psychiatry when bed available     Ruben Cortes MD  3/28/2022

## 2022-03-28 NOTE — PLAN OF CARE
Pt agitated & restless at beginning of shift. Calmed down & slept most of shift.. Gait steady. Cooperative. 1:1 coverage maintained. No verbalization of suicidal thoughts.

## 2022-03-28 NOTE — BEHAVIORAL HEALTH CRISIS PROGRESS NOTE
Behavioral Health Crisis Progress Note    Reason for Follow Up Placement    Presenting Problem  Status epilepticus (CMS/HCC) [G40.901]  Altered mental status, unspecified altered mental status type [R41.82]     Interval History  09:00:  Pt medically cleared for transfer to inpatient psych.      Bed search completed:    Facility Phone Fax Spoke With Disposition   [facility] [phone] Not accepting outside referrals   [facility] [phone] [facility] No beds   [facility] [phone] [facility]  No beds   [facility] [phone] [facility] No beds   [facility] [phone] An[facility] Willing to review   [place] [phone] [facility] No beds   [facility] [phone] [facility] No beds   [facility] [phone]  No beds   [facility] [phone] [facility] acuity only     Clinical sent to [facility].       Spoke with pt at bedside, provided update.  Explained that pt would remain on the medical floor until a psychiatric bed was identified.  Discussed limited bed availability today.  Pt verbalized understanding.    Mother updated by phone.      Review of Systems  Current Medications:  •  acetaminophen, 650 mg, oral, q4h PRN  •  cephalexin, 500 mg, oral, QID  •  glucose, 16-32 g of dextrose, oral, PRN **OR** dextrose, 15-30 g of dextrose, oral, PRN **OR** glucagon, 1 mg, intramuscular, PRN **OR** dextrose in water, 25 mL, intravenous, PRN  •  enoxaparin, 40 mg, subcutaneous, Daily (6p)  •  levETIRAcetam, 500 mg, oral, BID  •  LORazepam, 0.5 mg, intravenous, q4h PRN  •  magnesium sulfate, 2 g, intravenous, PRN  •  melatonin, 5 mg, oral, Nightly PRN  •  nicotine, 1 patch, transdermal, Daily  •  potassium bicarbonate, 20 mEq, oral, PRN  •  potassium bicarbonate, 40 mEq, oral, PRN  •  potassium chloride, 20 mEq, oral, PRN  •  potassium chloride, 40 mEq, oral, PRN    Home Medications      Objective   Vital Signs for  the last 24 hours:  Temp:  [36.8 °C (98.3 °F)-37.1 °C (98.8 °F)] 36.9 °C (98.4 °F)  Heart Rate:  [73-94] 94  Resp:  [16] 16  BP: (103-118)/(69-82) 111/69    Impression  Overdose   Intervention/Plan  302 on chart. No beds identified today.  Will continue bed search for placement.   Follow Up Needed:  yes

## 2022-03-28 NOTE — PATIENT CARE CONFERENCE
Care Progression Rounds Note  Date: 3/28/2022  Time: 2:42 PM     Patient Name: Gemma Darnell     Medical Record Number: [id number]   YOB: 2000  Sex: Adult      Room/Bed: 0365    Admitting Diagnosis: Status epilepticus (CMS/[facility]) [G40.901]  Altered mental status, unspecified altered mental status type [R41.82]   Admit Date/Time: 3/21/2022  9:45 PM    Primary Diagnosis: MSSA (methicillin susceptible Staphylococcus aureus) pneumonia (CMS/[facility])  Principal Problem: MSSA (methicillin susceptible Staphylococcus aureus) pneumonia (CMS/[facility])    GMLOS: 4.1  Anticipated Discharge Date: 3/28/2022    AM-PAC:  Mobility Score:      Discharge Planning:  Concerns to be Addressed: discharge planning, substance/tobacco abuse/use, mental health, coping/stress  Anticipated Discharge Disposition: psychiatric facility    Barriers to Discharge:  Facility availability    Comments:  Pt medically cleared to transport to psych unit, waiting for a bed.[facility] following.    Participants:  nursing, social work/services

## 2022-03-29 ENCOUNTER — HOSPITAL ENCOUNTER (INPATIENT)
Facility: HOSPITAL | Age: 22
LOS: 8 days | Discharge: NURSING, REHAB OR LONG-TERM CARE | DRG: 885 | End: 2022-04-06
Attending: PSYCHIATRY & NEUROLOGY | Admitting: PSYCHIATRY & NEUROLOGY
Payer: COMMERCIAL

## 2022-03-29 VITALS
OXYGEN SATURATION: 99 % | SYSTOLIC BLOOD PRESSURE: 115 MMHG | WEIGHT: 131.39 LBS | RESPIRATION RATE: 16 BRPM | DIASTOLIC BLOOD PRESSURE: 78 MMHG | TEMPERATURE: 99.1 F | BODY MASS INDEX: 22.43 KG/M2 | HEART RATE: 98 BPM | HEIGHT: 64 IN

## 2022-03-29 LAB
POTASSIUM SERPL-SCNC: 4.1 MEQ/L (ref 3.6–5.1)
SARS-COV-2 RNA RESP QL NAA+PROBE: NEGATIVE

## 2022-03-29 PROCEDURE — U0002 COVID-19 LAB TEST NON-CDC: HCPCS | Performed by: STUDENT IN AN ORGANIZED HEALTH CARE EDUCATION/TRAINING PROGRAM

## 2022-03-29 PROCEDURE — 99239 HOSP IP/OBS DSCHRG MGMT >30: CPT | Performed by: STUDENT IN AN ORGANIZED HEALTH CARE EDUCATION/TRAINING PROGRAM

## 2022-03-29 PROCEDURE — 12400000 HC ROOM AND CARE SEMIPRIVATE PSYCH

## 2022-03-29 PROCEDURE — 63700000 HC SELF-ADMINISTRABLE DRUG: Performed by: PSYCHIATRY & NEUROLOGY

## 2022-03-29 PROCEDURE — 99231 SBSQ HOSP IP/OBS SF/LOW 25: CPT | Performed by: NURSE PRACTITIONER

## 2022-03-29 PROCEDURE — 63700000 HC SELF-ADMINISTRABLE DRUG: Performed by: INTERNAL MEDICINE

## 2022-03-29 PROCEDURE — 63700000 HC SELF-ADMINISTRABLE DRUG: Performed by: PHYSICIAN ASSISTANT

## 2022-03-29 RX ORDER — DOCUSATE SODIUM 100 MG/1
100 CAPSULE, LIQUID FILLED ORAL 2 TIMES DAILY PRN
Status: DISCONTINUED | OUTPATIENT
Start: 2022-03-29 | End: 2022-04-06 | Stop reason: HOSPADM

## 2022-03-29 RX ORDER — SENNOSIDES 8.6 MG/1
1 TABLET ORAL 2 TIMES DAILY PRN
Status: DISCONTINUED | OUTPATIENT
Start: 2022-03-29 | End: 2022-04-06 | Stop reason: HOSPADM

## 2022-03-29 RX ORDER — LEVETIRACETAM 500 MG/1
500 TABLET ORAL 2 TIMES DAILY
Qty: 60 TABLET | Refills: 0
Start: 2022-03-29 | End: 2022-04-06 | Stop reason: HOSPADM

## 2022-03-29 RX ORDER — MELATONIN 5 MG
5 CAPSULE ORAL NIGHTLY PRN
Refills: 0
Start: 2022-03-29 | End: 2023-05-18

## 2022-03-29 RX ORDER — CEPHALEXIN 500 MG/1
500 CAPSULE ORAL 4 TIMES DAILY
Refills: 0
Start: 2022-03-29 | End: 2022-04-06 | Stop reason: HOSPADM

## 2022-03-29 RX ORDER — CEPHALEXIN 500 MG/1
500 CAPSULE ORAL 4 TIMES DAILY
Status: DISPENSED | OUTPATIENT
Start: 2022-03-29 | End: 2022-04-01

## 2022-03-29 RX ORDER — DIPHENHYDRAMINE HCL 50 MG/ML
50 VIAL (ML) INJECTION EVERY 4 HOURS PRN
Status: DISCONTINUED | OUTPATIENT
Start: 2022-03-29 | End: 2022-04-06 | Stop reason: HOSPADM

## 2022-03-29 RX ORDER — IBUPROFEN 200 MG
1 TABLET ORAL DAILY
Qty: 30 PATCH | Refills: 0 | Status: ON HOLD
Start: 2022-03-29 | End: 2023-07-14 | Stop reason: ENTERED-IN-ERROR

## 2022-03-29 RX ORDER — IBUPROFEN 200 MG
1 TABLET ORAL DAILY
Status: DISCONTINUED | OUTPATIENT
Start: 2022-03-30 | End: 2022-04-06 | Stop reason: HOSPADM

## 2022-03-29 RX ORDER — MICONAZOLE NITRATE 2 %
2 CREAM (GRAM) TOPICAL EVERY 2 HOUR PRN
Status: DISCONTINUED | OUTPATIENT
Start: 2022-03-29 | End: 2022-04-06 | Stop reason: HOSPADM

## 2022-03-29 RX ORDER — ACETAMINOPHEN 325 MG/1
650 TABLET ORAL EVERY 4 HOURS PRN
Start: 2022-03-29 | End: 2022-04-06 | Stop reason: HOSPADM

## 2022-03-29 RX ORDER — DOCUSATE SODIUM 100 MG/1
100 CAPSULE, LIQUID FILLED ORAL 2 TIMES DAILY
Status: DISCONTINUED | OUTPATIENT
Start: 2022-03-29 | End: 2022-03-29

## 2022-03-29 RX ORDER — LEVETIRACETAM 500 MG/1
500 TABLET ORAL 2 TIMES DAILY
Status: DISCONTINUED | OUTPATIENT
Start: 2022-03-29 | End: 2022-04-04

## 2022-03-29 RX ORDER — IBUPROFEN/PSEUDOEPHEDRINE HCL 200MG-30MG
3 TABLET ORAL NIGHTLY
Status: DISCONTINUED | OUTPATIENT
Start: 2022-03-29 | End: 2022-04-06 | Stop reason: HOSPADM

## 2022-03-29 RX ORDER — LORAZEPAM 0.5 MG/1
0.5 TABLET ORAL EVERY 6 HOURS PRN
Status: DISCONTINUED | OUTPATIENT
Start: 2022-03-29 | End: 2022-03-31

## 2022-03-29 RX ORDER — LORAZEPAM 0.5 MG/1
0.5 TABLET ORAL EVERY 4 HOURS PRN
COMMUNITY
End: 2022-04-06 | Stop reason: HOSPADM

## 2022-03-29 RX ORDER — OLANZAPINE 5 MG/1
5 TABLET, ORALLY DISINTEGRATING ORAL EVERY 6 HOURS PRN
Status: DISCONTINUED | OUTPATIENT
Start: 2022-03-29 | End: 2022-03-31

## 2022-03-29 RX ORDER — ACETAMINOPHEN 325 MG/1
650 TABLET ORAL EVERY 4 HOURS PRN
Status: DISCONTINUED | OUTPATIENT
Start: 2022-03-29 | End: 2022-04-06 | Stop reason: HOSPADM

## 2022-03-29 RX ADMIN — Medication 3 MG: at 21:15

## 2022-03-29 RX ADMIN — CEPHALEXIN 500 MG: 500 CAPSULE ORAL at 09:14

## 2022-03-29 RX ADMIN — LEVETIRACETAM 500 MG: 500 TABLET, FILM COATED ORAL at 09:14

## 2022-03-29 RX ADMIN — LORAZEPAM 0.5 MG: 0.5 TABLET ORAL at 21:15

## 2022-03-29 RX ADMIN — CEPHALEXIN 500 MG: 500 CAPSULE ORAL at 21:15

## 2022-03-29 RX ADMIN — CEPHALEXIN 500 MG: 500 CAPSULE ORAL at 13:16

## 2022-03-29 RX ADMIN — LEVETIRACETAM 500 MG: 500 TABLET, FILM COATED ORAL at 21:15

## 2022-03-29 ASSESSMENT — PATIENT HEALTH QUESTIONNAIRE - PHQ9: SUM OF ALL RESPONSES TO PHQ9 QUESTIONS 1 & 2: 6

## 2022-03-29 ASSESSMENT — COGNITIVE AND FUNCTIONAL STATUS - GENERAL: DO YOU HAVE SERIOUS DIFFICULTY WALKING OR CLIMBING STAIRS: NO

## 2022-03-29 NOTE — DISCHARGE INSTRUCTIONS
Hospital Course  Ms. Danrell was admitted to [facility] from 3/21-3/29 for evaluation and management of her seizure, which was thought to be secondary to drug overdose as EMS reported they found a bottle containing hydroxyzine, Effexor, and Benadryl.  Per neurology recommendations patient was continued on Keppra 500 mg twice daily.  CT head and MRI brain were negative for acute findings.  Continuous EEG was done which did not show evidence of seizure activity.  Poison control was also contacted and recommended supportive measures. Patient was seen by the psychiatry team and behavioral health crisis team who recommended one-to-one sitter and inpatient psych treatment at discharge.  Of note, patient was also diagnosed with MSSA pneumonia in the setting of fever, leukocytosis, and positive sputum culture chest x-ray showing patchy bilateral infiltrates suspicious for multifocal pneumonia likely 2/2 aspiration iso seizure at presentation.  Patient was started on cefepime, which was deescalated to Keflex to complete a total 7-day course per infectious diseases. She was successfully extubated on 3/23/22. She has remained afebrile and hemodynamically stable over the last 48-72 hours. Leukocytosis resolved and remains on room air. She is medically ready for discharge to inpatient psych.    Issues to be addressed post-discharge:  Please call [phone] to schedule an appointment with Neurology (Dr. Moctezuma) within 1-2 week of discharge from inpatient psych rehab.

## 2022-03-29 NOTE — PATIENT CARE CONFERENCE
Care Progression Rounds Note  Date: 3/29/2022  Time: 11:42 AM     Patient Name: Gemma Darnell     Medical Record Number: [id number]   YOB: 2000  Sex: Adult      Room/Bed: 0365    Admitting Diagnosis: Status epilepticus (CMS/[facility]) [G40.901]  Altered mental status, unspecified altered mental status type [R41.82]   Admit Date/Time: 3/21/2022  9:45 PM    Primary Diagnosis: MSSA (methicillin susceptible Staphylococcus aureus) pneumonia (CMS/[facility])  Principal Problem: MSSA (methicillin susceptible Staphylococcus aureus) pneumonia (CMS/[facility])    GMLOS: 4.1  Anticipated Discharge Date: 3/29/2022    AM-PAC:  Mobility Score:      Discharge Planning:  Concerns to be Addressed: discharge planning, substance/tobacco abuse/use, mental health, coping/stress  Anticipated Discharge Disposition: psychiatric facility    Barriers to Discharge:  Facility availability    Comments:  [facility]  Accepting facility with an open bed pending.  [facility] following.    Participants:  social work/services, nursing

## 2022-03-29 NOTE — PROGRESS NOTES
"Psychiatry Progress Note    Chief Complaint/Reason for follow-up: Intentional overdose    Interval History: Patient seen for follow up.  Pt received in bed, awake, alert, oriented x3.  She endorses frustration about continued boarding on medical floor and is hopeful she can transfer to psychiatric facility today.  She declined to speak further and requested I leave so she could use the phone.      Vital Signs for the last 24 hours:  Temp:  [36.3 °C (97.4 °F)-37.6 °C (99.6 °F)] 37.6 °C (99.6 °F)  Heart Rate:  [] 89  Resp:  [16-18] 18  BP: ()/(57-71) 101/57    Labs:  Results from last 7 days   Lab Units 03/26/22  1115 03/25/22  0840   HEMOGLOBIN g/dL 10.9 11.3   WBC K/uL 12.60 16.31   PLATELETS K/uL 267 254   POTASSIUM mEQ/L 3.2* 3.3*   SODIUM mEQ/L 137 134*   CREATININE mg/dL 0.6 0.7       Mental Status Evaluation:  Pt appears current age, dressed in hospital attire, fair eye contact.  Clear speech, normal rate and tone.  Mood is \"frustrated.\"  Affect irritable.  AOx3.  TP coherent.  Pt endorses passive wish to be dead.  Cognition grossly intact.  I/J poor    Mood disorder (CMS/HCC)  Overview  Patient is a 22 y.o. adult who was admitted for altered mental status and possible seizure activity.   Psychiatry was consulted due to history of depression, suspected suicide attempt. Patient known to our service from previous admissions.  She has a past psychiatric history of anxiety, depression, eating disorder, cluster B traits, ETOH use disorder, cocaine use disorder, stimulant (Adderall) use diorder,  and multiple previous suicide attempts.  Patient received lying in bed, she is intubated and sedated, unable to participate in psychiatric ROS.  UDS + for benzodiazepines, opiates, and methamphetamines.  Suspected intentional overdose      Assessment & Plan  Pt more irritable today, acknowledges intentional overdose. Patient understands plan for involuntary admission.    1) Disposition - acute inpatient " psychiatric hospitalization once patient is medically stable. Our team petitioned a 302 for danger to self with act of furtherance.    2) Continue 1:1 until time of transfer  3) Patient cannot leave AMA  4) Lorazepam 0.5mg q8hr PO PRN for anxiety  5) Trazodone 25mg HS PRN for insomnia      15 minutes were spent in direct face-to-face counseling/coordination of care. >50% of total minutes were spent reviewing medical record, in counseling and/or coordination of care

## 2022-03-29 NOTE — PLAN OF CARE
Problem: Adult Inpatient Plan of Care  Goal: Plan of Care Review  Outcome: Progressing  Flowsheets  Taken 3/29/2022 1353 by Iain Barboza, RN  Progress: improving  Plan of Care Reviewed With: patient  Taken 3/29/2022 0322 by Catherine Rivera, RN  Outcome Summary: Pt had no complaints of pain or discomfort overnight. Alert and oriented. 1:1 remains at bedside. Waiting for bed to open up in psych. Receiving oral antibiotics. Nursing will continue to monitor.  Goal: Patient-Specific Goal (Individualized)  Outcome: Progressing  Flowsheets (Taken 3/29/2022 1353)  Patient-Specific Goals (Include Timeframe): None  Individualized Care Needs: Pt wants IV removed  Anxieties, Fears or Concerns: None  Goal: Absence of Hospital-Acquired Illness or Injury  Outcome: Met  Goal: Optimal Comfort and Wellbeing  Outcome: Progressing  Goal: Readiness for Transition of Care  Outcome: Progressing     Problem: Skin Injury Risk Increased  Goal: Skin Health and Integrity  Outcome: Met     Problem: Fall Injury Risk  Goal: Absence of Fall and Fall-Related Injury  Outcome: Met     Problem: Seizure, Active Management  Goal: Absence of Seizure/Seizure-Related Injury  Outcome: Met   Plan of Care Review  Plan of Care Reviewed With: patient  Progress: improving     Pt will be transferred to inpatient psych.

## 2022-03-29 NOTE — PLAN OF CARE
"  Problem: Adult Behavioral Health Plan of Care  Goal: Plan of Care Review  Outcome: Progressing  Flowsheets (Taken 3/29/2022 1845)  Plan of Care Reviewed With: patient  Patient Agreement with Plan of Care: agrees  Outcome Summary: Gemma is a 23 y/o female admitted on a 302, transferred from the medical unit s/p polysubstance OD, seizure activity, and intubation. Pt's stated reason for admission: \"I tried to kill myself.\" Reports OD after taking Effexor, hydroxyzine, Oxycontin, and Benadryl. Pt confirms a total of 3 suicide attempts: 2 via OD, 1 via stabbing self. Pt denies current SI in the hospital and endorses telling staff if these thoughts should arise. Pt also denies AVH.     AAOx3. Appears unkempt and aloof with restricted affect and guarded behavior. Provides little information regarding events leading up to OD, other than \"I'm not usually happy, but not suicidal.\" Ambulates without difficulty; steady gait. Cooperative with admission process. Telepsych for admission orders. Will continue to monitor on q15' checks.    Intervention(s):   assessed for SI   oriented to unit      Problem: Suicidal Behavior  Goal: Suicidal Behavior is Absent or Managed  Outcome: Progressing  Flowsheets (Taken 3/29/2022 1845)  [place] Goal: identifies crisis plan  Individual Goal: pt will alert staff of suicidal thoughts  Short Term Goal Established: 03/29/22  Goal Outcome: progressing       Problem: Decreased Participation and Engagement (Excessive Substance Use)  Goal: Increased Participation and Engagement (Excessive Substance Use)  Outcome: Progressing  Flowsheets (Taken 3/29/2022 1850)  Short Term Goal Established: 03/29/22  Goal Outcome: progressing  Individual Goal: pt agrees to attend groups  [place] Goal: establishes future-oriented goal     Note written by Brittany Pinon RN; orienting with DANGELO Newell    "

## 2022-03-29 NOTE — BEHAVIORAL HEALTH CRISIS PROGRESS NOTE
Behavioral Health Crisis Progress Note    Reason for Follow Up 302 Placement  Presenting Problem  Status epilepticus (CMS/HCC) [G40.901]  Altered mental status, unspecified altered mental status type [R41.82]     Interval History  Spoke with Lincoln at [facility], informed that pt will need a bed at [facility] when available.     Spoke with Dr. Avendaño, pt accepted to [facility] when bed is available.     Call placed to [facility] ([phone]), spoke with Mavis who authorized 7 days IP Psych, 03/29/2022-04/04/2022, auth # [id number].    Spoke with Cindi at [facility], provided update.    Spoke with pt at bedside, provided update, pt agreeable to plan.  She will update her mother when she arrives.     Completed 302 sent to [facility] and [facility].    Spoke with CHARLES Aaron on [facility], handoff given.     13:00 ADD: Spoke with Agnieszka on Psych Unit, requested that pt's family be updated on visitation.  Spoke with pt at bedside, provided update.  Pt irritable that she cannot have visitors.  She will notify her family. Agnieszka updated.        Review of Systems  Current Medications:  •  acetaminophen, 650 mg, oral, q4h PRN  •  cephalexin, 500 mg, oral, QID  •  glucose, 16-32 g of dextrose, oral, PRN **OR** dextrose, 15-30 g of dextrose, oral, PRN **OR** glucagon, 1 mg, intramuscular, PRN **OR** dextrose in water, 25 mL, intravenous, PRN  •  enoxaparin, 40 mg, subcutaneous, Daily (6p)  •  levETIRAcetam, 500 mg, oral, BID  •  LORazepam, 0.5 mg, intravenous, q4h PRN  •  magnesium sulfate, 2 g, intravenous, PRN  •  melatonin, 5 mg, oral, Nightly PRN  •  nicotine, 1 patch, transdermal, Daily  •  potassium bicarbonate, 20 mEq, oral, PRN  •  potassium bicarbonate, 40 mEq, oral, PRN  •  potassium chloride, 20 mEq, oral, PRN  •  potassium chloride, 40 mEq, oral, PRN    Home Medications      Objective   Vital Signs for the last 24 hours:  Temp:  [36.3 °C (97.4 °F)-37.6 °C (99.6 °F)] 37.6 °C (99.6  °F)  Heart Rate:  [] 89  Resp:  [16-18] 18  BP: ()/(57-71) 101/57      Impression  Overdose  Intervention/Plan  IP Psych, 302 [facility] Unit  Follow Up Needed:  no

## 2022-03-29 NOTE — PLAN OF CARE
Problem: Adult Inpatient Plan of Care  Goal: Plan of Care Review  3/29/2022 1500 by Iain Barboza RN  Outcome: Met  3/29/2022 1353 by Iian Barboza RN  Outcome: Progressing  Flowsheets  Taken 3/29/2022 1353 by Iain Barboza RN  Progress: improving  Plan of Care Reviewed With: patient  Taken 3/29/2022 0322 by Catherine Rivera RN  Outcome Summary: Pt had no complaints of pain or discomfort overnight. Alert and oriented. 1:1 remains at bedside. Waiting for bed to open up in psych. Receiving oral antibiotics. Nursing will continue to monitor.  Goal: Patient-Specific Goal (Individualized)  3/29/2022 1500 by Iain Barboza RN  Outcome: Met  3/29/2022 1353 by Iain Barboza RN  Outcome: Progressing  Flowsheets (Taken 3/29/2022 1353)  Patient-Specific Goals (Include Timeframe): None  Individualized Care Needs: Pt wants IV removed  Anxieties, Fears or Concerns: None  Goal: Absence of Hospital-Acquired Illness or Injury  3/29/2022 1500 by Iain Barboza RN  Outcome: Met  3/29/2022 1353 by Iain Barboza RN  Outcome: Met  Goal: Optimal Comfort and Wellbeing  3/29/2022 1500 by Iain Barboza RN  Outcome: Met  3/29/2022 1353 by Iain Barboza RN  Outcome: Progressing  Goal: Readiness for Transition of Care  3/29/2022 1500 by Iain Barboza RN  Outcome: Met  3/29/2022 1353 by Iain Barboza RN  Outcome: Progressing     Problem: Skin Injury Risk Increased  Goal: Skin Health and Integrity  3/29/2022 1500 by Iain Barboza RN  Outcome: Met  3/29/2022 1353 by Iain Barboza RN  Outcome: Met     Problem: Fall Injury Risk  Goal: Absence of Fall and Fall-Related Injury  3/29/2022 1500 by Iain Barboza RN  Outcome: Met  3/29/2022 1353 by Iain Barboza RN  Outcome: Met     Problem: Seizure, Active Management  Goal: Absence of Seizure/Seizure-Related Injury  3/29/2022 1500 by Iain Barboza RN  Outcome: Met  3/29/2022 1353 by Iain Barboza, RN  Outcome: Met   Plan of Care Review  Plan of Care Reviewed With: patient  Progress: improving    Pt to  be transferred to inpatient psych.

## 2022-03-29 NOTE — DISCHARGE SUMMARY
Hospital Medicine Service -  Inpatient Discharge Summary        BRIEF OVERVIEW   Admitting Provider: Dinora Galeas MD  Attending Provider: Ruben Cortes MD Attending phys phone: [phone]    PCP: Arlette Tolentino [phone]    Admission Date: 3/21/2022  Discharge Date: 3/29/2022     DISCHARGE DIAGNOSES      Primary Discharge Diagnosis  MSSA (methicillin susceptible Staphylococcus aureus) pneumonia (CMS/HCC)    Secondary Discharge Diagnoses  Active Hospital Problems    Diagnosis Date Noted   • MSSA (methicillin susceptible Staphylococcus aureus) pneumonia (CMS/[facility]) 03/25/2022   • Fever 03/23/2022   • Drug overdose 03/22/2022   • Mood disorder (CMS/[facility]) 03/22/2022   • Seizure (CMS/HCC) 03/21/2022      Resolved Hospital Problems    Diagnosis Date Noted Date Resolved   • On mechanically assisted ventilation (CMS/[facility]) 03/21/2022 03/24/2022   • Lactic acidosis 03/21/2022 03/24/2022       Problem List on Day of Discharge  Mood disorder (CMS/[facility])  Overview  Patient is a 22 y.o. adult who was admitted for altered mental status and possible seizure activity.   Psychiatry was consulted due to history of depression, suspected suicide attempt. Patient known to our service from previous admissions.  She has a past psychiatric history of anxiety, depression, eating disorder, cluster B traits, ETOH use disorder, cocaine use disorder, stimulant (Adderall) use diorder,  and multiple previous suicide attempts.  Patient received lying in bed, she is intubated and sedated, unable to participate in psychiatric ROS.  UDS + for benzodiazepines, opiates, and methamphetamines.  Suspected intentional overdose      Assessment & Plan  H/o mood disorder with multiple suicide attempts in the past.   -Psych and behavioral health crisis consulted  -1:1 for safety  -Cannot leave AMA    Drug overdose  Assessment & Plan  Concern for drug OD given previous history  EMS found bottle containing hydroxyzine, effexor, benadryl  -Poison control  "was contacted and recommended supportive measures  -Follow EKG for changes to include: if QRS >100 give bicarb, if QTc >500 give IV mag  -Okay for Ativan PRN for seizures/agitation  -Behavioral health crisis and Psych consult, appreciate recs  -C/w 1:1  -Inpatient psych on discharge - awaits bed availability     Seizure (CMS/HCC)  Assessment & Plan  Reported seizure activity and was unresponsive to Ativan. She was subsequently intubated for airway protection  There was concern for drug OD   -CT head neg for any acute abnormalities  -MRI brain neg  -s/p continuous EEG - w/o seizures  -C/w Keppra 500mg BID per Neuro  -Ativan PRN for seizure activity  -No evidence of seizures during hospitalization, stable for discharge to inpatient psych    * MSSA (methicillin susceptible Staphylococcus aureus) pneumonia (CMS/[facility])  Assessment & Plan  Saturating well on room air  Trend fever curve  Leukocytosis resolved  BCx NGTD, sputum culture positive for MSSA  CXR with patchy bilateral infiltrates suspicious for multifocal pneumonia.  Off Cefepime; C/w Keflex (day 4/7)  Patient has remained afebrile for over the last 48 hours, stable for discharge to inpatient psych      SUMMARY OF HOSPITALIZATION      Presenting Problem/History of Present Illness  Status epilepticus (CMS/[facility]) [G40.901]  Altered mental status, unspecified altered mental status type [R41.82]    This is a 22 y.o. year-old adult admitted on 3/21/2022 with Status epilepticus (CMS/[facility]) [G40.901]  Altered mental status, unspecified altered mental status type [R41.82].    Per admission H&P:  \"Pt presented with altered mental status, seizure  This occurred prior to arrival to ED.  Pt was last known baseline at 10 am, when mother talked to her on phone.  Patient did not have any concerns or complaints, and mother reported conversation was unremarkable.  Subsequently throughout the day, the mother was unable to reach or make contact with pt.  The father came by later and " "found the patient unresponsive, with seizure activity.  He called EMS.  EMS found pill bottle in room, which contained some pills.     In ED, on arrival,  ED staff reported that the patient is tachycardic.  She is extremely altered.  Has psychomotor agitation.  She does have shaking activity but also nystagmus.  Patient was given 2 mg of IV Ativan x2 without really any significant change to her vital signs nor her mental status.  The shaking episodes continued.  EKG check showing sinus tachycardia with a heart rate of 140 bpm.      In ED, . Received ativan 2 mg x 2.  Subsequently was intubated 2nd to ongoing seizure  Currently sedated with profofol gtt  Other treatment in ED: ns 1000 cc      Currently intubated, sedated. I talked to both father and mother on speaker phone ( mother Is out of town on vacation ),who reported that patient has history of psychiatric conditions, prior suicide attempts include drug overdoses and self harm with knife, also history of eating disorders.\"        Hospital Course  Patient was admitted for further evaluation and management of her seizure, which was thought to be secondary to drug overdose as EMS reported they found a bottle containing hydroxyzine, Effexor, and Benadryl.  Per neurology recommendations patient was continued on Keppra 500 mg twice daily.  CT head and MRI brain were negative for acute findings.  Continuous EEG was done which did not show evidence of seizure activity.  Poison control was also contacted and recommended supportive measures. Patient was seen by the psychiatry team and behavioral health crisis team who recommended one-to-one sitter and inpatient psych treatment at discharge.  Of note, patient was also diagnosed with MSSA pneumonia in the setting of fever, leukocytosis, and positive sputum culture chest x-ray showing patchy bilateral infiltrates suspicious for multifocal pneumonia likely 2/2 aspiration iso seizure at presentation.  Patient was started on " cefepime, which was deescalated to Keflex to complete a total 7-day course per infectious diseases. She was successfully extubated on 3/23/22. She has remained afebrile and hemodynamically stable over the last 48-72 hours. Leukocytosis resolved and remains on room air. She is medically ready for discharge to inpatient psych.    Exam on Day of Discharge  Physical Exam   General: NAD, calm, sitting in bed comfortably  HEENT: atraumatic, normocephalic, mmm  Cardiovascular: RRR, no murmurs; S1/S2+  Pulmonary: CTAB; no rales, rhonchi or wheezing  Gi: Soft, nontender, nondistended, BS present  Ext: no significant peripheral edema  Neuro: Alert and oriented x3; no sensory or motor deficits  Psych: Calm, flat affect, cooperative, appropriate answers    Consults During Admission  IP CONSULT TO NEUROLOGY  IP CONSULT TO NUTRITION SERVICES  IP CONSULT TO PSYCHIATRY  IP CONSULT TO BEHAVIORAL HEALTH CRISIS  IP CONSULT TO INFECTIOUS DISEASE    DISCHARGE MEDICATIONS       Instructions for after discharge     Activity:  As Tolerated      Admission H&P Valid:  Yes      Discharge Condtion:  Stabilized      Discharge Summary:  Enclosed      Discharge diet      Diet Type / Texture: Regular    Follow Up Appointments:      Follow-up appointments include: Neurology within 1-2 weeks of discharge from facility    Follow Up:  With Primary MD within 1 week discharge from facility      For follow up    Follow-up with Provider:      Please call to schedule an appointment within 1-2 week of discharge from inpatient psych    Liliane Moctezuma MD   [phone]    Neurology  [address]  [address]  [place] [address]       Level of Care:  Skilled      Psychologist As Needed Per Facility      Treatment Options: Full Resuscitation      Vital Signs Per Facility Protocol      Weights Per Facility Protocol               PROCEDURES / LABS / IMAGING      Operative Procedures  None    Other Procedures  Intubation 3/21  Extubation 3/23    Pertinent  Labs  Results from last 7 days   Lab Units 03/28/22  0614 03/26/22  1115 03/25/22  0840 03/24/22  0549   SODIUM mEQ/L  --  137 134* 138   POTASSIUM mEQ/L 4.1 3.2* 3.3* 3.9   CHLORIDE mEQ/L  --  104 104 109   CO2 mEQ/L  --  23 18* 19*   BUN mg/dL  --  9 6* 7*   CREATININE mg/dL  --  0.6 0.7 0.6   GLUCOSE mg/dL  --  108* 100* 84           No lab exists for component:  LIPASE  Results from last 7 days   Lab Units 03/26/22  1115 03/25/22  0840 03/24/22  0549   WBC K/uL 12.60 16.31 13.24   HEMOGLOBIN g/dL 10.9 11.3 10.4   PLATELETS K/uL 267 254 190         Microbiology Results     Procedure Component Value Units Date/Time    Sputum culture / smear Expectorated Sputum [[id number]]  (Abnormal) Collected: 03/22/22 2224    Specimen: Expectorated Sputum Updated: 03/24/22 1450    Narrative:      The following orders were created for panel order Sputum culture / smear Expectorated Sputum.  Procedure                               Abnormality         Status                     ---------                               -----------         ------                     Sputum Gram Stain (Lab O...[[id number]]                      Final result               Sputum Culture (Lab Only...[[id number]]  Abnormal            Final result                 Please view results for these tests on the individual orders.    Sputum Gram Stain (Lab Only) Expectorated Sputum [[id number]] Collected: 03/22/22 2224    Specimen: Expectorated Sputum Updated: 03/23/22 0039     Gram Stain Result 4+ WBC      No Epithelial Cells Seen      3+ Gram positive cocci in clusters      1+ Gram positive cocci in chains    Sputum Culture (Lab Only) Expectorated Sputum [[id number]]  (Abnormal)  (Susceptibility) Collected: 03/22/22 2224    Specimen: Expectorated Sputum Updated: 03/24/22 1450     Culture **Positive Culture**      4+ Staphylococcus aureus      Normal Ruby Also Recovered    MRSA Screen, Nares Only Nose [[id number]]  (Normal) Collected: 03/22/22 0022    Specimen: Nasal  Swab from Nose Updated: 03/22/22 0513     MRSA DNA, Nares Negative    Blood Culture Blood, Venous [[id number]]  (Normal) Collected: 03/21/22 2329    Specimen: Blood, Venous Updated: 03/27/22 0502     Culture No growth at 120 hours    Blood Culture Blood, Venous [[id number]]  (Normal) Collected: 03/21/22 2329    Specimen: Blood, Venous Updated: 03/27/22 0502     Culture No growth at 120 hours    SARS-CoV-2 (COVID-19), PCR Nasopharynx [[id number]]  (Normal) Collected: 03/21/22 2329    Specimen: Nasopharyngeal Swab from Nasopharynx Updated: 03/22/22 0015    Narrative:      The following orders were created for panel order SARS-CoV-2 (COVID-19), PCR Nasopharynx.  Procedure                               Abnormality         Status                     ---------                               -----------         ------                     SARS-CoV-2 (COVID-19), P...[[id number]]  Normal              Final result                 Please view results for these tests on the individual orders.    SARS-CoV-2 (COVID-19), PCR Nasopharynx [[id number]]  (Normal) Collected: 03/21/22 2329    Specimen: Nasopharyngeal Swab from Nasopharynx Updated: 03/22/22 0015     SARS-CoV-2 (COVID-19) Negative         Pertinent Imaging  CT HEAD WITHOUT IV CONTRAST    Result Date: 3/21/2022  IMPRESSION: Study is significantly limited secondary to patient motion.  No evidence of large intracranial hemorrhage, midline shift or other mass effect COMMENT: Comparison:  None. TECHNIQUE: CT of the brain was performed and reconstructed/reformatted in the axial, coronal and sagittal planes. CT DOSE:  One or more dose reduction techniques (e.g. automated exposure control, adjustment of the mA and/or kV according to patient size, use of iterative reconstruction technique) utilized for this examination. INTRAVENOUS CONTRAST: None Brain parenchyma:  No evidence of large intracranial hemorrhage, midline shift or other mass effect.  No evidence of an extra-axial fluid  collection. Ventricles and cisterns:  Normal in size and configuration. Cranial carotid arteries: Limited assessment of the vasculature on this nonenhanced study. Calvarium and extra cranial soft tissues:  No evidence of a displaced calvarial fracture.   Scalp soft tissue within normal limits. Visualized paranasal sinuses and mastoid air cells:  Clear bilaterally.     MRI BRAIN WITH AND WITHOUT CONTRAST    Result Date: 3/23/2022  IMPRESSION: Normal study. COMMENT: Postsurgical change: None. Restricted diffusion: None Brain parenchyma: There is no intraparenchymal hemorrhage, mass effect, midline shift or extra-axial fluid collection.  No abnormal enhancement. White matter changes: Normal for age Ventricles, cisterns, and sulci: Normal in size and configuration. Sella and cerebellar tonsils: Normal in appearance. Orbits: Normal Nasopharynx: Normal Specific attention to the mesial temporal lobes shows symmetric volume and signal of the hippocampal apparatus.  No obvious congenital abnormality. Calvarium and extra cranial soft tissues: Normal. Paranasal sinuses: Clear bilaterally. Mastoid air cell: Normal Vascular flow voids: Normal     X-RAY CHEST 1 VIEW    Result Date: 3/24/2022  IMPRESSION:Patchy bilateral lung infiltrates as described above suspicious for multifocal pneumonia. Probable small bilateral pleural effusions.    X-RAY CHEST 1 VIEW    Result Date: 3/22/2022  IMPRESSION: See above. Findings above including an enteric tube malposition were discussed with the physician assistant Malissa at 9:55 AM on March 22, 2022     OUTPATIENT  FOLLOW-UP / REFERRALS / PENDING TESTS        Outpatient Follow-Up Appointments  Encounter Information    This patient does not currently have any appointments scheduled.       Referrals  No orders of the defined types were placed in this encounter.    Test Results Pending at Discharge  Unresulted Labs (From admission, onward)            Important Issues to Address in  Follow-Up  Continue inpatient psych treatment   Neurology follow up within 1-2 weeks after discharge from inpatient rehab     DISCHARGE DISPOSITION      Disposition: [facility]    Code Status At Discharge: Full Code    Physician Order for Life-Sustaining Treatment Document Status      No documents found

## 2022-03-29 NOTE — PLAN OF CARE
Problem: Adult Inpatient Plan of Care  Goal: Plan of Care Review  Outcome: Progressing  Flowsheets (Taken 3/29/2022 0322)  Progress: no change  Plan of Care Reviewed With: patient  Outcome Summary: Pt had no complaints of pain or discomfort overnight. Alert and oriented. 1:1 remains at bedside. Waiting for bed to open up in psych. Receiving oral antibiotics. Nursing will continue to monitor.   Plan of Care Review  Plan of Care Reviewed With: patient  Progress: no change  Outcome Summary: Pt had no complaints of pain or discomfort overnight. Alert and oriented. 1:1 remains at bedside. Waiting for bed to open up in psych. Receiving oral antibiotics. Nursing will continue to monitor.

## 2022-03-30 PROBLEM — F19.90 SUBSTANCE USE DISORDER: Status: ACTIVE | Noted: 2022-03-30

## 2022-03-30 PROCEDURE — 90791 PSYCH DIAGNOSTIC EVALUATION: CPT | Performed by: PSYCHIATRY & NEUROLOGY

## 2022-03-30 PROCEDURE — 12400000 HC ROOM AND CARE SEMIPRIVATE PSYCH

## 2022-03-30 PROCEDURE — 63700000 HC SELF-ADMINISTRABLE DRUG: Performed by: PSYCHIATRY & NEUROLOGY

## 2022-03-30 PROCEDURE — 200200 PR NO CHARGE: Performed by: HOSPITALIST

## 2022-03-30 RX ADMIN — CEPHALEXIN 500 MG: 500 CAPSULE ORAL at 17:11

## 2022-03-30 RX ADMIN — LEVETIRACETAM 500 MG: 500 TABLET, FILM COATED ORAL at 09:18

## 2022-03-30 RX ADMIN — CEPHALEXIN 500 MG: 500 CAPSULE ORAL at 20:56

## 2022-03-30 RX ADMIN — LORAZEPAM 0.5 MG: 0.5 TABLET ORAL at 20:56

## 2022-03-30 RX ADMIN — NICOTINE 1 PATCH: 21 PATCH, EXTENDED RELEASE TRANSDERMAL at 09:19

## 2022-03-30 RX ADMIN — LEVETIRACETAM 500 MG: 500 TABLET, FILM COATED ORAL at 17:11

## 2022-03-30 RX ADMIN — Medication 3 MG: at 20:56

## 2022-03-30 RX ADMIN — CEPHALEXIN 500 MG: 500 CAPSULE ORAL at 09:18

## 2022-03-30 NOTE — PLAN OF CARE
"Plan of Care Review  Plan of Care Reviewed With: patient  Patient Agreement with Plan of Care: agrees  Progress: improving  Intervention(s): Assessed for SI, HI & AVH  Outcome Summary: Gemma denies SI/HI/AVH.     Gemma appeared irritable upon approach and refused to attend groups. Gemma inquired about visitation policy and expressed frustration when informed visitors are not permitted on the unit due to Covid restrictions..     Gemma was seen on the phone multiple times throughout the shift, she appeared tearful and frustrated, RN asked her to talk and Gemma stated \"I don't need to talk to you.\"    Gemma isolated to her room and had minimal interactions with peers this shift.     Will continue to monitor for safety.   "

## 2022-03-30 NOTE — H&P
Psychiatry Consult    No chief complaint on file.      Gemma Darnell is a 22 y.o. adult known to unit from recent admission for depression during which time she underwent successful course of ect. She had been at residential facility after that but was transferred home with family and has not been compliant with op treatment since then.     She presented to Ed on 3/21 after intentional overdose on unspecified quantity of effexor/oxycontin/benadryl with subsequent seizure activity. She states she was found by her brother and taken to ED. Reports her intent was to die and that she had been planning this for several weeks. She can not identify trigger other than ongoing depression and breakup with bf (who also has substance use issues). Pt denies recent use but uox positive for benzos/amphetamines/opiates. She asks team for adderall on arrival to unit.     Pt was treated in ICU with IV ativan and started on keppra for sz prophylaxis. She was intubated and extubated on 3/23. She is admitted to ipu on 302 3/29. Also found to have aspiration pneumonia being treated with po keflex. Mri head 3/22 unremarkable.     Pt has long treatment history and believes she has bipolar disorder based on periods of decreased sleep, promiscuity, spending but admits most of these occurred while using cocaine. Reports her sister carries diagnosis of bipolar d/o but is treated with wellbutrin. Past med trials include lexapro, seroquel, abilify, lamictal, zoloft, prozac, effexor, zyprexa.     She reports disappointment that attempt failed but is open to tretament now and would like to be transferred back to residential facility once stable. Bright and social with team. No sx of opiate/benzo w/d. Visible on unit. Eating meals.     No active suidical plan/intent on unit.    Not currently interested in ect.      Social History:   Social History     Socioeconomic History   • Marital status: Single   Tobacco Use   • Smoking status: Current Every Day  Smoker     Packs/day: 1.00     Types: Cigarettes, Electronic Cigarette   Substance and Sexual Activity   • Alcohol use: Not Currently     Comment: no drink x 1 mo   • Drug use: Yes     Types: Amphetamines, Oxycodone, Benzodiazepines     Comment: unknown   • Sexual activity: Defer     Social Determinants of Health     Stress: Unknown   • Feeling of Stress : Patient refused       Medical History: No past medical history on file.    Surgical History: No past surgical history on file.    Allergies: No Known Allergies    Current Medications:  SCHEDULED:  • cephalexin  500 mg oral QID   • levETIRAcetam  500 mg oral BID   • melatonin  3 mg oral Nightly   • nicotine  1 patch transdermal Daily     PRN  •  acetaminophen  •  diphenhydrAMINE  •  docusate sodium  •  LORazepam  •  nicotine polacrilex  •  OLANZapine zydis  •  senna      Review of Systems      Objective     Vital Signs for the last 24 hours:  Temp:  [36.7 °C (98.1 °F)-37.3 °C (99.1 °F)] 36.7 °C (98.1 °F)  Heart Rate:  [] 100  Resp:  [16-18] 18  BP: (108-126)/(78-79) 108/79    Labs  Results from last 7 days   Lab Units 03/28/22  0614 03/26/22  1115 03/25/22  0840 03/24/22  0549   HEMOGLOBIN g/dL  --  10.9 11.3 10.4   WBC K/uL  --  12.60 16.31 13.24   PLATELETS K/uL  --  267 254 190   POTASSIUM mEQ/L 4.1 3.2* 3.3* 3.9   SODIUM mEQ/L  --  137 134* 138   CREATININE mg/dL  --  0.6 0.7 0.6         Ethanol   Date Value Ref Range Status   03/21/2022 <5 <=10 mg/dL Final     No results found for: VPA, TSH, FREET4, N2QCHME, HIJOCSLH47, FOLATE      Lab Results   Component Value Date    VENTRICRATE 140 03/21/2022    QTCCALCULAT 375 03/21/2022     No results found for: HDL, LDLCALC, TRIG, CHOL, HGBA1C    CT HEAD WITHOUT IV CONTRAST    Result Date: 3/21/2022  IMPRESSION: Study is significantly limited secondary to patient motion.  No evidence of large intracranial hemorrhage, midline shift or other mass effect COMMENT: Comparison:  None. TECHNIQUE: CT of the brain was  performed and reconstructed/reformatted in the axial, coronal and sagittal planes. CT DOSE:  One or more dose reduction techniques (e.g. automated exposure control, adjustment of the mA and/or kV according to patient size, use of iterative reconstruction technique) utilized for this examination. INTRAVENOUS CONTRAST: None Brain parenchyma:  No evidence of large intracranial hemorrhage, midline shift or other mass effect.  No evidence of an extra-axial fluid collection. Ventricles and cisterns:  Normal in size and configuration. Cranial carotid arteries: Limited assessment of the vasculature on this nonenhanced study. Calvarium and extra cranial soft tissues:  No evidence of a displaced calvarial fracture.   Scalp soft tissue within normal limits. Visualized paranasal sinuses and mastoid air cells:  Clear bilaterally.     MRI BRAIN WITH AND WITHOUT CONTRAST    Result Date: 3/23/2022  IMPRESSION: Normal study. COMMENT: Postsurgical change: None. Restricted diffusion: None Brain parenchyma: There is no intraparenchymal hemorrhage, mass effect, midline shift or extra-axial fluid collection.  No abnormal enhancement. White matter changes: Normal for age Ventricles, cisterns, and sulci: Normal in size and configuration. Sella and cerebellar tonsils: Normal in appearance. Orbits: Normal Nasopharynx: Normal Specific attention to the mesial temporal lobes shows symmetric volume and signal of the hippocampal apparatus.  No obvious congenital abnormality. Calvarium and extra cranial soft tissues: Normal. Paranasal sinuses: Clear bilaterally. Mastoid air cell: Normal Vascular flow voids: Normal     X-RAY CHEST 1 VIEW    Result Date: 3/24/2022  IMPRESSION:Patchy bilateral lung infiltrates as described above suspicious for multifocal pneumonia. Probable small bilateral pleural effusions.    X-RAY CHEST 1 VIEW    Result Date: 3/22/2022  IMPRESSION: See above. Findings above including an enteric tube malposition were discussed with  the physician assistant Malissa at 9:55 AM on March 22, 2022       MENTAL STATUS EXAM  Appearance: well groomed  Gait and Motor: no abnormal movements and normal  Speech: normal rate/rhythm/volume  Mood: depressed  Affect: normal  Associations: coherent  Thought Process: goal-directed  Thought Content: no auditory or visual hallucinations.  Suicidality/Homicidality: desire to self harm by od  Judgement/Insight: acknowledges illness  Orientation: day, month and year  Memory: recalls recent events and recalls remote events  Attention: alert  Knowledge: normal  Language: normal       Assessment/Plan  * Mood disorder (CMS/[facility])  Assessment & Plan  -on 302 now - t/c offer conversion to voluntary as pt is open to treatment  -increase database  -t/c lithium given possible fhx of bipolar d/o and chronic suicidality  -structure and support  -will look into residential options once stable  -keep family updated      Substance use disorder  Assessment & Plan  -no current signs of benzo/opiate w/d  -motivational interviewing  -will offer GREG program as part of aftercare plan    MSSA (methicillin susceptible Staphylococcus aureus) pneumonia (CMS/[facility])  Assessment & Plan  -breathing comfortably on RA  -complete course of po keflex    Seizure (CMS/[facility])  Assessment & Plan  -related to OD  -continue keppra and will speak with neuro as to duration of treatment

## 2022-03-30 NOTE — ASSESSMENT & PLAN NOTE
-on 303 x 10 days as of 4/1  -improved and close to baseline  -continue trileptal 600mg bid  -d/c today to residential, mother will transport

## 2022-03-31 PROCEDURE — 12400000 HC ROOM AND CARE SEMIPRIVATE PSYCH

## 2022-03-31 PROCEDURE — 63700000 HC SELF-ADMINISTRABLE DRUG: Performed by: STUDENT IN AN ORGANIZED HEALTH CARE EDUCATION/TRAINING PROGRAM

## 2022-03-31 PROCEDURE — 99232 SBSQ HOSP IP/OBS MODERATE 35: CPT | Performed by: PSYCHIATRY & NEUROLOGY

## 2022-03-31 PROCEDURE — 63700000 HC SELF-ADMINISTRABLE DRUG: Performed by: PSYCHIATRY & NEUROLOGY

## 2022-03-31 RX ORDER — OXCARBAZEPINE 300 MG/1
150 TABLET, FILM COATED ORAL 2 TIMES DAILY
Status: DISCONTINUED | OUTPATIENT
Start: 2022-03-31 | End: 2022-04-01

## 2022-03-31 RX ORDER — OLANZAPINE 5 MG/1
5 TABLET, ORALLY DISINTEGRATING ORAL EVERY 6 HOURS PRN
Status: DISCONTINUED | OUTPATIENT
Start: 2022-03-31 | End: 2022-04-06 | Stop reason: HOSPADM

## 2022-03-31 RX ORDER — LORAZEPAM 1 MG/1
1 TABLET ORAL EVERY 6 HOURS PRN
Status: DISCONTINUED | OUTPATIENT
Start: 2022-03-31 | End: 2022-04-06 | Stop reason: HOSPADM

## 2022-03-31 RX ORDER — OLANZAPINE 10 MG/2ML
10 INJECTION, POWDER, FOR SOLUTION INTRAMUSCULAR EVERY 8 HOURS PRN
Status: DISCONTINUED | OUTPATIENT
Start: 2022-03-31 | End: 2022-04-06 | Stop reason: HOSPADM

## 2022-03-31 RX ADMIN — CEPHALEXIN 500 MG: 500 CAPSULE ORAL at 08:34

## 2022-03-31 RX ADMIN — CEPHALEXIN 500 MG: 500 CAPSULE ORAL at 17:03

## 2022-03-31 RX ADMIN — LEVETIRACETAM 500 MG: 500 TABLET, FILM COATED ORAL at 08:34

## 2022-03-31 RX ADMIN — LORAZEPAM 0.5 MG: 0.5 TABLET ORAL at 08:56

## 2022-03-31 RX ADMIN — OXCARBAZEPINE 150 MG: 300 TABLET, FILM COATED ORAL at 17:03

## 2022-03-31 RX ADMIN — CEPHALEXIN 500 MG: 500 CAPSULE ORAL at 21:20

## 2022-03-31 RX ADMIN — LORAZEPAM 1 MG: 1 TABLET ORAL at 14:56

## 2022-03-31 RX ADMIN — LORAZEPAM 1 MG: 1 TABLET ORAL at 21:20

## 2022-03-31 RX ADMIN — Medication 3 MG: at 21:19

## 2022-03-31 RX ADMIN — CEPHALEXIN 500 MG: 500 CAPSULE ORAL at 12:48

## 2022-03-31 RX ADMIN — NICOTINE 1 PATCH: 21 PATCH, EXTENDED RELEASE TRANSDERMAL at 08:34

## 2022-03-31 RX ADMIN — LEVETIRACETAM 500 MG: 500 TABLET, FILM COATED ORAL at 17:03

## 2022-03-31 NOTE — PROGRESS NOTES
"PSYCHIATRIC PROGRESS NOTE    Subjective     Chief Complaint/Reason for follow-up: s/p suicide attempt by toxic pill ingestion      Interval History: Med-adherent, on Keppra. PRN Ativan 0.5mg at 2056, 0856. RN documents high anxiety, affect flat, mood irritable, poor appetite, ate 50% at supper, drinking adequate fluids, discharged focus.    On evaluation, pt continues to be discharge-focused, reporting that she feels she will benefit more from her outpatient groups than her treatment here on the psychiatric unit. Volunteers that she is open to a residential and denies active SI. When asked what else is occupying her thoughts, she states that she has been thinking about how she needs to get a job and talk to her little brother about her suicide attempt. States she \"feels good.\"    Review of Systems:   Sleep:  Fair, Appetite: Fair and GI: No complaints      Objective     Scheduled Meds:  • cephalexin  500 mg oral QID   • levETIRAcetam  500 mg oral BID   • melatonin  3 mg oral Nightly   • nicotine  1 patch transdermal Daily       PRN Meds:  •  acetaminophen  •  diphenhydrAMINE  •  docusate sodium  •  LORazepam  •  nicotine polacrilex  •  OLANZapine zydis  •  senna    Vital Signs for the last 24 hours:  Temp:  [36.6 °C (97.9 °F)] 36.6 °C (97.9 °F)  Heart Rate:  [91-98] 98  Resp:  [16] 16  BP: (115-126)/(70-82) 126/70    Labs: Selected Electrolytes  Results from last 7 days   Lab Units 03/28/22  0614 03/26/22  1115 03/25/22  0840   POTASSIUM mEQ/L 4.1 3.2* 3.3*   SODIUM mEQ/L  --  137 134*   CREATININE mg/dL  --  0.6 0.7       Recent EKG HR/QTC  Lab Results   Component Value Date    VENTRICRATE 140 03/21/2022    QTCCALCULAT 375 03/21/2022       No results found for: VPA, LITHIUM    MENTAL STATUS EXAM  Appearance: well groomed and appropriate attire  Gait and Motor: normal  Speech: normal rate/rhythm/volume  Mood: 'good'  Affect: irritable  Associations: coherent and logical  Thought Process: goal-directed  Thought " Content: no auditory or visual hallucinations. 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     Substance use disorder  Assessment & Plan  -No current signs of benzo/opiate withdrawal  -Continue motivational interviewing  -Will offer GREG program as part of aftercare plan    MSSA (methicillin susceptible Staphylococcus aureus) pneumonia (CMS/[facility])  Assessment & Plan  -Breathing comfortably on RA  -Complete course of PO Keflex    Seizure (CMS/[facility])  Assessment & Plan  -No prior seizure history  -Continue Keppra per neurology; t/c gradual cross-taper to lamotrigine vs more immediate cross taper to Trileptal given undesirable neuropsychiatric side effect profile of Keppra.   -Pending further recommendations from neurology    * Mood disorder (CMS/[facility])  Assessment & Plan  -Continue 302, 303 hearing tomorrow  -Increase database  -Given likely need for long-term AED, t/c lamotrigine vs Trileptal for mood stability; awaiting guidance from neurology  -Psychoeducation and group/milieu therapy  -T/c residential options on discharge  -Update family      This is a resident note, please await attending's final attestation.    Aaron Bloch, MD  Psychiatry Resident, PGY2

## 2022-03-31 NOTE — PLAN OF CARE
"  Problem: Suicidal Behavior  Goal: Suicidal Behavior is Absent or Managed  Outcome: Progressing  Flowsheets (Taken 3/31/2022 1056)  Citra Goal: shares suicidal thoughts  Individual Goal: Gemma will inform staff thoughts of suicial ideation.  Goal Outcome: progressing     Problem: Adult Behavioral Health Plan of Care  Goal: Plan of Care Review  Outcome: Progressing  Flowsheets (Taken 3/31/2022 1056)  Progress: improving  Plan of Care Reviewed With: patient  Patient Agreement with Plan of Care: agrees  Intervention(s): Nursing assessment, medication management and safety monitoring every 15 mins.     Gemma is irritable, anxious with blunted affect on approach. Poor eye contact while interacting. Pt reported having high anxiety and feeling Jittery, reported depression 6/10. Denies any SI/HI/AVH. Pt contracted for safety. Pt requested Ativan for anxiety. PRN PO ativan given per pt's request. Pt stated she wants to get discharge from here as this is not making her less anxious and she would rather be with family. Support provided and encouraged pt's to participate in therapy groups. Initially pt stated she would go and attend therapy group but later refused stating that \" it's not helpful to me\". Pt is guarded, withdrawn to self. Visible on unit but minimal interaction with peers and staff. Pt is complaints with taking medications. Appetite is fair, 50% mostly. In afternoon pt was upset, irritable and wanted to get d/c \" right now\". Pt explained her involuntary admission process. Pt refused to eat lunch stating \" I'm not hungry\". Later around 1400 Pt was agitated. Banging on pt's phone. Cursing staff, loud, demanding to see doctor( not telling staff why need to see doctor) when tried to redirect pt verbally as pt already saw resident in morning, pt stated \" I will and I can do whatever I want to do\", Pt offered PRN Zyprexa for agitation which she refused to take. Then pt walked away. Walked with another nurse for few " min. Pt saw resident in afternoon again. PRN PO ativan given with good effect on re-assessment. At dinner pt ate 75% of meal. Isolative in her room. Calm and quite. Will continue to monitor and support pt.

## 2022-03-31 NOTE — PROGRESS NOTES
met with Gemma. Gemma stated she did not feel suicidal and wants to be discharged. Stated at first she regretted being alive after her overdose but now does not. When  asked her why the change in her thinking, Gemma could not say why. Gemma stated she was glad her brother did not see her having seizures but other than that very much minimized, dismissed the seriousness of her overdose.Gemma stated her mother is angry with her and she cannot return to mother's house. Stated she thinks she could live with father but father drinks.Stated she would be OK with returning to residential treatment at [facility] and signed TORITO for  to be able to contact [facility] and send clinical information.  did not tell Gemma that a [address] hearing is scheduled for tomorrow (4/1/22).    After speaking with Gemma,  spoke with her mother. Mother stated she is very angry with Gemma but also feels very sad. Mother questioned if father would allow Gemma to live with him and his girlfriend.  told mother that Gemma would have 303 court hearing tomorrow and explained the purpose of the hearing.Mother stated that she was OK with whatever decisions the treatment team makes going forward.  told mother that she would keep mother updated.

## 2022-03-31 NOTE — NURSING NOTE
Nursing Note [id number]    Gemma was compliant with HS medications. She requested 0.5 mg Ativan for high anxiety after talking to family on phone. Coping skills reviewed. Affect flat, mood irritable. Reported poor appetite. Depression reported 6/10, pt very discharge focused. Ate 50% supper, is drinking adequate fluids. She denied SI/HI/AVH and pain. Pt retired to bed around 2040. Will ctm and support.

## 2022-04-01 PROCEDURE — 63700000 HC SELF-ADMINISTRABLE DRUG: Performed by: PSYCHIATRY & NEUROLOGY

## 2022-04-01 PROCEDURE — 12400000 HC ROOM AND CARE SEMIPRIVATE PSYCH

## 2022-04-01 PROCEDURE — 99232 SBSQ HOSP IP/OBS MODERATE 35: CPT | Performed by: PSYCHIATRY & NEUROLOGY

## 2022-04-01 PROCEDURE — 63700000 HC SELF-ADMINISTRABLE DRUG: Performed by: STUDENT IN AN ORGANIZED HEALTH CARE EDUCATION/TRAINING PROGRAM

## 2022-04-01 RX ORDER — OXCARBAZEPINE 300 MG/1
300 TABLET, FILM COATED ORAL 2 TIMES DAILY
Status: DISCONTINUED | OUTPATIENT
Start: 2022-04-01 | End: 2022-04-04

## 2022-04-01 RX ADMIN — LEVETIRACETAM 500 MG: 500 TABLET, FILM COATED ORAL at 16:54

## 2022-04-01 RX ADMIN — LORAZEPAM 1 MG: 1 TABLET ORAL at 21:04

## 2022-04-01 RX ADMIN — CEPHALEXIN 500 MG: 500 CAPSULE ORAL at 16:54

## 2022-04-01 RX ADMIN — Medication 3 MG: at 21:04

## 2022-04-01 RX ADMIN — CEPHALEXIN 500 MG: 500 CAPSULE ORAL at 09:25

## 2022-04-01 RX ADMIN — LORAZEPAM 1 MG: 1 TABLET ORAL at 11:35

## 2022-04-01 RX ADMIN — LEVETIRACETAM 500 MG: 500 TABLET, FILM COATED ORAL at 09:25

## 2022-04-01 RX ADMIN — OXCARBAZEPINE 300 MG: 300 TABLET, FILM COATED ORAL at 16:54

## 2022-04-01 RX ADMIN — OXCARBAZEPINE 150 MG: 300 TABLET, FILM COATED ORAL at 09:25

## 2022-04-01 RX ADMIN — CEPHALEXIN 500 MG: 500 CAPSULE ORAL at 12:34

## 2022-04-01 RX ADMIN — NICOTINE 1 PATCH: 21 PATCH, EXTENDED RELEASE TRANSDERMAL at 09:26

## 2022-04-01 NOTE — PLAN OF CARE
Patient was admitted on a 302. She had 303 hearing today, Court ordered 10 days inpatient and 10 days outpatient. Patient was informed of the court's decision by Dr. Bloch.  also met with patient to discuss court's decision and to tell patient that [facility] not accept patient's insurance, [employer].  told patient that  will send clinical documentation to [place] [facility].

## 2022-04-01 NOTE — PROGRESS NOTES
[facility] Court Order (303 Petition): 10 days inpatient and 10 days outpatient.  secure chatted Dr. Braun re: Court Order. Dr. Bloch informed patient of court decision.

## 2022-04-01 NOTE — PROGRESS NOTES
PSYCHIATRIC PROGRESS NOTE    Interval History: remains highly irritable and discharge focused. Eating, sleeping well. Denying si and perseverates on going home asap. Compliant with meds. vss and no sx of substance w/d. behaviorally in control. No sz activity on unit. Had 303 hearing this morning and hospital awaits decision.    Vital Signs for the last 24 hours:  Temp:  [36.6 °C (97.9 °F)-36.7 °C (98.1 °F)] 36.6 °C (97.9 °F)  Heart Rate:  [76-92] 92  Resp:  [16-18] 18  BP: ()/(69-82) 118/75    Scheduled Meds:  • cephalexin  500 mg oral QID   • levETIRAcetam  500 mg oral BID   • melatonin  3 mg oral Nightly   • nicotine  1 patch transdermal Daily   • OXcarbazepine  300 mg oral BID       Labs:  Selected Electrolytes  Results from last 7 days   Lab Units 03/28/22  0614 03/26/22  1115   POTASSIUM mEQ/L 4.1 3.2*   SODIUM mEQ/L  --  137   CREATININE mg/dL  --  0.6       Recent EKG HR/QTC  Lab Results   Component Value Date    VENTRICRATE 140 03/21/2022    QTCCALCULAT 375 03/21/2022       No results found for: VPA, LITHIUM    MENTAL STATUS EXAM  Appearance: well groomed  Gait and Motor: no abnormal movements and normal  Speech: normal rate/rhythm/volume  Mood: irritable  Affect: irritable  Associations: coherent  Thought Process: goal-directed  Thought Content: no auditory or visual hallucinations.  Suicidality/Homicidality: denies  Judgement/Insight: help rejecting  Orientation: day, month and year  Memory: recalls recent events and recalls remote events  Attention: alert  Knowledge: normal  Language: normal     Assessment/Plan  * Mood disorder (CMS/[facility])  Assessment & Plan  -on 302 - [facility] hearing today  -start trileptal targeting mood stability. Interaction with ocp discussed.   -prn ativan/zyprexa for agitation  -structure and support  -will look into residential options once stable  -keep family updated      Substance use disorder  Assessment & Plan  -no current signs of benzo/opiate w/d  -motivational  interviewing  -will offer GREG program as part of aftercare plan    MSSA (methicillin susceptible Staphylococcus aureus) pneumonia (CMS/[facility])  Assessment & Plan  -breathing comfortably on RA  -complete course of po keflex    Seizure (CMS/[facility])  Assessment & Plan  -related to OD  -continue keppra, cross titrating to trileptal as is better for mood.

## 2022-04-01 NOTE — PROGRESS NOTES
"PSYCHIATRIC PROGRESS NOTE    Subjective     Chief Complaint/Reason for follow-up: s/p suicide attempt by toxic pill ingestion      Interval History: Med-adherent, on Keppra, took first dose of Trileptal. Per RN notes was intermittently agitated throughout day and night about not being discharged that day but denies SI/HI/AVH.      On evaluation, pt continues to be discharge-focused, wants to know how likely it is that mental health  will release her today. Reports she is glad to be alive, denies SI. Still minimizing of suicide attempt, talking about the need to discuss it w/ her 14-year-old brother who found her in the aftermath but describing it as \"not a big deal\" because he is \"old enough.\"     Review of Systems:   Sleep:  Fair, Appetite: Fair and GI: No complaints      Objective     Scheduled Meds:  • cephalexin  500 mg oral QID   • levETIRAcetam  500 mg oral BID   • melatonin  3 mg oral Nightly   • nicotine  1 patch transdermal Daily   • OXcarbazepine  150 mg oral BID       PRN Meds:  •  acetaminophen  •  diphenhydrAMINE  •  docusate sodium  •  LORazepam  •  nicotine polacrilex  •  OLANZapine zydis **OR** OLANZapine  •  senna    Vital Signs for the last 24 hours:  Temp:  [36.7 °C (98.1 °F)] 36.7 °C (98.1 °F)  Heart Rate:  [76-87] 76  Resp:  [16] 16  BP: (116-121)/(77-82) 120/80    Labs: Selected Electrolytes  Results from last 7 days   Lab Units 03/28/22  0614 03/26/22  1115 03/25/22  0840   POTASSIUM mEQ/L 4.1 3.2* 3.3*   SODIUM mEQ/L  --  137 134*   CREATININE mg/dL  --  0.6 0.7       Recent EKG HR/QTC  Lab Results   Component Value Date    VENTRICRATE 140 03/21/2022    QTCCALCULAT 375 03/21/2022       No results found for: VPA, LITHIUM    MENTAL STATUS EXAM  Appearance: well groomed and appropriate attire  Gait and Motor: normal  Speech: normal rate/rhythm/volume  Mood: irritable  Affect: irritable  Associations: coherent and logical  Thought Process: goal-directed  Thought Content: no auditory or " visual hallucinations. 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     * Mood disorder (CMS/[facility])  Assessment & Plan  -Continue 302, 303 hearing held pending decision  -Increase database  -Trileptal 300mg BID for dual-indication of mood stability and seizure prophylaxis  -Weekly CBC, BMP (monitor Na); baseline TSH ordered  -Close monitoring for increased suicidal ideation vs mood benefit on Trileptal and consideration of discontinuation OP if no longer indicated for seizure prophylaxis and not helpful for mood  -Psychoeducation and group/milieu therapy  -T/c residential options on discharge  -Update family     Substance use disorder  Assessment & Plan  -No current signs of benzo/opiate withdrawal  -Continue motivational interviewing  -Will offer GREG program as part of aftercare plan    MSSA (methicillin susceptible Staphylococcus aureus) pneumonia (CMS/[facility])  Assessment & Plan  -Breathing comfortably on RA  -Complete course of PO Keflex    Seizure (CMS/[facility])  Assessment & Plan  -No prior seizure history  -Continue Keppra pending cross taper to Trileptal given undesirable neuropsychiatric side effect profile of Keppra   -Neurology follow-up OP w/ Dr. Moctezuma    This is a resident note, please await attending's final attestation.    Aaron Bloch, MD  Psychiatry Resident, PGY2

## 2022-04-01 NOTE — PROGRESS NOTES
At patient's request,  left voice mail message with Gemma's mother to inform her of today's court decision and also that [facility] does not accept [employer].  told mother that she will fax clinical documentation to [place] [facility].

## 2022-04-01 NOTE — ASSESSMENT & PLAN NOTE
Pt with h/o seizure on keppra.  Can switch to trileptal 150mg BID.  Then can d/c keppra two days later.  Pt to f/u with DR Moctezuma.  Psych eval underway.

## 2022-04-01 NOTE — PLAN OF CARE
"  Problem: Suicidal Behavior  Goal: Suicidal Behavior is Absent or Managed  Outcome: Progressing  Flowsheets (Taken 4/1/2022 1139)  [place] Goal: shares suicidal thoughts  Individual Goal: Gemma will verbalize her feeling and come to staff if she has any SI thoughts.  Goal Outcome: progressing     Problem: Adult Behavioral Health Plan of Care  Goal: Plan of Care Review  4/1/2022 1141 by Charlotte Panchal RN  Flowsheets (Taken 4/1/2022 1141)  Outcome Summary: Gemma  4/1/2022 1139 by Charlotte Panchal RN  Outcome: Progressing  Flowsheets (Taken 4/1/2022 1139)  Progress: improving  Plan of Care Reviewed With: patient  Patient Agreement with Plan of Care: agrees  Intervention(s): Nursing assessment, medication managemnet, provide emotional support and monitored for safety.     Gemma appears angry, upset and irritable most of the day. Isolative in her room. Guarded with restricted , labile affect on approach. Pt is discharged focus and doesn't want to be in hospital. Pt reported having high anxiety related her court hearing today. Depression is not \"bad\". Denies SI/HI/AVH.  Pt refused to attend therapy group. Complaints with medications. Pt mostly stays in her room or frequently talks on phone. Around 11:20 Pt was angry, upset and hopeless. Slammed her room door. PRN Po Ativan given for high anxiety, anxiety reported 10/10 after finding out that she is not going to get discharge today. Later pt stayed in her room most of the shift  And used phone for few times. Appetite is poor. Refused to eat breakfast, had good lunch but refused to eat dinner, only had fruit cup.  "

## 2022-04-01 NOTE — PROGRESS NOTES
Neurology Daily Progress Note    Subjective     Interval History: no events    Objective     Physical Exam:  General appearance: alert, appears stated age and cooperative  Neurologic:   Cranial Nerves: 2-12 intact  Motor: 5/5 all extremities  Sensory: intact to light touch all extremities  Cerebellar: normal coordination    Vital signs in last 24 hours:  Temp:  [36.6 °C (97.9 °F)-36.7 °C (98.1 °F)] 36.6 °C (97.9 °F)  Heart Rate:  [76-92] 92  Resp:  [16-18] 18  BP: ()/(69-82) 118/75    No intake or output data in the 24 hours ending 04/01/22 1059    Labs  I have reviewed the patient's labs to the time of note. No new clinical concern.    Imaging  Not applicable              Assessment/Plan   Seizure (CMS/[facility])  Assessment & Plan  Pt with h/o seizure on keppra.  Can switch to trileptal 150mg BID.  Then can d/c keppra two days later.  Pt to f/u with DR Moctezuma.  Psych eval underway.             Expected Discharge Date:  4/5/2022

## 2022-04-01 NOTE — PROGRESS NOTES
received return call from [facility] regarding admission ( had faxed clinical documentation to [facility]). [place] from Admissions told  that [facility] does not accept patient's insurance, [employer].

## 2022-04-01 NOTE — PLAN OF CARE
"Plan of Care Review  Plan of Care Reviewed With: patient  Patient Agreement with Plan of Care: agrees  Progress: improving  Intervention(s): Nursing assessment; Provided emotional support as needed; Monitored for safety Q 15 minutes  Outcome Summary: Gemma was briefly visible on the unit this evening observed talking on the phone, otherwise withdrawn to her room. She presented as irritable and stated \"I'm  anxious and irate\" but would not elaborate. Reported anxiety 7/10. Denied SI/HI/AVH. She was compliant with night time medication without incident and offered no c/o side effects. Given PRN Ativan 1mg at 2120 with positive effects. Will continue to provide support as needed.   "

## 2022-04-02 LAB — TSH SERPL DL<=0.05 MIU/L-ACNC: 1.45 MIU/L (ref 0.34–5.6)

## 2022-04-02 PROCEDURE — 99232 SBSQ HOSP IP/OBS MODERATE 35: CPT | Performed by: PSYCHIATRY & NEUROLOGY

## 2022-04-02 PROCEDURE — 12400000 HC ROOM AND CARE SEMIPRIVATE PSYCH

## 2022-04-02 PROCEDURE — 63700000 HC SELF-ADMINISTRABLE DRUG: Performed by: PSYCHIATRY & NEUROLOGY

## 2022-04-02 PROCEDURE — 36415 COLL VENOUS BLD VENIPUNCTURE: CPT | Performed by: PSYCHIATRY & NEUROLOGY

## 2022-04-02 PROCEDURE — 84443 ASSAY THYROID STIM HORMONE: CPT | Performed by: PSYCHIATRY & NEUROLOGY

## 2022-04-02 RX ADMIN — LEVETIRACETAM 500 MG: 500 TABLET, FILM COATED ORAL at 09:35

## 2022-04-02 RX ADMIN — NICOTINE 1 PATCH: 21 PATCH, EXTENDED RELEASE TRANSDERMAL at 09:35

## 2022-04-02 RX ADMIN — LEVETIRACETAM 500 MG: 500 TABLET, FILM COATED ORAL at 17:18

## 2022-04-02 RX ADMIN — LORAZEPAM 1 MG: 1 TABLET ORAL at 17:19

## 2022-04-02 RX ADMIN — OXCARBAZEPINE 300 MG: 300 TABLET, FILM COATED ORAL at 09:35

## 2022-04-02 RX ADMIN — OXCARBAZEPINE 300 MG: 300 TABLET, FILM COATED ORAL at 17:18

## 2022-04-02 RX ADMIN — Medication 3 MG: at 22:14

## 2022-04-02 NOTE — PLAN OF CARE
Plan of Care Review  Plan of Care Reviewed With: patient  Patient Agreement with Plan of Care: agrees  Progress: improving  Intervention(s): Provided emotional support as needed; Nursing assessment; Montiored for safety Q 15 minutes  Outcome Summary: Gemma was visible on the unit this evening, withdrawn to herself, observed talking on the phone. She presented as irritable with flat affect and was superficial in conversation. Continues with poor eye contact. Stated her day was horrible and the only good part was a nap. Continues to report high anxiety. Denied SI/SH/HI/AVH. Eating and sleeping well. Attending to ADLs. She was compliant with night time medication without incident and offered no c/o side effects. Given PRN Ativan per her request. Will continue to provide support as needed.

## 2022-04-02 NOTE — PLAN OF CARE
"Plan of Care Review  Plan of Care Reviewed With: patient  Patient Agreement with Plan of Care: agrees  Progress: improving  Intervention(s): Allowed to process frustration.  Encouraged participation  Outcome Summary: Gemma reports that she feels \"irritable' today and \"tired\".  She reports feeling \"bored\" here and frusutrated that she has to stay here any longer.  She expressed frustration that she agreed to go to residential and would rather just go there instead of participating in anything here. She denied suicidal  I did explain that residential programs would have to interview her first and that they probably would want more assurance that she wasn't suicidal, given her recent overdose. Also encouraged Gemma to participate in group, especially since she was feeling bored.  She agreed, but didn't attend today.  Did coloring activity in her room.  Arguing a little with family member on phone. Otherwise, calm and cooperative.  Out of room periodically and social with peers.  Flat affect.  Eating 50% of meals.    "

## 2022-04-02 NOTE — PROGRESS NOTES
"PSYCHIATRIC PROGRESS NOTE    Interval History: Today, she states, \"I don't need to be here.\"  She denies suicidal thoughts, homicidal thoughts, or hallucinations.  She is focused on discharge.  She states that she is eating and sleeping well.    Vital Signs for the last 24 hours:  Temp:  [36.6 °C (97.9 °F)-36.7 °C (98.1 °F)] 36.6 °C (97.9 °F)  Heart Rate:  [] 69  Resp:  [16] 16  BP: (103-126)/(80-83) 126/82    Scheduled Meds:  • levETIRAcetam  500 mg oral BID   • melatonin  3 mg oral Nightly   • nicotine  1 patch transdermal Daily   • OXcarbazepine  300 mg oral BID       Labs:  Selected Electrolytes  Results from last 7 days   Lab Units 03/28/22  0614   POTASSIUM mEQ/L 4.1       Recent EKG HR/QTC  Lab Results   Component Value Date    VENTRICRATE 140 03/21/2022    QTCCALCULAT 375 03/21/2022       No results found for: VPA, LITHIUM    MENTAL STATUS EXAM  Appearance: well groomed  Gait and Motor: no abnormal movements and normal  Speech: normal rate/rhythm/volume  Mood: \"ok\"  Affect: irritable  Associations: coherent  Thought Process: goal-directed  Thought Content: no auditory or visual hallucinations.  Suicidality/Homicidality: denies  Judgement/Insight: help rejecting  Orientation: day, month and year  Memory: recalls recent events and recalls remote events  Attention: alert  Knowledge: normal  Language: normal     Assessment/Plan  Substance use disorder  Assessment & Plan  -no current signs of benzo/opiate w/d  -motivational interviewing  -will offer GREG program as part of aftercare plan    MSSA (methicillin susceptible Staphylococcus aureus) pneumonia (CMS/[facility])  Assessment & Plan  -breathing comfortably on RA  -complete course of po keflex    Seizure (CMS/[facility])  Assessment & Plan  -related to OD  -continue keppra, cross titrating to trileptal as is better for mood.    * Mood disorder (CMS/[facility])  Assessment & Plan  -on [facility] hearing today  -start trileptal targeting mood stability. Interaction with ocp " discussed.   -prn ativan/zyprexa for agitation  -structure and support  -will look into residential options once stable  -keep family updated

## 2022-04-03 PROCEDURE — 63700000 HC SELF-ADMINISTRABLE DRUG: Performed by: PSYCHIATRY & NEUROLOGY

## 2022-04-03 PROCEDURE — 12400000 HC ROOM AND CARE SEMIPRIVATE PSYCH

## 2022-04-03 PROCEDURE — 99232 SBSQ HOSP IP/OBS MODERATE 35: CPT | Performed by: PSYCHIATRY & NEUROLOGY

## 2022-04-03 RX ADMIN — LORAZEPAM 1 MG: 1 TABLET ORAL at 12:40

## 2022-04-03 RX ADMIN — NICOTINE 1 PATCH: 21 PATCH, EXTENDED RELEASE TRANSDERMAL at 09:49

## 2022-04-03 RX ADMIN — OXCARBAZEPINE 300 MG: 300 TABLET, FILM COATED ORAL at 17:31

## 2022-04-03 RX ADMIN — LEVETIRACETAM 500 MG: 500 TABLET, FILM COATED ORAL at 17:31

## 2022-04-03 RX ADMIN — OXCARBAZEPINE 300 MG: 300 TABLET, FILM COATED ORAL at 09:49

## 2022-04-03 RX ADMIN — LEVETIRACETAM 500 MG: 500 TABLET, FILM COATED ORAL at 09:49

## 2022-04-03 RX ADMIN — LORAZEPAM 1 MG: 1 TABLET ORAL at 19:43

## 2022-04-03 RX ADMIN — Medication 3 MG: at 21:11

## 2022-04-03 NOTE — PLAN OF CARE
Plan of Care Review  Plan of Care Reviewed With: patient  Patient Agreement with Plan of Care: agrees  Progress: improving  Intervention(s): Assessed for safety. Gemma was encouraged to participate in groups.  Outcome Summary: Gemma  is less irritable today.  Smiling more and more spontaneous and talkative with staff and peers. Good ADLS.  Eating 50-75%.  Remains focused on D/C.  Expressed wanting to go to residential treatment and frustrated that she can not go to [facility] because of insurance issues. Willing to discuss other options. Encouraged to go to group.  Pt said she would think about it, but did not attend.   Denies SI/HI/AVH.

## 2022-04-03 NOTE — PROGRESS NOTES
"PSYCHIATRIC PROGRESS NOTE    Interval History: Today, she continues to state that she wants to be discharged tomorrow.  She states that she is not suicidal, not homicidal, and has no complaints.  She does not feel that she is in need of treatment, and states that she does not want to do groups today because they are not helpful.    Vital Signs for the last 24 hours:  Temp:  [36.8 °C (98.2 °F)] 36.8 °C (98.2 °F)  Heart Rate:  [63-96] 63  Resp:  [16] 16  BP: (124-134)/(78-84) 134/78    Scheduled Meds:  • levETIRAcetam  500 mg oral BID   • melatonin  3 mg oral Nightly   • nicotine  1 patch transdermal Daily   • OXcarbazepine  300 mg oral BID       Labs:  Selected Electrolytes  Results from last 7 days   Lab Units 03/28/22  0614   POTASSIUM mEQ/L 4.1       Recent EKG HR/QTC  Lab Results   Component Value Date    VENTRICRATE 140 03/21/2022    QTCCALCULAT 375 03/21/2022       No results found for: VPA, LITHIUM    MENTAL STATUS EXAM  Appearance: well groomed  Gait and Motor: no abnormal movements and normal  Speech: normal rate/rhythm/volume  Mood: \"fine\"  Affect: irritable  Associations: coherent  Thought Process: goal-directed  Thought Content: no auditory or visual hallucinations.  Suicidality/Homicidality: denies  Judgement/Insight: help rejecting  Orientation: day, month and year  Memory: recalls recent events and recalls remote events  Attention: alert  Knowledge: normal  Language: normal     Assessment/Plan  Substance use disorder  Assessment & Plan  -no current signs of benzo/opiate w/d  -motivational interviewing  -will offer GREG program as part of aftercare plan    MSSA (methicillin susceptible Staphylococcus aureus) pneumonia (CMS/[facility])  Assessment & Plan  -breathing comfortably on RA  -complete course of po keflex    Seizure (CMS/[facility])  Assessment & Plan  -related to OD  -continue keppra, cross titrating to trileptal as is better for mood.    * Mood disorder (CMS/[facility])  Assessment & Plan  -on [address] [place] [address] hearing " today  -start trileptal targeting mood stability. Interaction with ocp discussed.   -prn ativan/zyprexa for agitation  -structure and support  -will look into residential options once stable  -keep family updated

## 2022-04-03 NOTE — PLAN OF CARE
Plan of Care Review  Plan of Care Reviewed With: patient  Patient Agreement with Plan of Care: agrees with comment (describe) (Discharge focused)  Progress: improving  Intervention(s): Gemma was encouraged to spend time in milieu this shift with peers.  Outcome Summary: Gemma remained isolative to self in room napping this shift. She continues to report moderate depression and anxiety but denied SI/HI/AVH. Poor appetite. She denied pain. Melatonin provided for sleep. ADLs+. No peer interaction this shift. Dismissive during assessment, very discharge focused. Will ctm for safety and support as necessary.

## 2022-04-04 PROBLEM — J15.211 MSSA (METHICILLIN SUSCEPTIBLE STAPHYLOCOCCUS AUREUS) PNEUMONIA (CMS/HCC): Status: RESOLVED | Noted: 2022-03-25 | Resolved: 2022-04-04

## 2022-04-04 LAB — SARS-COV-2 RNA RESP QL NAA+PROBE: NEGATIVE

## 2022-04-04 PROCEDURE — 63700000 HC SELF-ADMINISTRABLE DRUG: Performed by: PSYCHIATRY & NEUROLOGY

## 2022-04-04 PROCEDURE — 99232 SBSQ HOSP IP/OBS MODERATE 35: CPT | Performed by: PSYCHIATRY & NEUROLOGY

## 2022-04-04 PROCEDURE — 12400000 HC ROOM AND CARE SEMIPRIVATE PSYCH

## 2022-04-04 PROCEDURE — U0003 INFECTIOUS AGENT DETECTION BY NUCLEIC ACID (DNA OR RNA); SEVERE ACUTE RESPIRATORY SYNDROME CORONAVIRUS 2 (SARS-COV-2) (CORONAVIRUS DISEASE [COVID-19]), AMPLIFIED PROBE TECHNIQUE, MAKING USE OF HIGH THROUGHPUT TECHNOLOGIES AS DESCRIBED BY CMS-2020-01-R: HCPCS | Performed by: PSYCHIATRY & NEUROLOGY

## 2022-04-04 RX ORDER — OXCARBAZEPINE 300 MG/1
600 TABLET, FILM COATED ORAL 2 TIMES DAILY
Status: DISCONTINUED | OUTPATIENT
Start: 2022-04-04 | End: 2022-04-06 | Stop reason: HOSPADM

## 2022-04-04 RX ORDER — LEVETIRACETAM 250 MG/1
250 TABLET ORAL 2 TIMES DAILY
Status: DISCONTINUED | OUTPATIENT
Start: 2022-04-04 | End: 2022-04-05

## 2022-04-04 RX ADMIN — Medication 3 MG: at 21:04

## 2022-04-04 RX ADMIN — LORAZEPAM 1 MG: 1 TABLET ORAL at 12:25

## 2022-04-04 RX ADMIN — OXCARBAZEPINE 600 MG: 300 TABLET, FILM COATED ORAL at 09:19

## 2022-04-04 RX ADMIN — LEVETIRACETAM 250 MG: 250 TABLET, FILM COATED ORAL at 18:23

## 2022-04-04 RX ADMIN — NICOTINE 1 PATCH: 21 PATCH, EXTENDED RELEASE TRANSDERMAL at 09:20

## 2022-04-04 RX ADMIN — OXCARBAZEPINE 600 MG: 300 TABLET, FILM COATED ORAL at 18:23

## 2022-04-04 RX ADMIN — LORAZEPAM 1 MG: 1 TABLET ORAL at 21:04

## 2022-04-04 RX ADMIN — LEVETIRACETAM 500 MG: 500 TABLET, FILM COATED ORAL at 09:19

## 2022-04-04 NOTE — PLAN OF CARE
"Plan of Care Review  Plan of Care Reviewed With: patient  Patient Agreement with Plan of Care: agrees  Progress: improving  Intervention(s): Encouraged Gemma to remain visible in the milieu this shift and out of her room.  Outcome Summary: Gemma was able to spend most of the evening with peers watching TV. Mood remains irritable, she is dismissive and condescending with staff at times. Very discharge focused. Pt is compliant with ADLs and medications. Frequently requests Ativan for \"high anxiety\"--coping skills discussed but pt perseverated on receiving medication. She denied SI/HI/AVH, reports \"some\" depression. She is eating and sleeping better. Will ctm.  "

## 2022-04-04 NOTE — PLAN OF CARE
"Plan of Care Review  Plan of Care Reviewed With: patient  Patient Agreement with Plan of Care: agrees  Progress: improving  Intervention(s): NSG assessment, monitored for safety, encouraged to participate in treatment  Outcome Summary: Gemma has been irritable and frustrated today. She has been  perseverative about being discharged. She was seen crying on the phone multiple times then would present to the nurses station and demand she see her psychiatrist. She denies feeling depressed/SI/HI. She admits to feeling anxious here on the unit as she does not feel she could benefit from treatment. She states \"I need to get the fuck out of here. This place isn't helping me. I'm fine\". She did express frustration about her housing disposition as she claims that her mother and father will not allow her to live with them. Today Gemma refused group and was minimally social with peers. She does seek out nursing support frequently.   She was compliant with medication and received PRN Ativan at 1225 with positive effect. Emotional support provided. Will CTM/Support.   "

## 2022-04-04 NOTE — PROGRESS NOTES
PSYCHIATRIC PROGRESS NOTE    Interval History: remains  irritable and highly discharge focused. Struggles to redirect to any other topics. Later in morning more accepting of not being discharged today but asks to start planning for transfer to residential. behviorally in control. Asking for prn ativan frequently. Visible on unit and appropriate interactions when not discussing discharge. Denies si. No psychotic/manic sx. Compliant with meds. No sx substance w/d.    Team spoke with mother and updated on plan, who is in agreement with plan for residential.      Vital Signs for the last 24 hours:  Temp:  [36.7 °C (98.1 °F)-36.8 °C (98.2 °F)] 36.7 °C (98.1 °F)  Heart Rate:  [76-81] 81  Resp:  [16-20] 16  BP: (101-118)/(70-72) 101/72    Scheduled Meds:  • levETIRAcetam  250 mg oral BID   • melatonin  3 mg oral Nightly   • nicotine  1 patch transdermal Daily   • OXcarbazepine  600 mg oral BID       Labs:  Selected Electrolytes        Recent EKG HR/QTC  Lab Results   Component Value Date    VENTRICRATE 140 03/21/2022    QTCCALCULAT 375 03/21/2022       No results found for: VPA, LITHIUM    MENTAL STATUS EXAM  Appearance: well groomed  Gait and Motor: no abnormal movements and normal  Speech: normal rate/rhythm/volume  Mood: irritable  Affect: irritable  Associations: coherent  Thought Process: goal-directed  Thought Content: no auditory or visual hallucinations.  Suicidality/Homicidality: denies  Judgement/Insight: help rejecting  Orientation: day, month and year  Memory: recalls recent events and recalls remote events  Attention: alert  Knowledge: normal  Language: normal     Assessment/Plan  * Mood disorder (CMS/[facility])  Assessment & Plan  -on 303 x 10 days as of 4/1  -increase trileptal 600mg bid  -prn ativan/zyprexa for agitation  -structure and support  -will look into residential options once stable  -keep family updated      Substance use disorder  Assessment & Plan  -no current signs of benzo/opiate w/d  -motivational  interviewing  -will offer GREG program as part of aftercare plan  -desire to use likely a component of urgency to leave unit    Seizure (CMS/HCC)  Assessment & Plan  -related to OD  -cross titrating keppra to trileptal as is better for mood.

## 2022-04-05 LAB
ALBUMIN SERPL-MCNC: 3.8 G/DL (ref 3.4–5)
ALP SERPL-CCNC: 53 IU/L (ref 35–126)
ALT SERPL-CCNC: 23 IU/L (ref 11–54)
ANION GAP SERPL CALC-SCNC: 10 MEQ/L (ref 3–15)
ANION GAP SERPL CALC-SCNC: 12 MEQ/L (ref 3–15)
ANION GAP SERPL CALC-SCNC: 13 MEQ/L (ref 3–15)
ANION GAP SERPL CALC-SCNC: 7 MEQ/L (ref 3–15)
ANION GAP SERPL CALC-SCNC: 8 MEQ/L (ref 3–15)
AST SERPL-CCNC: 46 IU/L (ref 15–41)
BACTERIA BLD CULT: NORMAL
BACTERIA BLD CULT: NORMAL
BASE EXCESS BLDA CALC-SCNC: -5.6 MEQ/L
BASE EXCESS BLDA CALC-SCNC: -6.6 MEQ/L
BASOPHILS # BLD: 0.02 K/UL (ref 0.01–0.1)
BASOPHILS # BLD: 0.12 K/UL (ref 0.01–0.1)
BASOPHILS NFR BLD: 0.1 %
BASOPHILS NFR BLD: 1.3 %
BILIRUB DIRECT SERPL-MCNC: 0.5 MG/DL
BILIRUB SERPL-MCNC: 1.1 MG/DL (ref 0.3–1.2)
BUN SERPL-MCNC: 15 MG/DL (ref 8–20)
BUN SERPL-MCNC: 6 MG/DL (ref 8–20)
BUN SERPL-MCNC: 7 MG/DL (ref 8–20)
BUN SERPL-MCNC: 8 MG/DL (ref 8–20)
BUN SERPL-MCNC: 9 MG/DL (ref 8–20)
CA-I BLD-SCNC: 1.18 MMOL/L (ref 1.15–1.27)
CA-I BLD-SCNC: 1.22 MMOL/L (ref 1.15–1.27)
CALCIUM SERPL-MCNC: 7.9 MG/DL (ref 8.9–10.3)
CALCIUM SERPL-MCNC: 8.2 MG/DL (ref 8.9–10.3)
CALCIUM SERPL-MCNC: 8.5 MG/DL (ref 8.9–10.3)
CALCIUM SERPL-MCNC: 8.6 MG/DL (ref 8.9–10.3)
CALCIUM SERPL-MCNC: 8.6 MG/DL (ref 8.9–10.3)
CALCIUM SERPL-MCNC: 8.9 MG/DL (ref 8.9–10.3)
CALCIUM SERPL-MCNC: 9.4 MG/DL (ref 8.9–10.3)
CHLORIDE BLDA-SCNC: 108 MEQ/L (ref 98–109)
CHLORIDE BLDA-SCNC: 108 MEQ/L (ref 98–109)
CHLORIDE SERPL-SCNC: 104 MEQ/L (ref 98–109)
CHLORIDE SERPL-SCNC: 108 MEQ/L (ref 98–109)
CHLORIDE SERPL-SCNC: 108 MEQ/L (ref 98–109)
CHLORIDE SERPL-SCNC: 109 MEQ/L (ref 98–109)
CHLORIDE SERPL-SCNC: 111 MEQ/L (ref 98–109)
CK SERPL-CCNC: 3767 U/L (ref 15–200)
CK SERPL-CCNC: 4395 U/L (ref 15–200)
CO2 BLDA-SCNC: 18 MEQ/L (ref 22–32)
CO2 BLDA-SCNC: 20.4 MEQ/L (ref 22–32)
CO2 SERPL-SCNC: 15 MEQ/L (ref 22–32)
CO2 SERPL-SCNC: 18 MEQ/L (ref 22–32)
CO2 SERPL-SCNC: 18 MEQ/L (ref 22–32)
CO2 SERPL-SCNC: 19 MEQ/L (ref 22–32)
CO2 SERPL-SCNC: 20 MEQ/L (ref 22–32)
CO2 SERPL-SCNC: 23 MEQ/L (ref 22–32)
CO2 SERPL-SCNC: 24 MEQ/L (ref 22–32)
COHGB MFR BLD: 0.9 %
CREAT SERPL-MCNC: 0.6 MG/DL (ref 0.6–1.1)
CREAT SERPL-MCNC: 0.7 MG/DL (ref 0.6–1.1)
CREAT SERPL-MCNC: 0.8 MG/DL (ref 0.6–1.1)
DIFFERENTIAL METHOD BLD: ABNORMAL
DIFFERENTIAL METHOD BLD: ABNORMAL
EOSINOPHIL # BLD: 0 K/UL (ref 0.04–0.36)
EOSINOPHIL # BLD: 0.32 K/UL (ref 0.04–0.36)
EOSINOPHIL NFR BLD: 0 %
EOSINOPHIL NFR BLD: 3.6 %
ERYTHROCYTE [DISTWIDTH] IN BLOOD BY AUTOMATED COUNT: 12 % (ref 11.7–14.4)
ERYTHROCYTE [DISTWIDTH] IN BLOOD BY AUTOMATED COUNT: 12 % (ref 11.7–14.4)
ERYTHROCYTE [DISTWIDTH] IN BLOOD BY AUTOMATED COUNT: 12.1 % (ref 11.7–14.4)
ERYTHROCYTE [DISTWIDTH] IN BLOOD BY AUTOMATED COUNT: 12.2 % (ref 11.7–14.4)
ERYTHROCYTE [DISTWIDTH] IN BLOOD BY AUTOMATED COUNT: 12.4 % (ref 11.7–14.4)
FIO2 ON VENT: 40 %
GFR SERPL CREATININE-BSD FRML MDRD: >60 ML/MIN/1.73M*2
GLUCOSE BLDA-MCNC: 104 MG/DL (ref 70–99)
GLUCOSE BLDA-MCNC: 67 MG/DL (ref 70–99)
GLUCOSE SERPL-MCNC: 100 MG/DL (ref 70–99)
GLUCOSE SERPL-MCNC: 108 MG/DL (ref 70–99)
GLUCOSE SERPL-MCNC: 125 MG/DL (ref 70–99)
GLUCOSE SERPL-MCNC: 60 MG/DL (ref 70–99)
GLUCOSE SERPL-MCNC: 84 MG/DL (ref 70–99)
GLUCOSE SERPL-MCNC: 86 MG/DL (ref 70–99)
GLUCOSE SERPL-MCNC: 97 MG/DL (ref 70–99)
GRAM STN SPEC: NORMAL
HCO3 BLDA-SCNC: 20 MEQ/L (ref 21–28)
HCO3 BLDA-SCNC: 20.6 MEQ/L (ref 21–28)
HCT VFR BLDCO AUTO: 31.1 % (ref 35–45)
HCT VFR BLDCO AUTO: 31.4 % (ref 35–45)
HCT VFR BLDCO AUTO: 31.9 % (ref 35–45)
HCT VFR BLDCO AUTO: 33.1 % (ref 35–45)
HCT VFR BLDCO AUTO: 36.1 % (ref 35–45)
HCT VFR BLDCO AUTO: 37.2 % (ref 35–45)
HCT VFR BLDCO AUTO: 38.3 % (ref 35–45)
HGB BLD-MCNC: 10.4 G/DL (ref 11.8–15.7)
HGB BLD-MCNC: 10.6 G/DL (ref 11.8–15.7)
HGB BLD-MCNC: 10.9 G/DL (ref 11.8–15.7)
HGB BLD-MCNC: 11.3 G/DL (ref 11.8–15.7)
HGB BLD-MCNC: 12.2 G/DL (ref 11.8–15.7)
HGB BLD-MCNC: 12.4 G/DL (ref 11.8–15.7)
HGB BLD-MCNC: 13 G/DL (ref 11.8–15.7)
HGB BLDA OXIMETRY-MCNC: 12 G/DL (ref 12–16)
HGB BLDA-MCNC: 10.5 G/DL (ref 12–16)
IMM GRANULOCYTES # BLD AUTO: 0.05 K/UL (ref 0–0.08)
IMM GRANULOCYTES # BLD AUTO: 0.07 K/UL (ref 0–0.08)
IMM GRANULOCYTES NFR BLD AUTO: 0.3 %
IMM GRANULOCYTES NFR BLD AUTO: 0.8 %
INHALED O2 CONCENTRATION: ABNORMAL %
LACTATE BLDA-SCNC: 0.6 MMOL/L (ref 0.4–1.6)
LACTATE BLDA-SCNC: 1.8 MMOL/L (ref 0.4–1.6)
LYMPHOCYTES # BLD: 0.46 K/UL (ref 1.2–3.5)
LYMPHOCYTES # BLD: 2.94 K/UL (ref 1.2–3.5)
LYMPHOCYTES NFR BLD: 3.2 %
LYMPHOCYTES NFR BLD: 32.9 %
MCH RBC QN AUTO: 28.8 PG (ref 28–33.2)
MCH RBC QN AUTO: 29 PG (ref 28–33.2)
MCH RBC QN AUTO: 29.1 PG (ref 28–33.2)
MCH RBC QN AUTO: 29.3 PG (ref 28–33.2)
MCH RBC QN AUTO: 29.4 PG (ref 28–33.2)
MCH RBC QN AUTO: 29.5 PG (ref 28–33.2)
MCH RBC QN AUTO: 29.9 PG (ref 28–33.2)
MCHC RBC AUTO-ENTMCNC: 32.8 G/DL (ref 32.2–35.5)
MCHC RBC AUTO-ENTMCNC: 33.2 G/DL (ref 32.2–35.5)
MCHC RBC AUTO-ENTMCNC: 33.4 G/DL (ref 32.2–35.5)
MCHC RBC AUTO-ENTMCNC: 33.9 G/DL (ref 32.2–35.5)
MCHC RBC AUTO-ENTMCNC: 34.1 G/DL (ref 32.2–35.5)
MCHC RBC AUTO-ENTMCNC: 34.3 G/DL (ref 32.2–35.5)
MCHC RBC AUTO-ENTMCNC: 34.7 G/DL (ref 32.2–35.5)
MCV RBC AUTO: 83.7 FL (ref 83–98)
MCV RBC AUTO: 84.9 FL (ref 83–98)
MCV RBC AUTO: 87 FL (ref 83–98)
MCV RBC AUTO: 87 FL (ref 83–98)
MCV RBC AUTO: 87.6 FL (ref 83–98)
MCV RBC AUTO: 87.7 FL (ref 83–98)
MCV RBC AUTO: 88.4 FL (ref 83–98)
METHGB BLD-SCNC: 0.8 % (ref 0.4–1.5)
MONOCYTES # BLD: 0.22 K/UL (ref 0.28–0.8)
MONOCYTES # BLD: 0.65 K/UL (ref 0.28–0.8)
MONOCYTES NFR BLD: 1.5 %
MONOCYTES NFR BLD: 7.3 %
NEUTROPHILS # BLD: 13.69 K/UL (ref 1.7–7)
NEUTROPHILS # BLD: 4.84 K/UL (ref 1.7–7)
NEUTS SEG NFR BLD: 54.1 %
NEUTS SEG NFR BLD: 94.9 %
NRBC BLD-RTO: 0 %
NRBC BLD-RTO: 0 %
PCO2 BLDA: 29 MM HG (ref 35–48)
PCO2 BLDA: 35 MM HG (ref 35–48)
PDW BLD AUTO: 10 FL (ref 9.4–12.3)
PDW BLD AUTO: 10 FL (ref 9.4–12.3)
PDW BLD AUTO: 10.8 FL (ref 9.4–12.3)
PDW BLD AUTO: 9 FL (ref 9.4–12.3)
PDW BLD AUTO: 9.1 FL (ref 9.4–12.3)
PDW BLD AUTO: 9.3 FL (ref 9.4–12.3)
PDW BLD AUTO: 9.6 FL (ref 9.4–12.3)
PH BLDA: 7.35 [PH] (ref 7.35–7.45)
PH BLDA: 7.38 PH (ref 7.35–7.45)
PHOSPHATE SERPL-MCNC: 4.1 MG/DL (ref 2.4–4.7)
PLATELET # BLD AUTO: 190 K/UL (ref 150–369)
PLATELET # BLD AUTO: 211 K/UL (ref 150–369)
PLATELET # BLD AUTO: 254 K/UL (ref 150–369)
PLATELET # BLD AUTO: 260 K/UL (ref 150–369)
PLATELET # BLD AUTO: 265 K/UL (ref 150–369)
PLATELET # BLD AUTO: 267 K/UL (ref 150–369)
PLATELET # BLD AUTO: 373 K/UL (ref 150–369)
PO2 BLDA: 204 MM HG (ref 83–100)
PO2 BLDA: 74 MM HG (ref 83–100)
POCT PATIENT TEMPERATURE: 98.6 °F (ref 97–99)
POCT TEST (BLD GAS): ABNORMAL
POTASSIUM BLDA-SCNC: 3.6 MEQ/L (ref 3.4–4.5)
POTASSIUM BLDA-SCNC: 3.9 MEQ/L (ref 3.4–4.5)
POTASSIUM SERPL-SCNC: 3.2 MEQ/L (ref 3.6–5.1)
POTASSIUM SERPL-SCNC: 3.3 MEQ/L (ref 3.6–5.1)
POTASSIUM SERPL-SCNC: 3.9 MEQ/L (ref 3.6–5.1)
POTASSIUM SERPL-SCNC: 4 MEQ/L (ref 3.6–5.1)
POTASSIUM SERPL-SCNC: 4.3 MEQ/L (ref 3.6–5.1)
POTASSIUM SERPL-SCNC: 4.4 MEQ/L (ref 3.6–5.1)
POTASSIUM SERPL-SCNC: 4.5 MEQ/L (ref 3.6–5.1)
PROT SERPL-MCNC: 6.4 G/DL (ref 6–8.2)
RBC # BLD AUTO: 3.55 M/UL (ref 3.93–5.22)
RBC # BLD AUTO: 3.61 M/UL (ref 3.93–5.22)
RBC # BLD AUTO: 3.75 M/UL (ref 3.93–5.22)
RBC # BLD AUTO: 3.9 M/UL (ref 3.93–5.22)
RBC # BLD AUTO: 4.15 M/UL (ref 3.93–5.22)
RBC # BLD AUTO: 4.24 M/UL (ref 3.93–5.22)
RBC # BLD AUTO: 4.4 M/UL (ref 3.93–5.22)
SAO2 % BLDA: 96 % (ref 93–98)
SAO2 % BLDA: 98 % (ref 93–98)
SODIUM BLDA-SCNC: 135 MEQ/L (ref 136–145)
SODIUM BLDA-SCNC: 136 MEQ/L (ref 136–145)
SODIUM SERPL-SCNC: 134 MEQ/L (ref 136–144)
SODIUM SERPL-SCNC: 136 MEQ/L (ref 136–144)
SODIUM SERPL-SCNC: 137 MEQ/L (ref 136–144)
SODIUM SERPL-SCNC: 138 MEQ/L (ref 136–144)
SODIUM SERPL-SCNC: 138 MEQ/L (ref 136–144)
WBC # BLD AUTO: 12.6 K/UL (ref 3.8–10.5)
WBC # BLD AUTO: 13.24 K/UL (ref 3.8–10.5)
WBC # BLD AUTO: 14.44 K/UL (ref 3.8–10.5)
WBC # BLD AUTO: 16.31 K/UL (ref 3.8–10.5)
WBC # BLD AUTO: 17.76 K/UL (ref 3.8–10.5)
WBC # BLD AUTO: 20.02 K/UL (ref 3.8–10.5)
WBC # BLD AUTO: 8.94 K/UL (ref 3.8–10.5)

## 2022-04-05 PROCEDURE — 36415 COLL VENOUS BLD VENIPUNCTURE: CPT | Performed by: STUDENT IN AN ORGANIZED HEALTH CARE EDUCATION/TRAINING PROGRAM

## 2022-04-05 PROCEDURE — 63700000 HC SELF-ADMINISTRABLE DRUG: Performed by: PSYCHIATRY & NEUROLOGY

## 2022-04-05 PROCEDURE — 12400000 HC ROOM AND CARE SEMIPRIVATE PSYCH

## 2022-04-05 PROCEDURE — 80048 BASIC METABOLIC PNL TOTAL CA: CPT | Performed by: STUDENT IN AN ORGANIZED HEALTH CARE EDUCATION/TRAINING PROGRAM

## 2022-04-05 PROCEDURE — 85025 COMPLETE CBC W/AUTO DIFF WBC: CPT | Performed by: STUDENT IN AN ORGANIZED HEALTH CARE EDUCATION/TRAINING PROGRAM

## 2022-04-05 PROCEDURE — 99232 SBSQ HOSP IP/OBS MODERATE 35: CPT | Performed by: PSYCHIATRY & NEUROLOGY

## 2022-04-05 RX ADMIN — LORAZEPAM 1 MG: 1 TABLET ORAL at 20:01

## 2022-04-05 RX ADMIN — NICOTINE 1 PATCH: 21 PATCH, EXTENDED RELEASE TRANSDERMAL at 09:00

## 2022-04-05 RX ADMIN — LEVETIRACETAM 250 MG: 250 TABLET, FILM COATED ORAL at 09:00

## 2022-04-05 RX ADMIN — NICOTINE POLACRILEX 2 MG: 2 GUM, CHEWING BUCCAL at 20:51

## 2022-04-05 RX ADMIN — OXCARBAZEPINE 600 MG: 300 TABLET, FILM COATED ORAL at 09:00

## 2022-04-05 RX ADMIN — Medication 3 MG: at 20:01

## 2022-04-05 RX ADMIN — LORAZEPAM 1 MG: 1 TABLET ORAL at 13:50

## 2022-04-05 NOTE — PROGRESS NOTES
spoke with Ariel in Admissions at [facility]  ([phone] cell phone; [phone] fax). Ariel asked  to fax clinical documentation.  spoke with patient about [facility].  told patient that the notes do not look positive for her to transfer to a residential treatment facility, i.e., she presents as negative, irritable and feels groups are boring, a waste. Patient responded that she would be OK going to the [facility] but does not want to remain on the unit. told patient the [facility] will want phone interview with her and, at that time, patient could state that she is receptive to going to the [facility] and engaging in treatment there.

## 2022-04-05 NOTE — PLAN OF CARE
Plan of Care Review  Plan of Care Reviewed With: patient  Patient Agreement with Plan of Care: agrees  Progress: improving  Intervention(s): NSG assessment, monitored for safety  Outcome Summary: Gemma presents disheveled today but is dressed appropriately. Mood is upbeat, bright and she is smiling. Sociable with staff and select peers throughout the shift. She was visible in the community at times. She was compliant with medication. She did not attend groups. [facility] and was smiling and excited afterwards expressing that it went well. Gemma also opened up about here suicide attempt. She explained that her guilt R/T the stress she caused her mother over the years has been weighing on her. She describes wanting to end her life to put an end to her mothers stress/sadness. She now sees that her mom would be her happiest to see her thrive in life. Will CTM/Support.

## 2022-04-05 NOTE — PROGRESS NOTES
PSYCHIATRIC PROGRESS NOTE    Interval History: bright and jovial with team. Remains solely discharge focused. [facility] interactions immature but overall appropriate. No sx substance w/d. No physical agitation/aggression. Not attending most groups. States she is open to residential placement but wants to stop at home first. Future oriented with no si.       Vital Signs for the last 24 hours:  Temp:  [36.7 °C (98.1 °F)] 36.7 °C (98.1 °F)  Heart Rate:  [] 124  Resp:  [16] 16  BP: (107-112)/(61-69) 108/63    Scheduled Meds:  • melatonin  3 mg oral Nightly   • nicotine  1 patch transdermal Daily   • OXcarbazepine  600 mg oral BID       Labs:  Selected Electrolytes  Results from last 7 days   Lab Units 04/05/22  0857   POTASSIUM mEQ/L 4.4   SODIUM mEQ/L 136   CREATININE mg/dL 0.6       Recent EKG HR/QTC  Lab Results   Component Value Date    VENTRICRATE 140 03/21/2022    QTCCALCULAT 375 03/21/2022       No results found for: VPA, LITHIUM    MENTAL STATUS EXAM  Appearance: well groomed  Gait and Motor: no abnormal movements and normal  Speech: normal rate/rhythm/volume  Mood even  Affect neutral, reactive  Associations: coherent  Thought Process: goal-directed  Thought Content: no auditory or visual hallucinations.  Suicidality/Homicidality: denies  Judgement/Insight: help rejecting  Orientation: day, month and year  Memory: recalls recent events and recalls remote events  Attention: alert  Knowledge: normal  Language: normal     Assessment/Plan  * Mood disorder (CMS/HCC)  Assessment & Plan  -on 303 x 10 days as of 4/1  -continue trileptal 600mg bid  -prn ativan/zyprexa for agitation  -discharge planning  -will look into residential options   -keep family updated      Substance use disorder  Assessment & Plan  -no current signs of benzo/opiate w/d  -motivational interviewing  -will offer GREG program as part of aftercare plan  -desire to use likely a component of urgency to leave unit    Seizure  (CMS/[facility])  Assessment & Plan  -related to OD  -cross titrating keppra to trileptal as is better for mood.

## 2022-04-05 NOTE — PLAN OF CARE
Called and left message for patient to call Plan of Care Review  Plan of Care Reviewed With: patient  Patient Agreement with Plan of Care: agrees  Progress: improving  Intervention(s): Provided emotional support as needed; Encouraged participation in groups and activities; Monitored for safety Q 15 minutes  Outcome Summary: Gemma was briefly visible on the unit this evening, observed talking on the phone and watching television, withdrawn to herself. She continues to present as irritable and dismissive at times. Discharge focused and frequently expresses frustration that she is still here. Denied SI/HI/AVH as well as depression. Endorses mild anxiety. Eating and sleeping well. Attending to ADLs. She was compliant with night time medication without incident and offered no c/o side effects. Will continue to provide support as needed.

## 2022-04-05 NOTE — PROGRESS NOTES
"PSYCHIATRIC PROGRESS NOTE    Subjective     Chief Complaint/Reason for follow-up: s/p suicide attempt by toxic pill ingestion      Interval History: VS reviewed--isolated tachycardia to 120s. Med-adherent, Keppra reduced to 250mg BID, Trileptal increased to 600mg BID; continuing to utilize PRN Ativan 1mg approximately twice daily in the early afternoon and evening. Per RN notes, continues to be irritable and frustrated, perseverative on discharge, though denying SI/HI/AVH. Not attending most groups.     On evaluation, pt continues to be discharge-focused. Describes night as \"horrible\" and jokes w/ team \"I bet YOUR night wasn't horrible.\" Continues to deny SI and discuss plans for the future. Wants to negotiate the possibility of a brief discharge home prior to intake at a residential program.     Review of Systems:   Sleep:  Fair, Appetite: Fair and GI: No complaints      Objective     Scheduled Meds:  • levETIRAcetam  250 mg oral BID   • melatonin  3 mg oral Nightly   • nicotine  1 patch transdermal Daily   • OXcarbazepine  600 mg oral BID       PRN Meds:  •  acetaminophen  •  diphenhydrAMINE  •  docusate sodium  •  LORazepam  •  nicotine polacrilex  •  OLANZapine zydis **OR** OLANZapine  •  senna    Vital Signs for the last 24 hours:  Temp:  [36.7 °C (98.1 °F)] 36.7 °C (98.1 °F)  Heart Rate:  [] 124  Resp:  [16] 16  BP: (107-112)/(61-69) 108/63    Labs: Selected Electrolytes        Recent EKG HR/QTC  Lab Results   Component Value Date    VENTRICRATE 140 03/21/2022    QTCCALCULAT 375 03/21/2022       No results found for: VPA, LITHIUM    MENTAL STATUS EXAM  Appearance: well groomed and appropriate attire  Gait and Motor: normal  Speech: normal rate/rhythm/volume  Mood: \"horrible\"  Affect: incongruent and euthymic  Associations: coherent and logical  Thought Process: goal-directed  Thought Content: no auditory or visual hallucinations. 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     * Mood disorder (CMS/HCC)  Assessment & Plan  -Continue 303   -Increase database  -Trileptal 600mg BID for dual-indication of mood stability and seizure prophylaxis  -Weekly CBC, BMP (monitor Na); baseline TSH wnl  -Close monitoring for increased suicidal ideation vs mood benefit on Trileptal and consideration of discontinuation OP if no longer indicated for seizure prophylaxis and not helpful for mood  -Psychoeducation and group/milieu therapy  -T/c residential options on discharge  -Update family     Substance use disorder  Assessment & Plan  -Continue motivational interviewing  -Will offer GREG program as part of aftercare plan    MSSA (methicillin susceptible Staphylococcus aureus) pneumonia (CMS/[facility])  Assessment & Plan  -Breathing comfortably on RA  -Complete course of PO Keflex    Seizure (CMS/HCC)  Assessment & Plan  -No prior seizure history  -Discontinue Keppra given cross taper to Trileptal and undesirable neuropsychiatric side effect profile of Keppra   -Neurology follow-up OP w/ Dr. Moctezuma    This is a resident note, please await attending's final attestation.    Aaron Bloch, MD  Psychiatry Resident, PGY2

## 2022-04-06 VITALS
SYSTOLIC BLOOD PRESSURE: 114 MMHG | HEIGHT: 65 IN | BODY MASS INDEX: 19.86 KG/M2 | HEART RATE: 112 BPM | RESPIRATION RATE: 16 BRPM | DIASTOLIC BLOOD PRESSURE: 76 MMHG | WEIGHT: 119.2 LBS | TEMPERATURE: 97.7 F | OXYGEN SATURATION: 97 %

## 2022-04-06 PROBLEM — R56.9 SEIZURE (CMS/HCC): Status: RESOLVED | Noted: 2022-03-21 | Resolved: 2022-04-06

## 2022-04-06 PROCEDURE — 63700000 HC SELF-ADMINISTRABLE DRUG: Performed by: PSYCHIATRY & NEUROLOGY

## 2022-04-06 PROCEDURE — 99232 SBSQ HOSP IP/OBS MODERATE 35: CPT | Performed by: PSYCHIATRY & NEUROLOGY

## 2022-04-06 RX ORDER — OXCARBAZEPINE 600 MG/1
600 TABLET, FILM COATED ORAL 2 TIMES DAILY
Qty: 60 TABLET | Refills: 0 | Status: SHIPPED | OUTPATIENT
Start: 2022-04-06 | End: 2023-05-18 | Stop reason: ALTCHOICE

## 2022-04-06 RX ADMIN — OXCARBAZEPINE 600 MG: 300 TABLET, FILM COATED ORAL at 08:06

## 2022-04-06 RX ADMIN — NICOTINE 1 PATCH: 21 PATCH, EXTENDED RELEASE TRANSDERMAL at 08:08

## 2022-04-06 NOTE — PLAN OF CARE
Plan of Care Review  Plan of Care Reviewed With: patient  Patient Agreement with Plan of Care: agrees  Progress: improving  Intervention(s): Provided emotional support as needed; Nursing assessment; Monitored for safety Q 15 minutes  Outcome Summary: Gemma was more visible on the unit this evening than noted on previous shift. Observed watching television and noted to be selectively social with peers and staff. She initially presented as bright and pleasant and reported looking forward to discharge tomorrow. She later became irritated after finding out her father had not dropped off any belongings for her and was angry that she had the hiccups, requesting medication numerous times to help her sleep. Denied SI/HI/AVH as well as depression. Continues to report mild anxiety. Eating and sleeping well. Attending to ADLs. She was compliant with night time medication without incident and offered no c/o side effects. Will continue to provide support as needed.

## 2022-04-06 NOTE — PROGRESS NOTES
faxed clinical documentation to [facility] yesterday, 4/5/22. Ariel in Admissions conducted phone assessment with Gemma and subsequently Gemma was approved for admission.  spoke with Gemma's mother who stated she will transport Gemma to [facility] on 4/6/22.     met with Gemma prior to discharge on 4/6/22 and Gemma stated she feels she will start to get the help she needs at [facility].

## 2022-04-06 NOTE — DISCHARGE SUMMARY
Inpatient Psychiatry Discharge Summary    BRIEF OVERVIEW  Admitting Provider: Marvel Villa MD  Discharge Provider: Hari Braun MD  Primary Care Physician at Discharge: Arlette Tolentino [phone]     Admission Date: 3/29/2022     Discharge Date: 4/6/2022    Discharge Diagnoss  Mood d/o nos s/p OD, polysubstance abuse    Discharge Disposition  Skilled Nursing Facility - Other     Discharge Medications     Medication List      START taking these medications    OXcarbazepine 600 mg tablet  Commonly known as: TRILEPTAL  Take 1 tablet (600 mg total) by mouth 2 (two) times a day.  Dose: 600 mg        CONTINUE taking these medications    melatonin 5 mg capsule  Take 1 capsule (5 mg total) by mouth nightly as needed (sleep).  Dose: 5 mg     nicotine 21 mg/24 hr  Commonly known as: NICODERM CQ  Place 1 patch on the skin daily.  Dose: 1 patch        STOP taking these medications    acetaminophen 325 mg tablet  Commonly known as: TYLENOL     cephalexin 500 mg capsule  Commonly known as: KEFLEX     levETIRAcetam 500 mg tablet  Commonly known as: KEPPRA     LORazepam 0.5 mg tablet  Commonly known as: ATIVAN                   Outpatient Follow-Up  Encounter Information    This patient does not currently have any appointments scheduled.         Test Results Pending at Discharge        DETAILS OF HOSPITAL STAY    Presenting Problem/History of Present Illness    Gemma Darnell is a 22 y.o. adult known to unit from recent admission for depression during which time she underwent successful course of ect. She had been at residential facility after that but was transferred home with family and has not been compliant with op treatment since then.      She presented to Ed on 3/21 after intentional overdose on unspecified quantity of effexor/oxycontin/benadryl with subsequent seizure activity. She states she was found by her brother and taken to ED. Reports her intent was to die and that she had been planning this for  several weeks. She can not identify trigger other than ongoing depression and breakup with bf (who also has substance use issues). Pt denies recent use but uox positive for benzos/amphetamines/opiates. She asks team for adderall on arrival to unit.      Pt was treated in ICU with IV ativan and started on keppra for sz prophylaxis. She was intubated and extubated on 3/23. She is admitted to ipu on 302 3/29. Also found to have aspiration pneumonia being treated with po keflex. Mri head 3/22 unremarkable.      Pt has long treatment history and believes she has bipolar disorder based on periods of decreased sleep, promiscuity, spending but admits most of these occurred while using cocaine. Reports her sister carries diagnosis of bipolar d/o but is treated with wellbutrin. Past med trials include lexapro, seroquel, abilify, lamictal, zoloft, prozac, effexor, zyprexa.      She reports disappointment that attempt failed but is open to tretament now and would like to be transferred back to residential facility once stable. Bright and social with team. No sx of opiate/benzo w/d. Visible on unit. Eating meals.      No active suidical plan/intent on unit.     Not currently interested in ect.    Hospital Course  Pt arrived on unit and was immediately discharge focused. She was visible and jovial on unit with reactive affect and looked less depressed compared with prior admission. She was eating/sleeping well and expressed regret over suicide attempt.   Three were no signs of substance w/d. She was awarded 10 days inpt at [facility] hearing. topamax started in ICU was switched to trileptal for improved mood stablity which she tolerated well. Family was in contact and supportive. On 4/6 she was close to baseline and discharged to the [facility], a residential treatment facility.    Consults:   Orders Placed This Encounter   Procedures   • Inpatient consult to Hospitalist     * Mood disorder (CMS/[facility])  Assessment & Plan  -on 303 x 10 days as  of 4/1  -improved and close to baseline  -continue trileptal 600mg bid  -d/c today to residential, mother will transport      Substance use disorder  Assessment & Plan  -no current signs of benzo/opiate w/d  -[facility] referrals given

## 2022-10-20 NOTE — ASSESSMENT & PLAN NOTE
- right pupil more dilated than left on exam   - both pupils reactive to light, however, right more sluggish in comparison   - patient denies any recent falls or trauma.. although bruise on back?  - no neuro deficits or abnormalities on exam   - CT head negative    Stroke Week 1 Survey    Flowsheet Row Responses   [facility] patient discharged from? [facility]   Does the patient have one of the following disease processes/diagnoses(primary or secondary)? Stroke   Week 1 attempt successful? Yes   Call start time 1505   Call end time 1508   Discharge diagnosis CVA   Meds reviewed with patient/caregiver? Yes   Is the patient taking all medications as directed (includes completed medication regime)? Yes   Does the patient have a primary care provider?  Yes   Does the patient have an appointment with their PCP within 7 days of discharge? Yes   Comments regarding PCP Tele phone   Has the patient kept scheduled appointments due by today? Yes   Psychosocial issues? No   Does the patient require any assistance with activities of daily living such as eating, bathing, dressing, walking, etc.? No   Does the patient have any residual symptoms from stroke/TIA? No   Did the patient receive a copy of their discharge instructions? Yes   Nursing interventions Reviewed instructions with patient   What is the patient's perception of their health status since discharge? Improving   Nursing interventions Nurse provided patient education   Is the patient/caregiver able to teach back the risk factors for a stroke? History of TIAs, High blood pressure-goal below 120/80, Sleep apnea, High Cholesterol, Carotid or other artery disease   Is the patient/caregiver able to teach back signs and symptoms related to disease process for when to call PCP? Yes   Is the patient/caregiver able to teach back signs and symptoms related to disease process for when to call 911? Yes   Is the patient/caregiver able to teach back the hierarchy of who to call/visit for symptoms/problems? PCP, Specialist, Home health nurse, Urgent Care, ED, 911 Yes   Is the patient able to teach back FAST for Stroke? B alance: Watch for sudden loss of balance, E yes: Check for vision loss, F ace: Look for an uneven smile, A rm: Check if one  arm is weak, S peech: Listen for slurred speech, T kit: Call 9-1-1 right away   Week 1 call completed? Yes   Wrap up additional comments Pt reports she is doing well at this time.           MICHAEL MENDEZ - Registered Nurse

## 2023-04-28 ENCOUNTER — TELEPHONE (OUTPATIENT)
Dept: ADMISSIONS | Facility: HOSPITAL | Age: 23
End: 2023-04-28
Payer: COMMERCIAL

## 2023-04-28 NOTE — TELEPHONE ENCOUNTER
Initial Intake    Gemma Darnell, a 23 y.o. female.    Reason for seeking services (in client's own words)?: pt looking to see doc for medication management.  pt currently in a 14 week OP program through [facility] and needs doc to manage meds ongoing..Pt has hx of depression    Current Mental Health Symptoms  Current Symptoms: Depression (mdd with psychotic features)  Depression: -- (pt denies any acute psychiatric symptoms or psychosis)    Mental Health Treatment History  Prior Treatment Reported?: Yes  Type of Treatment: Inpatient; PHP; Residential; IOP; Outpatient    PHP  Details: [facility]-jan 2023  Was treatment completed?: Yes    IOP  Details: [place] feb 2023- d/c mid march 23    Inpatient  Details: [place]- d/c end of summer 2022  Was treatment completed?: Yes    Outpatient  Details: [facility] 14 week program -OP    Residential  Details: cannot recall name d/c sept 22 in [place]    Medical History  Extensive History: None  Medications: clozapine 125mg bid, bupropion 300mg daily,metformin 750mg daily    Suicide Thoughts/Plans  Have you had suicidal thoughts in the past 72 hours?: No  Have you ever had suicidal thoughts?: Yes  Describe: june 2022 - passive si  Do you have a plan?: No  Have you ever attempted?: Yes (march 2022-attempted via od)  Have you ever harmed yourself?: No  Do you have easy access to firearms?: No    Violence/Trauma  Do you currently have, or have you ever had, thoughts of harming someone else?: No  Any history of an event that you would consider emotionally/psychologically/physically traumatic?: no    Employment and Legal  Are you actively employed?: No  Are you presently or have you ever been a ? (Career or Volunteer): No  Do you now or have you ever served in the ?: No  Do you now or have you in the past had any legal involvement?: No    Substance Use Details  Substance Use Includes: Alcohol    Alcohol  Alcohol Types: Liquor  Method of use: Oral  Date of last  "use:  (04/24/2023)  Details: 2x per week 2 drinks      Substance Use Treatment History  Is the patient interested in MAT?: No  [facility]?: No  [facility]?: No  Have you ever overdosed?: No  Are you currently experiencing, or have you ever experienced, withdrawal symptoms?: No  Prior treatment reported?: Yes  SUDS Treatment Type: Residential-SUDS    Residential  Details: [facility] sept 2012  Treatment completed?: Yes    Nutrition  Do you have any problematic food related behaviors?: Yes (diagnosed w anorexia in year 2021)  What is your height?: 1.676 m (5' 6\")  What is your weight?: 68 kg (150 lb)  Are you resticting food daily?: No  Are you vomiting after meals regularly?: No  If anyone has been concerned about your food related behaviors, list concerns: no  Have you ever been preoccupied with your looks or body image?: Yes    Determination  Determination: [facility] for Outpatient  Psychiatry Evaluation  First appointment date offered: 06/05/23                    "

## 2023-05-01 ENCOUNTER — TELEPHONE (OUTPATIENT)
Dept: ADMISSIONS | Facility: HOSPITAL | Age: 23
End: 2023-05-01
Payer: COMMERCIAL

## 2023-05-01 NOTE — TELEPHONE ENCOUNTER
Initial Intake    Gemma Darnell, a 23 y.o. female     [facility]  Intake is for which department?: MTC  Reason for seeking services (in client's own words)?: I was inpt at [facility] last year then did PHP at [facility] now looking for psych and therapist    Current Mental Health Symptoms  How are your symptoms affecting your relationships?: my prim dx is MDD with Psychotic features    Mental Health Treatment History  Prior Treatment Reported?: Yes  Type of Treatment: Inpatient    Inpatient  Details: [facility] , [facility] , Twice at [facility], [facility] in [place]    Medical  Medications: Clozapine 250 mg , Wellbutrin 300 mg , Metformin to counter the clozapine 750 mg    Suicide Thoughts/Plans  Have you had suicidal thoughts in the past 72 hours?: No  Have you ever had suicidal thoughts?: Yes  Describe: 3/2022  Do you have a plan?: No  Have you ever attempted?: Yes (I took a bunch of pills , I went to [facility])  Have you ever harmed yourself?: No  Do you have easy access to firearms?: No    Violence/Trauma  Do you currently have, or have you ever had, thoughts of harming someone else?: No  Any history of an event that you would consider emotionally/psychologically/physically traumatic?: no    Employment and Legal  Are you actively employed?: Yes  Do you now or have you in the past had any legal involvement?: No    Substance Use Details  Substance Use Includes: Cocaine/Crack; Alcohol    Alcohol  Details: I was at [facility], [facility]    Cocaine/Crack  Method of use: Smoking  Details: pt reports using cocaine etoh, using it 3 x a week , [facility] for cocaine and etoh      Substance Use Treatment History  Are you currently experiencing, or have you ever experienced, withdrawal symptoms?: No  Prior treatment reported?: Yes  SUDS Treatment Type: Residential-SUDS    Residential  Details: [facility] 2020 , I had a therapist though out this , I am currently in a MH program called [facility]  will be staying with them until august 7th  Treatment completed?: No    Eating Disorders  Do you have any problematic food related behaviors?: Yes  Details: i restricted , I did not eat , [id number] I was at [facility] residential I did a php and IOP there  Have you had any prior treatment?: Yes  If yes, insert comments: [facility] , my lowest weight was 110 I am probably 160 now  Have you ever been preoccupied with your looks or body image?: Yes    Additional Information  [facility]

## 2023-06-08 ENCOUNTER — TELEPHONE (OUTPATIENT)
Dept: ADMISSIONS | Facility: HOSPITAL | Age: 23
End: 2023-06-08
Payer: COMMERCIAL

## 2023-06-09 NOTE — TELEPHONE ENCOUNTER
Initial Intake    Gemma Darnell, a 23 y.o. female                    Referred To Facility: [facility] [address]

## 2023-07-13 ENCOUNTER — HOSPITAL ENCOUNTER (INPATIENT)
Facility: HOSPITAL | Age: 23
LOS: 5 days | Discharge: HOME | DRG: 885 | End: 2023-07-18
Attending: PSYCHIATRY & NEUROLOGY | Admitting: PSYCHIATRY & NEUROLOGY
Payer: COMMERCIAL

## 2023-07-13 ENCOUNTER — HOSPITAL ENCOUNTER (EMERGENCY)
Facility: HOSPITAL | Age: 23
End: 2023-07-13
Attending: EMERGENCY MEDICINE
Payer: COMMERCIAL

## 2023-07-13 VITALS
SYSTOLIC BLOOD PRESSURE: 137 MMHG | OXYGEN SATURATION: 98 % | HEIGHT: 65 IN | HEART RATE: 103 BPM | BODY MASS INDEX: 25.83 KG/M2 | DIASTOLIC BLOOD PRESSURE: 75 MMHG | TEMPERATURE: 98.8 F | RESPIRATION RATE: 18 BRPM | WEIGHT: 155 LBS

## 2023-07-13 DIAGNOSIS — F25.9 SCHIZOAFFECTIVE DISORDER, UNSPECIFIED TYPE: Primary | ICD-10-CM

## 2023-07-13 DIAGNOSIS — G47.19 EXCESSIVE DAYTIME SLEEPINESS: ICD-10-CM

## 2023-07-13 DIAGNOSIS — F39 UNSPECIFIED MOOD (AFFECTIVE) DISORDER (CMS/HCC): Primary | ICD-10-CM

## 2023-07-13 LAB
AMPHET UR QL SCN: NOT DETECTED
ANION GAP SERPL CALC-SCNC: 8 MEQ/L (ref 3–15)
APAP SERPL-MCNC: 0.1 UG/ML (ref 10–30)
BARBITURATES UR QL SCN: NOT DETECTED
BASOPHILS # BLD: 0.06 K/UL (ref 0.01–0.1)
BASOPHILS NFR BLD: 0.6 %
BENZODIAZ UR QL SCN: NOT DETECTED
BUN SERPL-MCNC: 7 MG/DL (ref 7–25)
CALCIUM SERPL-MCNC: 9.3 MG/DL (ref 8.6–10.3)
CANNABINOIDS UR QL SCN: NOT DETECTED
CHLORIDE SERPL-SCNC: 107 MEQ/L (ref 98–107)
CO2 SERPL-SCNC: 21 MEQ/L (ref 21–31)
COCAINE UR QL SCN: NOT DETECTED
CREAT SERPL-MCNC: 0.6 MG/DL (ref 0.6–1.2)
DIFFERENTIAL METHOD BLD: ABNORMAL
EOSINOPHIL # BLD: 0.01 K/UL (ref 0.04–0.36)
EOSINOPHIL NFR BLD: 0.1 %
ERYTHROCYTE [DISTWIDTH] IN BLOOD BY AUTOMATED COUNT: 13.3 % (ref 11.7–14.4)
ETHANOL SERPL-MCNC: <5 MG/DL
FENTANYL URINE SCR: NOT DETECTED
GFR SERPL CREATININE-BSD FRML MDRD: >60 ML/MIN/1.73M*2
GLUCOSE SERPL-MCNC: 111 MG/DL (ref 70–99)
HCG UR QL: NEGATIVE
HCT VFR BLDCO AUTO: 38 % (ref 35–45)
HGB BLD-MCNC: 12.2 G/DL (ref 11.8–15.7)
IMM GRANULOCYTES # BLD AUTO: 0.04 K/UL (ref 0–0.08)
IMM GRANULOCYTES NFR BLD AUTO: 0.4 %
LYMPHOCYTES # BLD: 2.3 K/UL (ref 1.2–3.5)
LYMPHOCYTES NFR BLD: 23.8 %
MCH RBC QN AUTO: 26.2 PG (ref 28–33.2)
MCHC RBC AUTO-ENTMCNC: 32.1 G/DL (ref 32.2–35.5)
MCV RBC AUTO: 81.5 FL (ref 83–98)
MONOCYTES # BLD: 0.72 K/UL (ref 0.28–0.8)
MONOCYTES NFR BLD: 7.5 %
NEUTROPHILS # BLD: 6.52 K/UL (ref 1.7–7)
NEUTS SEG NFR BLD: 67.6 %
NRBC BLD-RTO: 0 %
OPIATES UR QL SCN: NOT DETECTED
PCP UR QL SCN: NOT DETECTED
PDW BLD AUTO: 10.1 FL (ref 9.4–12.3)
PLATELET # BLD AUTO: 277 K/UL (ref 150–369)
POTASSIUM SERPL-SCNC: 3.7 MEQ/L (ref 3.5–5.1)
RBC # BLD AUTO: 4.66 M/UL (ref 3.93–5.22)
SALICYLATES SERPL-MCNC: <1.5 MG/DL
SARS-COV-2 RNA RESP QL NAA+PROBE: NEGATIVE
SODIUM SERPL-SCNC: 136 MEQ/L (ref 136–145)
TSH SERPL DL<=0.05 MIU/L-ACNC: 1.33 MIU/L (ref 0.34–5.6)
WBC # BLD AUTO: 9.65 K/UL (ref 3.8–10.5)

## 2023-07-13 PROCEDURE — 80076 HEPATIC FUNCTION PANEL: CPT | Performed by: STUDENT IN AN ORGANIZED HEALTH CARE EDUCATION/TRAINING PROGRAM

## 2023-07-13 PROCEDURE — 12400000 HC ROOM AND CARE SEMIPRIVATE PSYCH

## 2023-07-13 PROCEDURE — 84703 CHORIONIC GONADOTROPIN ASSAY: CPT | Performed by: EMERGENCY MEDICINE

## 2023-07-13 PROCEDURE — 80307 DRUG TEST PRSMV CHEM ANLYZR: CPT | Performed by: EMERGENCY MEDICINE

## 2023-07-13 PROCEDURE — 80048 BASIC METABOLIC PNL TOTAL CA: CPT | Performed by: EMERGENCY MEDICINE

## 2023-07-13 PROCEDURE — 80061 LIPID PANEL: CPT | Performed by: STUDENT IN AN ORGANIZED HEALTH CARE EDUCATION/TRAINING PROGRAM

## 2023-07-13 PROCEDURE — 99283 EMERGENCY DEPT VISIT LOW MDM: CPT

## 2023-07-13 PROCEDURE — 84443 ASSAY THYROID STIM HORMONE: CPT | Performed by: PSYCHIATRY & NEUROLOGY

## 2023-07-13 PROCEDURE — 36415 COLL VENOUS BLD VENIPUNCTURE: CPT | Performed by: EMERGENCY MEDICINE

## 2023-07-13 PROCEDURE — 85025 COMPLETE CBC W/AUTO DIFF WBC: CPT | Performed by: EMERGENCY MEDICINE

## 2023-07-13 PROCEDURE — 83036 HEMOGLOBIN GLYCOSYLATED A1C: CPT | Performed by: STUDENT IN AN ORGANIZED HEALTH CARE EDUCATION/TRAINING PROGRAM

## 2023-07-13 PROCEDURE — G0480 DRUG TEST DEF 1-7 CLASSES: HCPCS | Performed by: EMERGENCY MEDICINE

## 2023-07-13 PROCEDURE — 87635 SARS-COV-2 COVID-19 AMP PRB: CPT | Performed by: PHYSICIAN ASSISTANT

## 2023-07-13 RX ORDER — DIPHENHYDRAMINE HCL 50 MG/ML
50 VIAL (ML) INJECTION EVERY 6 HOURS PRN
Status: DISCONTINUED | OUTPATIENT
Start: 2023-07-13 | End: 2023-07-18 | Stop reason: HOSPADM

## 2023-07-13 RX ORDER — MICONAZOLE NITRATE 2 %
2 CREAM (GRAM) TOPICAL EVERY 2 HOUR PRN
OUTPATIENT
Start: 2023-07-13 | End: 2023-10-11

## 2023-07-13 RX ORDER — SENNOSIDES 8.6 MG/1
1 TABLET ORAL 2 TIMES DAILY PRN
OUTPATIENT
Start: 2023-07-13 | End: 2023-10-11

## 2023-07-13 RX ORDER — POLYETHYLENE GLYCOL 3350 17 G/17G
17 POWDER, FOR SOLUTION ORAL 2 TIMES DAILY PRN
OUTPATIENT
Start: 2023-07-13

## 2023-07-13 RX ORDER — LORAZEPAM 0.5 MG/1
0.25 TABLET ORAL 2 TIMES DAILY
Status: ON HOLD | COMMUNITY
Start: 2023-02-21 | End: 2023-07-14 | Stop reason: ENTERED-IN-ERROR

## 2023-07-13 RX ORDER — TRAZODONE HYDROCHLORIDE 50 MG/1
50 TABLET ORAL NIGHTLY PRN
Status: DISCONTINUED | OUTPATIENT
Start: 2023-07-13 | End: 2023-07-14

## 2023-07-13 RX ORDER — METFORMIN HYDROCHLORIDE 500 MG/1
250 TABLET ORAL 2 TIMES DAILY
OUTPATIENT
Start: 2023-07-13

## 2023-07-13 RX ORDER — TRAZODONE HYDROCHLORIDE 50 MG/1
50 TABLET ORAL NIGHTLY PRN
OUTPATIENT
Start: 2023-07-13

## 2023-07-13 RX ORDER — LORAZEPAM 1 MG/1
1 TABLET ORAL EVERY 6 HOURS PRN
Status: DISCONTINUED | OUTPATIENT
Start: 2023-07-13 | End: 2023-07-14

## 2023-07-13 RX ORDER — IBUPROFEN 200 MG
1 TABLET ORAL DAILY
OUTPATIENT
Start: 2023-07-13

## 2023-07-13 RX ORDER — ACETAMINOPHEN 325 MG/1
650 TABLET ORAL EVERY 4 HOURS PRN
Status: DISCONTINUED | OUTPATIENT
Start: 2023-07-13 | End: 2023-07-18 | Stop reason: HOSPADM

## 2023-07-13 RX ORDER — FLUTICASONE PROPIONATE 50 MCG
2 SPRAY, SUSPENSION (ML) NASAL DAILY
Status: ON HOLD | COMMUNITY
Start: 2022-11-10 | End: 2023-07-14 | Stop reason: ENTERED-IN-ERROR

## 2023-07-13 RX ORDER — HALOPERIDOL 5 MG/ML
5 INJECTION INTRAMUSCULAR EVERY 6 HOURS PRN
Status: DISCONTINUED | OUTPATIENT
Start: 2023-07-13 | End: 2023-07-18 | Stop reason: HOSPADM

## 2023-07-13 RX ORDER — HYDROXYZINE HYDROCHLORIDE 25 MG/1
50 TABLET, FILM COATED ORAL EVERY 6 HOURS PRN
OUTPATIENT
Start: 2023-07-13 | End: 2023-10-11

## 2023-07-13 RX ORDER — ALUMINUM HYDROXIDE, MAGNESIUM HYDROXIDE, AND SIMETHICONE 1200; 120; 1200 MG/30ML; MG/30ML; MG/30ML
30 SUSPENSION ORAL EVERY 4 HOURS PRN
Status: DISCONTINUED | OUTPATIENT
Start: 2023-07-13 | End: 2023-07-18 | Stop reason: HOSPADM

## 2023-07-13 RX ORDER — PNV NO.95/FERROUS FUM/FOLIC AC 28MG-0.8MG
100 TABLET ORAL DAILY
OUTPATIENT
Start: 2023-07-13

## 2023-07-13 RX ORDER — BUPROPION HYDROCHLORIDE 300 MG/1
300 TABLET ORAL DAILY
OUTPATIENT
Start: 2023-07-13

## 2023-07-13 RX ORDER — HALOPERIDOL 5 MG/1
5 TABLET ORAL EVERY 6 HOURS PRN
Status: DISCONTINUED | OUTPATIENT
Start: 2023-07-13 | End: 2023-07-18 | Stop reason: HOSPADM

## 2023-07-13 RX ORDER — LORAZEPAM 0.5 MG/1
0.25 TABLET ORAL 2 TIMES DAILY
OUTPATIENT
Start: 2023-07-13

## 2023-07-13 RX ORDER — LORAZEPAM 2 MG/ML
2 INJECTION INTRAMUSCULAR EVERY 4 HOURS PRN
Status: DISCONTINUED | OUTPATIENT
Start: 2023-07-13 | End: 2023-07-14

## 2023-07-13 RX ORDER — DOCUSATE SODIUM 100 MG/1
100 CAPSULE, LIQUID FILLED ORAL 2 TIMES DAILY
OUTPATIENT
Start: 2023-07-13 | End: 2023-10-11

## 2023-07-13 RX ORDER — FLUTICASONE PROPIONATE 50 MCG
2 SPRAY, SUSPENSION (ML) NASAL DAILY
OUTPATIENT
Start: 2023-07-13

## 2023-07-13 ASSESSMENT — ENCOUNTER SYMPTOMS
DIFFICULTY URINATING: 0
FEVER: 0
SLEEP DISTURBANCE: 1
VOMITING: 0
ABDOMINAL PAIN: 0
SHORTNESS OF BREATH: 0
HEADACHES: 0
HALLUCINATIONS: 0
LIGHT-HEADEDNESS: 0
WEAKNESS: 0
NUMBNESS: 0
CHILLS: 0
NAUSEA: 0
BACK PAIN: 0
DIZZINESS: 0
COUGH: 0
DYSPHORIC MOOD: 1

## 2023-07-13 NOTE — CONSULTS
Name/:  Gemma Darnell, 2000  Date/Time (EST): 2023, 640pm  Location of patient:  [facility] ED   Location of doctor:  [place]  Length of consult:  30 minutes total     This evaluation was conducted via telepsychiatry with the assistance of onsite staff.      Reason for consult:  Treatment resistant psychosis  Requested by: attending physician  History of Present Illness: This patient is a 23-year-old woman with a history of treatment resistant schizoaffective disorder. She presents with her aunt at bedside. And it remained at bedside with patient's permission. Aunt also relayed to me that patient has a history of catatonia and was treated with ECT including at [facility]. She apparently has had 20 treatments in the past. Patient presents reporting that she doesn't feel like her medications are working well enough. She saw her psychiatrist a short time ago and does not have another appointment until mid August. She says that she has been dissociating heavily and has periods where she zones out and her thoughts escape her. She says both of her doctors and her therapist told her to come for admission to psychiatry to sort out her medications. She told me she's been taking clozapine for about a year. She is asking for admission to the hospital. She told me that she has been becoming more severely depressed and is at a point where she thinks that she will become suicidal very soon if she doesn't get help.    Collateral contacted:  Her aunt is at bedside    Sleep issues: unknown, unable to cooperate   Psychiatric History:    Past diagnoses: Treatment resistant schizoaffective disorder.   Hospitalizations: Multiple past, last time in 2022. History of ECT treatments for catatonia reported..   Current Treatment: She has her own outpatient psychiatrist named Dr. Mosley. She says that she just recently began seeing this new psychiatrist..     PSS-3: unknown, unable to cooperate at this time; She told me she  is not currently feeling suicidal but she feels that the depression and the psychotic symptoms are getting to the point where she's going to get suicidal again. She has a history of suicide attempts.      [facility] Safety Assessment:   Cannot complete due to patient unable to cooperate with questions in this area.     Protective Factors         Cannot complete due to patient unable to cooperate with questions in this area.    Medical History: No ongoing problems reported  Current psychiatric medications: Clozaril 125 mg twice daily, Ativan 0.25 milligrams twice daily, Wellbutrin extended release 300 mg daily  Allergies: None reported    Mental Status Exam:   Appearance and attire:  hospital attire  Attitude and behavior:  Pleasant, cooperative   Motor (movements): normal, no EPS seen  Speech: minimal  Affect and mood: mood more depressed every day, affect dysphoric and flat  Association and thought processes: Some thought blocking is seen  Thought content: No SI, no HI, no paranoia  Perception: Intermittent auditory hallucinations ongoing  Sensorium, memory, and orientation:  alert, cannot assess orientation or memory  Intellectual functioning: Average by estimate  Insight and judgment: Intact/intact    Impression/Risk Assessment: This is a 23-year-old woman with a history of treatment resistant schizoaffective disorder and eating disorder, borderline personality traits versus disorder, presenting with worsening psychotic symptoms including dissociation, worsening mood, fear that she will become suicidal very soon as this has happened to her in the past and she became suicidal and had a suicide attempt when her psychotic symptoms have worsened. She does not fill safely to the hospital and is requesting inpatient admission to psychiatry. Her aunt is at bedside and is supportive.  Current Suicide Risk elevated? yes  Current Violence Risk elevated? no  Ability to care for self: no    Diagnosis:  Schizoaffective  disorder, treatment resistant, unspecified  Reported history of eating disorder, anxiety disorder   Reported history of borderline personality traits versus disorder  CPT code if hospital choses to use: 68730    Treatment Plan  Level of Care:  Inpatient psychiatry, voluntary. I spoke with both of them and they are both in agreement. If patient changes her mind, she will need to be seen by psychiatry again. She is not safe to leave AMA.  Psychiatric Clearance: no  Observation level - 1:1   Pharmacological: If EKG QT is acceptable, recommend continuation of current outpatient psychiatric medications for now.   Psychotic: yes   Standing antipsychotic started: clozapine  Therapy: supportive  Follow up needed by [facility] while in hospital: yes, daily  Discussed with onsite team member? Yes, Cindy in crisis    Please contact [facility] for any additional assistance or questions.    Portions of this chart may have been created with Dragon voice recognition software.  Occasional wrong-word or “sound-like” substitutions may have occurred due to the inherent limitations of voice recognition software.  Please read the chart carefully and recognize, using context, where the substitutions have occurred.    Garett Solis M.D.  Psychiatrist

## 2023-07-13 NOTE — CONSULTS
Patient's ED PA stated that patient is presenting with worsening depression and dissociation.  Patient has a history of schizoaffective disorder.  Patient is denying SI and HI but states that she is worried that she will harm herself if things get worse.  Patient is seeking inpatient psych treatment.  Patient was referred to ED by outpatient therapist and psychiatrist.  4:58pm - Internal psychiatrist will be seeing another patient therefore this [facility] called [facility] and placed patient in the queue for level of care assessment due to low staffing and high volume in the ED today.    Spoke with Lincoln/transfer center and placed patient on the board for [facility] psych unit.  Lincoln will call back with which floor.    8:07pm - Dr. Peoples approved Pt for admission to [facility] Psych    [facility]-Wendy JACOBO Assisting with 201 and pre-cert.     Handing off case to ELISHA-Wendy JACOBO

## 2023-07-13 NOTE — ED PROVIDER NOTES
Emergency Medicine Note  HPI   HISTORY OF PRESENT ILLNESS     HPI   24 y/o female with PMH of schizoaffective disorder, suicidal overdose, depression, anxiety, eating disorder, borderline personality disorder presents to the ED with complaint of for psych evaluation.  Patient states for the last 1 week has had increasing depression and feeling dissociated from reality.  She is compliant with her medications and denies any recent medication changes.  She denies any current SI or HI, visual or auditory hallucinations.  She does state that the symptoms have worsened and she feels like she needs admission to the hospital or else her symptoms will worsen and will start having thoughts of hurting herself.  Previous history of suicide attempts, last 1 March 2022 OD no medications.  Denies any current medical complaints.      Patient History   PAST HISTORY     Reviewed from Nursing Triage:       Past Medical History:   Diagnosis Date   • Anxiety    • Depression    • Eating disorder    • Eating disorder    • Eating disorder    • H/O borderline personality disorder    • History of ITP    • Suicidal overdose (CMS/HCC)        Past Surgical History:   Procedure Laterality Date   • WOUND EXPLORATION         Family History   Problem Relation Age of Onset   • Alcohol abuse Biological Mother    • Alcohol abuse Biological Father    • Bipolar disorder Biological Sister         Currently age 21; diagnosed age 19, lamotrigine was helpful for her; also had psychiatric hospitalizaiton   • Alcohol abuse Biological Sister    • Suicidality Biological Sister         Attempted suicide   • Anxiety disorder Maternal Grandmother    • Depression Maternal Grandmother    • Eating disorder Maternal Grandmother    • Cancer Maternal Grandfather        Social History     Tobacco Use   • Smoking status: Every Day     Packs/day: 1.00     Types: Cigarettes, Electronic Cigarette   • Smokeless tobacco: Never   Substance Use Topics   • Alcohol use: Not  Currently     Comment: no drink x 1 mo   • Drug use: Not Currently     Types: Amphetamines, Oxycodone, Benzodiazepines     Comment: last use over one yr ago         Review of Systems   REVIEW OF SYSTEMS     Review of Systems   Constitutional: Negative for chills and fever.   Respiratory: Negative for cough and shortness of breath.    Cardiovascular: Negative for chest pain.   Gastrointestinal: Negative for abdominal pain, nausea and vomiting.   Genitourinary: Negative for difficulty urinating.   Musculoskeletal: Negative for back pain.   Neurological: Negative for dizziness, weakness, light-headedness, numbness and headaches.   Psychiatric/Behavioral: Positive for dysphoric mood and sleep disturbance. Negative for hallucinations, self-injury and suicidal ideas.         VITALS     ED Vitals    Date/Time Temp Pulse Resp BP SpO2 [facility]   07/13/23 1933 -- 103 18 137/75 98 % [facility]   07/13/23 1620 -- 110 16 119/55 100 % [facility]   07/13/23 1536 37.1 °C (98.8 °F) 129 20 118/62 97 % [facility]        Pulse Ox %: 97 % (07/13/23 1602)  Pulse Ox Interpretation: Normal (07/13/23 1602)           Physical Exam   PHYSICAL EXAM     Physical Exam  Vitals and nursing note reviewed.   Constitutional:       General: She is not in acute distress.     Appearance: She is not toxic-appearing.   HENT:      Head: Normocephalic and atraumatic.   Pulmonary:      Effort: Pulmonary effort is normal. No respiratory distress.   Musculoskeletal:         General: Normal range of motion.      Cervical back: Normal range of motion and neck supple.   Skin:     General: Skin is warm and dry.   Neurological:      General: No focal deficit present.      Mental Status: She is alert.      Cranial Nerves: No cranial nerve deficit.   Psychiatric:         Attention and Perception: Attention normal.         Mood and Affect: Mood is anxious and depressed.         Speech: Speech normal.         Behavior: Behavior is cooperative.         Thought Content: Thought content normal.  Thought content does not include homicidal or suicidal ideation.           PROCEDURES     Procedures     DATA     Results     Procedure Component Value Units Date/Time    SARS-CoV-2 (COVID-19), PCR Nasopharynx [[id number]]  (Normal) Collected: 07/13/23 1929    Specimen: Nasopharyngeal Swab from Nasopharynx Updated: 07/13/23 2010    Narrative:      The following orders were created for panel order SARS-CoV-2 (COVID-19), PCR Nasopharynx.  Procedure                               Abnormality         Status                     ---------                               -----------         ------                     SARS-CoV-2 (COVID-19), P...[[id number]]  Normal              Final result                 Please view results for these tests on the individual orders.    SARS-CoV-2 (COVID-19), PCR Nasopharynx [[id number]]  (Normal) Collected: 07/13/23 1929    Specimen: Nasopharyngeal Swab from Nasopharynx Updated: 07/13/23 2010     SARS-CoV-2 (COVID-19) Negative    Narrative:      Testing performed using real-time PCR for detection of COVID-19. EUA approved validation studies performed on site.     ER toxicology screen, serum [[id number]]  (Abnormal) Collected: 07/13/23 1550    Specimen: Blood, Venous Updated: 07/13/23 1645     Salicylate <1.5 mg/dL      Acetaminophen 0.1 ug/mL      Ethanol <5 mg/dL     Urine drug screen (UDS) [[id number]]  (Normal) Collected: 07/13/23 1550    Specimen: Urine, Clean Catch Updated: 07/13/23 1643     PCP Scrn, Ur Not Detected     Comment: Assay Detects: phencyclidine in urine. Lowest detectable concentration is 25 ng/mL of phencyclidine.        Benzodiazepine Ur Qual Not Detected     Comment: Assay Detects: benzodiazepines and metabolites at varying concentrations. Lowest detectable concentration is 200 ng/mL of oxazepam.        Cocaine Screen, Urine Not Detected     Comment: Assay Detects: benzoylecgonine and cocaine in urine. Lowest detectable concentration is 300 ng/mL of benzoylecgonine.         Amphetamine+Methamphetamine Screen, Ur Not Detected     Comment: Assay Detects: d-methamphetamine, d-amphetamine, methlyenedioxyamphetamine (MDA), and methlyenendioxymethamphetamine (MDMA) in urine. Lowest detectable concentration is 1000 ng/mL of d-methamphetamine.  Assay is less sensitive to MDA and MDMA (lowest detectable concentration, 2500 ng/mL) and could produce a false negative result. If MDMA overdose is suspected and the result is negative, a more specific test should be requested.        Cannabinoid Screen, Urine Not Detected     Comment: Assay Detects: cannabinoid metabolites in urine. Lowest detectable concentration is 50 ng/mL        Opiate Scrn, Ur Not Detected     Comment: Assay Detects: codeine, dihydrocodeine, hydrocodone, hydromorphone, levorphanol, morphine, morphine-3-glucuronide, norcodeine, oxycodone in urine. Lowest detectable concentration is 300 ng/mL of morphine.        Barbiturate Screen, Ur Not Detected     Comment: Assay Detects: alphenal, amobarbital, aprobarbital, barbital, butabarbital, butalbital, butethal, diallybarbital, pentobarbital, secobarbital,talbutal, and thiopental. Lowest detectable concentration is 200 ng/mL of secobarbital.        Fentanyl Screen, Urine Not Detected    BhCG, Serum, Qual [[id number]]  (Normal) Collected: 07/13/23 1550    Specimen: Blood, Venous Updated: 07/13/23 1639     Preg Test, Serum Negative    Basic metabolic panel [[id number]]  (Abnormal) Collected: 07/13/23 1550    Specimen: Blood, Venous Updated: 07/13/23 1637     Sodium 136 mEQ/L      Potassium 3.7 mEQ/L      Comment: Results obtained on plasma. Plasma Potassium values may be up to 0.4 mEQ/L less than serum values. The differences may be greater for patients with high platelet or white cell counts.        Chloride 107 mEQ/L      CO2 21 mEQ/L      BUN 7 mg/dL      Creatinine 0.6 mg/dL      Glucose 111 mg/dL      Calcium 9.3 mg/dL      eGFR >60.0 mL/min/1.73m*2      Anion Gap 8 mEQ/L     CBC and  differential [[id number]]  (Abnormal) Collected: 07/13/23 1550    Specimen: Blood, Venous Updated: 07/13/23 1609     WBC 9.65 K/uL      RBC 4.66 M/uL      Hemoglobin 12.2 g/dL      Hematocrit 38.0 %      MCV 81.5 fL      MCH 26.2 pg      MCHC 32.1 g/dL      RDW 13.3 %      Platelets 277 K/uL      MPV 10.1 fL      Differential Type Auto     nRBC 0.0 %      Immature Granulocytes 0.4 %      Neutrophils 67.6 %      Lymphocytes 23.8 %      Monocytes 7.5 %      Eosinophils 0.1 %      Basophils 0.6 %      Immature Granulocytes, Absolute 0.04 K/uL      Neutrophils, Absolute 6.52 K/uL      Lymphocytes, Absolute 2.30 K/uL      Monocytes, Absolute 0.72 K/uL      Eosinophils, Absolute 0.01 K/uL      Basophils, Absolute 0.06 K/uL           Imaging Results    None         No orders to display       Scoring tools                                  ED Course & MDM   MDM / ED COURSE / CLINICAL IMPRESSION / DISPO     MDM    ED Course as of 07/13/23 2119   u Jul 13, 2023   1612 Patient denies any current SI or HI.  Patient feels if she does not get admitted to the hospital she will start having thoughts of wanting to hurt herself. Pt is here with her mother. Plan for psych labs for medical clearance and crisis consult. [TM]   1641 Cbc  Bmp  Wnl   [KRYSTYNA]   1641 Pt med cleared for psych  [KRYSTYNA]   1645 Discussed with crisis who will evaluate patient [TM]   1718 Crisis evaluating patient [TM]   1921 Psychiatry saw patient who recommends one-to-one precautions and inpatient admission.  Patient voluntarily wants to come in. One-to-one precautions and COVID ordered. [TM]   1923 Urine drug screen (UDS)  Unremarkable [TM]   1929 Added 1-1 at psych recommendation  Admitted to [facility] psych  [KRYSTYNA]      ED Course User Index  [KRYSTYNA] Pete Dumont,   [TM] Benitez Diaz, TELMA BEAR     Clinical Impression      Schizoaffective disorder, unspecified type (CMS/HCC)     _________________     ED Disposition   Transfer to Behavioral Health                    Benitez Diaz PA C  07/13/23 1719       Benitez Diaz PA C  07/13/23 9585

## 2023-07-13 NOTE — ED ATTESTATION NOTE
Physician Attestation:     I have personally seen and examined the patient, participated in the management, and agree with the findings in the above note except as where stated.      I have personally performed the key components of the encounter and provided a substantive portion of the care and medical decision making.    The Physician Assistant and I discussed  the case, workup, and disposition.        Chief Complaint  Chief Complaint   Patient presents with   • Psychiatric Evaluation       My focused history, examination, assessment, and plan of care is as follows:    23 y.o.female  Presents for eval  Pt on wellbutrin, for schtizoaffective  States she feels detached from reality  No si  Mom at bedside    Non toxic  nad  vss  Sitting on floor not in chair  Calm  Cooperative but anxious  No current si      Plan/mdm  Pt with mom at bedside provide hx  Concern for deteriorating mental health  Will consult social work after med crisis screening labs         Pete Dumont, DO  07/13/23 7746

## 2023-07-13 NOTE — BEHAVIORAL HEALTH CRISIS PROGRESS NOTE
7/13/2023 7:31 PM     Pt signed 201 and was given rights.  Original 201 placed on pt's chart.     8:07 PM   Alisia MAHMOOD ([employer] [id number]) authorized 3 days, 7/13-7/15, auth# [id number].  [facility] (transfer center) was updated.  Pt to admit to 3E.  RN advised to call report to [phone].       ARLETTE Schroeder

## 2023-07-14 LAB
ALBUMIN SERPL-MCNC: 4.1 G/DL (ref 3.5–5.7)
ALP SERPL-CCNC: 84 IU/L (ref 34–104)
ALT SERPL-CCNC: 22 IU/L (ref 7–52)
AST SERPL-CCNC: 24 IU/L (ref 13–39)
BILIRUB DIRECT SERPL-MCNC: 0.1 MG/DL
BILIRUB SERPL-MCNC: 0.3 MG/DL (ref 0.3–1)
CHOLEST SERPL-MCNC: 129 MG/DL
EST. AVERAGE GLUCOSE BLD GHB EST-MCNC: 108 MG/DL
HBA1C MFR BLD: 5.4 %
HDLC SERPL-MCNC: 53 MG/DL
HDLC SERPL: 2.4 {RATIO}
LDLC SERPL CALC-MCNC: 61 MG/DL
NONHDLC SERPL-MCNC: 76 MG/DL
PROT SERPL-MCNC: 7.3 G/DL (ref 6.4–8.9)
TRIGL SERPL-MCNC: 77 MG/DL

## 2023-07-14 PROCEDURE — 12400000 HC ROOM AND CARE SEMIPRIVATE PSYCH

## 2023-07-14 PROCEDURE — 90792 PSYCH DIAG EVAL W/MED SRVCS: CPT | Performed by: STUDENT IN AN ORGANIZED HEALTH CARE EDUCATION/TRAINING PROGRAM

## 2023-07-14 PROCEDURE — 93005 ELECTROCARDIOGRAM TRACING: CPT | Performed by: PSYCHIATRY & NEUROLOGY

## 2023-07-14 PROCEDURE — 63700000 HC SELF-ADMINISTRABLE DRUG: Performed by: STUDENT IN AN ORGANIZED HEALTH CARE EDUCATION/TRAINING PROGRAM

## 2023-07-14 PROCEDURE — 200200 PR NO CHARGE

## 2023-07-14 RX ORDER — IBUPROFEN 200 MG
1 TABLET ORAL DAILY
Status: DISCONTINUED | OUTPATIENT
Start: 2023-07-15 | End: 2023-07-18 | Stop reason: HOSPADM

## 2023-07-14 RX ORDER — MICONAZOLE NITRATE 2 %
2 CREAM (GRAM) TOPICAL EVERY 2 HOUR PRN
Status: DISCONTINUED | OUTPATIENT
Start: 2023-07-14 | End: 2023-07-18 | Stop reason: HOSPADM

## 2023-07-14 RX ORDER — METFORMIN HYDROCHLORIDE 500 MG/1
500 TABLET ORAL 2 TIMES DAILY WITH MEALS
Status: ON HOLD | COMMUNITY
End: 2023-07-18 | Stop reason: SDUPTHER

## 2023-07-14 RX ORDER — LORAZEPAM 1 MG/1
1 TABLET ORAL EVERY 6 HOURS PRN
Status: DISCONTINUED | OUTPATIENT
Start: 2023-07-14 | End: 2023-07-15

## 2023-07-14 RX ORDER — ONDANSETRON 4 MG/1
4 TABLET, ORALLY DISINTEGRATING ORAL EVERY 8 HOURS PRN
Status: DISCONTINUED | OUTPATIENT
Start: 2023-07-14 | End: 2023-07-18 | Stop reason: HOSPADM

## 2023-07-14 RX ORDER — METFORMIN HYDROCHLORIDE 500 MG/1
500 TABLET ORAL 2 TIMES DAILY WITH MEALS
Status: DISCONTINUED | OUTPATIENT
Start: 2023-07-14 | End: 2023-07-18 | Stop reason: HOSPADM

## 2023-07-14 RX ORDER — CLOZAPINE 25 MG/1
25 TABLET ORAL EVERY 12 HOURS
Status: DISCONTINUED | OUTPATIENT
Start: 2023-07-14 | End: 2023-07-14

## 2023-07-14 RX ORDER — CLOZAPINE 100 MG/1
100 TABLET ORAL EVERY 12 HOURS
Status: DISCONTINUED | OUTPATIENT
Start: 2023-07-14 | End: 2023-07-14

## 2023-07-14 RX ORDER — POLYETHYLENE GLYCOL 3350 17 G/17G
17 POWDER, FOR SOLUTION ORAL DAILY PRN
Status: DISCONTINUED | OUTPATIENT
Start: 2023-07-14 | End: 2023-07-18 | Stop reason: HOSPADM

## 2023-07-14 RX ORDER — DOCUSATE SODIUM 100 MG/1
100 CAPSULE, LIQUID FILLED ORAL 2 TIMES DAILY
Status: DISCONTINUED | OUTPATIENT
Start: 2023-07-14 | End: 2023-07-18 | Stop reason: HOSPADM

## 2023-07-14 RX ORDER — BUPROPION HYDROCHLORIDE 300 MG/1
300 TABLET ORAL DAILY
Status: DISCONTINUED | OUTPATIENT
Start: 2023-07-14 | End: 2023-07-18 | Stop reason: HOSPADM

## 2023-07-14 RX ORDER — CLOZAPINE 25 MG/1
25 TABLET ORAL EVERY 12 HOURS
Status: ON HOLD | COMMUNITY
Start: 2023-06-21 | End: 2023-07-18 | Stop reason: SDUPTHER

## 2023-07-14 RX ORDER — LORAZEPAM 2 MG/ML
2 INJECTION INTRAMUSCULAR EVERY 4 HOURS PRN
Status: DISCONTINUED | OUTPATIENT
Start: 2023-07-14 | End: 2023-07-18 | Stop reason: HOSPADM

## 2023-07-14 RX ADMIN — BUPROPION HYDROCHLORIDE 300 MG: 300 TABLET, EXTENDED RELEASE ORAL at 14:08

## 2023-07-14 RX ADMIN — CLOZAPINE 125 MG: 100 TABLET ORAL at 22:37

## 2023-07-14 RX ADMIN — CLOZAPINE 125 MG: 100 TABLET ORAL at 14:23

## 2023-07-14 NOTE — PLAN OF CARE
Problem: Adult Behavioral Health Plan of Care  Goal: Plan of Care Review  Outcome: Progressing  Flowsheets (Taken 7/14/2023 1805)  Progress: no change  Patient Agreement with Plan of Care: agrees  Outcome Evaluation: Gemma presents as AAOx3-4, hesitant to answer assessment questions, lethargic and irritable but cooperative. Pt refused colace and metformin in evening for this nurse. Pt sleeping throughout the day. Pt denied current SI/HI/AVH. PT ate meals in room, isolative to self.   Plan of Care Reviewed With: patient  Intervention(s): Nursing assessment, suicide assessment,medication administration and education  Goal: Patient-Specific Goal (Individualization)  Outcome: Progressing  Flowsheets (Taken 7/14/2023 1805)  Individualized Care Needs: Refuses  Anxieties, Fears or Concerns: Refuses

## 2023-07-14 NOTE — PROGRESS NOTES
07/14/23 1030   Activity/Group Checklist   Group Title Wellness tools   Attendance Did not attend

## 2023-07-14 NOTE — H&P
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 Grandmother    • Eating disorder Maternal Grandmother    • Cancer Maternal Grandfather        Social History:   Current employment: started at  job 1 week ago  Finances/SSDI: supported by mother  Current living situation: with mother  Source of social support: mother, friends  Relationship status: single  Sexually active: no  Children, pregnancies: denies  History of abuse/trauma: denies  History of violence toward others: denies  Access to firearms: denies  History of traumatic brain injury, concussion, seizure: seizure in context of overdose  Born and raised: [place], raised in [place], went to [employer]  Number of siblings: sister and brother, pt is middle child  Upbringing: father alcoholic, parents got  2 years ago  Level of education: good student, \"average\" and says she stopped going to college because she went to rehab then eating disorder treatment; she planned to go back to school then did not \"because of the suicidal episode\"; she would like to go back to school in the future; denies problems with assisted, suspension, expulsion in school  Learning disability (e.g., ADHD, dyslexia): denies    Social History     Socioeconomic History   • Marital status: Single     Spouse name: None   • Number of children: 0   • Years of education: 12   • Highest education level: High school graduate   Occupational History   • Occupation: Priv[employer]   Tobacco Use   • Smoking status: Every Day     " Packs/day: 1.00     Types: Cigarettes, Electronic Cigarette   • Smokeless tobacco: Never   Vaping Use   • Vaping Use: Every day   • Substances: Nicotine   Substance and Sexual Activity   • Alcohol use: Not Currently     Comment: no drink x 1 mo   • Drug use: Not Currently     Types: Amphetamines, Oxycodone, Benzodiazepines     Comment: last use over one yr ago   • Sexual activity: Defer   Social History Narrative    ** Merged History Encounter **          Social Determinants of Health     Food Insecurity: No Food Insecurity (7/13/2023)    Hunger Vital Sign    • Worried About Running Out of Food in the Last Year: Never true    • Ran Out of Food in the Last Year: Never true   Stress: Unknown (3/29/2022)    [employer] - Occupational Stress Questionnaire    • Feeling of Stress : Patient refused       Medical History:     Past Medical History:   Diagnosis Date   • Anxiety    • Depression    • Eating disorder    • Eating disorder    • Eating disorder    • H/O borderline personality disorder    • History of ITP    • Suicidal overdose (CMS/HCC)        Surgical History:     Past Surgical History:   Procedure Laterality Date   • WOUND EXPLORATION         Allergies:     No Known Allergies    Current Medications:  SCHEDULED:  • buPROPion XL  300 mg oral Daily   • cloZAPine  125 mg oral q12h ELMER   • docusate sodium  100 mg oral BID   • metFORMIN  500 mg oral BID with meals   • nicotine  1 patch transdermal Daily     PRN  •  acetaminophen  •  alum-mag hydroxide-simeth  •  diphenhydrAMINE  •  haloperidoL **OR** haloperidol lactate  •  hydrOXYzine  •  [DISCONTINUED] LORazepam **OR** LORazepam  •  nicotine polacrilex  •  ondansetron ODT  •  polyethylene glycol      Review of Systems  Musculoskeletal:  denies    Objective     Vital Signs for the last 24 hours:  Temp:  [36.2 °C (97.1 °F)-37.1 °C (98.8 °F)] 37.1 °C (98.8 °F)  Heart Rate:  [] 92  Resp:  [16-20] 16  BP: (106-137)/(55-75)  "106/74    Labs  Results from last 7 days   Lab Units 07/13/23  1550   HEMOGLOBIN g/dL 12.2   WBC K/uL 9.65   PLATELETS K/uL 277   POTASSIUM mEQ/L 3.7   SODIUM mEQ/L 136   CREATININE mg/dL 0.6     Results from last 7 days   Lab Units 07/13/23  1550   ALT IU/L 22   AST IU/L 24   ALK PHOS IU/L 84     Ethanol   Date Value Ref Range Status   07/13/2023 <5 <=10 mg/dL Final   03/21/2022 <5 <=10 mg/dL Final   07/19/2021 <5 <=10 mg/dL Final     Lab Results   Component Value Date    TSH 1.33 07/13/2023    RCGFBAJT73 564 07/20/2021    FOLATE 15.2 07/20/2021           No lab exists for component: BENZOURQL, BARBSCRNUR  Lab Results   Component Value Date    VENTRICRATE 140 03/21/2022    QTCCALCULAT 375 03/21/2022     Lab Results   Component Value Date    HDL 53 07/13/2023    LDLCALC 61 07/13/2023    TRIG 77 07/13/2023    CHOL 129 07/13/2023    HGBA1C 5.4 07/13/2023       No results found.    MENTAL STATUS EXAM  Appearance: in gowns, has pink nails that look recently done  Behavior: adolescent-like, puts head down on table, vague at times, asking when she can leave the hospital  Gait and Motor: no abnormal movements and normal  Speech: normal rate/rhythm/volume  Mood: \"not depressed\"  Affect: tired, briefly irritable at times, somewhat disengaged as opposed to guarded in a psychotic sense; when talking about what it takes for a safe discharge plan she brightens up considerably and expresses appropriate humor  Associations: coherent  Thought Process: goal-directed  Thought Content: denies psychosis and none is observed or elicited; future-oriented, wants to go back to work as soon as she can  Suicidality/Homicidality: denies; contracts for safety in the hospital  Judgement/Insight: partial  Orientation: intact  Memory: grossly intact  Attention: alert, not so overly sedated that she does not attend  Knowledge: normal  Language: normal       Assessment/Plan    Signout: Severity: Watch (history of suicidal and self-injurious " "behaviors) - See formulation below. In brief, pt with many prior diagnoses including mood disorders- MDD or bipolar disorder, schizoaffective disorder, psychosis, catatonia, borderline personality disorder, eating disorder, substance use disorders. History of ECT. Not entirely clear why she was admitted. She came to the ED because she was having episodes of \"dissociation\" over the last week. Vague description of this. Dissociation was not observed on admission evaluation. Easily irritable. She may be under-reporting alcohol use. Not psychotic. Not catatonic. Denies feeling depressed. No hypo/shantel. Denies SI. Though said she could become suicidal. Has history of serious suicide attempts. Mother concerned she has not been taking medications as prescribed. Have resumed on home medications. Checking clozapine level. No anticipated medication changes over the weekend. She is discharge-focused, wants to return home to work. We discussed that a discharge early next week would not be unreasonable should she continue to present stable. Remind her that in order for safe discharge she needs to maintain safety in the hospital off 1:1. She is motivated for discharge. She has been able to contract for safety in the hospital thus far.    * Unspecified mood (affective) disorder (CMS/[facility], located within [facility])  Overview  Gemma Darnell is a 23 y.o. single woman who recently started working at a  1 week ago with PMH overweight and PPH history of unspecified mood disorder vs. MDD vs. bipolar disorder vs. 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 Pt was admitted voluntarily 7/13/22 from the [facility] ED where she was brought in by her aunt. Pt came seeking admission stating she is having episodes of dissociation x1 week, that she doesn't feel like medications are " "working, that she is not suicidal, but could become suicidal.     Outside records would help with diagnostic clarity as she has never presented as psychotic or catatonic at [facility] though is reported to have a prolonged hospitalization at [facility] 04-08/2022 where she was treated with ECT and initiated on clozapine 08/2022 for reported psychosis and catatonia. Pt with multiple prior diagnoses including mood disorders, borderline personality disorder, eating disorder, substance use disorders. History of multiple suicide attempts including some of high lethal potential requiring ICU admission. Also with history of residential treatment, rehabs. Pt has spent most of adult life in higher levels of care, following parents' separation, appears to have difficulties with transitions and adjustment, and is noted to and admits she does not have good insight into antecedents for episodes.    Presently, the pt presents as nonpsychotic, denies feeling depressed or suicidal, and explains have periods of \"dissociation\" for the last 1 week. The context is that she has a new job at a , after recently not being able to hold other jobs. Her mother is also away for a trip. Pt denies other stressors. The patient would like to understand the episodes of dissociation. She was not presenting as dissociated on admission evaluation. It is not yet clear what these episodes consist of or could be attributed to- e.g., psychological, medication SE, substance, ?psychosis. Pt denies any substance relapse though also states she continues to drink once weekly up to 6 drinks per occasion. Unclear if she could be under-reporting the drinking and whether alcohol or alcohol-medication interaction could be contributing to what pt describes as \"dissociation.\" Despite sleeping well pt asks directly for Ativan for sleep- will avoid benzodiazepines. Her mother is concerned she has not been taking her medications since pt started to self-administer them " over the last 3 weeks, though pt states she has been taking the medications. Mother denies any recent concern for SI, psychosis, catatonia. Pt described to have josie nneka psychosis and catatonia at [facility] so her current presentation suggests the current regimen has been helpful, and it does not appear prudent to make any changes at this time. Will check orthostatics and a clozapine level and monitor pt in the hospital. Increase understanding of current alcohol use. Pt asking to leave soon to go back to work. Zara for safety in the hospital.    Home regimen: clozapine 125 mg q12h, bupropion  mg daily, metformin 500 mg BID with meals (though pharmacy records suggest metformin is 250 mg TID with meals)    Assessment & Plan  -individual, group, milieu therapy  -increase collateral- spoke with psychiatrist, family  -collect data- attempt to obtain [facility] discharge summary  - cessation of substances  -continue home clozapine 125 mg q12h  -colace 100 mg BID to reduce risks of clozapine-associated GI hypomotility  -continue home bupropion  mg daily  -continue home metformin 500 mg BID with meals  -continue to monitor for safety of discharge  -consider referral to metabolic health clinic  -consider atropine drops for sialorrhea    Alcohol use disorder, mild, abuse  Assessment & Plan  -pt with history of severe use disorder previously in rehab and now is binge drinking once weekly 6 drinks per occasion when out with friends, does not identify this as problematic  - cessation  -inquire about sober supports, e.g., AA, sponsor, [facility]  -inquire about history of MAT  -avoid PRN oral benzodiazepines for now    Nicotine use disorder  Assessment & Plan  -vapes 1 pod daily, reports rare cigarette use  -nicotine replacement  -counseled cessation  -motivational interviewing  -education about interaction of smoking and clozapine    Severe cocaine use disorder, in sustained remission  (CMS/[facility])  Assessment & Plan  - continued cessation  -inquire about sober supports, e.g., AA, sponsor, [facility]    Medical conditions managed by the hospital medicine service include: none    Commitment: 201  Observation: 15 minute checks  Disposition: admit to inpatient  Outpatient: Dr. Nick psychiatrist  Housing: living with mother prior to admission (aunt staying with pt while mother away this week)     100 minutes were spent including direct face-to-face counseling/coordination of care, review of the medical record, and documentation, as well as discussion with mother and outpatient psychiatrist. I discussed the treatment plan and the patient's progress with nursing, therapy, social work staff as appropriate.

## 2023-07-14 NOTE — PLAN OF CARE
met with Gemma to review goals and interventions on her initial treatment plan. Gemma stated she was in agreement with the goals and the related interventions and signed the treatment plan.  encouraged Gemma to attend some groups over the weekend and to sit in the common area of the unit, watch some TV and engage with peers as she feels comfortable to do so.

## 2023-07-14 NOTE — PLAN OF CARE
Plan of Care Review  Plan of Care Reviewed With: patient  Patient Agreement with Plan of Care: agrees  Consent Given to Review Plan with: self  Progress: no change  Intervention(s): new admission nursing assessment completed  Outcome Evaluation: Gemma was admitted to the [facility] 3E psychiatric unit this evening on a 201 commitment from the [facility] ED.  Pt presented to the ED for a psychiatric evaluation due to recent behavior changes including feeling increasingly tired, decreased sleep, nightmares and self reported periods of disassociation.  Pt reported she felt like her medications had not been working for the past several months and was unable to obtain an outpatient medical appointment for medications changes.  Pt denied any current SI/HI or AVH at the time of admission.  Pt did report a hx of a previous suicide attempt last year by attempted overdose on medications.  Also reported hx of +AVH, seeing and hearing pictures and voices of friends most recently 1 year ago.  Pt 1:1 to remain in place for safety until further evaluation by medical staff in AM.  Cooperative with admission process, explained unit rules and policy and given a tour of unit.  Pt able to verbalized understanding of unit rules and policy.  Will continue to offer any help and support toward unit adjustment.

## 2023-07-14 NOTE — CONSULTS
[facility] Medicine [facility]     Psychiatric Unit Medical Evaluation         Requesting:  Inpatient Psychiatric Unit Team    Reason for Consultation:  Initial Medical Evaluation     HISTORY OF PRESENT ILLNESS        This is a 23 y.o. female admitted to the psychiatric unit for psychiatric evaluation. [facility] is consulted for routine medical evaluation. History obtained by chart review and patient interview.      Patient’s past medical history is significant for catatonia, depression and schizophrenia. No reported medical hx.     Upon assessment, patient is alert and in no distress. She is seen sitting up in her chair eating a late breakfast. Patient currently denies any current or recent symptoms of chest pain, shortness of breath, dizziness or lightheadedness. Patient denies headaches or visual changes.  Patient also denies abdominal pain, constipation, vomiting, diarrhea, or dysuria symptoms. No recent fevers/chills or sick contacts reported.      Medication dispense history reviewed. Patient reports she takes Metformin for weight loss associated with her psychiatry medications. She denies any known hx of PCOS or diabetes.     The patient denies any medical concerns currently. Patient should follow up with PCP at discharge for hospital follow up appt and age appropriate cancer screenings.     1:1 staff present during encounter.     PAST MEDICAL AND SURGICAL HISTORY        Past Medical History:   Diagnosis Date   • Anxiety    • Depression    • Eating disorder    • Eating disorder    • Eating disorder    • H/O borderline personality disorder    • History of ITP    • Suicidal overdose (CMS/HCC)        Past Surgical History:   Procedure Laterality Date   • WOUND EXPLORATION         PCP: Arlette Tolentino FNP    MEDICATIONS        Home Medications:  Medications Prior to Admission   Medication Sig Dispense Refill Last Dose   • cloZAPine (CLOZARIL) 100 mg tablet Take 125 mg by mouth 2  (two) times a day.   7/13/2023 at 0900   • buPROPion XL (WELLBUTRIN XL) 300 mg 24 hr tablet Take 300 mg by mouth daily.   Unknown   • cyanocobalamin (VITAMIN B12) 100 mcg tablet Take 100 mcg by mouth daily.   Unknown   • fluticasone propionate (FLONASE) 50 mcg/actuation nasal spray Administer 2 sprays into each nostril daily.   Unknown   • LORazepam (ATIVAN) 0.5 mg tablet Take 0.25 mg by mouth 2 times daily.   Unknown   • metformin HCl (METFORMIN ORAL) Take 250 mg by mouth 2 (two) times a day.   Unknown   • nicotine (NICODERM CQ) 21 mg/24 hr Place 1 patch on the skin daily. 30 patch 0        Current inpatient medications were personally reviewed.    ALLERGIES        Patient has no known allergies.    FAMILY HISTORY        Family History   Problem Relation Age of Onset   • Alcohol abuse Biological Mother    • Alcohol abuse Biological Father    • Bipolar disorder Biological Sister         Currently age 22; diagnosed age 19, lamotrigine was helpful for her; also had psychiatric hospitalizaiton   • Alcohol abuse Biological Sister    • Suicidality Biological Sister         Attempted suicide   • Anxiety disorder Maternal Grandmother    • Depression Maternal Grandmother    • Eating disorder Maternal Grandmother    • Cancer Maternal Grandfather        SOCIAL HISTORY        Social History     Tobacco Use   • Smoking status: Every Day     Packs/day: 1.00     Types: Cigarettes, Electronic Cigarette   • Smokeless tobacco: Never   Vaping Use   • Vaping Use: Every day   • Substances: Nicotine   Substance Use Topics   • Alcohol use: Not Currently     Comment: no drink x 1 mo   • Drug use: Not Currently     Types: Amphetamines, Oxycodone, Benzodiazepines     Comment: last use over one yr ago        REVIEW OF SYSTEMS        All other systems reviewed and negative except as noted in HPI    PHYSICAL EXAMINATION        Visit Vitals  /74 (Patient Position: Lying)   Pulse 92   Temp 37.1 °C (98.8 °F) (Oral)   Resp 16   Ht 1.651 m  "(5' 5\")   Wt 90.7 kg (200 lb)   LMP  (LMP Unknown) Comment: HCG negative in ED   SpO2 95%   Breastfeeding No   BMI 33.28 kg/m²     Body mass index is 33.28 kg/m².      Physical Exam  Vitals reviewed.   Constitutional:       General: She is not in acute distress.     Appearance: She is not diaphoretic.   Cardiovascular:      Rate and Rhythm: Normal rate and regular rhythm.      Pulses: Normal pulses.      Heart sounds: Normal heart sounds, S1 normal and S2 normal. No murmur heard.  Pulmonary:      Effort: Pulmonary effort is normal.      Breath sounds: Normal breath sounds. No wheezing.   Skin:     General: Skin is warm.   Neurological:      General: No focal deficit present.      Mental Status: She is alert and oriented to person, place, and time.   Psychiatric:         Mood and Affect: Affect is flat.         LABS / EKG        Labs  Results from last 7 days   Lab Units 07/13/23  1550   WBC K/uL 9.65   HEMOGLOBIN g/dL 12.2   HEMATOCRIT % 38.0   PLATELETS K/uL 277       Results from last 7 days   Lab Units 07/13/23  1550   SODIUM mEQ/L 136   POTASSIUM mEQ/L 3.7   CHLORIDE mEQ/L 107   CO2 mEQ/L 21   BUN mg/dL 7   CREATININE mg/dL 0.6   CALCIUM mg/dL 9.3   GLUCOSE mg/dL 111*       Lab Results   Component Value Date    TSH 1.33 07/13/2023        Urine Drug Screen Results       07/13/23     1550    PCP Scrn Ur Not Detected    Benzodiazepine Ur Qual Not Detected    Amphetamine+Methamphetamine Scrn Ur Not Detected    Cannabinoid Screen Ur Not Detected    Opiate Scrn Ur Not Detected    Barbiturate Screen Ur Not Detected         Comment for PCP Scrn Ur at 1550 on 07/13/23: Assay Detects: phencyclidine in urine. Lowest detectable concentration is 25 ng/mL of phencyclidine.    Comment for Benzodiazepine Ur Qual at 1550 on 07/13/23: Assay Detects: benzodiazepines and metabolites at varying concentrations. Lowest detectable concentration is 200 ng/mL of oxazepam.    Comment for Amphetamine+Methamphetamine Scrn Ur at 1550 on " 23: Assay Detects: d-methamphetamine, d-amphetamine, methlyenedioxyamphetamine (MDA), and methlyenendioxymethamphetamine (MDMA) in urine. Lowest detectable concentration is 1000 ng/mL of d-methamphetamine.  Assay is less sensitive to MDA and MDMA (lowest detectable concentration, 2500 ng/mL) and could produce a false negative result. If MDMA overdose is suspected and the result is negative, a more specific test should be requested.    Comment for Cannabinoid Screen Ur at 1550 on 23: Assay Detects: cannabinoid metabolites in urine. Lowest detectable concentration is 50 ng/mL    Comment for Opiate Scrn Ur at 1550 on 23: Assay Detects: codeine, dihydrocodeine, hydrocodone, hydromorphone, levorphanol, morphine, morphine-3-glucuronide, norcodeine, oxycodone in urine. Lowest detectable concentration is 300 ng/mL of morphine.    Comment for Barbiturate Screen Ur at 1550 on 23: Assay Detects: alphenal, amobarbital, aprobarbital, barbital, butabarbital, butalbital, butethal, diallybarbital, pentobarbital, secobarbital,talbutal, and thiopental. Lowest detectable concentration is 200 ng/mL of secobarbital.           SARS-CoV-2 (COVID-19) (no units)   Date/Time Value   2023 Negative          EK/14: No baseline EKG in chart at time of consultation.         ASSESSMENT AND RECOMMENDATIONS           * Mood disorder (CMS/[facility])  Assessment & Plan  Management per psychiatry       Please communicate acute concerns with the covering hospitalist.    The patient should follow up with Primary Care upon discharge from the [facility].        AMPARO Kellogg  2023

## 2023-07-14 NOTE — PROGRESS NOTES
met with Gemma to obtain information for the assessment.   07/14/23 1600   General Information,    Admission Status voluntary admission   Arrived From emergency department  ([facility])   Highest Level of Education Some College   Contact Information   Permission Granted to Share Info With family/designee;psychiatrist;therapist/counselor  (mother, aunt, Dr. Nick)   Living Environment   People in Home parent(s);sibling(s)  (mother, brother)   Current Living Arrangements home   Primary Care Provided by self   Provides Primary Care For no one   Quality of Family Relationships supportive   Able to Return to Prior Arrangements yes   Employment/   Employment Status employed part-time   Current or Previous Occupation other (see comments)  (nanny)   Legal   Criminal Activity/Legal Involvement none   Values/Beliefs   Spiritual, Cultural Beliefs, Rastafari Practices, Values that Affect Care no   Personal Safety   Feels Safe at Home or Work/School yes   Feels Threatened by Someone no   Does Anyone Try to Keep You From Having Contact with Others or Doing Things Outside Your Home? no   Physical Signs of Abuse Present no   Violence Risk   History of Violence 0-->No   Aggressive/Violent Observed Behaviors 0-->No observed behaviors   Total Score 0   Do You Have Any Dangerous Items in Your Possession no   Homicidal ideation No   Current Self-injurious Behavior   Current Self-injurious Behavior denies   Past Self-injurious Behavior   Past Self-injurious Behavior denies   Behavioral Health Condition Management   Behavioral Health Symptoms/Conditions anxiety;depression;eating disorder;other (see comments)  (dissociation)   Behavioral Management Strategies abstinence from substances;counseling;medication therapy   Behavioral Health Self-Management Outcome 2 (bad)   Mental Health Treatment   Previous Mental Health Treatment counseling;ECT;inpatient treatment;medication;outpatient treatment;group therapy;psychiatrist    Current Mental Health Treatment inpatient treatment   Current Mental Health Tx Start Date 7/13/23   Substance Use   Substance Use Status current tobacco use;past alcohol use;past street drug/inhalant/medication abuse  (no alcohol x1 month, no substance use for over 1 year (amphetamine, oxycodone, benzodiazepine))   Previous Substance Use Treatment inpatient rehab;other (see comments)  (residential for eating disorder)   Coping/Stress, BH   Major Change/Loss/Stressor mental health condition   Sources of Support mental health providers;parent(s);sibling(s)   Techniques to Widen with Loss/Stress/Change medication;counseling;substance use   Reaction to Health Status accepting;depressed;anxious   Understanding of Condition and Treatment needs time to process   Developmental Stage (Eriksson's)   Developmental Stage Stage 6 (18-35 years/Young Adulthood) Intimacy vs. Isolation   Discharge Needs Assessment, BH   Readmission Within the Last 30 Days no previous admission in last 30 days   Concerns to be Addressed coping/stress;decision-making;discharge planning;medication;mental health;substance/tobacco abuse/use   Patient/Family Anticipates Transition to home with family   Patient/Family Anticipated Services at Transition mental health services   Transportation Concerns none   Anticipated Discharge Disposition home with family   Current Discharge Risk psychiatric illness   Discharge Planning   Anticipated Discharge Disposition home with outpatient services   Type of Outpatient Services counseling;medication management;psychiatric care

## 2023-07-14 NOTE — PROGRESS NOTES
07/14/23 1315   Activity/Group Checklist   Group Title Wellness tools   Attendance Did not attend

## 2023-07-15 PROBLEM — F17.200 NICOTINE USE DISORDER: Status: ACTIVE | Noted: 2023-07-15

## 2023-07-15 PROBLEM — F10.21: Status: ACTIVE | Noted: 2022-03-30

## 2023-07-15 PROBLEM — F14.90 COCAINE USE: Status: ACTIVE | Noted: 2020-07-13

## 2023-07-15 PROBLEM — F10.10 ALCOHOL USE DISORDER, MILD, ABUSE: Status: ACTIVE | Noted: 2022-03-30

## 2023-07-15 PROBLEM — F39 MOOD DISORDER (CMS/HCC): Status: ACTIVE | Noted: 2022-12-13

## 2023-07-15 PROBLEM — F14.21: Status: ACTIVE | Noted: 2020-07-13

## 2023-07-15 LAB
BILIRUB UR QL STRIP.AUTO: NEGATIVE MG/DL
CLARITY UR REFRACT.AUTO: CLEAR
COLOR UR AUTO: YELLOW
GLUCOSE UR STRIP.AUTO-MCNC: NEGATIVE MG/DL
HGB UR QL STRIP.AUTO: NEGATIVE
KETONES UR STRIP.AUTO-MCNC: NEGATIVE MG/DL
LEUKOCYTE ESTERASE UR QL STRIP.AUTO: NEGATIVE
NITRITE UR QL STRIP.AUTO: NEGATIVE
PH UR STRIP.AUTO: 7.5 [PH]
PROT UR QL STRIP.AUTO: NEGATIVE
SP GR UR REFRACT.AUTO: 1.01
UROBILINOGEN UR STRIP-ACNC: 0.2 EU/DL

## 2023-07-15 PROCEDURE — 99232 SBSQ HOSP IP/OBS MODERATE 35: CPT | Performed by: PSYCHIATRY & NEUROLOGY

## 2023-07-15 PROCEDURE — 12400000 HC ROOM AND CARE SEMIPRIVATE PSYCH

## 2023-07-15 PROCEDURE — 63700000 HC SELF-ADMINISTRABLE DRUG: Performed by: PSYCHIATRY & NEUROLOGY

## 2023-07-15 PROCEDURE — 81003 URINALYSIS AUTO W/O SCOPE: CPT | Performed by: PSYCHIATRY & NEUROLOGY

## 2023-07-15 PROCEDURE — 63700000 HC SELF-ADMINISTRABLE DRUG: Performed by: STUDENT IN AN ORGANIZED HEALTH CARE EDUCATION/TRAINING PROGRAM

## 2023-07-15 RX ORDER — HYDROXYZINE HYDROCHLORIDE 25 MG/1
50 TABLET, FILM COATED ORAL EVERY 6 HOURS PRN
Status: DISCONTINUED | OUTPATIENT
Start: 2023-07-15 | End: 2023-07-18 | Stop reason: HOSPADM

## 2023-07-15 RX ADMIN — METFORMIN HYDROCHLORIDE 500 MG: 500 TABLET ORAL at 09:58

## 2023-07-15 RX ADMIN — BUPROPION HYDROCHLORIDE 300 MG: 300 TABLET, EXTENDED RELEASE ORAL at 09:58

## 2023-07-15 RX ADMIN — CLOZAPINE 125 MG: 100 TABLET ORAL at 21:01

## 2023-07-15 RX ADMIN — CLOZAPINE 125 MG: 100 TABLET ORAL at 09:58

## 2023-07-15 RX ADMIN — METFORMIN HYDROCHLORIDE 500 MG: 500 TABLET ORAL at 16:23

## 2023-07-15 RX ADMIN — NICOTINE 1 PATCH: 14 PATCH, EXTENDED RELEASE TRANSDERMAL at 09:58

## 2023-07-15 NOTE — PROGRESS NOTES
07/15/23 1100   Activity/Group Checklist   Group Title Support and Self-expression  (Name that TV toon)   Attendance Did not attend

## 2023-07-15 NOTE — PROGRESS NOTES
spoke with Gemma's aunt, Svetlana. Told aunt that Gemma  was being prescribed and taking Clozaril and Wellbutrin. Aunt discussed the long journey it has been trying to help Gemma, keep her moving in a forward direction. 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  stated that loss of the job is a possibility but Gemma needs to first stabilize more and then, with 's support, she can reach out to the day care. Aunt told  that she appreciated 's call.

## 2023-07-15 NOTE — PROGRESS NOTES
"Psychiatry Progress Note    Chief Complaint/Reason for follow-up: unspecified mood disorder    Interval History: Per EMR: \"Gemma isolative to self in room except came to nurses station asking for nicotine patch, no order.  RN explained would have to obtain order.   Order obtained for patch and prn nicotine gum.  Flat affect, dismissive, poor eye contact.  Gemma had to be awakened for medication administration.  Med compliant.  No report of wanting to hurt herself or others.  Feels safe.  Will continue to monitor and provide support. \"    On interview this morning patient seen in room, resting in bed, no apparent distress. She reports she is doing \"good\" this morning. She denies dissociative symptoms. She denies suicidal ideation. She reports she is feeling tired. No other questions or complaints this morning.     Review of Systems:   Sleep:  Good, Appetite: Good and GI: No complaints    Vital Signs for the last 24 hours:  Temp:  [36.8 °C (98.3 °F)] 36.8 °C (98.3 °F)  Heart Rate:  [88-91] 88  Resp:  [16-17] 17  BP: (106-115)/(63-78) 106/63    Labs:  I have reviewed the patient's labs.  Current labs are within normal limits.   ANC 6.52     Mental Status Exam:  Appearance: well groomed  Gait and Motor: no abnormal movements  Speech: normal rate/rhythm/volume  Mood: 'good'  Affect: constricted  Associations: coherent  Thought Process: goal-directed  Thought Content: no auditory or visual hallucinations.  Suicidality/Homicidality: denies  Judgement/Insight: fair  Orientation: not assessed  Memory: recalls recent events and recalls remote events  Attention: alert    VTE Assessment: I have reassessed and the patient's VTE risk and treatment plan is appropriate.    Assessment/Plan   Nicotine use disorder  Assessment & Plan  -vapes 1 pod daily, reports rare cigarette use  -nicotine replacement  -counseled cessation  -motivational interviewing  -education about interaction of smoking and clozapine    Severe cocaine use " disorder, in sustained remission (CMS/HCC)  Assessment & Plan  - continued cessation  -inquire about sober supports, e.g., AA, sponsor, [facility]    Alcohol use disorder, mild, abuse  Assessment & Plan  -pt with history of severe use disorder previously in rehab and now is binge drinking once weekly 6 drinks per occasion when out with friends, does not identify this as problematic  - cessation  -inquire about sober supports, e.g., AA, sponsor, [facility]  -inquire about history of MAT  -avoid PRN oral benzodiazepines for now    * Unspecified mood (affective) disorder (CMS/HCC)  Overview  Gemma Darnell is a 23 y.o. single woman who recently started working at a  1 week ago with PMH overweight and PPH history of unspecified mood disorder vs. MDD vs. bipolar disorder vs. 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 Pt was admitted voluntarily 7/13/22 from the [facility] ED where she was brought in by her aunt. Pt came seeking admission stating she is having episodes of dissociation x1 week, that she doesn't feel like medications are working, that she is not suicidal, but could become suicidal.     Outside records would help with diagnostic clarity as she has never presented as psychotic or catatonic at [facility] though is reported to have a prolonged hospitalization at [facility] 04-08/2022 where she was treated with ECT and initiated on clozapine 08/2022 for reported psychosis and catatonia. Pt with multiple prior diagnoses including mood disorders, borderline personality disorder, eating disorder, substance use disorders. History of multiple suicide attempts including some of high lethal potential requiring ICU admission. Also with history of residential treatment, rehabs. Pt has spent most of adult life in  "higher levels of care, following parents' separation, appears to have difficulties with transitions and adjustment, and is noted to and admits she does not have good insight into antecedents for episodes.    Presently, the pt presents as nonpsychotic, denies feeling depressed or suicidal, and explains have periods of \"dissociation\" for the last 1 week. The context is that she has a new job at a , after recently not being able to hold other jobs. Her mother is also away for a trip. Pt denies other stressors. The patient would like to understand the episodes of dissociation. She was not presenting as dissociated on admission evaluation. It is not yet clear what these episodes consist of or could be attributed to- e.g., psychological, medication SE, substance, ?psychosis. Pt denies any substance relapse though also states she continues to drink once weekly up to 6 drinks per occasion. Unclear if she could be under-reporting the drinking and whether alcohol or alcohol-medication interaction could be contributing to what pt describes as \"dissociation.\" Despite sleeping well pt asks directly for Ativan for sleep- will avoid benzodiazepines. Her mother is concerned she has not been taking her medications since pt started to self-administer them over the last 3 weeks, though pt states she has been taking the medications. Mother denies any recent concern for SI, psychosis, catatonia. Pt described to have josie nneka psychosis and catatonia at [place] so her current presentation suggests the current regimen has been helpful, and it does not appear prudent to make any changes at this time. Will check orthostatics and a clozapine level and monitor pt in the hospital. Increase understanding of current alcohol use. Pt asking to leave soon to go back to work. Zara for safety in the hospital.    Home regimen: clozapine 125 mg q12h, bupropion  mg daily, metformin 500 mg BID with meals (though pharmacy records " suggest metformin is 250 mg TID with meals)    Assessment & Plan  -individual, group, milieu therapy  -increase collateral- spoke with psychiatrist, family  -collect data- attempt to obtain [place] discharge summary  - cessation of substances  -continue home clozapine 125 mg q12h  -colace 100 mg BID to reduce risks of clozapine-associated GI hypomotility  -continue home bupropion  mg daily  -continue home metformin 500 mg BID with meals  -continue to monitor for safety of discharge  -consider referral to metabolic health clinic  -consider atropine drops for sialorrhea    SATURDAY: patient comfortable in room, reporting stable mood and no dissociative episodes; no target symptoms for medication changes.

## 2023-07-15 NOTE — PLAN OF CARE
Problem: Adult Behavioral Health Plan of Care  Goal: Plan of Care Review  Outcome: Progressing  Flowsheets (Taken 7/15/2023 1524)  Progress: improving  Patient Agreement with Plan of Care: agrees  Outcome Evaluation: Gemma was mainly isoaltive to her room. She had no interactions with peers and did not attend groups. She was compliant with her meds and she ate her meals. She was cooperative with RN and assessment. Shestated that she was here d/t recent disassociative episodes that she was partially attributing to her medications. She denied any suicidal thoughts or intent. She was encouraged to come out of her room as well. She remains on q15min rounds.   Plan of Care Reviewed With: patient  Intervention(s): RN assessment, suicide check-in, reassurance provided

## 2023-07-15 NOTE — PROGRESS NOTES
07/15/23 0930   Activity/Group Checklist   Group Title Community meeting   Attendance Did not attend

## 2023-07-15 NOTE — ASSESSMENT & PLAN NOTE
-vapes 1 pod daily, reports rare cigarette use  -nicotine replacement prescribed at discharge  -counseled cessation  -motivational interviewing  -educated pt/family about interaction of smoking and clozapine

## 2023-07-15 NOTE — PROGRESS NOTES
07/15/23 1400   Activity/Group Checklist   Group Title Exercise/Yoga   Attendance Did not attend

## 2023-07-15 NOTE — ASSESSMENT & PLAN NOTE
"-7/18/23 status: remains stable- no episodes of \"dissociation\" that pt reported was motivation for seeking admission; no psychosis, SI, self-injurious behaviors; reports feeling tired, began first phase of redistributing portion of clozapine from morning to bedtime while maintaining current home total daily dose; referred to sleep medicine for JEOVANY evaluation and gave information for the metabolic health clinic for obesity evaluation; pt/family declined IOP/PHP; DC to home today, pt to return to individual level of care  -individual, group, milieu therapy  -obtained collateral- spoke with psychiatrist, family  -counseled cessation of substances  -redistributed home clozapine from 125 mg q12h to 100 mg every morning and 150 mg every night; consider ongoing redistribution of total daily dose to nighttime given sedation; untreated JEOVANY may also be contributing to excessive daytime sleepiness  -clozapine level obtained 7/17/23; results anticipated after discharge  -pt denies constipation though would consider scheduled bowel regimen on clozapine to reduce risks of clozapine-associated GI hypomotility  -continued home bupropion  mg daily  -titrated home metformin from 250 mg TID with meals to 500 mg BID with meals; consider continued titration up to 2000 mg tdd as an outpatient  -gave information for pt to call the metabolic health clinic for management of metabolic health on clozapine  -referred to sleep medicine clinic for concern for obstructive sleep apnea  -as an outpatient consider atropine drops for clozapine-induced sialorrhea, pt declined in the hospital stating this was mild and nonbothersome  "

## 2023-07-15 NOTE — ASSESSMENT & PLAN NOTE
-pt with history of severe use disorder previously in rehab and now is binge drinking once weekly 6 drinks per occasion when out with friends, does not identify this as problematic  -pt denies current engagement with sober supports, e.g., AA, sponsor, [facility]; encouraged engagement  -no history of MAT  -pt denies urges/cravings  -avoid benzodiazepines  -pt's contemplation is low  -counseled reduction and cessation, and advised of risks of alcohol and interactions with medications including seizure

## 2023-07-15 NOTE — PLAN OF CARE
Problem: Adult Behavioral Health Plan of Care  Goal: Plan of Care Review  Flowsheets (Taken 7/15/2023 0129)  Progress: no change  Patient Agreement with Plan of Care: agrees  Outcome Evaluation: Gemma isolative to self in room except came to nurses station asking for nicotine patch, no order.  RN explained would have to obtain order.   Order obtained for patch and prn nicotine gum.  Flat affect, dismissive, poor eye contact.  Gemma had to be awakened for medication administration.  Med compliant.  No report of wanting to hurt herself or others.  Feels safe.  Will continue to monitor and provide support.   Plan of Care Reviewed With: patient  Intervention(s): Suicide assessment/medication management/q 15 min checks

## 2023-07-15 NOTE — PROGRESS NOTES
07/15/23 1430   Activity/Group Checklist   Group Title Interactive therapy  ([place])   Attendance Did not attend

## 2023-07-16 LAB
ATRIAL RATE: 100
P AXIS: 56
PR INTERVAL: 130
QRS DURATION: 72
QT INTERVAL: 348
QTC CALCULATION(BAZETT): 448
R AXIS: 34
T WAVE AXIS: 40
VENTRICULAR RATE: 100

## 2023-07-16 PROCEDURE — 63700000 HC SELF-ADMINISTRABLE DRUG: Performed by: PSYCHIATRY & NEUROLOGY

## 2023-07-16 PROCEDURE — 63700000 HC SELF-ADMINISTRABLE DRUG: Performed by: STUDENT IN AN ORGANIZED HEALTH CARE EDUCATION/TRAINING PROGRAM

## 2023-07-16 PROCEDURE — 12400000 HC ROOM AND CARE SEMIPRIVATE PSYCH

## 2023-07-16 PROCEDURE — 99232 SBSQ HOSP IP/OBS MODERATE 35: CPT | Performed by: PSYCHIATRY & NEUROLOGY

## 2023-07-16 RX ADMIN — METFORMIN HYDROCHLORIDE 500 MG: 500 TABLET ORAL at 09:33

## 2023-07-16 RX ADMIN — NICOTINE 1 PATCH: 14 PATCH, EXTENDED RELEASE TRANSDERMAL at 09:35

## 2023-07-16 RX ADMIN — CLOZAPINE 125 MG: 100 TABLET ORAL at 21:34

## 2023-07-16 RX ADMIN — CLOZAPINE 125 MG: 100 TABLET ORAL at 09:33

## 2023-07-16 RX ADMIN — METFORMIN HYDROCHLORIDE 500 MG: 500 TABLET ORAL at 17:39

## 2023-07-16 RX ADMIN — BUPROPION HYDROCHLORIDE 300 MG: 300 TABLET, EXTENDED RELEASE ORAL at 09:33

## 2023-07-16 NOTE — PLAN OF CARE
Problem: Suicidal Behavior  Goal: Suicidal Behavior is Absent or Managed  Outcome: Progressing  Flowsheets (Taken 7/16/2023 1145)  [place] Goal: shares suicidal thoughts  Individual Goal: Gemma will notify staff if having suicidal thoughts  Goal Outcome: progressing     Problem: Adult Behavioral Health Plan of Care  Goal: Plan of Care Review  Outcome: Progressing  Flowsheets (Taken 7/16/2023 1145)  Progress: improving  Patient Agreement with Plan of Care: agrees  Outcome Evaluation: Gemma was mainly isolative to her room where she was seen laying in bed, sleeping at times. She was compliant with her meds and ate her meals though she did not attend groups. She denied any suicidal thoughts or intent and denied any hallucinations. She had no other complaints during shift and she was not seen to be in any distress. She remains on q15min rounds.   Plan of Care Reviewed With: patient  Intervention(s): RN assessment, suicide check-in, reassurance provided

## 2023-07-16 NOTE — PROGRESS NOTES
07/16/23 1015   Activity/Group Checklist   Group Title Exercise/Yoga   Attendance Did not attend        [FreeTextEntry1] : venous duplex showed a closed saphenous vein no evidence of DVT

## 2023-07-16 NOTE — PROGRESS NOTES
07/16/23 1430   Activity/Group Checklist   Group Title Interactive therapy  (Colleagues)   Attendance Did not attend

## 2023-07-16 NOTE — PROGRESS NOTES
07/16/23 1100   Activity/Group Checklist   Group Title Support and Self-expression  (Unhealthy vs healthy coping skills)   Attendance Did not attend

## 2023-07-16 NOTE — PLAN OF CARE
Plan of Care Review  Plan of Care Reviewed With: patient  Patient Agreement with Plan of Care: agrees  Progress: improving  Intervention(s): Nursing assessment, med management, safety rounds  Outcome Evaluation: Gemma was visible on the unit during evening shift. She came to request medication for nighttime. She c/o congestion, when offered to call telepsych for orders pt refused the offer. Denied any SI, HI, AVH. Restricted affect, mildly irritable but calm and cooperative. Will continue to monitor.

## 2023-07-16 NOTE — PROGRESS NOTES
07/16/23 0915   Activity/Group Checklist   Group Title Community meeting   Attendance Did not attend

## 2023-07-16 NOTE — PROGRESS NOTES
"Psychiatry Progress Note    Chief Complaint/Reason for follow-up: unspecified mood disorder    Interval History: Per EMR: \"Denied any SI, HI, AVH. Restricted affect, mildly irritable but calm and cooperative.\"    On interview this morning patient seen in room, resting in bed. Patient reports mood this morning is \"fine.\" She reports eating well. She reports sleeping well but continues to be \"really tired.\" She denies dissociative episodes since admission. She reports having missed medications for two days prior to presentation. She denies alcohol use or illicit substance use prior to admission. She denies increase in stress leading to dissociative episodes, which did not occur at times of heightened affect. She denies paranoia or AVH. She denies suicidal ideation.     Review of Systems:   Sleep:  Good, Appetite: Good and GI: No complaints    Vital Signs for the last 24 hours:  Temp:  [35.6 °C (96 °F)-36.8 °C (98.2 °F)] 36.8 °C (98.2 °F)  Heart Rate:  [] 65  Resp:  [16-18] 16  BP: (118-135)/(78-90) 118/78    Labs:  I have reviewed the patient's labs.  Current labs are within normal limits.   ANC 6.52     Clozapine level to be colected.     Mental Status Exam:  Appearance: well groomed  Gait and Motor: no abnormal movements  Speech: normal rate/rhythm/volume  Mood: 'good'  Affect: constricted  Associations: coherent  Thought Process: goal-directed  Thought Content: no auditory or visual hallucinations.  Suicidality/Homicidality: denies  Judgement/Insight: fair  Orientation: not assessed  Memory: recalls recent events and recalls remote events  Attention: alert    VTE Assessment: I have reassessed and the patient's VTE risk and treatment plan is appropriate.    Assessment/Plan   Nicotine use disorder  Assessment & Plan  -vapes 1 pod daily, reports rare cigarette use  -nicotine replacement  -counseled cessation  -motivational interviewing  -education about interaction of smoking and clozapine    Severe cocaine use " disorder, in sustained remission (CMS/HCC)  Assessment & Plan  - continued cessation  -inquire about sober supports, e.g., AA, sponsor, [facility]    Alcohol use disorder, mild, abuse  Assessment & Plan  -pt with history of severe use disorder previously in rehab and now is binge drinking once weekly 6 drinks per occasion when out with friends, does not identify this as problematic  - cessation  -inquire about sober supports, e.g., AA, sponsor, [facility]  -inquire about history of MAT  -avoid PRN oral benzodiazepines for now    * Unspecified mood (affective) disorder (CMS/HCC)  Overview  Gemma Darnell is a 23 y.o. single woman who recently started working at a  1 week ago with PMH overweight and PPH history of unspecified mood disorder vs. MDD vs. bipolar disorder vs. 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 Pt was admitted voluntarily 7/13/22 from the [facility] ED where she was brought in by her aunt. Pt came seeking admission stating she is having episodes of dissociation x1 week, that she doesn't feel like medications are working, that she is not suicidal, but could become suicidal.     Outside records would help with diagnostic clarity as she has never presented as psychotic or catatonic at [facility] though is reported to have a prolonged hospitalization at [facility] 04-08/2022 where she was treated with ECT and initiated on clozapine 08/2022 for reported psychosis and catatonia. Pt with multiple prior diagnoses including mood disorders, borderline personality disorder, eating disorder, substance use disorders. History of multiple suicide attempts including some of high lethal potential requiring ICU admission. Also with history of residential treatment, rehabs. Pt has spent most of adult life in  "higher levels of care, following parents' separation, appears to have difficulties with transitions and adjustment, and is noted to and admits she does not have good insight into antecedents for episodes.    Presently, the pt presents as nonpsychotic, denies feeling depressed or suicidal, and explains have periods of \"dissociation\" for the last 1 week. The context is that she has a new job at a , after recently not being able to hold other jobs. Her mother is also away for a trip. Pt denies other stressors. The patient would like to understand the episodes of dissociation. She was not presenting as dissociated on admission evaluation. It is not yet clear what these episodes consist of or could be attributed to- e.g., psychological, medication SE, substance, ?psychosis. Pt denies any substance relapse though also states she continues to drink once weekly up to 6 drinks per occasion. Unclear if she could be under-reporting the drinking and whether alcohol or alcohol-medication interaction could be contributing to what pt describes as \"dissociation.\" Despite sleeping well pt asks directly for Ativan for sleep- will avoid benzodiazepines. Her mother is concerned she has not been taking her medications since pt started to self-administer them over the last 3 weeks, though pt states she has been taking the medications. Mother denies any recent concern for SI, psychosis, catatonia. Pt described to have josie nneka psychosis and catatonia at [facility] so her current presentation suggests the current regimen has been helpful, and it does not appear prudent to make any changes at this time. Will check orthostatics and a clozapine level and monitor pt in the hospital. Increase understanding of current alcohol use. Pt asking to leave soon to go back to work. Zara for safety in the hospital.    Home regimen: clozapine 125 mg q12h, bupropion  mg daily, metformin 500 mg BID with meals (though pharmacy records " suggest metformin is 250 mg TID with meals)    Assessment & Plan  -individual, group, milieu therapy  -increase collateral- spoke with psychiatrist, family  -collect data- attempt to obtain [facility] discharge summary  - cessation of substances  -continue home clozapine 125 mg q12h  -colace 100 mg BID to reduce risks of clozapine-associated GI hypomotility  -continue home bupropion  mg daily  -continue home metformin 500 mg BID with meals  -continue to monitor for safety of discharge  -consider referral to metabolic health clinic  -consider atropine drops for sialorrhea    SATURDAY: patient comfortable in room, reporting stable mood and no dissociative episodes; no target symptoms for medication changes.     SUNDAY: to have clozapine level drawn. Denies SI or dissociative episodes. No clear target symptoms for medication changes as reports stable mood and sleep, denies SI, and denies any additional occurences of chief complaint upon admission (dissociations).
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
## 2023-07-17 PROBLEM — G47.19 EXCESSIVE DAYTIME SLEEPINESS: Status: ACTIVE | Noted: 2023-07-17

## 2023-07-17 PROBLEM — E66.9 OBESITY (BMI 30-39.9): Status: ACTIVE | Noted: 2023-07-17

## 2023-07-17 PROCEDURE — 63700000 HC SELF-ADMINISTRABLE DRUG: Performed by: PSYCHIATRY & NEUROLOGY

## 2023-07-17 PROCEDURE — 63700000 HC SELF-ADMINISTRABLE DRUG: Performed by: STUDENT IN AN ORGANIZED HEALTH CARE EDUCATION/TRAINING PROGRAM

## 2023-07-17 PROCEDURE — 80159 DRUG ASSAY CLOZAPINE: CPT | Performed by: PSYCHIATRY & NEUROLOGY

## 2023-07-17 PROCEDURE — 12400000 HC ROOM AND CARE SEMIPRIVATE PSYCH

## 2023-07-17 PROCEDURE — 36415 COLL VENOUS BLD VENIPUNCTURE: CPT | Performed by: PSYCHIATRY & NEUROLOGY

## 2023-07-17 PROCEDURE — 99233 SBSQ HOSP IP/OBS HIGH 50: CPT | Performed by: STUDENT IN AN ORGANIZED HEALTH CARE EDUCATION/TRAINING PROGRAM

## 2023-07-17 PROCEDURE — 90833 PSYTX W PT W E/M 30 MIN: CPT | Performed by: STUDENT IN AN ORGANIZED HEALTH CARE EDUCATION/TRAINING PROGRAM

## 2023-07-17 RX ORDER — CLOZAPINE 100 MG/1
100 TABLET ORAL DAILY
Status: DISCONTINUED | OUTPATIENT
Start: 2023-07-18 | End: 2023-07-18 | Stop reason: HOSPADM

## 2023-07-17 RX ADMIN — NICOTINE 1 PATCH: 14 PATCH, EXTENDED RELEASE TRANSDERMAL at 09:50

## 2023-07-17 RX ADMIN — METFORMIN HYDROCHLORIDE 500 MG: 500 TABLET ORAL at 17:58

## 2023-07-17 RX ADMIN — CLOZAPINE 125 MG: 100 TABLET ORAL at 22:22

## 2023-07-17 RX ADMIN — CLOZAPINE 125 MG: 100 TABLET ORAL at 09:50

## 2023-07-17 RX ADMIN — METFORMIN HYDROCHLORIDE 500 MG: 500 TABLET ORAL at 09:48

## 2023-07-17 RX ADMIN — BUPROPION HYDROCHLORIDE 300 MG: 300 TABLET, EXTENDED RELEASE ORAL at 09:48

## 2023-07-17 NOTE — ASSESSMENT & PLAN NOTE
-clozapine increases risk for obesity given metabolic SE profile  -pt already on metformin and this was titrated to 500 mg BID with meals; continued titration up to 2000 mg tdd as an outpatient may be considered  -gave information for pt to call the [facility] in [place]

## 2023-07-17 NOTE — PROGRESS NOTES
07/17/23 0945   Activity/Group Checklist   Group Title Wellness tools   Attendance Did not attend

## 2023-07-17 NOTE — PROGRESS NOTES
met with Gemma to discuss discharge tomorrow. 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 Gemma stated she has telehealth appointment tomorrow at 1:15 with therapist, Lona Krishnamurthy LCSW ([facility], [phone]). During meeting, Gemma completed her Safety Plan.     Before meeting with Gemma,  spoke with Gemma's aunt and aunt stated that she will come to the hospital at 11 AM tomorrow to bring Gemma home.    After meeting with Gemma,  spoke with Lona Krishnamurthy, Gemma's therapist, regarding discharge tomorrow.  told Lona that she will fax ([phone])  DC summary to Lona.

## 2023-07-17 NOTE — PROGRESS NOTES
07/17/23 1300   Activity/Group Checklist   Group Title Wellness tools   Attendance Did not attend

## 2023-07-17 NOTE — PLAN OF CARE
Plan of Care Review  Plan of Care Reviewed With: patient  Patient Agreement with Plan of Care: agrees  Progress: improving  Intervention(s): Nursing assessment, med managament, safety rounds  Outcome Evaluation: Gemma was visible during evening shift watching TV alongside peers. She has a restricted affect, slightly irritable at times, but calm and cooperative. Pt had insisted that she takes metformin 3 times a day, it is only scheduled 2 times a day. Otherwise she was medication compliant. Denied SI, HI, AVH. Will continue to monitor.

## 2023-07-17 NOTE — DISCHARGE SUMMARY
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 [facility]  ([place] after one week at the [facility] due to signs and symptoms of catatonia/psychosis. She was unable to get out of bed, or eat. Staff had to cut her hair short due to matting. She reports experiencing AVH during this time; hearing voices of people that she knew, and seeing other patients faces morph into faces of people she knew. She denied having any command hallucinations. She was hospitalized in [place] from April 2022-August 2022, and was treated with 20-22 sessions of ECT and was initiated on Clozapine. Her psychosis was long and retracted but resolved a couple months into treatment at [place]. After discharge she went to a residential treatment facility in [place] for one month, then home with her mom, and completed PHP and IOP through [place] (ended in December 2022). Since then she has been part of [facility] program with medication management and weekly individual therapy.\"     6/22/2023 outpatient follow-up, pt transferred care to Dr. Nick at \[facility]\""  • Pt denied symptoms of depression and psychosis. Reported she was eating and sleeping well. \"Mom reports difficulty managing expectations for the pt at home- in part because meds make her tired. 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     Medication Dispense History per EMR and PDMP  Reviewed     Psychiatric History:  Narrative history:   -Diagnoses- MDD with psychosis r/o bipolar disorder t/c schizoaffective disorder, borderline personality disorder, eating disorder, alcohol use disorder, cocaine use disorder, history of marijuana use r/o use disorder, nicotine use disorder  -Past psychiatric hospitalization- multiple see above  -Medication trials- currently on clozapine 125 mg q12h and bupropion  mg daily, many prior, including antidepressants, mood stabilizers, see above  -Neuromodulation trials- ECT at [facility] and at [facility]     History of suicidality:  -Suicide attempts- multiple  -Self-injurious behavior- pt denies lifetime cutting though there is a diagnosis on the problem list of \"history of self-harm\"     Current psychiatrist: Dr. Nick  Current therapist: no  Current : no     Suicide Risk Assessment Components:  Previous Suicide Attempts: Yes: multiple  Access to Firearms: No  Chronic Suicide Risk Factors: Psychiatric illness (mood, anxiety, psychotic, personality, SUDS, other)  Proximal Suicide Risk Factors: Impulsivity or agitation  Protective Factors: Connectedness to individuals, family, community, or social institutions, Identified reasons for living, Responsibility to dependents/pets and Access to effective mental health care     Substance Use History:  Substance use:  -Vapes 1 pod daily, rare cigarettes  -History of alcohol use disorder and cocaine use disorder, past rehab  -Continues to drink now, currently once weekly up to 6 drinks per occassion  -Marijuana in high school     Family History:            Family History   Problem Relation Age of Onset   • Alcohol abuse Biological Mother     • " "Alcohol abuse Biological Father     • Bipolar disorder Biological Sister           Currently age 21; diagnosed age 19, lamotrigine was helpful for her; also had psychiatric hospitalizaiton   • Alcohol abuse Biological Sister     • Suicidality Biological Sister           Attempted suicide   • Anxiety disorder Maternal Grandmother     • Depression Maternal Grandmother     • Eating disorder Maternal Grandmother     • Cancer Maternal Grandfather           Social History:   Current employment: started at  job 1 week ago  Finances/SSDI: supported by mother  Current living situation: with mother  Source of social support: mother, friends  Relationship status: single  Sexually active: no  Children, pregnancies: denies  History of abuse/trauma: denies  History of violence toward others: denies  Access to firearms: denies  History of traumatic brain injury, concussion, seizure: seizure in context of overdose  Born and raised: [place], raised in [place], went to [employer]  Number of siblings: sister and brother, pt is middle child  Upbringing: father alcoholic, parents got  2 years ago  Level of education: good student, \"average\" and says she stopped going to college because she went to rehab then eating disorder treatment; she planned to go back to school then did not \"because of the suicidal episode\"; she would like to go back to school in the future; denies problems with residential, suspension, expulsion in school  Learning disability (e.g., ADHD, dyslexia): denies      Hospital Course  Gemma Darnell was admitted voluntarily on 7/13/23 for subjective \"dissociative episodes\" in the context of new job x1 week, few missed clozapine doses, weekly alcohol use. Presenting symptoms included difficult to describe sense of \"dissociation\" that began 1 week prior, with vague frequency. Pt also presented largely with sleepiness during the daytime, and told the ED she was not suicidal but could become " suicidal. Also noted is context of prior borderline personality disorder diagnosis, and mother had gone away on a trip- unclear if this dynamic was contributing. Pt is reported to have had a prolonged hospitalization at [facility] 04-08/2022 for psychosis and catatonia, underwent ECT, and is reported to be stable on clozapine since. Prior admissions to [facility] were not remarkable for psychosis. Obtaining [facility] records would help clarify the underlying diagnosis. Pt appears to have a mood disorder, history of borderline personality disorder, current alcohol and nicotine use disorders, and a history of cocaine use disorder.    As noted, pt actively drinking, reports once weekly frequency, drinking up to 6 drinks, until intoxicated. She was not concerned about her alcohol use, stating it is less than prior. There was no emergent alcohol withdrawal. She denies urges/cravings. She was also not concerned about her vaping. She primarily vapes nicotine and reports rare cigarette use. We discussed the cigarette-clozapine interaction in detail. She was counseled regarding nicotine and alcohol cessation, and continued cessation from cocaine. She had limited contemplation for cessation, though did acknowledge the importance of at least cutting back on alcohol use.    The patient's home clozapine 125 mg q12h was redistributed to 100 mg every morning and 150 mg every night given her morning sedation. Home bupropion  mg every morning was continued unchanged. The home metformin was titrated from 250 mg TID with meals to 500 mg BID with meals. The patient tolerated and responded favorably to medication changes. Consider continuing to redistribute the clozapine to nighttime as an outpatient pending course and response. Counseled regarding contraception and potential medication effects in pregnancy.     Initially and early on during the hospitalization, the patient presented as disinterested and tired. She denied experiencing the  dissociation in the hospital. She denied any psychosis and catatonia and did not examine psychotic or catatonic. She reported mood had been improved, and that she had enjoyed the new job. She had limited insight into what psychological factors could be related to her worsening, such as her mother's departure. The patient remained somewhat aloof and disinterested in her presentation, though did engage with parts of individual sessions that focused on psychoeducation and motivational interviewing modalities. The patient was receptive to psychoeducation about the diagnostic formulation.    The patient remained stable throughout the hospitalization, reporting less stable mood, no emergent psychosis, no emergent catatonic features. She did not experience the dissociation in the hospital. She was often in her room, attending few groups, reading the Hunger Games novel. She was tired in the mornings. The patient's primary support, mother and aunt, were involved in the plan of care and in agreement.    The patient had no behavior disturbance, agitation, or requirement of intramuscular PRN medications.    Medically, the patient was noted to be at risk for obstructive sleep apnea, due to her weight gain and witnessed gasping while sleeping (per aunt). It was thought that this could contribute to her excessive daytime sleepiness, in addition to a clozapine side effect. The patient was in agreement with recommendation for sleep medicine evaluation, routine follow-up as outpatient with PCP, and contacting the metabolic health clinic to discuss other modalities to address her weight. The metformin was titrated, may be titrated further as an outpatient, and GLP-1 agonists could be considered to facilitate her adherence with the clozapine (along with diet, exercise).    The patient was stable for discharge on 7/18/23. At the time of discharge, the patient had no thoughts of harming self or others. The patient was bright,  future-oriented, and tolerating medications without side effects. The patient and family declined programming such as PHP, IOP, and the pt/family expressed preference to return to individual level of care with current psychiatrist and therapist. Prescriptions were provided for nicotine replacement. The patient already has supplies of the clozapine, bupropion XL, and metformin.    The patient has 3 lifetime suicide attempts by overdose (05/2021, 07/2021, 03/2022). The patient does not have access to a firearm. Acute stressors and modifiable suicide risk factors were addressed by discussion about reducing and removing access to lethal means, medication treatment, psychotherapy, psychoeducation, safety/crisis planning with patient and primary supports, arranging an outpatient aftercare plan. The risk of self-injurious behavior and impulsivity including suicidality and self-injurious behavior is chronically elevated. Hospitalization may engender regression and does not modify life stressors. Acute risk of harm to self or others is reduced compared to prior to admission. Pt does not meet criteria for involuntary commitment. Psychiatrically stable for discharge to home with outpatient follow-up. Emergency/crisis return precautions counseled.    Full assessment and plan by problem below. See individual progress notes for additional details of daily sessions. See After Visit Summary for additional guidance in patient-centered language.    Consults:   Orders Placed This Encounter   Procedures   • Inpatient consult to Hospitalist       Procedures:   None    Mental Status Exam at Discharge  Heart Rate: 98  Resp: 16  BP: 118/79  Temp: 36.8 °C (98.3 °F)  Weight: 90.7 kg (200 lb)    Appearance: casually dressed, somewhat untidy, resting in bed on approach in the late morning  Behavior: calm, eager to go home, asking for note for excused absence for work  Gait and Motor: no abnormal movements and normal  Speech: normal  "rate/rhythm/volume  Mood: \"fine\"  Affect: neutral, brighter when talks about leaving the hospital  Associations: coherent  Thought Process: goal-directed, concrete  Thought Content: denies psychosis, none elicited or observed  Suicidality/Homicidality: denies  Judgement/Insight: partial  Orientation: grossly intact, not formally assessed  Memory: grossly intact, not formally assessed  Attention: alert  Knowledge: normal  Language: normal    Subjective (day of discharge- 7/18/23): Pt seen, examined. Pt reports she is doing well, denies problems with initial phase of redistribution of clozapine dose. Discussed that she could consider further weighting towards bedtime with outpatient psychiatrist. Reviewed information about risk for sleep apnea, pt in agreement with referral to sleep medicine and to contact metabolic health clinic. No psychosis, catatonia, dissociation. Pt's mood/affect was not depressed, and there was no suicidality. She is future-oriented, asking for note for excused absence for work. Pt tolerating increase in metformin. Discussed continued titration is reasonable pending course. Labs, diagnoses, medication education reviewed. Discussed need for continued every other week lab monitoring on the clozapine. Risks of medications reviewed. Recommended safely discarding all other psychotropic medications they may have at home. Emphasized the importance of daily medication adherence, and taking medications as prescribed. Reviewed again the recommendation for follow-up with PCP for routine care in addition to above specialists. Pt continues to decline referral to PHP/IOP, prefers to return to care of individual psychiatrist and therapist. Reviewed recommendation against substance use as this would likely worsen psychiatric symptoms, and contribute to impulsivity, cognitive problems, and risks of psychosis, suicide, and falls/accidents/injuries.    Finalized problem list    * Unspecified mood (affective) " "disorder (CMS/formerly [facility])  Overview  Gemma Darnell is a 23 y.o. single woman who recently started working at a  1 week ago with PMH overweight and PPH history of unspecified mood disorder vs. MDD vs. bipolar disorder vs. schizoaffective disorder, reported history of psychosis and catatonia, borderline personality disorder, eating disorder reported in remission, alcohol use disorder- currently drinking weekly, cocaine use disorder in sustained remission, and nicotine use disorder. Pt was admitted voluntarily 7/13/22 from the [facility] ED where she was brought in by her aunt. Pt came seeking admission stating she is having episodes of dissociation of unclear frequency over the last 1 week, that she doesn't feel like medications are working, that she is not suicidal, but could become suicidal if not admitted.     Outside records would help with diagnostic clarity as she has never presented as psychotic or catatonic at [facility] though is reported to have a prolonged hospitalization at [facility] 04-08/2022 where she was treated with ECT and initiated on clozapine 08/2022 for reported psychosis and catatonia. Pt with multiple prior diagnoses including mood disorders, borderline personality disorder, eating disorder, substance use disorders. History of multiple suicide attempts including some of high lethal potential requiring ICU admission. Also with history of residential treatment, rehabs. Pt has spent most of adult life in higher levels of care, following parents' separation 2 years prior, appears to have difficulties with transitions and adjustment, and is noted to and admits she does not have good insight into antecedents for episodes.    On admission, pt presented as nonpsychotic, denied feeling depressed or suicidal, and explained having periods of \"dissociation\" for the last 1 week, with vague description of this and its frequency. The context was that she has a new job at a , after recently not being able to hold other " "jobs. Mother also went away for a trip. Pt denied other stressors though is actively drinking. The patient would like to understand the episodes of dissociation. She did not present as dissociated on admission evaluation. It is not yet clear what these episodes consist of or could be attributed to- e.g., psychological, medication SE, substance, psychosis. Pt denied any substance relapse though continues to drink once weekly up to 6 drinks per occasion until intoxicated, with limited insight into this being problematic. Unclear if she could be under-reporting the drinking and whether alcohol or alcohol-medication interaction could be contributing to what pt describes as \"dissociation.\" Despite sleeping well pt asked directly for Ativan for sleep- will avoid benzodiazepines. Her mother was concerned she had not been taking her medications since pt started to self-administer them over the last 3 weeks, though pt stated she has been taking the medications aside from 2 missed doses in the last week. Mother denied any recent concern for SI, psychosis, catatonia. Pt described to have josie nneka psychosis and catatonia at [facility] so her current presentation suggests the current regimen has been helpful, and it did not appear prudent to make any big changes. Pt asking on admission to leave soon to go back to work. Contracted for safety in the hospital.    Home regimen: clozapine 125 mg q12h, bupropion  mg daily, metformin 500 mg BID with meals (though pharmacy records suggest metformin is 250 mg TID with meals)    Clozapine REMS: Patient ID [id number]; currently every 2 weeks ANC monitoring frequency    Assessment & Plan  -7/18/23 status: remains stable- no episodes of \"dissociation\" that pt reported was motivation for seeking admission; no psychosis, SI, self-injurious behaviors; reports feeling tired, began first phase of redistributing portion of clozapine from morning to bedtime while maintaining current home total " daily dose; referred to sleep medicine for JEOVANY evaluation and gave information for the metabolic health clinic for obesity evaluation; pt/family declined IOP/PHP; DC to home today, pt to return to individual level of care  -individual, group, milieu therapy  -obtained collateral- spoke with psychiatrist, family  -counseled cessation of substances  -redistributed home clozapine from 125 mg q12h to 100 mg every morning and 150 mg every night; consider ongoing redistribution of total daily dose to nighttime given sedation; untreated JEOVANY may also be contributing to excessive daytime sleepiness  -clozapine level obtained 7/17/23; results anticipated after discharge  -pt denies constipation though would consider scheduled bowel regimen on clozapine to reduce risks of clozapine-associated GI hypomotility  -continued home bupropion  mg daily  -titrated home metformin from 250 mg TID with meals to 500 mg BID with meals; consider continued titration up to 2000 mg tdd as an outpatient  -gave information for pt to call the metabolic health clinic for management of metabolic health on clozapine  -referred to sleep medicine clinic for concern for obstructive sleep apnea  -as an outpatient consider atropine drops for clozapine-induced sialorrhea, pt declined in the hospital stating this was mild and nonbothersome    Obesity (BMI 30-39.9)  Assessment & Plan  -clozapine increases risk for obesity given metabolic SE profile  -pt already on metformin and this was titrated to 500 mg BID with meals; continued titration up to 2000 mg tdd as an outpatient may be considered  -gave information for pt to call the [facility] in [place]    Excessive daytime sleepiness  Assessment & Plan  -aunt reports witnessed gasping at night  -pt at risk for JEOVANY, referred to sleep medicine as outpatient  -sedation may also be in part related to clozapine, thus prompting the redistribution of the  dose as above    Alcohol use disorder, mild, abuse  Assessment & Plan  -pt with history of severe use disorder previously in rehab and now is binge drinking once weekly 6 drinks per occasion when out with friends, does not identify this as problematic  -pt denies current engagement with sober supports, e.g., AA, sponsor, [facility]; encouraged engagement  -no history of MAT  -pt denies urges/cravings  -avoid benzodiazepines  -pt's contemplation is low  -counseled reduction and cessation, and advised of risks of alcohol and interactions with medications including seizure    Nicotine use disorder  Assessment & Plan  -vapes 1 pod daily, reports rare cigarette use  -nicotine replacement prescribed at discharge  -counseled cessation  -motivational interviewing  -educated pt/family about interaction of smoking and clozapine    Severe cocaine use disorder, in sustained remission (CMS/[facility])  Assessment & Plan  -counseled continued cessation  -pt denies urges/cravings  -she is contemplative about continued cessation  -pt denies current sober supports, e.g., NA, sponsor, [facility]; encouraged engagement    45 minutes were spent including direct face-to-face counseling/coordination of care, review of the medical record, and documentation, as well as aftercare guidance. I discussed the treatment plan and the patient's progress with nursing, therapy, social work staff as appropriate.

## 2023-07-17 NOTE — DISCHARGE INSTR - APPOINTMENTS
Lona Krishnamurthy, NEREYDA  [facility]  [facility]  [address], [address] [place] [address]  [phone] fax  7/18/23 @ 1:15 PM / telehealth appointment

## 2023-07-17 NOTE — PROGRESS NOTES
"PSYCHIATRIC PROGRESS NOTE    Chief Complaint/Reason for follow-up: feeling of dissociation due to medications    Interval History: Case discussed in multidisciplinary treatment team meeting and chart reviewed; significant for: Mostly been isolated in her room. Clozapine levels were not drawn and clozapine placed on hold.      E&M- on exam, pt was awoken from sleeping in her room. She states that she feels tired all the time and has been largely sleeping since she was admitted. She is eating well and compliant with her medications.     Psychotherapy- supportive psychotherapy, insight-oriented psychotherapy, CBT, behavioral activation, DBT skills, motivational interviewing, psychoeducation-     Vital Signs for the last 24 hours:  Temp:  [36.2 °C (97.2 °F)-36.7 °C (98.1 °F)] 36.7 °C (98.1 °F)  Heart Rate:  [91-97] 91  Resp:  [16-18] 18  BP: (118-122)/(77-86) 122/77    Scheduled Meds:  • buPROPion XL  300 mg oral Daily   • cloZAPine  125 mg oral q12h ELMER   • docusate sodium  100 mg oral BID   • metFORMIN  500 mg oral BID with meals   • nicotine  1 patch transdermal Daily       Labs:  Selected Electrolytes  Results from last 7 days   Lab Units 07/13/23  1550   POTASSIUM mEQ/L 3.7   SODIUM mEQ/L 136   CREATININE mg/dL 0.6       Recent EKG (QTc)  Lab Results   Component Value Date    VENTRICRATE 100 07/15/2023    QTCCALCULAT 448 07/15/2023       No results found for: VPA, LITHIUM    MENTAL STATUS EXAM  Appearance: well groomed and appropriate attire  Behavior:  Groggy  Gait and Motor: slow and normal  Speech: normal rate/rhythm/volume  Mood: \"tired, sleepy\"  Affect: blunted  Associations: coherent  Thought Process: goal-directed  Thought Content: no auditory or visual hallucinations. 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 She was brought in voluntarily by her aunt on July 13th for feelings of \"dissociation\" lasting for the past week prior to admission.     Gemma has a history of substance use disorder including narcotics, alcohol, and tobacco. She currently vapes and reportedly rdrinks up to 6 glasses of wine once a week. Her mother was gone away for a trip while she remains at home with her aunt. She cur          Medical conditions managed by the hospital medicine service include: ***    Commitment: 201***  Observation: 15 minute checks***  Disposition: continue admission for ***severe anxiety and decreased function; anticipate referral to PHP/IOP*** when appropriate for discharge  Outpatient: ***  Housing: appears able to return home*** living with *** prior to admission     *** total minutes were spent including direct face-to-face counseling/coordination of care, review of the medical record, and documentation ***. I discussed the treatment plan and the patient's progress with nursing, therapy, and social work staff as appropriate. This total time includes *** minutes of prolonged service- ***.    Plus an additional *** minutes of psychotherapy (modality documented above)  "

## 2023-07-17 NOTE — ASSESSMENT & PLAN NOTE
-aunt reports witnessed gasping at night  -pt at risk for JEOVANY, referred to sleep medicine as outpatient  -sedation may also be in part related to clozapine, thus prompting the redistribution of the dose as above

## 2023-07-17 NOTE — PLAN OF CARE
Problem: Adult Behavioral Health Plan of Care  Goal: Plan of Care Review  Outcome: Progressing  Flowsheets (Taken 7/17/2023 1414)  Progress: improving  Patient Agreement with Plan of Care: agrees  Outcome Evaluation: Gemma was mainly isolative to her room during shift. She was seen laying in bed and was in no distress. She denied any suicidal thoughts r urges to self-harm. She has been able to make needs known. She was compliant with labs, meds, and meals. Though she did not attend groups. She has been observed on q15min rounds.   Plan of Care Reviewed With: patient  Intervention(s): RN assessment, suicide check-in, reassurance provided

## 2023-07-18 VITALS
WEIGHT: 200 LBS | HEIGHT: 65 IN | SYSTOLIC BLOOD PRESSURE: 118 MMHG | TEMPERATURE: 98.3 F | BODY MASS INDEX: 33.32 KG/M2 | HEART RATE: 98 BPM | DIASTOLIC BLOOD PRESSURE: 79 MMHG | OXYGEN SATURATION: 97 % | RESPIRATION RATE: 16 BRPM

## 2023-07-18 PROCEDURE — 99239 HOSP IP/OBS DSCHRG MGMT >30: CPT | Performed by: STUDENT IN AN ORGANIZED HEALTH CARE EDUCATION/TRAINING PROGRAM

## 2023-07-18 PROCEDURE — 63700000 HC SELF-ADMINISTRABLE DRUG: Performed by: PSYCHIATRY & NEUROLOGY

## 2023-07-18 PROCEDURE — 63700000 HC SELF-ADMINISTRABLE DRUG: Performed by: STUDENT IN AN ORGANIZED HEALTH CARE EDUCATION/TRAINING PROGRAM

## 2023-07-18 RX ORDER — IBUPROFEN 200 MG
1 TABLET ORAL DAILY
Qty: 30 PATCH | Refills: 0 | Status: ON HOLD | OUTPATIENT
Start: 2023-07-19 | End: 2024-01-09 | Stop reason: SDDI

## 2023-07-18 RX ORDER — CLOZAPINE 25 MG/1
TABLET ORAL
Start: 2023-07-18 | End: 2023-08-16 | Stop reason: SDUPTHER

## 2023-07-18 RX ORDER — CLOZAPINE 100 MG/1
TABLET ORAL
Refills: 0
Start: 2023-07-18 | End: 2023-08-16 | Stop reason: SDUPTHER

## 2023-07-18 RX ORDER — MICONAZOLE NITRATE 2 %
2 CREAM (GRAM) TOPICAL EVERY 2 HOUR PRN
Qty: 100 EACH | Refills: 0 | Status: ON HOLD | OUTPATIENT
Start: 2023-07-18 | End: 2024-01-09 | Stop reason: SDDI

## 2023-07-18 RX ORDER — METFORMIN HYDROCHLORIDE 500 MG/1
500 TABLET ORAL 2 TIMES DAILY WITH MEALS
Status: ON HOLD
Start: 2023-07-18 | End: 2024-01-09 | Stop reason: SDDI

## 2023-07-18 RX ADMIN — BUPROPION HYDROCHLORIDE 300 MG: 300 TABLET, EXTENDED RELEASE ORAL at 09:18

## 2023-07-18 RX ADMIN — DOCUSATE SODIUM 100 MG: 100 CAPSULE, LIQUID FILLED ORAL at 09:18

## 2023-07-18 RX ADMIN — NICOTINE 1 PATCH: 14 PATCH, EXTENDED RELEASE TRANSDERMAL at 09:23

## 2023-07-18 RX ADMIN — CLOZAPINE 100 MG: 100 TABLET ORAL at 09:19

## 2023-07-18 RX ADMIN — METFORMIN HYDROCHLORIDE 500 MG: 500 TABLET ORAL at 09:19

## 2023-07-18 NOTE — NURSING NOTE
Gemma is AAOx3, VSS and WNL.  She has signed for and received for her belongings and discharge paperwork.  She will ambulate off the unit.

## 2023-07-18 NOTE — IP PSYCH PROVIDER DISCHARGE INSTRUCTIONS
Reason for Admission  Dissociative episodes    Major Procedures and Tests  Imaging Results  No results found.    No Surgical Procedures    Advance Directives  Does patient have advance directive?: No  Patient does not have Advance Directive: Patient/Family declines further information  Does patient have current OOH DNR form?: No  Patient does not have current OOH DNR form: Patient/Family declines further information  Does patient have current POLST?: No  Patient does not have current POLST: Patient/Family declines further information  Does patient have mental health advance directive?: No  Patient does not have Mental Health Advance Directive: Patient/Family declines further information

## 2023-07-18 NOTE — PLAN OF CARE
Problem: Adult Behavioral Health Plan of Care  Goal: Plan of Care Review  Outcome: Progressing  Flowsheets (Taken 7/18/2023 0113)  Progress: improving  Patient Agreement with Plan of Care: agrees  Outcome Evaluation: Gemma was visible out in the community this shift. She was observed watching T.V with her peers. She said she was not having any suicidal or self harm thoughts this shift. Said she feels ready for her upcoming discharge. Will continue to monitor for safety.   Plan of Care Reviewed With: patient  Intervention(s): Assessed for suicidal thoughts. Monitored for safety on q15 minute rounds.

## 2023-07-18 NOTE — DISCHARGE INSTRUCTIONS
Gemma,     You were hospitalized for report of dissociation.     You had a difficult time describing this feeling and did not experience this in the hospital. We talked about how it is not entirely clear what was contributing to this, but we discussed possibilities including possible effects of the medication, alcohol, untreated sleep apnea. Fortunately, you were not having any symptoms of psychosis of catatonia. We talked about how the stress from the new job could also be playing a role.     Regarding medications: You reported that the sedation from the clozapine was making it difficult especially with the new job. We discussed that you can shift and redistribute some of the morning dose to bedtime. We started with the first step of this process, changing the dose from 125 mg in the morning and 125 mg at bedtime to 100 mg in the morning and 150 mg at bedtime. We also checked a clozapine level but the results were not back yet by the time of your discharge. Dr. Nick will have access to this lab when the results are available. We checked your white blood cell count while you were here and it was normal. You need to continue with the blood tests every other week in order for the pharmacy to continue to fill your clozapine prescription. The bupropion  mg every morning was continued unchanged. We discussed that the dose range for metformin goes up to 2000 mg total per day, the medication can be taken twice instead of 3 times daily, and the dose was increased from 250 mg three times per day with meals (750 mg daily) to 500 mg two times per day with breakfast and dinner (1000 mg daily). No new prescriptions were immediately necessary. You can use the medication you already have for now. It is important to continue to take your medications as prescribed as missed doses could lead to worsening and/or recurrence of symptoms.    Regarding medication side effects: Potential side effects of bupropion XL [Wellbutrin,  "an antidepressant] include but are not limited to increased energy, appetite suppression, and lower seizure threshold. Potential side effects of metformin include but are not limited to upset stomach and appetite suppression. Potential side effects of clozapine include but are not limited to sedation, increased appetite/weight gain (e.g., risk for obesity), increased blood sugar (e.g., risk for diabetes mellitus), increased cholesterol (e.g., risk for hyperlipidemia/dyslipidemia), constipation, restlessness (e.g., \"akathisia\"), muscle stiffness/rigidity (e.g., \"extrapyramidal symptoms\"), seizures, drooling, and permanent abnormal muscle movements (e.g., \"tardive dyskinesia\"). We talked about how a medication called atropine can be used as a swish and spit mouthwash to reduce drooling. You were not interested in trying this in the hospital but it remains an option if you reconsider. Despite the potential side effects/risks, the benefits of treating your illness with these medications appears to outweigh the risks of the potential side effects at this time. There can be catastrophic consequences of abruptly stopping your medications. Make any changes gradually and do so with the guidance of your psychiatrist. You may continue to work on redistributing the morning dose to bedtime if this is helpful for the morning tiredness.    Regarding substance use: We discussed the important recommendation against substance use. We recommend that you consider AA, NA, or Smart Recovery to help with your sobriety. You denied having any problems with urges or cravings, though if you do, there are medications that can help with this. Be open about this with your psychiatrist. Substances like alcohol and marijuana (THC, CBD) can worsen psychiatric symptoms (e.g., motivation, energy, sleep, mood, anxiety, psychosis), can interact with your medications and their efficacy, and contribute to cognitive problems, accidents, falls, and " impulsive behaviors including suicidal thoughts and behaviors. The recommendation is to avoid all substances. When you are ready, it may also be prudent to attempt smoking cessation with guidance from your primary care doctor and/or psychiatrist. This can not only improve physical health, but also improve your mental health. Nicotine patches and gum were sent to your pharmacy to support this goal. We also talked about how smoking cigarettes can reduce the amount of clozapine in your system by nearly half. This is a drug interaction and you should tell your psychiatrist if you are smoking cigarettes because the clozapine dose would need to be adjusted.    Regarding psychotherapy, lifestyle, and outpatient treatment: You were not interested in a PHP or IOP and will return to the care of Dr. Nick and your therapist. If in the future you are looking for a second opinion, there is a specialty psychosis outpatient clinic at [facility] that has an excellent team of psychiatrists who specialize in psychotic disorders. They are located at [address] [place] [address], [address], [place], [place], [facility]. The phone number for their  is [phone]. Ask for Dr. Olivia Lopez's clinic.    Regarding treatment course: You did not have any symptoms of psychosis or catatonia, and denied any thoughts of suicide. You were eager to return home and we agreed on the discharge date.     Regarding medical care: We reviewed your labwork (see below), and you had healthy and normal labs and an EKG. The hospital medicine service evaluated you and did not identify other active medical issues. Your aunt has noticed that you have had episodes of gasping while asleep. This information, in combination with the weight gain and your daytime sleepiness suggest you are at risk for obstructive sleep apnea (JEOVANY). This is a condition that can occur with weight gain where when you are asleep you do not get enough oxygen to  your brain. If can contribute to problems like low energy, and can also affect your cardiovascular health. I placed a referral order for you to go to the sleep medicine clinic. We also discussed that you may benefit from consultation in the metabolic health clinic. Clozapine is a medication that can be life-saving but has risks like increased appetite/weight and obesity. At the metabolic clinic you may discuss other options for managing weight and maintaining your health. Please contact the clinic- they may require a formal referral from your PCP. Whenever you have been hospitalized, it is always a good idea to see your primary care doctor.    [facility] in [place]  Get started now with a consultation. Call [phone].    Regarding safety and return precautions: If you have any concerns for your safety in the future and it is an emergency, you should call 911, notify your support system, and come to the emergency department for evaluation. You can elaborate on the safety plan you developed here in the hospital with your outpatient therapist, psychiatrist, and support system, so that you have a team approach to managing crisis should this arise in the future.    Gemam, it was really nice to meet and work with you and we are always here should you ever need our support in the future.    Best wishes,  ARLETTE Luke MD  [phone] (3rd floor nurse's station)    Pertinent lab results:

## 2023-07-18 NOTE — PROGRESS NOTES
07/18/23 0945   Activity/Group Checklist   Group Title Wellness tools  (Community Meeting, Emotion-Freedom Tapping, and DBT Wise Mind)   Attendance Did not attend  (Gemma was offered a group handout as an alternative intervention, though she declined)

## 2023-07-22 LAB
CLOZAPINE SERPL-MCNC: 261 MCG/L
NORCLOZAPINE SERPL-MCNC: 105 MCG/L (ref 25–400)

## 2023-09-06 ENCOUNTER — OFFICE VISIT (OUTPATIENT)
Dept: BEHAVIORAL HEALTH | Facility: CLINIC | Age: 23
End: 2023-09-06
Payer: COMMERCIAL

## 2023-09-06 DIAGNOSIS — F39 UNSPECIFIED MOOD (AFFECTIVE) DISORDER (CMS/HCC): Primary | ICD-10-CM

## 2023-09-06 PROCEDURE — 90791 PSYCH DIAGNOSTIC EVALUATION: CPT | Performed by: PSYCHOLOGIST

## 2023-09-07 ASSESSMENT — COGNITIVE AND FUNCTIONAL STATUS - GENERAL
REMOTE MEMORY: WNL
APPEARANCE: WELL GROOMED
INSIGHT: OTHER:
MOOD: DEPRESSED
AFFECT: FLAT
ATTENTION: WNL
THOUGHT_PROCESS: WNL;POVERTY OF CONTENT
LIBIDO: NON-CONTRIBUTORY
PERCEPTUAL FUNCTION: NORMAL
THOUGHT_CONTENT: APPROPRIATE
EYE_CONTACT: FLEETING
CONCENTRATION: WNL
ORIENTATION: FULLY ORIENTED
RECENT MEMORY: WNL
AROUSAL LEVEL: ALERT
APPETITE: NO CHANGE
DELUSIONS: NONE OR AGE APPROPRIATE
PSYCHOMOTOR FUNCTIONING: WNL
IMPULSE CONTROL: INTACT
SPEECH: REGULAR
SLEEP_WAKE_CYCLE: NIGHTMARES

## 2023-09-07 NOTE — PROGRESS NOTES
[facility]- Outpatient Initial Visit    Visit Type Performed: In-office     Gemma Darnell presented today for a behavioral health visit.    Clinician confirmed identification of patient by name and birthdate.      Informed Consent/Confidentiality:  Patient was explained the model of mental healthcare we provide at [facility] ([facility]), including documentation visibility, the model of care, and confidentiality.  Model of care: This is a medium-intensity model of care, where we provide 12-20 visits of evidence-based psychotherapy. Patients always have the right to pursue other options for their behavioral healthcare (ie: longer-term outpatient psychotherapy, Intensive Outpatient Programs, Partial Hospitalization Programs, and Inpatient services).    Documentation: Psychologists and therapists (aka providers) of [facility] have access to see the progress notes. The visit diagnosis and appointment/scheduling information is visible to other providers who are listed as part of the patients' care team, to provide continuity of care across the care team, but they will not see the contents of the note. Patients have access to view their progress notes via TM (patient portal). Portal messages sent by patients are visible to providers and staff across [facility]. For portal communication with your [facility] provider, we recommend using the portal for non-clinical issues (ie: scheduling needs).     Confidentiality: Information shared by the patient with [facility] providers is kept confidential, and only discussed with their clinical supervisors. [facility] will only share information when patients make an informed written request (via Release of Information form) to have their information shared with a specific party. In rare circumstances, providers can be legally mandated by a 's court order to release information (this does not include subpoena's from attorneys).  "Exceptions to confidentiality are made to ensure the safety of the patient or others (ie: to engage emergency services) if patients are in imminent risk of suicide or homicide. If child, elder, or other vulnerable persons abuse or neglect is reported, your healthcare providers must follow mandated reporting requirements.     Patient was given the opportunity to ask clarifying questions, and they expressed understanding and consent: Yes      SUBJECTIVE     History of Behavioral Health Treatment  Previous treatment: Previous therapy: yes  Currently in treatment with Lona Krishnamurthy LCSW  Previous psychiatric treatment and medication trials: Currently taking clozapine, metformin, and buproprion  Previous psychiatric hospitalizations: Several - most recent in July 2023; lengthy hospitalization in Summer 2022  Previous diagnoses: anorexia nervosa, psychosis with catatonia, schizoaffective disorder,  BPD  Previously experienced symptoms of: anxiety, behavioral disorder, depression, hallucinations, irritability, mood swings and suicidal ideation  Psychotropic medication: Yes, see above  Other pertinent behavioral health history: Refer to discharge from [facility] Discharge Summary from 7/18/2023 for extensive behavioral health tx history. Summarized below:    \"....history of reported MDD vs. bipolar disorder vs. schizoaffective disorder, history of multiple suicide attempts including overdose requiring ICU admission, multiple psychiatric admissions, with reported history of psychosis and catatonia, history of ECT, hospitalized at [place] 04-08/2022 where she was treated with ECT and initiated on clozapine 08/2022, with history of borderline personality disorder, eating disorder reported to be in remission, history of residential treatment, multiple substance use disorders- alcohol and cocaine use disorders in remission, history of rehab, nicotine use disorder.      Substance Use History  ETOH/alcohol use: pt stated that " "she drinks \"a couple of times a week, if that\"  she drinks to the point of intoxication, but denied periods of blacking out  Other substance or supplement use: drugs: denied.  Past cocaine.; tobacco: currently vapes daily, has since age 17; caffeine: coffee 1 /day and caffeinated soft drinks 0-1 /day  Previous substance use treatment: Other:  unclear whether prior residential treatment was just for mental health or dual diagnosis      Social History  Gemma lives at home with her mother and 16 year old brother. She has a sister who is a year older and goes to [place]. They have a close relationship. She grew up in [place] and currently lives in [place]. Her parents  in  and father recently remarried - Gemma does not like his new wife. Gets along fairly well with mother but feels mother has \"a lot of expectations\" of her. Her father lives close by but she does not have much of a relationship with him.  Gemma recently started working part-time at a  (15/hrs a week).  She enjoys her work.        Reported Symptoms  Depression symptoms:  PHQ 9:  Little Interest or Pleasure in Doing Things: 0-->not at all    Feeling Down, Depressed or Hopeless: 0-->not at all    Trouble Falling or Staying Asleep, or Sleeping Too Much: 1-->several days    Feeling Tired or Having Little Energy: 0-->not at all    Poor Appetite or Overeatin-->not at all    Feeling Bad about Yourself - or that You are a Failure or Have Let Yourself or Your Family Down: 1-->several days    Trouble Concentrating on Things, Such as Reading the Newspaper or Watching Television: 1-->several days    Moving or Speaking So Slowly that Other People Could Have Noticed? Or the Opposite - Being So Fidgety: 0-->not at all    Thoughts that You Would be Better Off Dead or of Hurting Yourself in Some Way: 0-->not at all    PHQ-9: Brief Depression Severity Measure Score: 3    If You Checked Off Any Problems, How Difficult Have These Problems Made It " For You to Do Your Work, Take Care of Things at Home, or Get Along with Other People?: somewhat difficult      Anxiety symptoms:  JAY-7  Feeling nervous, anxious or on edge: 2-->More than half the days    Not being able to stop or control worryin-->More than half the days    Worrying too much about different things: 1-->Several days    Trouble relaxin-->Several days    Being so restless that it is hard to sit still: 0-->Not at all    Becoming easily annoyed or irritable: 0-->Not at all    Feeling afraid as if something awful might happen: 1-->Several days      GAD7 Total Score: : 7      If you checked off any problems, how difficult have these made it for you to do your work, take care of things at home, or get along with other people?: Somewhat difficult      Additional reported symptoms: none reported    OBJECTIVE     Mental Status Exam  Appearance: Well Groomed  Speech: Regular  Psychomotor Functioning: WNL  Eye Contact: Fleeting  Orientation: Fully oriented  Attention: WNL  Concentration: WNL  Recent Memory: WNL  Remote Memory: WNL  Thought Content: Appropriate  Thought Process: WNL, Poverty of Content  Insight: Other:  Perceptual Function: Normal (history of auditory and visual hallucinations)  Delusions: None or age appropriate  Sleeping: Nightmares  Appetite: No Change  Libido: Non-Contributory  Affect: Flat  Mood: Depressed      ASSESSMENT     Psychotropic medications: no known adherence challenges, unable to assess effectiveness  Pt stated that she would like to eventually stop taking medications, stated that she was not sure they were helpful at all  Current Outpatient Medications   Medication Sig Dispense Refill    buPROPion XL (WELLBUTRIN XL) 300 mg 24 hr tablet Take 300 mg by mouth daily.      cloZAPine (CLOZARIL) 100 mg tablet Take one 100 mg tablet in the morning and one 100 mg tablet at bedtime. Take with two 25 mg tablets at bedtime for a total of 100 mg in the morning and 150 mg at  bedtime. Total of 250 mg every day. 60 tablet 0    cloZAPine (CLOZARIL) 25 mg tablet Take two 25 mg tablets with one 100 mg tablet at bedtime for a total of 150 mg at bedtime. In addition to the 100 mg every morning. Total of 250 mg every day. 60 tablet 0    metFORMIN (GLUCOPHAGE) 500 mg tablet Take 1 tablet (500 mg total) by mouth 2 (two) times a day with meals.      nicotine (NICODERM CQ) 14 mg/24 hr Place 1 patch on the skin daily Indications: stop smoking. 30 patch 0    nicotine polacrilex (NICORETTE) 2 mg gum Apply 1 each (2 mg total) to cheek every 2 (two) hours as needed for smoking cessation. 100 each 0     No current facility-administered medications for this visit.       Suicidal Ideation/Homicidal Ideation Risk Assessment  Risk Factors: Previous suicide attempt(s) and Presence of a Mood Disorder  Protective factors: Effective and accessible mental health care  and Connectedness to individuals, family, community, and social institutions    Suicidal Ideation: Not Present, No intention or plan.  Self Injurious Behavior:  Not Present  Homicidal Ideation: Not Present  Estimate of Current Risk: Low Risk    Plan for Safety-   If pt develops passive SI or HI, they agreed to the following safety plan:    Warning signs: suicidal thoughts; pt did not have much insight into warning signs from prior suicide attempts   Internal coping skills (things I can do on my own): go for a walk, listen to music  External coping skills (things that involve others): work  People I can call: sister, friend in [place]  How I can make my environment safe: have a support person manage medication and have a support person check in frequently  What makes life worth living: unsure    If pt develops active SI or HI, pt agreed to use one of the following crisis resources:  [facility] Crisis Resources   Mobile Crisis  [phone]  Crisis Centers  [facility] [place] [address]  "  [phone]    Warm Lines   Peer Warm Line: [phone]  D&A Assessment   [phone] (24/7)    General resources: call or text 798 (suicide and crisis lifeline), chat online via https://[employer]/, call 911 or go to the nearest ED or crisis center for an emergency psychiatric evaluation.                [place] Suicide Severity Rating Scale:  Done today          C-SSRS   1. Within the past month, have you wished you were dead or wished you could go to sleep and not wake up?: No  2. Within the past month, have you actually had any thoughts of killing yourself?: No           6. Have you ever done anything, started to do anything, or prepared to do anything to end your life?: Yes  If yes, was this within the past 3 months?: Yes    Result of CSSRS Screening: Positive      Safe-T Assessment: NOT DONE TODAY       Screening measures administered during this visit  PHQ-9: Brief Depression Severity Measure Score: 3  GAD7 Total Score: : 7      CLINICAL IMPRESSIONS  Gemma Darnell seems to be experiencing a period of relative stability following a psychiatric hospitalization in July 2023 where she reported that she was \"heavily dissociating\".  She denied being suicidal at that time, but stated that she could become suicidal.  As noted above, Gemma has an extensive history of mental health treatment including inpatient hospitalizations, residential treatment, ECT, and outpatient therapy.  Upon discharge from inpatient psychiatry in July, it was recommended that Gemma participate in a PHP or IOP program. She refused those levels of care.  She has been seeing Lona Krishnamurthy LCSW for outpatient therapy.  Gemma reported that her course of treatment with MsDebra Raghu is ending and she is seeking ongoing outpatient treatment.   Gemma presented with a flat affect and seemed fatigued during this encounter. She yawned multiple times.  Gemma reported difficulty sleeping recently due to nightmares.  Gemma would likely benefit from " ongoing support through weekly outpatient therapy.  Treatment will focus on helping Gemma develop healthy lifestyle habits and routines, on improving insight into her mental health, and developing coping skills to better manage symptoms of depression.        PLAN     Goals:  TBD    Recommendations for treatment: Individual Therapy, weekly    Recommendations for Interventions: Acceptance and Commitment Therapy, Behavior Management and Cognitive Behavior Therapy      Next visit plan  Follow up in one week  Develop treatment goals    I spent 55 minutes on this date of service performing the following activities: obtaining history.

## 2023-09-19 ENCOUNTER — HOSPITAL ENCOUNTER (OUTPATIENT)
Facility: CLINIC | Age: 23
Discharge: HOME | End: 2023-09-19
Attending: FAMILY MEDICINE
Payer: COMMERCIAL

## 2023-09-19 VITALS
DIASTOLIC BLOOD PRESSURE: 84 MMHG | HEART RATE: 108 BPM | RESPIRATION RATE: 16 BRPM | OXYGEN SATURATION: 99 % | SYSTOLIC BLOOD PRESSURE: 128 MMHG | TEMPERATURE: 97.1 F

## 2023-09-19 DIAGNOSIS — H10.33 ACUTE BACTERIAL CONJUNCTIVITIS OF BOTH EYES: ICD-10-CM

## 2023-09-19 DIAGNOSIS — J06.9 UPPER RESPIRATORY TRACT INFECTION, UNSPECIFIED TYPE: Primary | ICD-10-CM

## 2023-09-19 PROCEDURE — 87637 SARSCOV2&INF A&B&RSV AMP PRB: CPT | Performed by: FAMILY MEDICINE

## 2023-09-19 PROCEDURE — 99203 OFFICE O/P NEW LOW 30 MIN: CPT | Performed by: FAMILY MEDICINE

## 2023-09-19 PROCEDURE — S9083 URGENT CARE CENTER GLOBAL: HCPCS | Performed by: FAMILY MEDICINE

## 2023-09-19 RX ORDER — OFLOXACIN 3 MG/ML
1 SOLUTION/ DROPS OPHTHALMIC 4 TIMES DAILY
Qty: 5 ML | Refills: 0 | Status: ON HOLD | OUTPATIENT
Start: 2023-09-19 | End: 2024-01-09 | Stop reason: SDDI

## 2023-09-19 ASSESSMENT — ENCOUNTER SYMPTOMS
SORE THROAT: 0
EYE PAIN: 0
COUGH: 0
FEVER: 0
CHILLS: 0
RHINORRHEA: 1
EYE DISCHARGE: 1
EYE REDNESS: 1
PHOTOPHOBIA: 1

## 2023-09-19 ASSESSMENT — VISUAL ACUITY: OU: 1

## 2023-09-19 NOTE — Clinical Note
Gemma Darnell was seen and treated in our urgent care on 9/19/2023.  She may return to work on 09/21/2023.       If you have any questions or concerns, please don't hesitate to call.      Tosha Jackson, DO

## 2023-09-19 NOTE — DISCHARGE INSTRUCTIONS
Apply the eye drops as prescribed  Do not place a pair of new contact lenses until you complete the antibiotic drops  Follow up with your Eye Doctor in a week or sooner if worse  Your covid test results will be available in your [employer] account when resulted  Flonase or Allegra to help with congestion and runny nose  Tylenol or Advil as needed for pain or fever  Follow up with your Primary Doctor in a week or sooner if worse

## 2023-09-19 NOTE — ED PROVIDER NOTES
History  Chief Complaint   Patient presents with    red eyes     Both eyes red and crusty started today fatigue congested      23yoF presents with concern for pink eye  Both eyes  Sx started this morning  + red and drainage  Photophobia   No visual changes  Wears contact lenses and took these out  + congestion, rhinorrhea  Denies f/c, ear pain, sore throat or cough   Works at a  which is where she thinks she picked this up          Past Medical History:   Diagnosis Date    Anxiety     Depression     Eating disorder     Eating disorder     Eating disorder     H/O borderline personality disorder     History of ITP     Suicidal overdose (CMS/[facility])        Past Surgical History:   Procedure Laterality Date    WOUND EXPLORATION         Family History   Problem Relation Age of Onset    Alcohol abuse Biological Mother     Alcohol abuse Biological Father     Bipolar disorder Biological Sister         Currently age 22; diagnosed age 19, lamotrigine was helpful for her; also had psychiatric hospitalizaiton    Alcohol abuse Biological Sister     Suicidality Biological Sister         Attempted suicide    Anxiety disorder Maternal Grandmother     Depression Maternal Grandmother     Eating disorder Maternal Grandmother     Cancer Maternal Grandfather        Social History     Tobacco Use    Smoking status: Every Day     Packs/day: 1     Types: Cigarettes, Electronic Cigarette    Smokeless tobacco: Never   Vaping Use    Vaping Use: Every day    Substances: Nicotine   Substance Use Topics    Alcohol use: Not Currently     Comment: no drink x 1 mo    Drug use: Not Currently     Types: Amphetamines, Oxycodone, Benzodiazepines     Comment: last use over one yr ago       Review of Systems   Constitutional: Negative for chills and fever.   HENT: Positive for congestion and rhinorrhea. Negative for sore throat.    Eyes: Positive for photophobia, discharge and redness. Negative for pain and visual  disturbance.   Respiratory: Negative for cough.    All other systems reviewed and are negative.      Physical Exam  ED Triage Vitals [09/19/23 1825]   Temp Heart Rate Resp BP SpO2   36.2 °C (97.1 °F) (!) 108 16 128/84 99 %      Temp src Heart Rate Source Patient Position BP Location FiO2 (%) (Set)   -- Monitor Sitting Right upper arm --       Physical Exam  Vitals and nursing note reviewed.   Constitutional:       General: She is not in acute distress.     Appearance: Normal appearance. She is not ill-appearing.   HENT:      Head: Normocephalic and atraumatic.      Right Ear: Tympanic membrane, ear canal and external ear normal.      Left Ear: Tympanic membrane, ear canal and external ear normal.      Nose: Congestion present.      Mouth/Throat:      Mouth: Mucous membranes are moist.      Pharynx: Oropharynx is clear. No posterior oropharyngeal erythema.   Eyes:      General: Lids are normal. Vision grossly intact. Gaze aligned appropriately.         Right eye: Discharge present.         Left eye: Discharge present.     Extraocular Movements: Extraocular movements intact.      Conjunctiva/sclera:      Right eye: Right conjunctiva is injected.      Left eye: Left conjunctiva is injected.      Pupils: Pupils are equal, round, and reactive to light.   Cardiovascular:      Rate and Rhythm: Normal rate and regular rhythm.   Pulmonary:      Effort: Pulmonary effort is normal. No respiratory distress.      Breath sounds: Normal breath sounds.   Musculoskeletal:         General: Normal range of motion.      Cervical back: Normal range of motion.   Lymphadenopathy:      Cervical: No cervical adenopathy.   Skin:     General: Skin is warm and dry.   Neurological:      General: No focal deficit present.      Mental Status: She is alert.   Psychiatric:         Mood and Affect: Mood normal.           Procedures  Procedures    UC Course       Medical Decision Making  Ocuflox gtt rx  Pt is not to replace with new contact lenses  until tx is completed and follows up with optho  F/u with optho in a week or sooner if worse  URI- covid/flu/rsv pcr obtained  Encouraged supportive care and f/u with PCP  Strict ER precautions  Work note provided  Pt states understanding and is agreeable to plan    Acute bacterial conjunctivitis of both eyes: acute illness or injury  Upper respiratory tract infection, unspecified type: acute illness or injury                 Tosha Jackson,   09/19/23 1619

## 2023-09-20 LAB
FLUAV RNA SPEC QL NAA+PROBE: NEGATIVE
FLUBV RNA SPEC QL NAA+PROBE: NEGATIVE
RSV RNA SPEC QL NAA+PROBE: NEGATIVE
SARS-COV-2 RNA RESP QL NAA+PROBE: NEGATIVE

## 2023-09-22 ENCOUNTER — HOSPITAL ENCOUNTER (OUTPATIENT)
Facility: CLINIC | Age: 23
Discharge: HOME | End: 2023-09-22
Attending: FAMILY MEDICINE
Payer: COMMERCIAL

## 2023-09-22 VITALS
SYSTOLIC BLOOD PRESSURE: 127 MMHG | WEIGHT: 200 LBS | DIASTOLIC BLOOD PRESSURE: 98 MMHG | OXYGEN SATURATION: 97 % | TEMPERATURE: 98.2 F | BODY MASS INDEX: 33.28 KG/M2 | HEART RATE: 116 BPM

## 2023-09-22 DIAGNOSIS — H10.9 CONJUNCTIVITIS OF BOTH EYES, UNSPECIFIED CONJUNCTIVITIS TYPE: Primary | ICD-10-CM

## 2023-09-22 PROCEDURE — S9083 URGENT CARE CENTER GLOBAL: HCPCS | Performed by: FAMILY MEDICINE

## 2023-09-22 PROCEDURE — 99213 OFFICE O/P EST LOW 20 MIN: CPT | Performed by: FAMILY MEDICINE

## 2023-09-22 RX ORDER — PNV NO.95/FERROUS FUM/FOLIC AC 28MG-0.8MG
100 TABLET ORAL DAILY
Status: ON HOLD | COMMUNITY
End: 2024-01-09 | Stop reason: SDDI

## 2023-09-22 ASSESSMENT — ENCOUNTER SYMPTOMS
BLURRED VISION: 0
COUGH: 1
WHEEZING: 0
FEVER: 0
EYE REDNESS: 1
EYE DISCHARGE: 0
SHORTNESS OF BREATH: 0

## 2023-09-22 ASSESSMENT — VISUAL ACUITY: OU: 1

## 2023-09-22 NOTE — ED PROVIDER NOTES
History  Chief Complaint   Patient presents with    Eye Problem     Pt seen and treated a few days ago  and symptoms improving but needs note for work.     Patient was seen and treated a few days ago for conjunctivitis.  She has been using the ofloxacin drops and the symptoms are improving.  She did not go to work yesterday or today as she still has her cold symptoms.      Eye Problem  Location:  Both eyes  Duration:  5 days  Progression:  Improving  Relieved by: ofloxacin eye drops.  Associated symptoms: redness    Associated symptoms: no blurred vision and no discharge        Past Medical History:   Diagnosis Date    Anxiety     Depression     Eating disorder     Eating disorder     Eating disorder     H/O borderline personality disorder     History of ITP     Suicidal overdose (CMS/[facility])        Past Surgical History:   Procedure Laterality Date    WOUND EXPLORATION         Family History   Problem Relation Age of Onset    Alcohol abuse Biological Mother     Alcohol abuse Biological Father     Bipolar disorder Biological Sister         Currently age 22; diagnosed age 19, lamotrigine was helpful for her; also had psychiatric hospitalizaiton    Alcohol abuse Biological Sister     Suicidality Biological Sister         Attempted suicide    Anxiety disorder Maternal Grandmother     Depression Maternal Grandmother     Eating disorder Maternal Grandmother     Cancer Maternal Grandfather        Social History     Tobacco Use    Smoking status: Every Day     Packs/day: 1     Types: Cigarettes, Electronic Cigarette    Smokeless tobacco: Never   Vaping Use    Vaping Use: Every day    Substances: Nicotine   Substance Use Topics    Alcohol use: Not Currently     Comment: no drink x 1 mo    Drug use: Not Currently     Types: Amphetamines, Oxycodone, Benzodiazepines     Comment: last use over one yr ago       Review of Systems   Constitutional: Negative for fever.   HENT: Positive for congestion.    Eyes:  Positive for redness. Negative for blurred vision, discharge and visual disturbance.   Respiratory: Positive for cough. Negative for shortness of breath and wheezing.        Physical Exam  ED Triage Vitals [09/22/23 1714]   Temp Heart Rate Resp BP SpO2   36.8 °C (98.2 °F) (!) 116 -- (!) 127/98 97 %      Temp src Heart Rate Source Patient Position BP Location FiO2 (%) (Set)   -- -- -- -- --       Physical Exam  Vitals reviewed.   Constitutional:       Appearance: Normal appearance.   HENT:      Right Ear: Tympanic membrane, ear canal and external ear normal.      Left Ear: Tympanic membrane, ear canal and external ear normal.      Mouth/Throat:      Mouth: Mucous membranes are moist.      Pharynx: Oropharynx is clear.   Eyes:      General: Lids are normal. Vision grossly intact.      Conjunctiva/sclera:      Left eye: Left conjunctiva is injected.      Pupils: Pupils are equal, round, and reactive to light.        Comments: Subconjunctival hemorrhage on the left   Cardiovascular:      Rate and Rhythm: Normal rate and regular rhythm.      Heart sounds: Normal heart sounds.   Pulmonary:      Effort: Pulmonary effort is normal. No respiratory distress.      Breath sounds: Normal breath sounds. No wheezing.   Neurological:      Mental Status: She is alert.           Procedures  Procedures    UC Course   Viral URI  Conjunctivitis  Subconjunctival hematoma on the left    MDM  Continue ofloxacin drops  Follow-up with eye doctor if symptoms persist  No contacts until symptoms improve entirely             Merline Hu DO  09/22/23 6301

## 2023-09-22 NOTE — Clinical Note
Gemma Darnell was seen and treated in our urgent care on 9/22/2023.  She may return to work on 09/25/2023.  She was out of work 9/21/2023 and 9/22/2023.     If you have any questions or concerns, please don't hesitate to call.      Merline Hu, DO

## 2023-10-17 ENCOUNTER — OFFICE VISIT (OUTPATIENT)
Dept: BEHAVIORAL HEALTH | Facility: CLINIC | Age: 23
End: 2023-10-17
Payer: COMMERCIAL

## 2023-10-17 DIAGNOSIS — F39 UNSPECIFIED MOOD (AFFECTIVE) DISORDER (CMS/HCC): Primary | ICD-10-CM

## 2023-10-17 PROCEDURE — 90834 PSYTX W PT 45 MINUTES: CPT | Performed by: PSYCHOLOGIST

## 2023-10-17 NOTE — PROGRESS NOTES
[facility] - Psychotherapy Follow-up Visit Note  Visit number: 1     Visit Type Performed: In-office     Gemma Darnell presented today for a behavioral health visit.          SUBJECTIVE     Symptoms  Depression symptoms: sleep difficulties (nightmares)  Anxiety symptoms: moderate anxiety/worry and difficulty controlling worry  Additional reported symptoms: none      OBJECTIVE     Mental Status Evaluation  Patient's mood and affect were consistent with the context, and consistent with their baseline: Yes   Comments:  calm and pleasant, awake and alert; oriented to person, place, and time    Additional Assessments completed this visit         Suicidal Ideation/Homicidal Ideation Risk Assessment: assessed. If not assessed, reason:    Risk Factors: Previous suicide attempt(s) and Presence of a Mood Disorder  Protective factors: Effective and accessible mental health care  and Connectedness to individuals, family, community, and social institutions    Suicidal Ideation: Not Present, No intention or plan.  Self Injurious Behavior:  Not Present  Homicidal Ideation: Not Present  Estimate of Current Risk: Minimal risk    Plan for Safety-   N/A:  Risk is assessed to be minimal; therefore, developing a safety plan is not indicated at this time.          Interventions  Acceptance and Commitment Therapy, Cognitive Behavior Therapy and Monitoring of Symptoms  Discussed updates since last encounter, which was initial session on 9/06.  Gemma reported that she was let go from her job and is currently seeking another job - also in a  facility. She is hoping to secure another job soon as she knows being home alone all day is not good for her mental health.  Gemma reported that overall, she is doing well, but that she continues to have some difficulty with sleep. She noted nightmares and some uncomfortable sensations when she is sleeping.  Her doctor had recommended she get a sleep study, but Gemma has not yet  followed up on that. Gemma noted that she went off of her medication briefly, but after seeing her psychiatrist at the end of Sept, she resumed her medications - though she is taking a lower dose of clozapine.  Gemma reported that she stopped taking her medications because she does not like taking them and resumed taking them because her mother found out that she had stopped.  Gemma talked about conflict and tension in her family.    Psychotropic medications: known adherence challenges, somewhat effective   Current Outpatient Medications   Medication Sig Dispense Refill    buPROPion XL (WELLBUTRIN XL) 300 mg 24 hr tablet Take 300 mg by mouth daily.      cloZAPine (CLOZARIL) 100 mg tablet Take one 100 mg tablet in the morning and one 100 mg tablet at bedtime. Take with two 25 mg tablets at bedtime for a total of 100 mg in the morning and 150 mg at bedtime. Total of 250 mg every day. 60 tablet 0    cloZAPine (CLOZARIL) 25 mg tablet Take two 25 mg tablets with one 100 mg tablet at bedtime for a total of 150 mg at bedtime. In addition to the 100 mg every morning. Total of 250 mg every day. 60 tablet 0    cyanocobalamin (VITAMIN B12) 100 mcg tablet Take 100 mcg by mouth daily.      metFORMIN (GLUCOPHAGE) 500 mg tablet Take 1 tablet (500 mg total) by mouth 2 (two) times a day with meals.      nicotine (NICODERM CQ) 14 mg/24 hr Place 1 patch on the skin daily Indications: stop smoking. 30 patch 0    nicotine polacrilex (NICORETTE) 2 mg gum Apply 1 each (2 mg total) to cheek every 2 (two) hours as needed for smoking cessation. 100 each 0    ofloxacin (OCUFLOX) 0.3 % ophthalmic solution Administer 1 drop into both eyes 4 (four) times a day. (duration 5 days sufficient if symptoms resolved). 5 mL 0     No current facility-administered medications for this visit.       ASSESSMENT       Progress  Patient's progress toward their goals is generally showing no change (still working to develop tx goals)   Patient's  symptomology is unchanged (Gemma reported being stable, denied sig problems with dep, anxiety.  Denied SI. Denied symptoms of dissociation or AVH.)       PLAN     Goals:  Continue to monitor symptoms  Remain medication complaint  Improve coping skills    Recommendations  Individual Therapy  45 minutes weekly    Next visit plan:  Follow up in one week      I spent 45 minutes on this date of service performing the following activities: providing counseling and education.

## 2023-11-02 ENCOUNTER — OFFICE VISIT (OUTPATIENT)
Dept: BEHAVIORAL HEALTH | Facility: CLINIC | Age: 23
End: 2023-11-02
Payer: COMMERCIAL

## 2023-11-02 DIAGNOSIS — F39 UNSPECIFIED MOOD (AFFECTIVE) DISORDER (CMS/HCC): Primary | ICD-10-CM

## 2023-11-02 PROCEDURE — 90834 PSYTX W PT 45 MINUTES: CPT | Performed by: PSYCHOLOGIST

## 2023-11-03 NOTE — PROGRESS NOTES
[facility] - Psychotherapy Follow-up Visit Note  Visit number: 2     Visit Type Performed: In-office     Gemma Darnell presented today for a behavioral health visit.          SUBJECTIVE     Symptoms  Depression symptoms: depressed mood  Anxiety symptoms: moderate anxiety/worry and difficulty controlling worry  Additional reported symptoms: none      OBJECTIVE     Mental Status Evaluation  Patient's mood and affect were consistent with the context, and consistent with their baseline: Yes   Comments:  calm and pleasant, awake and alert; oriented to person, place, and time    Additional Assessments completed this visit         Suicidal Ideation/Homicidal Ideation Risk Assessment: assessed. If not assessed, reason:    Risk Factors: Previous suicide attempt(s) and Presence of a Mood Disorder  Protective factors: Effective and accessible mental health care  and Connectedness to individuals, family, community, and social institutions    Suicidal Ideation: Not Present, No intention or plan.  Self Injurious Behavior:  Not Present  Homicidal Ideation: Not Present  Estimate of Current Risk: Minimal risk    Plan for Safety-   N/A:  Risk is assessed to be minimal; therefore, developing a safety plan is not indicated at this time.          Interventions  Acceptance and Commitment Therapy, Cognitive Behavior Therapy and Monitoring of Symptoms  Discussed updates since last encounter.  Gemma continues her job search. She is hopeful that she will soon be hired at a  in [place].  Gemma recognized the importance of sticking to a routine to help her avoid her symptoms of depression from worsening.  She acknowledged the importance of regular sleep patterns, getting showered and dressed daily, and going outside every day.  Discussed schedule maintenance especially as the days grow shorter.  Gemma noted that she is bored; discussed activities she could engage in that are productive and would alleviate boredom.  "    Psychotropic medications: known adherence challenges, somewhat effective   Gemma noted that her mother continues to monitor her medication compliance.  She noted that this is \"annoying\", but is not resistant to her mother's oversight.    Current Outpatient Medications   Medication Sig Dispense Refill    buPROPion XL (WELLBUTRIN XL) 300 mg 24 hr tablet Take 300 mg by mouth daily.      cloZAPine (CLOZARIL) 100 mg tablet Take one 100 mg tablet in the morning and one 100 mg tablet at bedtime. Take with two 25 mg tablets at bedtime for a total of 100 mg in the morning and 150 mg at bedtime. Total of 250 mg every day. 60 tablet 0    cloZAPine (CLOZARIL) 25 mg tablet Take two 25 mg tablets with one 100 mg tablet at bedtime for a total of 150 mg at bedtime. In addition to the 100 mg every morning. Total of 250 mg every day. 60 tablet 0    cyanocobalamin (VITAMIN B12) 100 mcg tablet Take 100 mcg by mouth daily.      metFORMIN (GLUCOPHAGE) 500 mg tablet Take 1 tablet (500 mg total) by mouth 2 (two) times a day with meals.      nicotine (NICODERM CQ) 14 mg/24 hr Place 1 patch on the skin daily Indications: stop smoking. 30 patch 0    nicotine polacrilex (NICORETTE) 2 mg gum Apply 1 each (2 mg total) to cheek every 2 (two) hours as needed for smoking cessation. 100 each 0    ofloxacin (OCUFLOX) 0.3 % ophthalmic solution Administer 1 drop into both eyes 4 (four) times a day. (duration 5 days sufficient if symptoms resolved). 5 mL 0     No current facility-administered medications for this visit.       ASSESSMENT       Progress  Patient's progress toward their goals is generally improving (Gemma is medication compliant and is working to stick to a routine/schedule)   Patient's symptomology is unchanged (Gemma reported being stable, denied sig problems with dep, anxiety.  Denied SI. Denied symptoms of dissociation or AVH.)       PLAN     Goals:  Continue to monitor symptoms  Remain medication complaint  Improve coping " skills  Maintain consistent schedule of self care    Recommendations  Individual Therapy  45 minutes weekly    Next visit plan:  Follow up in 1-2 weeks      I spent 45 minutes on this date of service performing the following activities: providing counseling and education.

## 2023-11-27 ENCOUNTER — OFFICE VISIT (OUTPATIENT)
Dept: BEHAVIORAL HEALTH | Facility: CLINIC | Age: 23
End: 2023-11-27
Payer: COMMERCIAL

## 2023-11-27 DIAGNOSIS — F39 UNSPECIFIED MOOD (AFFECTIVE) DISORDER (CMS/HCC): Primary | ICD-10-CM

## 2023-11-27 PROCEDURE — 90834 PSYTX W PT 45 MINUTES: CPT | Performed by: PSYCHOLOGIST

## 2023-11-28 NOTE — PROGRESS NOTES
[facility] - Psychotherapy Follow-up Visit Note  Visit number: 3     Visit Type Performed: In-office     Gemma Darnell presented today for a behavioral health visit.          SUBJECTIVE     Symptoms  Depression symptoms: depressed mood and lack of motivation  Anxiety symptoms: mild anxiety/worry  Additional reported symptoms: none      OBJECTIVE     Mental Status Evaluation  Patient's mood and affect were consistent with the context, and consistent with their baseline: Yes   Comments:  calm and pleasant, awake and alert; oriented to person, place, and time    Additional Assessments completed this visit         Suicidal Ideation/Homicidal Ideation Risk Assessment: assessed. If not assessed, reason:    Risk Factors: Previous suicide attempt(s) and Presence of a Mood Disorder  Protective factors: Effective and accessible mental health care  and Connectedness to individuals, family, community, and social institutions    Suicidal Ideation: Not Present, No intention or plan.  Self Injurious Behavior:  Not Present  Homicidal Ideation: Not Present  Estimate of Current Risk: Minimal risk    Plan for Safety-   N/A:  Risk is assessed to be minimal; therefore, developing a safety plan is not indicated at this time.          Interventions  Acceptance and Commitment Therapy, Cognitive Behavior Therapy and Monitoring of Symptoms  Discussed updates since last encounter.  Gemma has not yet found a job.  She plans to go into work with her mother (who is a ) a few days this week just to have something to do. She noted that she is bored but otherwise doing 'okay'.  Her mood is stable. She remains connected to friends and family.  Gemma talked about reviewing her medical chart and feeling somewhat upset/bothered by reading the accounts of her behavior and affect during her prolonged hospitalization. She does not recall significant portions of her time there.  Gemma also talked about working on a transfer  "to a long term therapist. She noted that her sister has had the same therapist for years and would like to develop that kind of relationship with a therapist as well.      Psychotropic medications: known adherence challenges, somewhat effective   Gemma noted that her mother continues to monitor her medication compliance.  She noted that this is \"annoying\", but is not resistant to her mother's oversight.    Current Outpatient Medications   Medication Sig Dispense Refill    buPROPion XL (WELLBUTRIN XL) 300 mg 24 hr tablet Take 1 tablet (300 mg total) by mouth daily. 30 tablet 0    cloZAPine (CLOZARIL) 100 mg tablet Take one 100 mg tablet in the morning and one 100 mg tablet at bedtime. Take with two 25 mg tablets at bedtime for a total of 100 mg in the morning and 150 mg at bedtime. Total of 250 mg every day. 60 tablet 0    cloZAPine (CLOZARIL) 25 mg tablet Take two 25 mg tablets with one 100 mg tablet at bedtime for a total of 150 mg at bedtime. In addition to the 100 mg every morning. Total of 250 mg every day. 60 tablet 0    clozapine (CLOZARIL) 50 mg tablet Take 1 tablet (50 mg total) by mouth 2 (two) times a day. 60 tablet 0    cyanocobalamin (VITAMIN B12) 100 mcg tablet Take 100 mcg by mouth daily.      metFORMIN (GLUCOPHAGE) 500 mg tablet Take 1 tablet (500 mg total) by mouth 2 (two) times a day with meals.      nicotine (NICODERM CQ) 14 mg/24 hr Place 1 patch on the skin daily Indications: stop smoking. 30 patch 0    nicotine polacrilex (NICORETTE) 2 mg gum Apply 1 each (2 mg total) to cheek every 2 (two) hours as needed for smoking cessation. 100 each 0    ofloxacin (OCUFLOX) 0.3 % ophthalmic solution Administer 1 drop into both eyes 4 (four) times a day. (duration 5 days sufficient if symptoms resolved). 5 mL 0     No current facility-administered medications for this visit.       ASSESSMENT       Progress  Patient's progress toward their goals is generally improving (Gemma is medication compliant and " is working to stick to a routine/schedule)   Patient's symptomology is symptoms have progressed to a point and plateaued (Gemma reported being stable, denied sig problems with dep, anxiety.  Denied SI. Denied symptoms of dissociation or AVH.)       PLAN     Goals:  Continue to monitor symptoms  Remain medication complaint  Improve coping skills  Maintain consistent schedule of self care    Recommendations  Individual Therapy  45 minutes weekly    Next visit plan:  Follow up in 2 weeks      I spent 45 minutes on this date of service performing the following activities: providing counseling and education.

## 2023-12-08 ENCOUNTER — TELEPHONE (OUTPATIENT)
Dept: BEHAVIORAL HEALTH | Facility: CLINIC | Age: 23
End: 2023-12-08
Payer: COMMERCIAL

## 2023-12-08 NOTE — TELEPHONE ENCOUNTER
Contacted patient to schedule her next follow up appointment, but she found another therapist so she will not be coming back

## 2024-01-09 ENCOUNTER — DOCUMENTATION (OUTPATIENT)
Dept: BEHAVIORAL HEALTH | Facility: CLINIC | Age: 24
End: 2024-01-09
Payer: COMMERCIAL

## 2024-01-09 NOTE — PROGRESS NOTES
Discharge Summary         Patient Name: Gemma Darnell    : 2000    Treatment start date: 2023    Date of last visit: 2023           Discharge Reason: Program Transfer - Patient began treatment with a long-term therapist         Reason for admission and treatment: Pt has a long history of mental health problems including anxiety, depression, suicide attempts, self harm behavior, eating disorder treatment, substance abuse treatment, inpatient hospitalizations, and dissociation.            Services offered and response to treatment: Pt was being maintained on psychiatric medications and outpatient therapy.  During treatment, pt had a pattern of cancelling every other appointment scheduled, making treatment consistency difficult.  She remained medication compliant and worked in treatment on adhering to a schedule and engaging in healthier lifestyle behaviors.           Client status - Condition upon discharge: Pt's symptoms were stable, she remained med compliant.  Pt found a long-term therapist and informed this practice of her intention to transfer to that therapist.          Suicidal Ideation/Homicidal Ideation Risk Assessment not assessed. If not assessed, reason:      Pt cancelled last scheduled appointment, however, pt was assessed for SI/HI at  visit and found to be at minimal risk.    Plan for Safety-   N/A:  Risk is assessed to be minimal; therefore, developing a safety plan is not indicated at this time.             Screening/assessment measures administered during last  visit     N/A               Clinical concerns to be addressed in continued care: Management of ongoing mental health concerns; medication compliance; coping skills.         Aftercare appointments: Pt reported on  to report that she found a long term therapist which she would be transferring to.         Special Instructions: None                      Mental Health Crisis Support:     [facility] Crisis Number:  [phone]    [facility] Crisis Number: [phone]    [facility] Crisis Number: [phone]    [facility] Crisis Number: [phone]                 Karmen Stacy PSY.D @ 10:34 AM